# Patient Record
Sex: FEMALE | Race: WHITE | NOT HISPANIC OR LATINO | Employment: UNEMPLOYED | URBAN - METROPOLITAN AREA
[De-identification: names, ages, dates, MRNs, and addresses within clinical notes are randomized per-mention and may not be internally consistent; named-entity substitution may affect disease eponyms.]

---

## 2017-10-10 ENCOUNTER — APPOINTMENT (OUTPATIENT)
Dept: RADIOLOGY | Facility: MEDICAL CENTER | Age: 32
End: 2017-10-10
Attending: EMERGENCY MEDICINE
Payer: MEDICARE

## 2017-10-10 ENCOUNTER — HOSPITAL ENCOUNTER (EMERGENCY)
Facility: MEDICAL CENTER | Age: 32
End: 2017-10-12
Attending: EMERGENCY MEDICINE
Payer: MEDICARE

## 2017-10-10 DIAGNOSIS — R07.9 CHEST PAIN, UNSPECIFIED TYPE: ICD-10-CM

## 2017-10-10 DIAGNOSIS — N39.0 URINARY TRACT INFECTION WITH HEMATURIA, SITE UNSPECIFIED: ICD-10-CM

## 2017-10-10 DIAGNOSIS — F29 PSYCHOSIS, UNSPECIFIED PSYCHOSIS TYPE (HCC): ICD-10-CM

## 2017-10-10 DIAGNOSIS — R31.9 URINARY TRACT INFECTION WITH HEMATURIA, SITE UNSPECIFIED: ICD-10-CM

## 2017-10-10 LAB
ALBUMIN SERPL BCP-MCNC: 4.1 G/DL (ref 3.2–4.9)
ALBUMIN/GLOB SERPL: 1.6 G/DL
ALP SERPL-CCNC: 97 U/L (ref 30–99)
ALT SERPL-CCNC: 55 U/L (ref 2–50)
AMPHET UR QL SCN: NEGATIVE
ANION GAP SERPL CALC-SCNC: 11 MMOL/L (ref 0–11.9)
APPEARANCE UR: ABNORMAL
AST SERPL-CCNC: 32 U/L (ref 12–45)
BACTERIA #/AREA URNS HPF: ABNORMAL /HPF
BARBITURATES UR QL SCN: NEGATIVE
BASOPHILS # BLD AUTO: 0.2 % (ref 0–1.8)
BASOPHILS # BLD: 0.03 K/UL (ref 0–0.12)
BENZODIAZ UR QL SCN: NEGATIVE
BILIRUB SERPL-MCNC: 0.7 MG/DL (ref 0.1–1.5)
BILIRUB UR QL STRIP.AUTO: NEGATIVE
BUN SERPL-MCNC: 6 MG/DL (ref 8–22)
BZE UR QL SCN: NEGATIVE
CALCIUM SERPL-MCNC: 9.4 MG/DL (ref 8.5–10.5)
CANNABINOIDS UR QL SCN: NEGATIVE
CHLORIDE SERPL-SCNC: 108 MMOL/L (ref 96–112)
CO2 SERPL-SCNC: 20 MMOL/L (ref 20–33)
COLOR UR: YELLOW
CREAT SERPL-MCNC: 0.71 MG/DL (ref 0.5–1.4)
CULTURE IF INDICATED INDCX: YES UA CULTURE
EOSINOPHIL # BLD AUTO: 0.05 K/UL (ref 0–0.51)
EOSINOPHIL NFR BLD: 0.3 % (ref 0–6.9)
EPI CELLS #/AREA URNS HPF: ABNORMAL /HPF
ERYTHROCYTE [DISTWIDTH] IN BLOOD BY AUTOMATED COUNT: 43.7 FL (ref 35.9–50)
ETHANOL BLD-MCNC: 0 G/DL
GFR SERPL CREATININE-BSD FRML MDRD: >60 ML/MIN/1.73 M 2
GLOBULIN SER CALC-MCNC: 2.6 G/DL (ref 1.9–3.5)
GLUCOSE SERPL-MCNC: 123 MG/DL (ref 65–99)
GLUCOSE UR STRIP.AUTO-MCNC: NEGATIVE MG/DL
HCG SERPL QL: NEGATIVE
HCT VFR BLD AUTO: 42.1 % (ref 37–47)
HGB BLD-MCNC: 14.1 G/DL (ref 12–16)
IMM GRANULOCYTES # BLD AUTO: 0.05 K/UL (ref 0–0.11)
IMM GRANULOCYTES NFR BLD AUTO: 0.3 % (ref 0–0.9)
KETONES UR STRIP.AUTO-MCNC: 100 MG/DL
LEUKOCYTE ESTERASE UR QL STRIP.AUTO: ABNORMAL
LYMPHOCYTES # BLD AUTO: 1.9 K/UL (ref 1–4.8)
LYMPHOCYTES NFR BLD: 12.3 % (ref 22–41)
MCH RBC QN AUTO: 29.2 PG (ref 27–33)
MCHC RBC AUTO-ENTMCNC: 33.5 G/DL (ref 33.6–35)
MCV RBC AUTO: 87.2 FL (ref 81.4–97.8)
METHADONE UR QL SCN: NEGATIVE
MICRO URNS: ABNORMAL
MONOCYTES # BLD AUTO: 0.86 K/UL (ref 0–0.85)
MONOCYTES NFR BLD AUTO: 5.6 % (ref 0–13.4)
NEUTROPHILS # BLD AUTO: 12.57 K/UL (ref 2–7.15)
NEUTROPHILS NFR BLD: 81.3 % (ref 44–72)
NITRITE UR QL STRIP.AUTO: NEGATIVE
NRBC # BLD AUTO: 0 K/UL
NRBC BLD AUTO-RTO: 0 /100 WBC
OPIATES UR QL SCN: NEGATIVE
OXYCODONE UR QL SCN: NEGATIVE
PCP UR QL SCN: NEGATIVE
PH UR STRIP.AUTO: 5 [PH]
PLATELET # BLD AUTO: 370 K/UL (ref 164–446)
PMV BLD AUTO: 9.2 FL (ref 9–12.9)
POTASSIUM SERPL-SCNC: 3.7 MMOL/L (ref 3.6–5.5)
PROPOXYPH UR QL SCN: NEGATIVE
PROT SERPL-MCNC: 6.7 G/DL (ref 6–8.2)
PROT UR QL STRIP: 30 MG/DL
RBC # BLD AUTO: 4.83 M/UL (ref 4.2–5.4)
RBC # URNS HPF: ABNORMAL /HPF
RBC UR QL AUTO: ABNORMAL
SODIUM SERPL-SCNC: 139 MMOL/L (ref 135–145)
SP GR UR STRIP.AUTO: 1.03
TROPONIN I SERPL-MCNC: <0.01 NG/ML (ref 0–0.04)
UROBILINOGEN UR STRIP.AUTO-MCNC: 2 MG/DL
WBC # BLD AUTO: 15.5 K/UL (ref 4.8–10.8)
WBC #/AREA URNS HPF: ABNORMAL /HPF

## 2017-10-10 PROCEDURE — 96376 TX/PRO/DX INJ SAME DRUG ADON: CPT

## 2017-10-10 PROCEDURE — 80053 COMPREHEN METABOLIC PANEL: CPT

## 2017-10-10 PROCEDURE — 700111 HCHG RX REV CODE 636 W/ 250 OVERRIDE (IP): Performed by: EMERGENCY MEDICINE

## 2017-10-10 PROCEDURE — 700105 HCHG RX REV CODE 258: Performed by: EMERGENCY MEDICINE

## 2017-10-10 PROCEDURE — A9270 NON-COVERED ITEM OR SERVICE: HCPCS | Performed by: EMERGENCY MEDICINE

## 2017-10-10 PROCEDURE — 700102 HCHG RX REV CODE 250 W/ 637 OVERRIDE(OP): Performed by: EMERGENCY MEDICINE

## 2017-10-10 PROCEDURE — 81001 URINALYSIS AUTO W/SCOPE: CPT | Mod: 59

## 2017-10-10 PROCEDURE — 96374 THER/PROPH/DIAG INJ IV PUSH: CPT

## 2017-10-10 PROCEDURE — 90791 PSYCH DIAGNOSTIC EVALUATION: CPT

## 2017-10-10 PROCEDURE — 71010 DX-CHEST-PORTABLE (1 VIEW): CPT

## 2017-10-10 PROCEDURE — 84703 CHORIONIC GONADOTROPIN ASSAY: CPT

## 2017-10-10 PROCEDURE — 87086 URINE CULTURE/COLONY COUNT: CPT

## 2017-10-10 PROCEDURE — 85025 COMPLETE CBC W/AUTO DIFF WBC: CPT

## 2017-10-10 PROCEDURE — 84484 ASSAY OF TROPONIN QUANT: CPT

## 2017-10-10 PROCEDURE — 99285 EMERGENCY DEPT VISIT HI MDM: CPT

## 2017-10-10 PROCEDURE — 80307 DRUG TEST PRSMV CHEM ANLYZR: CPT

## 2017-10-10 RX ORDER — ONDANSETRON 2 MG/ML
4 INJECTION INTRAMUSCULAR; INTRAVENOUS ONCE
Status: COMPLETED | OUTPATIENT
Start: 2017-10-10 | End: 2017-10-10

## 2017-10-10 RX ORDER — SODIUM CHLORIDE 9 MG/ML
1000 INJECTION, SOLUTION INTRAVENOUS ONCE
Status: COMPLETED | OUTPATIENT
Start: 2017-10-10 | End: 2017-10-10

## 2017-10-10 RX ORDER — NITROFURANTOIN 25; 75 MG/1; MG/1
100 CAPSULE ORAL 2 TIMES DAILY WITH MEALS
Status: DISCONTINUED | OUTPATIENT
Start: 2017-10-10 | End: 2017-10-11

## 2017-10-10 RX ADMIN — NITROFURANTOIN (MONOHYDRATE/MACROCRYSTALS) 100 MG: 75; 25 CAPSULE ORAL at 17:30

## 2017-10-10 RX ADMIN — SODIUM CHLORIDE 1000 ML: 9 INJECTION, SOLUTION INTRAVENOUS at 15:36

## 2017-10-10 RX ADMIN — ONDANSETRON 4 MG: 2 INJECTION INTRAMUSCULAR; INTRAVENOUS at 20:28

## 2017-10-10 RX ADMIN — SODIUM CHLORIDE 1000 ML: 9 INJECTION, SOLUTION INTRAVENOUS at 20:27

## 2017-10-10 RX ADMIN — ONDANSETRON 4 MG: 2 INJECTION INTRAMUSCULAR; INTRAVENOUS at 15:36
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
## 2017-10-10 NOTE — ED NOTES
Pt requesting daughter be placed onto gurney. Mother has eyes closed and is not attending to daughter while daughter is crawling on gurney almost falling off. For child's safety [place] and held by CARMEN Lovell. Pediatric RN requested to bring dry clothes for child.     Social work at bedside. Princess SORIANO taking child over to pediatric room while mother is being treated in ED. Mother provided consent for this.

## 2017-10-11 LAB
BACTERIA UR CULT: NORMAL
SIGNIFICANT IND 70042: NORMAL
SITE SITE: NORMAL
SOURCE SOURCE: NORMAL

## 2017-10-11 PROCEDURE — 700102 HCHG RX REV CODE 250 W/ 637 OVERRIDE(OP): Performed by: EMERGENCY MEDICINE

## 2017-10-11 PROCEDURE — A9270 NON-COVERED ITEM OR SERVICE: HCPCS | Performed by: EMERGENCY MEDICINE

## 2017-10-11 RX ORDER — CEFDINIR 300 MG/1
300 CAPSULE ORAL EVERY 12 HOURS
Status: DISCONTINUED | OUTPATIENT
Start: 2017-10-11 | End: 2017-10-12 | Stop reason: HOSPADM

## 2017-10-11 RX ORDER — QUETIAPINE FUMARATE 25 MG/1
50 TABLET, FILM COATED ORAL 2 TIMES DAILY
Status: DISCONTINUED | OUTPATIENT
Start: 2017-10-11 | End: 2017-10-12 | Stop reason: HOSPADM

## 2017-10-11 RX ORDER — QUETIAPINE FUMARATE 100 MG/1
100 TABLET, FILM COATED ORAL EVERY EVENING
Status: DISCONTINUED | OUTPATIENT
Start: 2017-10-11 | End: 2017-10-12 | Stop reason: HOSPADM

## 2017-10-11 RX ADMIN — CEFDINIR 300 MG: 300 CAPSULE ORAL at 21:00

## 2017-10-11 RX ADMIN — CEFDINIR 300 MG: 300 CAPSULE ORAL at 11:06

## 2017-10-11 RX ADMIN — NITROFURANTOIN (MONOHYDRATE/MACROCRYSTALS) 100 MG: 75; 25 CAPSULE ORAL at 07:55

## 2017-10-11 NOTE — ED NOTES
Spoke to  Miladis,  CPS has been called. Per Miladis, CPS was also called yesterday when daughter was brought in for ED evaluation.

## 2017-10-11 NOTE — ED NOTES
Patient's home medications have been reviewed by the pharmacy team.     Past Medical History:   Diagnosis Date   • Bipolar affective (CMS-[facility])    • Depression    • Kidney infection    • Suicide attempt        Patient's Medications   New Prescriptions    No medications on file   Previous Medications    No medications on file   Modified Medications    No medications on file   Discontinued Medications    CEFDINIR (OMNICEF) 300 MG CAPS    Take 300 mg by mouth 2 times a day. 10 day course    LEVONORGESTREL-ETHINYL ESTRADIOL (LEVONEST) PER TABLET    Take 1 Tab by mouth every day.    MULTIVITAMIN (THERAGRAN) TABS    Take 1 Tab by mouth every day.    PHENAZOPYRIDINE (PYRIDIUM) 200 MG TAB    Take 1 Tab by mouth 3 times a day as needed.    TOPIRAMATE (TOPAMAX) 100 MG TABS    Take 100 mg by mouth 2 times a day.        Pt presented to the ED for psychiatric evaluation. PMH significant for schizoaffective disorder.  Patient denies taking any prescription or OTC medications; no dietary supplements or herbals.  Psychiatry is consulting on patient case; defer to Psych team for initiation of psychogenic therapies.     Of note, pt's UA on arrival was significant for packed WBCs, and pt with leukocytosis (15.5) and low-grade temperature (100.4). She was originally started on Macrobid for tx of UTI.  However, with systemic sx of infection such as elevated WBC count and temperature, Macrobid is less than sufficient for treatment. Discussed this with Dr. Wang, as well as my concern for possible STD infection given pt's hx. MD verbalized that he would re-assess patient, and either order additional tests or alter abx accordingly. Patient is now being treated for UTI with Omnicef 300 mg BID, as appropriate.     No further recommendations at this time.     Teresa Pierre, PharmD
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
## 2017-10-11 NOTE — DISCHARGE PLANNING
Medical Social Work    Referral: Legal Hold    Intervention: Legal Hold Paperwork given to SW by [facility] [employer] RN: Jeff (provided to AM SW)    Legal Hold Initiated: Date: 10/10/2017  Time: 1715    Legal Hold faxed: Date: 10/11/2017  Time: 0611    Patient’s Insurance Listed on Face Sheet: Medicare and Medicaid FFS    Referrals sent to: [facility], [facility]    Plan: Patient will transfer to mental health facility once acceptance is obtained.

## 2017-10-11 NOTE — ED NOTES
Pt. Resting in [facility] at this time. RR of 15. Sitter in front of pt. Will continue to monitor.

## 2017-10-11 NOTE — ED NOTES
Pt. Resting in gurney at this time. Visible rise and fall of pt's chest noted. Sitter in front of pt. Will continue to monitor.

## 2017-10-11 NOTE — ED NOTES
Pt. Resting in [facility] at this time. Pt. Has turned herself from side to side. Sitter in front of pt. Will continue to monitor.
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
## 2017-10-11 NOTE — ED NOTES
Med rec updated and complete.  Allergies reviewed but not verified.  Pt is not currently taking medications.

## 2017-10-11 NOTE — ED NOTES
ASSIST RN NOTE : Stroller, diaper bag, and car seat given to CPS for transport and care of baby.  Rosana and ED supervisor Divina powers.

## 2017-10-11 NOTE — ED PROVIDER NOTES
ED Provider Note Addendum    Scribed for Chauncey Wang M.D. by Lorna Ludwig on 10/11/2017 at 5:53 AM.     This is an addendum to the note on Dunia Sahni.  For further details and full chart information, see patient's initial note.       5:53 AM - I discussed the patient's case with Dr. Aponte ([facility]) who will transfer care of the patient to me at this time.      7:12 AM Patient is currently in the 17th hour of their visit to the ED. Patient has has normal saline, Zofran and Macrobid administered since in the ED. There were no overnight issues. Patient has history of bipolar depression. Her labs reveal high WBC of 15, indicating UTI. Diagnosed with psychosis and was placed on a legal hold. Vital signs were reviewed and have been normal this morning.     10:15- Patient evaluated by myself.  Patient is resting comfortably at this time with no complaints except for worry regarding nodules in her chest. I gave her a copy of her chest x-ray report which was normal. On chest exam she's concerned about rib profiles that she can feel. There is no evidence of chest wall mass. Patient also has a urinary tract infection without symptoms of bladder infection. She's had pyelonephritis before and denies fever or flank pain now. She's had a remote STD but denies discharge now and is not sexually active currently. He called the lab to confirm the urine culture is pending. I will switch her to Omnicef twice a day which would cover pyelonephritis better than Macrobid.    Disposition:  Patient will be transferred to an appropriate psychiatric facility in stable condition for further psychiatric care and evaluation.  Patient will be placed under the care of my partner awaiting transfer.    FINAL IMPRESSION  1. Chest pain, unspecified type    2. Psychosis, unspecified psychosis type    3. Urinary tract infection with hematuria, site unspecified         Lorna GARCIA (Scribe), [place] scribing for, and in the presence of, Chauncey SUE  ANUEL Wang.    Electronically signed by: Lorna Ludwig (Scribe), 10/11/2017    I, Chauncey Wang M.D. personally performed the services described in this documentation, as scribed by Lorna Ludwig in my presence, and it is both accurate and complete.    The note accurately reflects work and decisions made by me.  Chauncey Wang  10/11/2017  10:36 AM
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
## 2017-10-11 NOTE — ED NOTES
Pt. Resting in [facility] at this time. Sitter in front of pt. Will continue to monitor.
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
## 2017-10-11 NOTE — ED NOTES
Pt. Ambulated to bathroom with steady gait. Pt. Back resting in gurney at this time. Will continue to monitor.

## 2017-10-12 VITALS
DIASTOLIC BLOOD PRESSURE: 55 MMHG | BODY MASS INDEX: 36.02 KG/M2 | OXYGEN SATURATION: 97 % | SYSTOLIC BLOOD PRESSURE: 106 MMHG | WEIGHT: 230 LBS | HEART RATE: 65 BPM | TEMPERATURE: 98.1 F | RESPIRATION RATE: 16 BRPM

## 2017-10-12 PROCEDURE — 700102 HCHG RX REV CODE 250 W/ 637 OVERRIDE(OP): Performed by: EMERGENCY MEDICINE

## 2017-10-12 PROCEDURE — A9270 NON-COVERED ITEM OR SERVICE: HCPCS | Performed by: EMERGENCY MEDICINE

## 2017-10-12 RX ADMIN — CEFDINIR 300 MG: 300 CAPSULE ORAL at 10:04

## 2017-10-12 NOTE — DISCHARGE PLANNING
Medical Social Work    Referral: Legal hold Transfer to Mental Health Facility    Intervention: SW received call from Gene from [facility] stating that they have accepted the patient for admission.     Pt's accepting physcian is Dr. Fito STANLEY arranged for transportation to be set up through [facility]    The pt will be picked up at 1030.     JADEN notified the RN of the departure time as well as accepting facility.     JADEN created transfer packet and placed on chart.     Plan: Pt will transfer back to [facility] at 1030

## 2017-10-12 NOTE — ED NOTES
"[facility] here to transport pt to [place], pt refusing to go, taz barrera and alert RN in to speak w/ pt. Pt finally agreed to go \"against my will\". Pt transferred w/ Maloriesa  "

## 2017-10-12 NOTE — ED NOTES
Report received from Colt RN, assumed care. Pt resting, equal chest rise and fall. Pt is q15min observations

## 2017-10-12 NOTE — DISCHARGE PLANNING
"ALERT team note:  Patient has many complaints and expresses her annoyance easily.  \"I need to be on a special diet because I have lymph nodes in my chest\"....\"I have been very patient and kind, but I'm getting ready to call my \".  Staff asked how they could be helpful and patient said with a shower; staff stated they could assist her when security was on the floor.   ....               "

## 2017-10-12 NOTE — PSYCHIATRY
PSYCHIATRIC FOLLOW UP:    Reason for Admission: psychosis   Legal hold status:   On hold   Psychiatric Supervising Attendin:     Hailey     HPI:       33 y/o woman with a history of schizoaffective disorder. She does very well on medication and I have seen her stable enough to work. She has not liked meds in the past due to weight gain. In fact, she has gained considerable weight but reports she has been off medications for 2 years. Today she is quite psychotic, talking about her multiple royal bloodlines as a reason why she doesn't need to take medications, conspiracy theories about the [facility] and [facility] and [facility]. She has a 10 month old that was just removed from her care; he has a heart problem. She demanded to call a , which we allowed her to do (provided her with number to the 's office).  Unfortunately from yesterday's note it appears she was unable to care for her baby. Again, on medication, which she is refusing, she is very high functioning and has demonstrated the ability to work limited hours at least for a limited time. This is far from her baseline.       Psychiatric Examination: observed phenomenon:  Vitals:   Vitals:    10/11/17 1743 10/11/17 2131 10/12/17 0642 10/12/17 0914   BP: 103/62 119/75 117/67 120/80   Pulse: 69 71 92 85   Resp: 16 16 18 16   Temp: 36.3 °C (97.3 °F) 36.7 °C (98.1 °F) 36.4 °C (97.6 °F) 36.7 °C (98.1 °F)   TempSrc:  Temporal Temporal    SpO2: 96% 97% 96% 97%   Weight:           Musculoskeletal(abnormal movements, gait, etc): mild psychomotor agitation   Appearance: overweight, considerable weight gain since last encounter.   Thoughts:tangential, paranoid, delusional   Speech:pressured but normal rate   Mood:    Irritable   Affect:    comstricted  SI/HI:   Denies   Attention/Alertness:   Poor attention   Memory:      Orientation:      Fund of Knowledge:      Insight/Judgement into symptoms  Neurological Testing:( ie clock, cube drawing, MMSE,  "MOCA,etc.)    Medical systems reviewed: (pain, new complaint, etc)       Reporting \"bumps\" in her R shoulder/ chest area.   Requesting oxygen even though having no SOB and 02 sats are fine.   All other systems reviewed and are negative.       Lab results/tests:   Recent Results (from the past 48 hour(s))   CBC WITH DIFFERENTIAL    Collection Time: 10/10/17  3:35 PM   Result Value Ref Range    WBC 15.5 (H) 4.8 - 10.8 K/uL    RBC 4.83 4.20 - 5.40 M/uL    Hemoglobin 14.1 12.0 - 16.0 g/dL    Hematocrit 42.1 37.0 - 47.0 %    MCV 87.2 81.4 - 97.8 fL    MCH 29.2 27.0 - 33.0 pg    MCHC 33.5 (L) 33.6 - 35.0 g/dL    RDW 43.7 35.9 - 50.0 fL    Platelet Count 370 164 - 446 K/uL    MPV 9.2 9.0 - 12.9 fL    Neutrophils-Polys 81.30 (H) 44.00 - 72.00 %    Lymphocytes 12.30 (L) 22.00 - 41.00 %    Monocytes 5.60 0.00 - 13.40 %    Eosinophils 0.30 0.00 - 6.90 %    Basophils 0.20 0.00 - 1.80 %    Immature Granulocytes 0.30 0.00 - 0.90 %    Nucleated RBC 0.00 /100 WBC    Neutrophils (Absolute) 12.57 (H) 2.00 - 7.15 K/uL    Lymphs (Absolute) 1.90 1.00 - 4.80 K/uL    Monos (Absolute) 0.86 (H) 0.00 - 0.85 K/uL    Eos (Absolute) 0.05 0.00 - 0.51 K/uL    Baso (Absolute) 0.03 0.00 - 0.12 K/uL    Immature Granulocytes (abs) 0.05 0.00 - 0.11 K/uL    NRBC (Absolute) 0.00 K/uL   COMP METABOLIC PANEL    Collection Time: 10/10/17  3:35 PM   Result Value Ref Range    Sodium 139 135 - 145 mmol/L    Potassium 3.7 3.6 - 5.5 mmol/L    Chloride 108 96 - 112 mmol/L    Co2 20 20 - 33 mmol/L    Anion Gap 11.0 0.0 - 11.9    Glucose 123 (H) 65 - 99 mg/dL    Bun 6 (L) 8 - 22 mg/dL    Creatinine 0.71 0.50 - 1.40 mg/dL    Calcium 9.4 8.5 - 10.5 mg/dL    AST(SGOT) 32 12 - 45 U/L    ALT(SGPT) 55 (H) 2 - 50 U/L    Alkaline Phosphatase 97 30 - 99 U/L    Total Bilirubin 0.7 0.1 - 1.5 mg/dL    Albumin 4.1 3.2 - 4.9 g/dL    Total Protein 6.7 6.0 - 8.2 g/dL    Globulin 2.6 1.9 - 3.5 g/dL    A-G Ratio 1.6 g/dL   TROPONIN    Collection Time: 10/10/17  3:35 PM   Result " Value Ref Range    Troponin I <0.01 0.00 - 0.04 ng/mL   HCG QUAL SERUM    Collection Time: 10/10/17  3:35 PM   Result Value Ref Range    Beta-Hcg Qualitative Serum Negative Negative   DIAGNOSTIC ALCOHOL    Collection Time: 10/10/17  3:35 PM   Result Value Ref Range    Diagnostic Alcohol 0.00 0.00 g/dL   ESTIMATED GFR    Collection Time: 10/10/17  3:35 PM   Result Value Ref Range    GFR If African American >60 >60 mL/min/1.73 m 2    GFR If Non African American >60 >60 mL/min/1.73 m 2   TROPONIN    Collection Time: 10/10/17  3:35 PM   Result Value Ref Range    Troponin I <0.01 0.00 - 0.04 ng/mL   URINALYSIS CULTURE, IF INDICATED    Collection Time: 10/10/17  4:40 PM   Result Value Ref Range    Micro Urine Req Microscopic     Color Yellow     Character Cloudy (A)     Specific Gravity 1.030 <1.035    Ph 5.0 5.0 - 8.0    Glucose Negative Negative mg/dL    Ketones 100 (A) Negative mg/dL    Protein 30 (A) Negative mg/dL    Bilirubin Negative Negative    Urobilinogen, Urine 2.0 Negative    Nitrite Negative Negative    Leukocyte Esterase Large (A) Negative    Occult Blood Moderate (A) Negative    Culture Indicated Yes UA Culture   URINE DRUG SCREEN    Collection Time: 10/10/17  4:40 PM   Result Value Ref Range    Amphetamines Urine Negative Negative    Barbiturates Negative Negative    Benzodiazepines Negative Negative    Cocaine Metabolite Negative Negative    Methadone Negative Negative    Opiates Negative Negative    Oxycodone Negative Negative    Phencyclidine -Pcp Negative Negative    Propoxyphene Negative Negative    Cannabinoid Metab Negative Negative   URINE MICROSCOPIC (W/UA)    Collection Time: 10/10/17  4:40 PM   Result Value Ref Range    WBC Packed (A) /hpf    RBC 2-5 (A) /hpf    Bacteria Many (A) None /hpf    Epithelial Cells Few /hpf   URINE CULTURE(NEW)    Collection Time: 10/10/17  4:40 PM   Result Value Ref Range    Significant Indicator NEG     Source UR     Site      Urine Culture Mixed enteric katy  >100,000 cfu/mL    TROPONIN    Collection Time: 10/10/17 10:50 PM   Result Value Ref Range    Troponin I <0.01 0.00 - 0.04 ng/mL          Assessment:(acuity level)          Schizoaffective disorder     Plan:  legal hold:  Extended.     Pt refusing mediation.   Transferring to [facility] today.

## 2017-10-12 NOTE — DISCHARGE PLANNING
MSW received order from Dr. Bailey for extension. MSW faxed extension order, facsheet and legal hold to Kristy in Karon Ambrose's office. Fax confirmation received.

## 2017-10-12 NOTE — ED NOTES
"Pt refusing Seroquel \"due to self preservation, I don't need them\" she states     Also wants me to call her Zahra I told her I would call her Dunia the name in the chart   "

## 2017-10-12 NOTE — ED NOTES
"Pt refused seroquel, states she is not psychotic and does not need any medication for. Pt states her name as \"Princess Duque\"  "

## 2017-10-19 LAB
ALBUMIN SERPL BCP-MCNC: 4.1 G/DL (ref 3.2–4.9)
ALBUMIN/GLOB SERPL: 1.3 G/DL
ALP SERPL-CCNC: 134 U/L (ref 30–99)
ALT SERPL-CCNC: 33 U/L (ref 2–50)
ANION GAP SERPL CALC-SCNC: 13 MMOL/L (ref 0–11.9)
APTT PPP: 28.7 SEC (ref 24.7–36)
AST SERPL-CCNC: 20 U/L (ref 12–45)
BASOPHILS # BLD AUTO: 0.4 % (ref 0–1.8)
BASOPHILS # BLD: 0.05 K/UL (ref 0–0.12)
BILIRUB SERPL-MCNC: 0.4 MG/DL (ref 0.1–1.5)
BUN SERPL-MCNC: 5 MG/DL (ref 8–22)
CALCIUM SERPL-MCNC: 9.6 MG/DL (ref 8.5–10.5)
CHLORIDE SERPL-SCNC: 103 MMOL/L (ref 96–112)
CO2 SERPL-SCNC: 19 MMOL/L (ref 20–33)
CREAT SERPL-MCNC: 0.75 MG/DL (ref 0.5–1.4)
EKG IMPRESSION: NORMAL
EOSINOPHIL # BLD AUTO: 0.11 K/UL (ref 0–0.51)
EOSINOPHIL NFR BLD: 0.8 % (ref 0–6.9)
ERYTHROCYTE [DISTWIDTH] IN BLOOD BY AUTOMATED COUNT: 42.3 FL (ref 35.9–50)
GFR SERPL CREATININE-BSD FRML MDRD: >60 ML/MIN/1.73 M 2
GLOBULIN SER CALC-MCNC: 3.2 G/DL (ref 1.9–3.5)
GLUCOSE SERPL-MCNC: 112 MG/DL (ref 65–99)
HCT VFR BLD AUTO: 44.9 % (ref 37–47)
HGB BLD-MCNC: 14.6 G/DL (ref 12–16)
IMM GRANULOCYTES # BLD AUTO: 0.07 K/UL (ref 0–0.11)
IMM GRANULOCYTES NFR BLD AUTO: 0.5 % (ref 0–0.9)
INR PPP: 1.01 (ref 0.87–1.13)
LIPASE SERPL-CCNC: 7 U/L (ref 11–82)
LYMPHOCYTES # BLD AUTO: 3.01 K/UL (ref 1–4.8)
LYMPHOCYTES NFR BLD: 22.6 % (ref 22–41)
MCH RBC QN AUTO: 28.3 PG (ref 27–33)
MCHC RBC AUTO-ENTMCNC: 32.5 G/DL (ref 33.6–35)
MCV RBC AUTO: 87 FL (ref 81.4–97.8)
MONOCYTES # BLD AUTO: 0.76 K/UL (ref 0–0.85)
MONOCYTES NFR BLD AUTO: 5.7 % (ref 0–13.4)
NEUTROPHILS # BLD AUTO: 9.33 K/UL (ref 2–7.15)
NEUTROPHILS NFR BLD: 70 % (ref 44–72)
NRBC # BLD AUTO: 0 K/UL
NRBC BLD AUTO-RTO: 0 /100 WBC
PLATELET # BLD AUTO: 506 K/UL (ref 164–446)
PMV BLD AUTO: 9.2 FL (ref 9–12.9)
POTASSIUM SERPL-SCNC: 4.1 MMOL/L (ref 3.6–5.5)
PROT SERPL-MCNC: 7.3 G/DL (ref 6–8.2)
PROTHROMBIN TIME: 13.6 SEC (ref 12–14.6)
RBC # BLD AUTO: 5.16 M/UL (ref 4.2–5.4)
SODIUM SERPL-SCNC: 135 MMOL/L (ref 135–145)
WBC # BLD AUTO: 13.3 K/UL (ref 4.8–10.8)

## 2017-10-19 PROCEDURE — 85025 COMPLETE CBC W/AUTO DIFF WBC: CPT

## 2017-10-19 PROCEDURE — 93005 ELECTROCARDIOGRAM TRACING: CPT

## 2017-10-19 PROCEDURE — 85730 THROMBOPLASTIN TIME PARTIAL: CPT

## 2017-10-19 PROCEDURE — 85610 PROTHROMBIN TIME: CPT

## 2017-10-19 PROCEDURE — 99284 EMERGENCY DEPT VISIT MOD MDM: CPT

## 2017-10-19 PROCEDURE — 84484 ASSAY OF TROPONIN QUANT: CPT

## 2017-10-19 PROCEDURE — 83880 ASSAY OF NATRIURETIC PEPTIDE: CPT

## 2017-10-19 PROCEDURE — 93005 ELECTROCARDIOGRAM TRACING: CPT | Performed by: EMERGENCY MEDICINE

## 2017-10-19 PROCEDURE — 83690 ASSAY OF LIPASE: CPT

## 2017-10-19 PROCEDURE — 80053 COMPREHEN METABOLIC PANEL: CPT

## 2017-10-19 PROCEDURE — 36415 COLL VENOUS BLD VENIPUNCTURE: CPT

## 2017-10-19 ASSESSMENT — PAIN SCALES - GENERAL: PAINLEVEL_OUTOF10: 8

## 2017-10-20 ENCOUNTER — HOSPITAL ENCOUNTER (EMERGENCY)
Dept: HOSPITAL 8 - ED | Age: 32
Discharge: HOME | End: 2017-10-20
Payer: MEDICAID

## 2017-10-20 ENCOUNTER — HOSPITAL ENCOUNTER (EMERGENCY)
Facility: MEDICAL CENTER | Age: 32
End: 2017-10-20
Attending: EMERGENCY MEDICINE
Payer: MEDICARE

## 2017-10-20 ENCOUNTER — APPOINTMENT (OUTPATIENT)
Dept: RADIOLOGY | Facility: MEDICAL CENTER | Age: 32
End: 2017-10-20
Attending: EMERGENCY MEDICINE
Payer: MEDICARE

## 2017-10-20 VITALS
HEIGHT: 67 IN | BODY MASS INDEX: 42.7 KG/M2 | HEART RATE: 73 BPM | SYSTOLIC BLOOD PRESSURE: 126 MMHG | DIASTOLIC BLOOD PRESSURE: 72 MMHG | WEIGHT: 272.05 LBS | OXYGEN SATURATION: 94 % | RESPIRATION RATE: 16 BRPM | TEMPERATURE: 97.7 F

## 2017-10-20 VITALS — BODY MASS INDEX: 38.06 KG/M2 | WEIGHT: 242.51 LBS | HEIGHT: 67 IN

## 2017-10-20 VITALS — DIASTOLIC BLOOD PRESSURE: 84 MMHG | SYSTOLIC BLOOD PRESSURE: 134 MMHG

## 2017-10-20 DIAGNOSIS — N30.00: ICD-10-CM

## 2017-10-20 DIAGNOSIS — R07.89 CHEST WALL PAIN: ICD-10-CM

## 2017-10-20 DIAGNOSIS — R07.89: Primary | ICD-10-CM

## 2017-10-20 LAB
BNP SERPL-MCNC: <2 PG/ML (ref 0–100)
TROPONIN I SERPL-MCNC: <0.01 NG/ML (ref 0–0.04)

## 2017-10-20 PROCEDURE — 99285 EMERGENCY DEPT VISIT HI MDM: CPT

## 2017-10-20 PROCEDURE — 87086 URINE CULTURE/COLONY COUNT: CPT

## 2017-10-20 PROCEDURE — 81001 URINALYSIS AUTO W/SCOPE: CPT

## 2017-10-20 PROCEDURE — 71010 DX-CHEST-LIMITED (1 VIEW): CPT

## 2017-10-20 PROCEDURE — 93005 ELECTROCARDIOGRAM TRACING: CPT

## 2017-10-20 ASSESSMENT — ENCOUNTER SYMPTOMS
HEADACHES: 0
VOMITING: 0
CHILLS: 0
NAUSEA: 1
SHORTNESS OF BREATH: 1
FEVER: 0
ABDOMINAL PAIN: 0

## 2017-10-20 NOTE — DISCHARGE INSTRUCTIONS
Chest Wall Pain  Chest wall pain is pain felt in or around the chest bones and muscles. It may take up to 6 weeks to get better. It may take longer if you are active. Chest wall pain can happen on its own. Other times, things like germs, injury, coughing, or exercise can cause the pain.  HOME CARE   · Avoid activities that make you tired or cause pain. Try not to use your chest, belly (abdominal), or side muscles. Do not use heavy weights.  · Put ice on the sore area.  ¨ Put ice in a plastic bag.  ¨ Place a towel between your skin and the bag.  ¨ Leave the ice on for 15-20 minutes for the first 2 days.  · Only take medicine as told by your doctor.  GET HELP RIGHT AWAY IF:   · You have more pain or are very uncomfortable.  · You have a fever.  · Your chest pain gets worse.  · You have new problems.  · You feel sick to your stomach (nauseous) or throw up (vomit).  · You start to sweat or feel lightheaded.  · You have a cough with mucus (phlegm).  · You cough up blood.  MAKE SURE YOU:   · Understand these instructions.  · Will watch your condition.  · Will get help right away if you are not doing well or get worse.     This information is not intended to replace advice given to you by your health care provider. Make sure you discuss any questions you have with your health care provider.     Document Released: 06/05/2009 Document Revised: 03/11/2013 Document Reviewed: 03/14/2016  [employer] Interactive Patient Education ©2016 [employer]

## 2017-10-20 NOTE — ED NOTES
Dunia Sahni  32 y.o.  Chief Complaint   Patient presents with   • Chest Pain     constant x10 days non radiating chest pain.     Patient reports was seen in ED 10 days ago for same symptoms, reportedly diagnosed with PTSD.      EKG complete.      Explained wait time and triage process to pt. Pt placed back out in lobby, told to notify ED tech or triage RN of any changes, verbalized understanding.

## 2017-10-20 NOTE — ED NOTES
Pt wants a ride home rn notified pt that in our records she has Medicaid and can have a free ride home with [facility], rn notified pt that  can not provide another ride home unfortunately.

## 2017-10-20 NOTE — ED NOTES
Pt has palpable bumps under skin states if she eats a low fat diet it helps keep the bumps from getting larger. Also says she needs a specific room temp to prevent chest from hurting

## 2017-10-20 NOTE — ED PROVIDER NOTES
"ED Provider Note    Scribed for Cari Ray D.O. by Winter Niño. 10/20/2017, 4:07 AM.    Primary care provider: Pepito Michelle P.A.-C.  Means of arrival: EMS  History obtained from: Patient   History limited by: None    CHIEF COMPLAINT  Chief Complaint   Patient presents with   • Chest Pain     constant x10 days non radiating chest pain.       LINDEN Sahni is a 32 y.o. female who presents to the Emergency Department for constant chest pain that she describes as \"knots\" in her muscle localized to the right and left side of her chest, onset 10 days ago. Her pain is exacerbated to touch of the \"knots.\" She has no alleviating factors of her pain.  Patient is nauseous and has shortness of breath associated.  She denies any fever or vomiting. She has had similar symptoms before in 2013. Patient recently was diagnosed with PTSD. She denies history of heat disease, hypertension or diabetes. Patient does not smoke, use drugs, or drink alcohol.     REVIEW OF SYSTEMS  Review of Systems   Constitutional: Negative for chills and fever.   Respiratory: Positive for shortness of breath.    Cardiovascular: Positive for chest pain.   Gastrointestinal: Positive for nausea. Negative for abdominal pain and vomiting.   Genitourinary: Negative for dysuria.   Neurological: Negative for headaches.   All other systems reviewed and are negative.  C.    PAST MEDICAL HISTORY   has a past medical history of Abscess (10/12/2017); Bipolar affective (CMS-HCC); Depression; Kidney infection; and Suicide attempt.    SURGICAL HISTORY   has a past surgical history that includes c-sec only,prev c-sec and appendectomy.    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 (Registered Nurse) on 10/20/17 at 0336  Med List Status: <None>   Medication Last Dose Status        Patient Corwin Taking any Medications                       ALLERGIES  Allergies   Allergen Reactions   • Vicodin [Hydrocodone-Acetaminophen] Anaphylaxis     RXN=9 years ago   • Pcn [Penicillins] Nausea     RXN=8 years ago   • Bee Venom        PHYSICAL EXAM  VITAL SIGNS: /88   Pulse 81   Temp 36.5 °C (97.7 °F)   Resp 16   Ht 1.702 m (5' 7\")   Wt 123.4 kg (272 lb 0.8 oz)   SpO2 97%   BMI 42.61 kg/m²   Vitals reviewed.  Constitutional: Patient is oriented to person, place, and time. Appears well-developed and well-nourished. No distress.    Head: Normocephalic and atraumatic.   Ears: Normal external ears bilaterally.   Mouth/Throat: Oropharynx is clear and moist  Eyes: Conjunctivae are normal. Pupils are equal, round, and reactive to light.   Neck: Normal range of motion. Neck supple.  Cardiovascular: Normal rate, regular rhythm and normal heart sounds. Normal peripheral pulses.  Pulmonary/Chest: Effort normal and breath sounds normal. No respiratory distress, no wheezes, rhonchi, or rales.  Localized tender areas of her right and left upper chest wall.   Abdominal: Soft. Bowel sounds are normal. There is no tenderness, rebound or guarding, or peritoneal signs No CVA tenderness.  Musculoskeletal: No edema and no tenderness.   Lymphadenopathy: No cervical adenopathy.   Neurological: No focal deficits.   Skin: Skin is warm and dry. No erythema. No pallor. Healing abrasions to bilateral posterior neck.    Psychiatric: Patient has a normal mood and affect.     LABS  Results for orders placed or performed during the hospital encounter of 10/20/17   Troponin   Result Value Ref Range    Troponin I <0.01 0.00 - 0.04 ng/mL   Btype Natriuretic Peptide   Result Value Ref Range    B Natriuretic Peptide <2 0 - 100 pg/mL   CBC with Differential   Result Value Ref Range    WBC 13.3 (H) 4.8 - 10.8 " K/uL    RBC 5.16 4.20 - 5.40 M/uL    Hemoglobin 14.6 12.0 - 16.0 g/dL    Hematocrit 44.9 37.0 - 47.0 %    MCV 87.0 81.4 - 97.8 fL    MCH 28.3 27.0 - 33.0 pg    MCHC 32.5 (L) 33.6 - 35.0 g/dL    RDW 42.3 35.9 - 50.0 fL    Platelet Count 506 (H) 164 - 446 K/uL    MPV 9.2 9.0 - 12.9 fL    Neutrophils-Polys 70.00 44.00 - 72.00 %    Lymphocytes 22.60 22.00 - 41.00 %    Monocytes 5.70 0.00 - 13.40 %    Eosinophils 0.80 0.00 - 6.90 %    Basophils 0.40 0.00 - 1.80 %    Immature Granulocytes 0.50 0.00 - 0.90 %    Nucleated RBC 0.00 /100 WBC    Neutrophils (Absolute) 9.33 (H) 2.00 - 7.15 K/uL    Lymphs (Absolute) 3.01 1.00 - 4.80 K/uL    Monos (Absolute) 0.76 0.00 - 0.85 K/uL    Eos (Absolute) 0.11 0.00 - 0.51 K/uL    Baso (Absolute) 0.05 0.00 - 0.12 K/uL    Immature Granulocytes (abs) 0.07 0.00 - 0.11 K/uL    NRBC (Absolute) 0.00 K/uL   Complete Metabolic Panel (CMP)   Result Value Ref Range    Sodium 135 135 - 145 mmol/L    Potassium 4.1 3.6 - 5.5 mmol/L    Chloride 103 96 - 112 mmol/L    Co2 19 (L) 20 - 33 mmol/L    Anion Gap 13.0 (H) 0.0 - 11.9    Glucose 112 (H) 65 - 99 mg/dL    Bun 5 (L) 8 - 22 mg/dL    Creatinine 0.75 0.50 - 1.40 mg/dL    Calcium 9.6 8.5 - 10.5 mg/dL    AST(SGOT) 20 12 - 45 U/L    ALT(SGPT) 33 2 - 50 U/L    Alkaline Phosphatase 134 (H) 30 - 99 U/L    Total Bilirubin 0.4 0.1 - 1.5 mg/dL    Albumin 4.1 3.2 - 4.9 g/dL    Total Protein 7.3 6.0 - 8.2 g/dL    Globulin 3.2 1.9 - 3.5 g/dL    A-G Ratio 1.3 g/dL   Prothrombin Time   Result Value Ref Range    PT 13.6 12.0 - 14.6 sec    INR 1.01 0.87 - 1.13   APTT   Result Value Ref Range    APTT 28.7 24.7 - 36.0 sec   Lipase   Result Value Ref Range    Lipase 7 (L) 11 - 82 U/L   ESTIMATED GFR   Result Value Ref Range    GFR If African American >60 >60 mL/min/1.73 m 2    GFR If Non African American >60 >60 mL/min/1.73 m 2   EKG (ER)   Result Value Ref Range    Report       [facility]    Test Date:  2017-10-19  Pt Name:     "EVIE ENGEL           Department: ER  MRN:        [id number]                      Room:  Gender:     JAYSON                            Technician: [id number]  :        1985                   Requested By:ER TRIAGE PROTOCOL  Order #:    [id number]                    Reading MD:    Measurements  Intervals                                Axis  Rate:       92                           P:          -5  OR:         128                          QRS:        20  QRSD:       86                           T:          38  QT:         356  QTc:        441    Interpretive Statements  SINUS RHYTHM  Compared to ECG 2016 03:46:53  No significant changes       All labs reviewed by me.    EKG Interpretation  Interpreted by me    Rhythm: normal sinus   Rate: 92  Axis: normal  Ectopy: none  Conduction: normal  ST Segments: no acute change  T Waves: no acute change  Q Waves: none    Clinical Impression: no acute changes and normal EKG    RADIOLOGY  DX-CHEST-LIMITED (1 VIEW)   Final Result      Normal chest. No interval change.               INTERPRETING LOCATION: [address] [place] [address], [facility]        The radiologist's interpretation of all radiological studies have been reviewed by me.    COURSE & MEDICAL DECISION MAKING  Pertinent Labs & Imaging studies reviewed. (See chart for details)    Obtained and reviewed past medical records from 10/12/2017 for psychiactric hold for psychosis and has been off her meds for two years.     4:07 AM - Patient seen and examined at bedside. Patient presents with anterior upper, chest wall pain. Patient's concerned about subcutaneous \"knots\". She does have sore areas to this places. No overlying skin changes. It's unclear, whether these are perhaps lipomas. I doubt that they are abscesses. She does not have risk factors for coronary artery disease. Chest pain protocol was entered per protocol. Ordered estimated GFR, Troponin, BNP, CBC with differential, CMP, Prothrombin Time, APTT, Lipase, DX-chest, " EKG to evaluate her symptoms. The differential diagnoses include but are not limited to: lymphadenopathy, lipoma. I do not think that this is ACS.    5:34 AM Recheck: Patient re-evaluated at beside. Patient reports feeling improved. Discussed patient's condition and treatment plan. Patient's lab and radiology results discussed. Troponin was negative. Chest x-ray unrevealing. Labs were unrevealing. Again, I do not think that this is a presentation of ACS. Patient would like a ride home secondary to not having shoes. Patient was counseled to return to ED for any new or worsening symptoms. Patient understood and is in agreement for discharge.       The patient will return for new or worsening symptoms and is stable at the time of discharge.    The patient is referred to a primary physician for blood pressure management, diabetic screening, and for all other preventative health concerns.    DISPOSITION:  Patient will be discharged home in stable condition.    FOLLOW UP:  Pepito Michelle P.A.-C.  [address]  [address] [address]  [phone]    In 3 days      [facility], Emergency Dept  [address] [place] [id number] [phone]    If symptoms worsen      FINAL IMPRESSION  1. Chest wall pain          Winter GARCIA (Scribe), am scribing for, and in the presence of, Cari Ray D.O..    Electronically signed by: Winter Niño (Scribe), 10/20/2017    Cari GARCIA D.O. personally performed the services described in this documentation, as scribed by Winter Niño in my presence, and it is both accurate and complete.    The note accurately reflects work and decisions made by me.  Cari Ray  10/20/2017  12:28 PM

## 2017-10-20 NOTE — ED NOTES
rn educated pt on discharge instructions, gave pt a prescription for u/s of chest wall, pt ambulated to lobby steady gait

## 2017-10-20 NOTE — ED NOTES
Pt states she had an abscess on 10/17/17 and a recent UTI given macrobid but states they took her off of it because she developed a rash

## 2017-11-29 ENCOUNTER — HOSPITAL ENCOUNTER (EMERGENCY)
Dept: HOSPITAL 8 - ED | Age: 32
End: 2017-11-29
Payer: MEDICARE

## 2017-11-29 DIAGNOSIS — Z53.21: ICD-10-CM

## 2017-11-29 DIAGNOSIS — R21: Primary | ICD-10-CM

## 2017-11-29 LAB — EKG IMPRESSION: NORMAL

## 2017-11-29 PROCEDURE — 93005 ELECTROCARDIOGRAM TRACING: CPT | Performed by: EMERGENCY MEDICINE

## 2017-11-29 PROCEDURE — 99284 EMERGENCY DEPT VISIT MOD MDM: CPT

## 2017-11-29 PROCEDURE — 93005 ELECTROCARDIOGRAM TRACING: CPT

## 2017-11-30 ENCOUNTER — APPOINTMENT (OUTPATIENT)
Dept: RADIOLOGY | Facility: MEDICAL CENTER | Age: 32
End: 2017-11-30
Attending: EMERGENCY MEDICINE
Payer: MEDICARE

## 2017-11-30 ENCOUNTER — HOSPITAL ENCOUNTER (EMERGENCY)
Facility: MEDICAL CENTER | Age: 32
End: 2017-11-30
Attending: EMERGENCY MEDICINE
Payer: MEDICARE

## 2017-11-30 VITALS
HEART RATE: 67 BPM | HEIGHT: 67 IN | WEIGHT: 253.09 LBS | BODY MASS INDEX: 39.72 KG/M2 | SYSTOLIC BLOOD PRESSURE: 113 MMHG | OXYGEN SATURATION: 97 % | RESPIRATION RATE: 20 BRPM | DIASTOLIC BLOOD PRESSURE: 52 MMHG | TEMPERATURE: 98.1 F

## 2017-11-30 DIAGNOSIS — F29 PSYCHOSIS, UNSPECIFIED PSYCHOSIS TYPE (HCC): ICD-10-CM

## 2017-11-30 LAB
AMPHET UR QL SCN: NEGATIVE
ANION GAP SERPL CALC-SCNC: 8 MMOL/L (ref 0–11.9)
BARBITURATES UR QL SCN: NEGATIVE
BENZODIAZ UR QL SCN: NEGATIVE
BUN SERPL-MCNC: 6 MG/DL (ref 8–22)
BZE UR QL SCN: NEGATIVE
CALCIUM SERPL-MCNC: 9.4 MG/DL (ref 8.5–10.5)
CANNABINOIDS UR QL SCN: NEGATIVE
CHLORIDE SERPL-SCNC: 108 MMOL/L (ref 96–112)
CO2 SERPL-SCNC: 21 MMOL/L (ref 20–33)
CREAT SERPL-MCNC: 0.54 MG/DL (ref 0.5–1.4)
ETHANOL BLD-MCNC: 0 G/DL
GFR SERPL CREATININE-BSD FRML MDRD: >60 ML/MIN/1.73 M 2
GLUCOSE SERPL-MCNC: 100 MG/DL (ref 65–99)
HCG SERPL QL: NEGATIVE
METHADONE UR QL SCN: NEGATIVE
OPIATES UR QL SCN: NEGATIVE
OXYCODONE UR QL SCN: NEGATIVE
PCP UR QL SCN: NEGATIVE
POTASSIUM SERPL-SCNC: 3.4 MMOL/L (ref 3.6–5.5)
PROPOXYPH UR QL SCN: NEGATIVE
SODIUM SERPL-SCNC: 137 MMOL/L (ref 135–145)

## 2017-11-30 PROCEDURE — 80307 DRUG TEST PRSMV CHEM ANLYZR: CPT

## 2017-11-30 PROCEDURE — 80048 BASIC METABOLIC PNL TOTAL CA: CPT

## 2017-11-30 PROCEDURE — 84703 CHORIONIC GONADOTROPIN ASSAY: CPT

## 2017-11-30 PROCEDURE — 71020 DX-CHEST-2 VIEWS: CPT

## 2017-11-30 RX ORDER — QUETIAPINE FUMARATE 25 MG/1
50 TABLET, FILM COATED ORAL 2 TIMES DAILY
Status: DISCONTINUED | OUTPATIENT
Start: 2017-11-30 | End: 2017-11-30 | Stop reason: HOSPADM

## 2017-11-30 RX ORDER — PYRIDOXINE HCL (VITAMIN B6) 50 MG
50 TABLET ORAL DAILY
COMMUNITY
End: 2018-01-12

## 2017-11-30 ASSESSMENT — PAIN SCALES - GENERAL: PAINLEVEL_OUTOF10: 8

## 2017-11-30 NOTE — ED NOTES
Report received from CHRISTIE Camacho.    Patient from [facility] to [facility] via britt accompanied by ED RN.

## 2017-11-30 NOTE — ED NOTES
"Chief Complaint   Patient presents with   • Chest Pain   • Rash     right forearm     Pt ambulatory to triage, states that she has a hx of \"heart problems\" and that she has pain in her chest, states she has lymph nodes in her chest and hot/cold weather makes it worse, pt also complains of right forearm rash. Pt acing bizarre in triage taking about \"having a blood line and that I'm a  [facility]\". ekg completed. Pt ambulatory to [facility], educated to notify er tech for worsening symptoms.    Blood Pressure: 126/79, Pulse: 86, Respiration: 19, Temperature: 36.3 °C (97.3 °F), Height: 170.2 cm (5' 7\"), Weight: 114.8 kg (253 lb 1.4 oz), BMI (Calculated): 39.64, BSA (Calculated): 2.3, Pulse Oximetry: 97 %    "
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
## 2017-11-30 NOTE — ED NOTES
Patient sleeping on gurney in prone position. No agitation noted. Active chest rise and fall noted. Close observation maintained.

## 2017-11-30 NOTE — ED NOTES
Dunia Sahni is a 32 y.o. female presenting with some vague chest symptoms and psychosis.     Patient's home medications have been reviewed by the pharmacy team.     Past Medical History:   Diagnosis Date   • Abscess 10/12/2017    right AC arm area   • Bipolar affective (CMS-HCC)    • Depression    • Kidney infection    • Suicide attempt        Patient's Medications   New Prescriptions    No medications on file   Previous Medications    PRENATAL VIT-FE FUMARATE-FA (PRENATAL PO)    Take 1 Tab by mouth every day.    PYRIDOXINE (VITAMIN B-6) 50 MG TAB    Take 50 mg by mouth every day. Takes for nausea   Modified Medications    No medications on file   Discontinued Medications    No medications on file          A:  Medications do not appear to be contributing to current complaints.         P:    No recommendations at this time.     Amy Ervin, PharmD, BCPS

## 2017-11-30 NOTE — DISCHARGE PLANNING
S) I am pregnant  With royal blood lines.  O) patient pressured, insistently inserting delusional statements rather than answering questions.  A) hypomanic, delusional  P) Psych MD continuing to assess and hold for placement.

## 2017-11-30 NOTE — ED NOTES
"Patient stating \"please put in my chart that I'm going to this place against my will\" Patient requesting to walk out of ER to ambulance.   "

## 2017-11-30 NOTE — ED NOTES
"Patient refusing AM seroquel, \"my name is Princess Duque, I'm not taking that.\" Attempted to educate patient, continues to refuse. \"even if people are insane, they won't take that because it will kill their kids\" VSS.   "

## 2017-11-30 NOTE — ED NOTES
Patient resting on gurney in supine position. No acute distress. No agitation noted at this time. Water provided to patient per her request.

## 2017-11-30 NOTE — ED PROVIDER NOTES
"ED PROVIDER NOTE    Scribed for Robert Holt M.D. by Kimberlee Monaco. 11/30/2017, 9:02 AM.    This is an addendum to the note on Dunia Sahni. For further details and full chart entry, see the previously signed ED Provider Note written by Dr. Leija ([facility]).      10:55 AM  Patient reevaluated at bedside. She is resting comfortably and has no complaints at this time. Her last set of vitals were: /65   Pulse 74   Temp 36.7 °C (98.1 °F)   Resp 20   Ht 1.702 m (5' 7\")   Wt 114.8 kg (253 lb 1.4 oz)   SpO2 98%   BMI 39.64 kg/m²     IKimberlee (Scribe), am scribing for, and in the presence of, Robert Holt M.D..  Electronically signed by: Kimberlee Monaco ([facility]), 11/30/2017  IRobert M.D. personally performed the services described in this documentation, as scribed by Kimberlee Monaco in my presence, and it is both accurate and complete.    The note accurately reflects work and decisions made by me.  Robert Holt  11/30/2017  11:16 AM        "

## 2017-11-30 NOTE — ED NOTES
"Patient ambulated to bathroom with supervision, requesting breakfast for \"me and my baby\". Patient preoccupied about her heart rate, \"I need caffeine to make my heart rate go faster because I'm pregnant.\" VSS. Patient continues to display ideas of grandiosity- requesting for writer to contact the [facility] because it is a \"matter of national security.\" Linens changed in bed and given water. Sitting in bed rambling to self.   "

## 2017-11-30 NOTE — ED NOTES
Assumed care of patient. Patient complains of lymph nodes on her chest that are painful from cold/heat. Patient claims that we can get her blood and verify that she has [place] wen blood line in her blood. Patient claims that she has a rash on right forearm for unknown amount of time. Patient alert and oriented x 4. Patient denies any other complaints at this time. Patient side rails up x 2 and call bell within reach.

## 2017-11-30 NOTE — DISCHARGE PLANNING
Medical Social Work  SW approached by  ERP   regarding placing Pt on a Legal Hold.  Dr. Leija states Pt was here in October with an infant child who was taken by CPS. Dr. Lejia would like to verify baby is not back with mother as she is now in ED on a Legal Hold    SW contacted CPS spoke to Lola Jeong who states records shows baby is still with Biological Father.    JADEN updated ERP Dr. Leija.

## 2017-11-30 NOTE — ED PROVIDER NOTES
ED Provider Note    ER PROVIDER NOTE    Scribed for Sam Leija M.D.  by Sam Leija. 11/30/2017 at 1:27 AM.    Primary Care Provider: Pepito Michelle P.A.-C.  Means of Arrival: pt  History obtained from: pt   History limited by: none     CHIEF COMPLAINT  Chief Complaint   Patient presents with   • Chest Pain   • Rash     right forearm       HPI  Dunia Sahni is a 32 y.o. female who presents to the emergency department complaining ofColdness in her chest. Patient is quite rambling, states she is a wen in [facility] and has a heart condition involving her lymph nodes that react when she is exposed to cold or heat. Regarding her medical complaints, She denies any actual chest pain, no difficulty breathing, no leg pain or swelling. She's had no cough, no fevers and no other symptoms. She also does report that she might be pregnant she does not know. No abdominal pain or vaginal bleeding or urinary symptoms    Patient is continually talking about her royal blood, stating that she will need to check with the [facility] to see she can have any testing done    REVIEW OF SYSTEMS  Pertinent positives include chest coldness. Pertinent negatives include no fever. See HPI for details. All other systems reviewed and are negative.    PAST MEDICAL HISTORY   has a past medical history of Abscess (10/12/2017); Bipolar affective (CMS-HCC); Depression; Kidney infection; and Suicide attempt.    SURGICAL HISTORY   has a past surgical history that includes c-sec only,prev c-sec and appendectomy.    FAMILY HISTORY  History reviewed. No pertinent family history.    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 (Registered Nurse) on 11/30/17 at 0154  Med List Status: Not Addressed   Medication Last Dose Status        Patient Corwin Taking any Medications                       ALLERGIES  Allergies   Allergen Reactions   • Vicodin [Hydrocodone-Acetaminophen] Anaphylaxis     RXN=9 years ago   • Pcn [Penicillins] Nausea     RXN=8 years ago   • Bee Venom        PHYSICAL EXAM  VITAL SIGNS: /58   Pulse 70   Temp 36.3 °C (97.3 °F)   Resp 16   Ht 1.702 m (5' 7\")   Wt 114.8 kg (253 lb 1.4 oz)   SpO2 97%   BMI 39.64 kg/m²   Pulse ox interpretation: I interpret this pulse ox as normal.    Constitutional: Alert , Anxious appearing  HENT: No signs of trauma, Bilateral external ears normal, Nose normal.   Eyes: Pupils are equal and reactive, Conjunctiva normal, Non-icteric.   Neck: Normal range of motion, No tenderness, Supple, No stridor.   Lymphatic: No lymphadenopathy noted.   Cardiovascular: Regular rate and rhythm, no murmurs.   Thorax & Lungs: Normal breath sounds, No respiratory distress, No wheezing, No chest tenderness.   Abdomen: Bowel sounds normal, Soft, No tenderness, No masses, No pulsatile masses. No peritoneal signs.  Skin: Warm, Dry, No erythema, No rash.   Back: No bony tenderness, No CVA tenderness.   Extremities: Intact distal pulses, No edema, No tenderness, No cyanosis, Musculoskeletal: Good range of motion in all major joints. No tenderness to palpation or major deformities noted.   Neurologic: Alert , Normal motor function, Normal sensory function, No focal deficits noted.   Psychiatric: Rapid and pressured speech, somewhat tangential    DIAGNOSTIC STUDIES / PROCEDURES        LABS  Labs Reviewed   BASIC METABOLIC PANEL - Abnormal; Notable for the following:        Result Value    Potassium 3.4 (*)  "    Glucose 100 (*)     Bun 6 (*)     All other components within normal limits   HCG QUAL SERUM   DIAGNOSTIC ALCOHOL   ESTIMATED GFR   URINE DRUG SCREEN       All labs reviewed by me.    RADIOLOGY  DX-CHEST-2 VIEWS   Final Result      No radiographic evidence of acute cardiopulmonary process.        The radiologist's interpretation of all radiological studies have been reviewed by me.    COURSE & MEDICAL DECISION MAKING  Nursing notes, VS, PMSFHx reviewed in chart.    1:27 AM Patient seen and examined at bedside. . Ordered forBlood work, x-ray to evaluate her symptoms. Life skills to evaluate    Legal hold initiated    Decision Making:  This is a 32 y.o. female presenting with some vague chest symptoms. Patient does have a quite rambling presentation, with ideas of grandiosity and does appear decompensated from her baseline either bipolar or schizoaffective. She does not appear to have a good grasp on reality this point and I do not think she is safe on her own. Regarding her chest symptoms I do detect no emergent medical condition, she does have a infant to has a cardiac history and this may actually be related to this.  In reviewing her past records, she was started on Seroquel and transferred to [place] she is not taking any medications currently, I will reinstitute her Seroquel, additionally I did have social work contact  to ensure that her infant child was safe    Care be signed out to Dr. Holt pending transfer to inpatient psychiatric facility        FINAL IMPRESSION  1. Psychosis, unspecified psychosis type         The note accurately reflects work and decisions made by me.  Sam Leija  11/30/2017  4:01 AM

## 2017-11-30 NOTE — CONSULTS
"[facility]   TRIAGE ASSESSMENT    Name: Dunia Sahni  MRN: [id number]  : 1985  Age: 32 y.o.  Date of assessment: 2017  PCP: Pepito Michelle P.A.-C.  Persons in attendance: Patient    CHIEF COMPLAINT/PRESENTING ISSUE (as stated by patient): 32 year old female patient admitted to ER in a very delusional and pressured/paranoid state. Feels she is from a royal blood line and staff must receive permission from the [facility] before treating her. Complained of lymph nodes heating up and soon to catch fire. Soon after my assessment began patient stated \"who are you again?\"  Then stated \"nevermind you need to leave.\"  Patient then rolled over and placed covers over her head.  Stated, \"I refuse to speak with you further.  You may be dismissed.\" This patient has a history of bipolar disorder with bouts of psychosis with inpatient treatment. (this patient presents too paranoid to answer some questions on the assessment.  Some information was gained through reviewing past admissions into the ER, as well as, information gained through providing ERP).  Chief Complaint   Patient presents with   • Chest Pain   • Rash     right forearm        CURRENT LIVING SITUATION/SOCIAL SUPPORT: According to previous admission patient has caregiver; however, patient refused to answer question when this writer asked.    BEHAVIORAL HEALTH TREATMENT HISTORY  Does patient/parent report a history of prior behavioral health treatment for patient?   Per previous admission notes, patient has history inpatient psychiatric treatment at [facility].    SAFETY ASSESSMENT - SELF  Does patient acknowledge current or past symptoms of dangerousness to self? no  Does parent/significant other report patient has current or past symptoms of dangerousness to self? Patient admitted into ER on 7/7/15 for SI  Does presenting problem suggest symptoms of dangerousness to self? No    SAFETY ASSESSMENT - OTHERS  Does patient acknowledge " "current or past symptoms of aggressive behavior or risk to others? no  Does parent/significant other report patient has current or past symptoms of aggressive behavior or risk to others?  no  Does presenting problem suggest symptoms of dangerousness to others? No    Crisis Safety Plan completed and copy given to patient? no    ABUSE/NEGLECT SCREENING  Does patient report feeling “unsafe” in his/her home, or afraid of anyone?  no  Does patient report any history of physical, sexual, or emotional abuse?  no  Does parent or significant other report any of the above? no  Is there evidence of neglect by self?  no  Is there evidence of neglect by a caregiver? no  Does the patient/parent report any history of CPS/APS/police involvement related to suspected abuse/neglect or domestic violence? no  Based on the information provided during the current assessment, is a mandated report of suspected abuse/neglect being made?  No    SUBSTANCE USE SCREENING  Yes:  Chauncey all substances used in the past 30 days:       Last Use Amount   []   Alcohol     []   Marijuana     []   Heroin     []   Prescription Opioids  (used without prescription, for    recreation, or in excess of prescribed amount)     []   Other Prescription  (used without prescription, for    recreation, or in excess of prescribed amount)     []   Cocaine      []   Methamphetamine     []   \"\" drugs (ectasy, MDMA)     []   Other substances        UDS results: Pending  Breathalyzer results: 0.00    What consequences does the patient associate with any of the above substance use and or addictive behaviors? None    Risk factors for detox (check all that apply):  []  Seizures   []  Diaphoretic (sweating)   []  Tremors   []  Hallucinations   []  Increased blood pressure   []  Decreased blood pressure   []  Other   [x]  None      [] Patient education on risk factors for detoxification and instructed to return to ER as needed. N/A      MENTAL STATUS   Participation: " Verbally monopolizing  Grooming: Disheveled  Orientation: Evidence of delusions present  Behavior: Agitated  Eye contact: Poor  Mood: Irritable  Affect: Angry  Thought process: Tangential, Flight of Ideas  Thought content: Evidence of delusion and Paranoia  Speech: Pressured and Hypertalkative  Perception: grandiose delusional  Memory:  No gross evidence of memory deficits  Insight: Poor  Judgment:  Poor  Other:    Collateral information :   Source:  [] Significant other present in person:   [] Significant other by telephone  [] [facility]   [x] [facility] Nursing Staff  [x] [facility]  [x] Other: ERP,   [] Unable to complete full assessment due to:  [] Acute intoxication  [x] Patient declined to participate/engage  [] Patient verbally unresponsive  [] Significant cognitive deficits  [x] Significant perceptual distortions or behavioral disorganization  [] Other:      CLINICAL IMPRESSIONS:  Primary:  Acute psychosis  Secondary: Anxiety/depression (hx of Bipolar disorder)       IDENTIFIED NEEDS/PLAN:  [Trigger DISPOSITION list for any items marked]    []  Imminent safety risk - self [] Imminent safety risk - others   []  Acute substance withdrawal [x]  Psychosis/Impaired reality testing   [x]  Mood/anxiety []  Substance use/Addictive behavior   [x]  Maladaptive behaviro []  Parent/child conflict   []  Family/Couples conflict []  Biomedical   []  Housing [x]  Financial   []   Legal  Occupational/Educational   []  Domestic violence []  Other:     Disposition: Actively being addressed by Legal [facility], [facility], [facility] and [facility]    Does patient express agreement with the above plan? No (patient acutely psychotic)  Referral appointment(s) scheduled? no    Alert team only:   I have discussed findings and recommendations with Dr. Leija who is in agreement with these recommendations.     This 32 year old female presented to ER in a very psychotic state.  Placed on  legal hold and will be transferred to inpatient psychiatric treatment.    Referral information sent to the following community providers : [facility]    If applicable : Referred  to : Lee[facility] for legal hold follow up at 0300      Clive Francisco R.N.  11/30/2017

## 2017-11-30 NOTE — DISCHARGE PLANNING
Medical Social Work    Referral: Legal Hold    Intervention: Legal Hold Paperwork given to SW by Life Skills RN: Clive Francisco    Legal Hold Initiated: Date: 11-  Time: 0200    Legal Hold faxed: Date: 11-  Time: 0700    Patient’s Insurance Listed on Face Sheet: Medicare/Medicaid FFS    Referrals sent to: [facility], [facility], [facility]    Plan: Patient will transfer to mental health facility once acceptance is obtained.

## 2017-11-30 NOTE — DISCHARGE PLANNING
Medical Social Work    Referral: Legal hold Transfer to Mental Health Facility    Intervention: SW received call from Georgi at [facility] stating that they have accepted the patient for admission.     Pt's accepting physcian is Dr. Fito STANLEY arranged for transportation to be set up through [facility]    The pt will be picked up at 1030.     JADEN notified the RN of the departure time as well as accepting facility.     JADEN created transfer packet and placed on chart.     Plan: Pt will transfer back to [facility] at 1030

## 2017-12-25 ENCOUNTER — HOSPITAL ENCOUNTER (EMERGENCY)
Facility: MEDICAL CENTER | Age: 32
End: 2017-12-25
Attending: EMERGENCY MEDICINE
Payer: MEDICARE

## 2017-12-25 VITALS
SYSTOLIC BLOOD PRESSURE: 129 MMHG | WEIGHT: 246.69 LBS | TEMPERATURE: 97.6 F | RESPIRATION RATE: 18 BRPM | HEIGHT: 67 IN | HEART RATE: 73 BPM | DIASTOLIC BLOOD PRESSURE: 79 MMHG | OXYGEN SATURATION: 94 % | BODY MASS INDEX: 38.72 KG/M2

## 2017-12-25 DIAGNOSIS — T78.40XA ALLERGIC REACTION, INITIAL ENCOUNTER: ICD-10-CM

## 2017-12-25 PROCEDURE — 99284 EMERGENCY DEPT VISIT MOD MDM: CPT

## 2017-12-25 PROCEDURE — 700102 HCHG RX REV CODE 250 W/ 637 OVERRIDE(OP): Performed by: EMERGENCY MEDICINE

## 2017-12-25 PROCEDURE — A9270 NON-COVERED ITEM OR SERVICE: HCPCS | Performed by: EMERGENCY MEDICINE

## 2017-12-25 RX ORDER — DEXAMETHASONE 4 MG/1
10 TABLET ORAL ONCE
Status: COMPLETED | OUTPATIENT
Start: 2017-12-25 | End: 2017-12-25

## 2017-12-25 RX ADMIN — DEXAMETHASONE 10 MG: 4 TABLET ORAL at 22:21

## 2017-12-25 ASSESSMENT — PAIN SCALES - GENERAL: PAINLEVEL_OUTOF10: 0

## 2017-12-26 NOTE — ED PROVIDER NOTES
"ED Provider Note    Scribed for Francisco Copeland M.D. by Edi Kirkpatrick. 12/25/2017, 10:04 PM.    Primary care provider: Pepito Michelle P.A.-C.  Means of arrival: Ambulance  History obtained from: Patient  History limited by: None     CHIEF COMPLAINT  Chief Complaint   Patient presents with   • Allergic Reaction     Pt states she was given haldol Dec. 23rd and stated she developed a rash 24 hrs after administration. Pt states tonight at 1900 she took haldol PO. Pt states, \"after about 6 minutes I felt itchy and my throat and lips were swollen.\" Pt states she received 100mg benadryl total.     HPI  Dunia Sahni is a 32 y.o. female who presents to the Emergency Department with allergic reaction. The patient, who is currently residing at [facility], started Haldol medication two days ago on December 23rd. She reports that approximately 24 hours following her Haldol.  She notes the physician at [facility] has discontinued the medication however she still received tonight's dose, at 7:30 PM. She notes the rash itches and occasionally burns. She denies any nausea, vomiting, shortness of breath, tongue swelling. He does note some itching of the throat. The patient received 100 mg of diphenhydramine prior to arrival. She states she otherwise feels well and would like to be discharged promptly.    REVIEW OF SYSTEMS  See HPI    PAST MEDICAL HISTORY   has a past medical history of Abscess (10/12/2017); Bipolar affective (CMS-HCC); Depression; Kidney infection; and Suicide attempt.    SURGICAL HISTORY   has a past surgical history that includes c-sec only,prev c-sec and appendectomy.    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     CURRENT MEDICATIONS  Reviewed.  See Encounter Summary.     ALLERGIES  Allergies   Allergen Reactions " "  • Vicodin [Hydrocodone-Acetaminophen] Anaphylaxis     RXN=9 years ago   • Pcn [Penicillins] Nausea     RXN=8 years ago   • Bee Venom    • Blackberry [Rubus Fruticosus] Rash     \"rash\"   • Haldol [Haloperidol] Rash     Pt states, \"I have a rash and my lips were swollen.\"     PHYSICAL EXAM  VITAL SIGNS: /79   Pulse 72   Temp 36.4 °C (97.5 °F)   Resp 18   Ht 1.702 m (5' 7\")   Wt 111.9 kg (246 lb 11.1 oz)   LMP 12/04/2017 (Within Days)   SpO2 95%   BMI 38.64 kg/m²   Constitutional: Obese, Awake, alert in no apparent distress.  HENT: Normocephalic, Bilateral external ears normal. Nose normal. Oropharyngeal normal, no swelling.   Eyes: Conjunctiva normal, non-icteric, EOMI.  Thorax & Lungs: Easy unlabored respirations, Clear to ascultation bilaterally. No wheezing.   Cardiovascular: Regular rate, Regular rhythm, No murmurs, rubs or gallops.   Abdomen:  Soft, nontender, no masses   Skin: Lacy erythema in the bilateral thighs, small amount of erythema in the lower abdomen. No cassidy hives. There are tiny papules on the right hand.  Extremities:   No cyanosis, clubbing or edema.  Neurologic: Alert, Grossly non-focal.   Psychiatric: Normal affect, Normal mood    DIAGNOSTIC STUDIES / PROCEDURES       RADIOLOGY  No orders to display     The radiologist's interpretation of all radiological studies have been reviewed by me.    COURSE & MEDICAL DECISION MAKING  Pertinent Labs & Imaging studies reviewed. (See chart for details)        10:04 PM - Patient seen and examined at bedside. Patient will be treated with10 mg of Decadron.     Decision Making:  This is a 32 y.o. year old female who presents with a pruritic rash on the bilateral thighs and lower abdomen since starting Haldol. The lesions are not raised at this time and do not appear to be hives, this is after 100 mg of diphenhydramine so I suspect the patient did have some hives prior to arrival that are resolving. She does not have any signs of anaphylaxis at " this time, there is no indication for epinephrine. The lesions on the hand are more consistent with dyshidrotic eczema.  The patient is discharged with a single dose of Decadron which should cover for any biphasic reaction. I strongly encouraged they find a different antipsychotic.    The patient's blood pressure is elevated today. >120/80. I have referred them to primary care for follow up.           FINAL IMPRESSION  1. Allergic reaction, initial encounter          Edi GARCIA (Scribe), am scribing for, and in the presence of, Francisco Copeland M.D..    Electronically signed by: Edi Kirkpatrick (Scribe), 12/25/2017    IFrancisco M.D. personally performed the services described in this documentation, as scribed by Edi Kirkpatrick in my presence, and it is both accurate and complete.    The note accurately reflects work and decisions made by me.  Francisco Copeland  12/25/2017  10:19 PM

## 2017-12-26 NOTE — ED NOTES
Reviewed discharge instructions, pt verbalized understanding of instructions. States she will schedule follow-up appointment. Denies further questions at this time. Pt ambulatory out of ER with stable gait with sit[facility].

## 2017-12-26 NOTE — DISCHARGE INSTRUCTIONS
Anaphylactic Reaction    STOP TAKING HALDOL!    An anaphylactic reaction is a sudden, severe allergic reaction that involves the whole body. It can be life threatening. A hospital stay is often required. People with asthma, eczema, or hay fever are slightly more likely to have an anaphylactic reaction.  CAUSES   An anaphylactic reaction may be caused by anything to which you are allergic. After being exposed to the allergic substance, your immune system becomes sensitized to it. When you are exposed to that allergic substance again, an allergic reaction can occur. Common causes of an anaphylactic reaction include:  · Medicines.  · Foods, especially peanuts, wheat, shellfish, milk, and eggs.  · Insect bites or stings.  · Blood products.  · Chemicals, such as dyes, latex, and contrast material used for imaging tests.  SYMPTOMS   When an allergic reaction occurs, the body releases histamine and other substances. These substances cause symptoms such as tightening of the airway. Symptoms often develop within seconds or minutes of exposure. Symptoms may include:  · Skin rash or hives.  · Itching.  · Chest tightness.  · Swelling of the eyes, tongue, or lips.  · Trouble breathing or swallowing.  · Lightheadedness or fainting.  · Anxiety or confusion.  · Stomach pains, vomiting, or diarrhea.  · Nasal congestion.  · A fast or irregular heartbeat (palpitations).  DIAGNOSIS   Diagnosis is based on your history of recent exposure to allergic substances, your symptoms, and a physical exam. Your caregiver may also perform blood or urine tests to confirm the diagnosis.  TREATMENT   Epinephrine medicine is the main treatment for an anaphylactic reaction. Other medicines that may be used for treatment include antihistamines, steroids, and albuterol. In severe cases, fluids and medicine to support blood pressure may be given through an intravenous line (IV). Even if you improve after treatment, you need to be observed to make sure  your condition does not get worse. This may require a stay in the hospital.  HOME CARE INSTRUCTIONS   · Wear a medical alert bracelet or necklace stating your allergy.  · You and your family must learn how to use an anaphylaxis kit or give an epinephrine injection to temporarily treat an emergency allergic reaction. Always carry your epinephrine injection or anaphylaxis kit with you. This can be lifesaving if you have a severe reaction.  · Do not drive or perform tasks after treatment until the medicines used to treat your reaction have worn off, or until your caregiver says it is okay.  · If you have hives or a rash:  ¨ Take medicines as directed by your caregiver.  ¨ You may use an over-the-counter antihistamine (diphenhydramine) as needed.  ¨ Apply cold compresses to the skin or take baths in cool water. Avoid hot baths or showers.  SEEK MEDICAL CARE IF:   · You develop symptoms of an allergic reaction to a new substance. Symptoms may start right away or minutes later.  · You develop a rash, hives, or itching.  · You develop new symptoms.  SEEK IMMEDIATE MEDICAL CARE IF:   · You have swelling of the mouth, difficulty breathing, or wheezing.  · You have a tight feeling in your chest or throat.  · You develop hives, swelling, or itching all over your body.  · You develop severe vomiting or diarrhea.  · You feel faint or pass out.  This is an emergency. Use your epinephrine injection or anaphylaxis kit as you have been instructed. Call your local emergency services (911 in [place] Even if you improve after the injection, you need to be examined at a hospital emergency department.  MAKE SURE YOU:   · Understand these instructions.  · Will watch your condition.  · Will get help right away if you are not doing well or get worse.     This information is not intended to replace advice given to you by your health care provider. Make sure you discuss any questions you have with your health care provider.     Document  Released: 12/18/2006 Document Revised: 12/23/2014 Document Reviewed: 03/20/2013  Elsevier Interactive Patient Education ©2016 [employer] Inc[employer]

## 2017-12-26 NOTE — DISCHARGE PLANNING
Medical Social Work    Referral: Legal hold Transfer to Mental Health Facility    Intervention: SW received a copy of pt's legal hold and extension.  JADEN spoke with Arlet at [facility] stating that Dr. Payton has accepted the patient for admission.     JADEN arranged for transportation to be set up through [facility] with [facility].    JADEN spoke with Lorene with [facility] who states that pt is not currently eligible for transport.    The pt will be picked up at 2345.     JADEN notified the RN of the departure time as well as accepting facility.     JADEN created transfer packet and placed on chart. Copy of legal hold placed in packet; as [facility] has original.     Plan: Pt will transfer to [facility] at 2345.

## 2017-12-26 NOTE — DISCHARGE PLANNING
Medical Social Work    MSW received a call from bedside RN that pt is ready to return to [facility] on a legal hold.  MSW reviewed pt's chart and spoke with [facility] sitter; pt's legal hold did not come to [facility] with pt.  MSW contacted Charge RN Arlet at [facility] and requested pt's legal hold be faxed to this worker for transport to be arranged.  Arlet was advised that pt's legal hold needs to stay with pt.  Arlet will accept pt back; receiving physician Dr. Payton.      Plan: SW to arrange transport back to [facility] once pt's legal hold is faxed.

## 2017-12-26 NOTE — ED NOTES
Pt medicated per MAR. SW contacted for transport arrangement back to [facility]. Pt VSS. Denies needs at this time. Sitter remains at bedside.

## 2017-12-26 NOTE — ED NOTES
"Dunia Sahni  Pt bib EMS. Pt changed into gown and placed on monitor.     Chief Complaint   Patient presents with   • Allergic Reaction     Pt states she was given haldol Dec. 23rd and stated she developed a rash 24 hrs after administration. Pt states tonight at 1900 she took haldol PO. Pt states, \"after about 6 minutes I felt itchy and my throat and lips were swollen.\" Pt states she received 100mg benadryl total.     Pt transferred from [facility] for medical evaluation of s/s listed above. [facility] tech from [facility] is with pt and at bedside for escort to and from facility.   VSS currently stable. No distress noted.  Chart up and ready for ERP now.     "

## 2018-01-12 ENCOUNTER — HOSPITAL ENCOUNTER (EMERGENCY)
Facility: MEDICAL CENTER | Age: 33
End: 2018-01-12
Attending: EMERGENCY MEDICINE
Payer: MEDICARE

## 2018-01-12 VITALS
SYSTOLIC BLOOD PRESSURE: 151 MMHG | TEMPERATURE: 97.6 F | DIASTOLIC BLOOD PRESSURE: 99 MMHG | HEART RATE: 77 BPM | WEIGHT: 239.42 LBS | RESPIRATION RATE: 14 BRPM | OXYGEN SATURATION: 95 % | HEIGHT: 67 IN | BODY MASS INDEX: 37.58 KG/M2

## 2018-01-12 DIAGNOSIS — B34.9 VIRAL ILLNESS: ICD-10-CM

## 2018-01-12 LAB
APPEARANCE UR: ABNORMAL
COLOR UR AUTO: YELLOW
EKG IMPRESSION: NORMAL
GLUCOSE UR QL STRIP.AUTO: NEGATIVE MG/DL
HCG UR QL: NEGATIVE
KETONES UR QL STRIP.AUTO: NEGATIVE MG/DL
LEUKOCYTE ESTERASE UR QL STRIP.AUTO: NEGATIVE
NITRITE UR QL STRIP.AUTO: NEGATIVE
PH UR STRIP.AUTO: 6 [PH]
PROT UR QL STRIP: NEGATIVE MG/DL
RBC UR QL AUTO: NEGATIVE
SP GR UR: 1.01

## 2018-01-12 PROCEDURE — 99284 EMERGENCY DEPT VISIT MOD MDM: CPT

## 2018-01-12 PROCEDURE — 93005 ELECTROCARDIOGRAM TRACING: CPT | Performed by: EMERGENCY MEDICINE

## 2018-01-12 PROCEDURE — 93005 ELECTROCARDIOGRAM TRACING: CPT

## 2018-01-12 PROCEDURE — 81002 URINALYSIS NONAUTO W/O SCOPE: CPT

## 2018-01-12 PROCEDURE — 81025 URINE PREGNANCY TEST: CPT

## 2018-01-12 RX ORDER — IBUPROFEN 800 MG/1
800 TABLET ORAL EVERY 8 HOURS PRN
Qty: 15 TAB | Refills: 3 | Status: SHIPPED
Start: 2018-01-12 | End: 2018-03-21

## 2018-01-12 ASSESSMENT — PAIN SCALES - GENERAL: PAINLEVEL_OUTOF10: 7
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
## 2018-01-13 NOTE — ED NOTES
Pt ambulated back to room 66 without assistance and without difficulty, pt also carried multiple bags back to the room with her, without difficulty or assistance.  Pt changing into gown, warm blanket given.

## 2018-01-13 NOTE — ED NOTES
"Pt ambulated from lobby to room. Pt presents to ED for c/o weakness and a \"possible kidney infection\". Pt reports \"hot and cold chills and fatigued\". Pt denies any dysuria or hesitancy but states she has had a kidney infection in the past.   "

## 2018-01-24 ENCOUNTER — HOSPITAL ENCOUNTER (EMERGENCY)
Dept: HOSPITAL 8 - ED | Age: 33
Discharge: HOME | End: 2018-01-24
Payer: MEDICARE

## 2018-01-24 VITALS — SYSTOLIC BLOOD PRESSURE: 115 MMHG | DIASTOLIC BLOOD PRESSURE: 66 MMHG

## 2018-01-24 VITALS — HEIGHT: 67 IN | BODY MASS INDEX: 31.14 KG/M2 | WEIGHT: 198.42 LBS

## 2018-01-24 DIAGNOSIS — J00: Primary | ICD-10-CM

## 2018-01-24 PROCEDURE — 87400 INFLUENZA A/B EACH AG IA: CPT

## 2018-01-24 PROCEDURE — 93005 ELECTROCARDIOGRAM TRACING: CPT

## 2018-01-24 PROCEDURE — 99285 EMERGENCY DEPT VISIT HI MDM: CPT

## 2018-01-24 PROCEDURE — 71045 X-RAY EXAM CHEST 1 VIEW: CPT

## 2018-02-22 ENCOUNTER — HOSPITAL ENCOUNTER (OUTPATIENT)
Dept: HOSPITAL 8 - ED | Age: 33
Setting detail: OBSERVATION
LOS: 7 days | Discharge: HOME | End: 2018-03-01
Attending: FAMILY MEDICINE | Admitting: FAMILY MEDICINE
Payer: MEDICARE

## 2018-02-22 VITALS — BODY MASS INDEX: 35.64 KG/M2 | WEIGHT: 227.08 LBS | HEIGHT: 67 IN

## 2018-02-22 DIAGNOSIS — Z87.442: ICD-10-CM

## 2018-02-22 DIAGNOSIS — F32.9: ICD-10-CM

## 2018-02-22 DIAGNOSIS — Z86.718: ICD-10-CM

## 2018-02-22 DIAGNOSIS — Z88.0: ICD-10-CM

## 2018-02-22 DIAGNOSIS — R45.850: Primary | ICD-10-CM

## 2018-02-22 DIAGNOSIS — N39.0: ICD-10-CM

## 2018-02-22 DIAGNOSIS — F23: ICD-10-CM

## 2018-02-22 DIAGNOSIS — R13.10: ICD-10-CM

## 2018-02-22 DIAGNOSIS — Z88.8: ICD-10-CM

## 2018-02-22 DIAGNOSIS — Z88.6: ICD-10-CM

## 2018-02-22 DIAGNOSIS — B85.2: ICD-10-CM

## 2018-02-22 DIAGNOSIS — F41.1: ICD-10-CM

## 2018-02-22 DIAGNOSIS — J02.0: ICD-10-CM

## 2018-02-22 LAB
ALBUMIN SERPL-MCNC: 3.4 G/DL (ref 3.4–5)
ALP SERPL-CCNC: 112 U/L (ref 45–117)
ALT SERPL-CCNC: 17 U/L (ref 12–78)
ANION GAP SERPL CALC-SCNC: 9 MMOL/L (ref 5–15)
APAP SERPL-MCNC: < 2 MCG/ML (ref 10–30)
BASOPHILS # BLD AUTO: 0.04 X10^3/UL (ref 0–0.1)
BASOPHILS NFR BLD AUTO: 0 % (ref 0–1)
BILIRUB SERPL-MCNC: 0.4 MG/DL (ref 0.2–1)
CALCIUM SERPL-MCNC: 8.5 MG/DL (ref 8.5–10.1)
CHLORIDE SERPL-SCNC: 109 MMOL/L (ref 98–107)
CREAT SERPL-MCNC: 0.73 MG/DL (ref 0.55–1.02)
CULTURE INDICATED?: YES
EOSINOPHIL # BLD AUTO: 0.23 X10^3/UL (ref 0–0.4)
EOSINOPHIL NFR BLD AUTO: 2 % (ref 1–7)
ERYTHROCYTE [DISTWIDTH] IN BLOOD BY AUTOMATED COUNT: 14 % (ref 9.6–15.2)
HCG UR SG: 1.01 (ref 1–1.03)
LYMPHOCYTES # BLD AUTO: 1.39 X10^3/UL (ref 1–3.4)
LYMPHOCYTES NFR BLD AUTO: 10 % (ref 22–44)
MCH RBC QN AUTO: 30.2 PG (ref 27–34.8)
MCHC RBC AUTO-ENTMCNC: 33.8 G/DL (ref 32.4–35.8)
MCV RBC AUTO: 89.4 FL (ref 80–100)
MD: NO
MICROSCOPIC: (no result)
MONOCYTES # BLD AUTO: 0.73 X10^3/UL (ref 0.2–0.8)
MONOCYTES NFR BLD AUTO: 5 % (ref 2–9)
NEUTROPHILS # BLD AUTO: 11.22 X10^3/UL (ref 1.8–6.8)
NEUTROPHILS NFR BLD AUTO: 83 % (ref 42–75)
PLATELET # BLD AUTO: 358 X10^3/UL (ref 130–400)
PMV BLD AUTO: 7.6 FL (ref 7.4–10.4)
PROT SERPL-MCNC: 6.8 G/DL (ref 6.4–8.2)
RBC # BLD AUTO: 4.5 X10^6/UL (ref 3.82–5.3)
VANCOMYCIN TROUGH SERPL-MCNC: < 1.7 MG/DL (ref 2.8–20)

## 2018-02-22 PROCEDURE — 80329 ANALGESICS NON-OPIOID 1 OR 2: CPT

## 2018-02-22 PROCEDURE — G0378 HOSPITAL OBSERVATION PER HR: HCPCS

## 2018-02-22 PROCEDURE — 81001 URINALYSIS AUTO W/SCOPE: CPT

## 2018-02-22 PROCEDURE — 36415 COLL VENOUS BLD VENIPUNCTURE: CPT

## 2018-02-22 PROCEDURE — 80307 DRUG TEST PRSMV CHEM ANLYZR: CPT

## 2018-02-22 PROCEDURE — 84703 CHORIONIC GONADOTROPIN ASSAY: CPT

## 2018-02-22 PROCEDURE — 87081 CULTURE SCREEN ONLY: CPT

## 2018-02-22 PROCEDURE — 80053 COMPREHEN METABOLIC PANEL: CPT

## 2018-02-22 PROCEDURE — G0480 DRUG TEST DEF 1-7 CLASSES: HCPCS

## 2018-02-22 PROCEDURE — 87880 STREP A ASSAY W/OPTIC: CPT

## 2018-02-22 PROCEDURE — 87086 URINE CULTURE/COLONY COUNT: CPT

## 2018-02-22 PROCEDURE — 87147 CULTURE TYPE IMMUNOLOGIC: CPT

## 2018-02-22 PROCEDURE — 85025 COMPLETE CBC W/AUTO DIFF WBC: CPT

## 2018-02-22 PROCEDURE — 81025 URINE PREGNANCY TEST: CPT

## 2018-02-22 PROCEDURE — 99285 EMERGENCY DEPT VISIT HI MDM: CPT

## 2018-02-23 VITALS — SYSTOLIC BLOOD PRESSURE: 119 MMHG | DIASTOLIC BLOOD PRESSURE: 84 MMHG

## 2018-02-23 LAB
BASOPHILS # BLD AUTO: 0.08 X10^3/UL (ref 0–0.1)
BASOPHILS NFR BLD AUTO: 1 % (ref 0–1)
EOSINOPHIL # BLD AUTO: 0.22 X10^3/UL (ref 0–0.4)
EOSINOPHIL NFR BLD AUTO: 2 % (ref 1–7)
ERYTHROCYTE [DISTWIDTH] IN BLOOD BY AUTOMATED COUNT: 14 % (ref 9.6–15.2)
LYMPHOCYTES # BLD AUTO: 2.07 X10^3/UL (ref 1–3.4)
LYMPHOCYTES NFR BLD AUTO: 15 % (ref 22–44)
MCH RBC QN AUTO: 30.5 PG (ref 27–34.8)
MCHC RBC AUTO-ENTMCNC: 33.8 G/DL (ref 32.4–35.8)
MCV RBC AUTO: 90.2 FL (ref 80–100)
MD: NO
MONOCYTES # BLD AUTO: 0.73 X10^3/UL (ref 0.2–0.8)
MONOCYTES NFR BLD AUTO: 5 % (ref 2–9)
NEUTROPHILS # BLD AUTO: 11.22 X10^3/UL (ref 1.8–6.8)
NEUTROPHILS NFR BLD AUTO: 78 % (ref 42–75)
PLATELET # BLD AUTO: 359 X10^3/UL (ref 130–400)
PMV BLD AUTO: 7.6 FL (ref 7.4–10.4)
RBC # BLD AUTO: 4.64 X10^6/UL (ref 3.82–5.3)

## 2018-02-23 RX ADMIN — CIPROFLOXACIN HYDROCHLORIDE SCH MG: 500 TABLET, FILM COATED ORAL at 00:57

## 2018-02-23 RX ADMIN — CIPROFLOXACIN HYDROCHLORIDE SCH MG: 500 TABLET, FILM COATED ORAL at 21:10

## 2018-02-23 RX ADMIN — CIPROFLOXACIN HYDROCHLORIDE SCH MG: 500 TABLET, FILM COATED ORAL at 08:33

## 2018-02-24 VITALS — SYSTOLIC BLOOD PRESSURE: 89 MMHG | DIASTOLIC BLOOD PRESSURE: 62 MMHG

## 2018-02-24 VITALS — SYSTOLIC BLOOD PRESSURE: 123 MMHG | DIASTOLIC BLOOD PRESSURE: 74 MMHG

## 2018-02-24 RX ADMIN — CEFDINIR SCH MG: 300 CAPSULE ORAL at 20:40

## 2018-02-24 RX ADMIN — CIPROFLOXACIN HYDROCHLORIDE SCH MG: 500 TABLET, FILM COATED ORAL at 10:24

## 2018-02-24 RX ADMIN — PERMETHRIN SCH APPLIC: 50 CREAM TOPICAL at 19:36

## 2018-02-25 VITALS — SYSTOLIC BLOOD PRESSURE: 132 MMHG | DIASTOLIC BLOOD PRESSURE: 84 MMHG

## 2018-02-25 VITALS — SYSTOLIC BLOOD PRESSURE: 123 MMHG | DIASTOLIC BLOOD PRESSURE: 78 MMHG

## 2018-02-25 RX ADMIN — CEFDINIR SCH MG: 300 CAPSULE ORAL at 09:20

## 2018-02-25 RX ADMIN — CEFDINIR SCH MG: 300 CAPSULE ORAL at 21:00

## 2018-02-26 VITALS — DIASTOLIC BLOOD PRESSURE: 79 MMHG | SYSTOLIC BLOOD PRESSURE: 116 MMHG

## 2018-02-26 VITALS — DIASTOLIC BLOOD PRESSURE: 89 MMHG | SYSTOLIC BLOOD PRESSURE: 127 MMHG

## 2018-02-26 RX ADMIN — CEFDINIR SCH MG: 300 CAPSULE ORAL at 08:57

## 2018-02-26 RX ADMIN — CEFDINIR SCH MG: 300 CAPSULE ORAL at 20:31

## 2018-02-27 VITALS — SYSTOLIC BLOOD PRESSURE: 112 MMHG | DIASTOLIC BLOOD PRESSURE: 82 MMHG

## 2018-02-27 VITALS — DIASTOLIC BLOOD PRESSURE: 73 MMHG | SYSTOLIC BLOOD PRESSURE: 112 MMHG

## 2018-02-27 RX ADMIN — CEFDINIR SCH MG: 300 CAPSULE ORAL at 08:18

## 2018-02-27 RX ADMIN — CEFDINIR SCH MG: 300 CAPSULE ORAL at 20:09

## 2018-02-28 VITALS — DIASTOLIC BLOOD PRESSURE: 76 MMHG | SYSTOLIC BLOOD PRESSURE: 111 MMHG

## 2018-02-28 VITALS — SYSTOLIC BLOOD PRESSURE: 115 MMHG | DIASTOLIC BLOOD PRESSURE: 77 MMHG

## 2018-02-28 LAB
BASOPHILS # BLD AUTO: 0.06 X10^3/UL (ref 0–0.1)
BASOPHILS NFR BLD AUTO: 1 % (ref 0–1)
EOSINOPHIL # BLD AUTO: 0.17 X10^3/UL (ref 0–0.4)
EOSINOPHIL NFR BLD AUTO: 1 % (ref 1–7)
ERYTHROCYTE [DISTWIDTH] IN BLOOD BY AUTOMATED COUNT: 14 % (ref 9.6–15.2)
LYMPHOCYTES # BLD AUTO: 2.58 X10^3/UL (ref 1–3.4)
LYMPHOCYTES NFR BLD AUTO: 20 % (ref 22–44)
MCH RBC QN AUTO: 30.1 PG (ref 27–34.8)
MCHC RBC AUTO-ENTMCNC: 33.9 G/DL (ref 32.4–35.8)
MCV RBC AUTO: 88.9 FL (ref 80–100)
MD: NO
MONOCYTES # BLD AUTO: 0.63 X10^3/UL (ref 0.2–0.8)
MONOCYTES NFR BLD AUTO: 5 % (ref 2–9)
NEUTROPHILS # BLD AUTO: 9.7 X10^3/UL (ref 1.8–6.8)
NEUTROPHILS NFR BLD AUTO: 74 % (ref 42–75)
PLATELET # BLD AUTO: 384 X10^3/UL (ref 130–400)
PMV BLD AUTO: 7.3 FL (ref 7.4–10.4)
RBC # BLD AUTO: 4.54 X10^6/UL (ref 3.82–5.3)

## 2018-02-28 RX ADMIN — CEFDINIR SCH MG: 300 CAPSULE ORAL at 08:16

## 2018-02-28 RX ADMIN — CEFDINIR SCH MG: 300 CAPSULE ORAL at 21:00

## 2018-03-01 VITALS — DIASTOLIC BLOOD PRESSURE: 78 MMHG | SYSTOLIC BLOOD PRESSURE: 118 MMHG

## 2018-03-01 RX ADMIN — PERMETHRIN SCH APPLIC: 50 CREAM TOPICAL at 09:22

## 2018-03-01 RX ADMIN — CEFDINIR SCH MG: 300 CAPSULE ORAL at 09:21

## 2018-03-16 ENCOUNTER — HOSPITAL ENCOUNTER (EMERGENCY)
Dept: HOSPITAL 8 - ED | Age: 33
End: 2018-03-16
Payer: MEDICAID

## 2018-03-16 VITALS — DIASTOLIC BLOOD PRESSURE: 72 MMHG | SYSTOLIC BLOOD PRESSURE: 124 MMHG

## 2018-03-16 VITALS — BODY MASS INDEX: 32.24 KG/M2 | WEIGHT: 200.62 LBS | HEIGHT: 66 IN

## 2018-03-16 DIAGNOSIS — F32.9: ICD-10-CM

## 2018-03-16 DIAGNOSIS — Z72.89: ICD-10-CM

## 2018-03-16 DIAGNOSIS — R05: Primary | ICD-10-CM

## 2018-03-16 DIAGNOSIS — Z88.8: ICD-10-CM

## 2018-03-16 DIAGNOSIS — Z86.718: ICD-10-CM

## 2018-03-16 DIAGNOSIS — F41.9: ICD-10-CM

## 2018-03-16 DIAGNOSIS — F43.12: ICD-10-CM

## 2018-03-16 DIAGNOSIS — Z88.0: ICD-10-CM

## 2018-03-16 LAB
ALBUMIN SERPL-MCNC: 3.4 G/DL (ref 3.4–5)
ANION GAP SERPL CALC-SCNC: 7 MMOL/L (ref 5–15)
BASOPHILS # BLD AUTO: 0.11 X10^3/UL (ref 0–0.1)
BASOPHILS NFR BLD AUTO: 1 % (ref 0–1)
CALCIUM SERPL-MCNC: 8.5 MG/DL (ref 8.5–10.1)
CHLORIDE SERPL-SCNC: 109 MMOL/L (ref 98–107)
CREAT SERPL-MCNC: 0.62 MG/DL (ref 0.55–1.02)
EOSINOPHIL # BLD AUTO: 0.13 X10^3/UL (ref 0–0.4)
EOSINOPHIL NFR BLD AUTO: 1 % (ref 1–7)
ERYTHROCYTE [DISTWIDTH] IN BLOOD BY AUTOMATED COUNT: 13.5 % (ref 9.6–15.2)
LYMPHOCYTES # BLD AUTO: 2.67 X10^3/UL (ref 1–3.4)
LYMPHOCYTES NFR BLD AUTO: 21 % (ref 22–44)
MCH RBC QN AUTO: 30.7 PG (ref 27–34.8)
MCHC RBC AUTO-ENTMCNC: 33.7 G/DL (ref 32.4–35.8)
MCV RBC AUTO: 91.1 FL (ref 80–100)
MD: NO
MONOCYTES # BLD AUTO: 0.76 X10^3/UL (ref 0.2–0.8)
MONOCYTES NFR BLD AUTO: 6 % (ref 2–9)
NEUTROPHILS # BLD AUTO: 8.96 X10^3/UL (ref 1.8–6.8)
NEUTROPHILS NFR BLD AUTO: 71 % (ref 42–75)
PLATELET # BLD AUTO: 406 X10^3/UL (ref 130–400)
PMV BLD AUTO: 7.5 FL (ref 7.4–10.4)
RBC # BLD AUTO: 3.99 X10^6/UL (ref 3.82–5.3)

## 2018-03-16 PROCEDURE — 82040 ASSAY OF SERUM ALBUMIN: CPT

## 2018-03-16 PROCEDURE — 71046 X-RAY EXAM CHEST 2 VIEWS: CPT

## 2018-03-16 PROCEDURE — 99285 EMERGENCY DEPT VISIT HI MDM: CPT

## 2018-03-16 PROCEDURE — 36415 COLL VENOUS BLD VENIPUNCTURE: CPT

## 2018-03-16 PROCEDURE — 51702 INSERT TEMP BLADDER CATH: CPT

## 2018-03-16 PROCEDURE — 80048 BASIC METABOLIC PNL TOTAL CA: CPT

## 2018-03-16 PROCEDURE — 85025 COMPLETE CBC W/AUTO DIFF WBC: CPT

## 2018-03-20 ENCOUNTER — HOSPITAL ENCOUNTER (EMERGENCY)
Dept: HOSPITAL 8 - ED | Age: 33
Discharge: HOME | End: 2018-03-20
Payer: MEDICARE

## 2018-03-20 VITALS — DIASTOLIC BLOOD PRESSURE: 85 MMHG | SYSTOLIC BLOOD PRESSURE: 144 MMHG
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
## 2018-03-20 DIAGNOSIS — Z88.0: ICD-10-CM

## 2018-03-20 DIAGNOSIS — F32.9: ICD-10-CM

## 2018-03-20 DIAGNOSIS — F43.10: ICD-10-CM
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
## 2018-03-20 DIAGNOSIS — Z88.8: ICD-10-CM

## 2018-03-20 DIAGNOSIS — Z90.49: ICD-10-CM

## 2018-03-20 DIAGNOSIS — Z59.0: ICD-10-CM

## 2018-03-20 DIAGNOSIS — S39.012A: Primary | ICD-10-CM

## 2018-03-20 PROCEDURE — 99284 EMERGENCY DEPT VISIT MOD MDM: CPT

## 2018-03-20 PROCEDURE — 87400 INFLUENZA A/B EACH AG IA: CPT

## 2018-03-20 SDOH — ECONOMIC STABILITY - HOUSING INSECURITY: HOMELESSNESS: Z59.0

## 2018-03-21 ENCOUNTER — HOSPITAL ENCOUNTER (EMERGENCY)
Facility: MEDICAL CENTER | Age: 33
End: 2018-03-21
Attending: EMERGENCY MEDICINE
Payer: MEDICARE

## 2018-03-21 VITALS
BODY MASS INDEX: 37.95 KG/M2 | TEMPERATURE: 98.8 F | OXYGEN SATURATION: 96 % | HEART RATE: 97 BPM | SYSTOLIC BLOOD PRESSURE: 103 MMHG | WEIGHT: 236.11 LBS | HEIGHT: 66 IN | DIASTOLIC BLOOD PRESSURE: 61 MMHG | RESPIRATION RATE: 16 BRPM

## 2018-03-21 DIAGNOSIS — R11.10 VOMITING AND DIARRHEA: ICD-10-CM

## 2018-03-21 DIAGNOSIS — R19.7 VOMITING AND DIARRHEA: ICD-10-CM

## 2018-03-21 LAB
ALBUMIN SERPL BCP-MCNC: 3.9 G/DL (ref 3.2–4.9)
ALBUMIN/GLOB SERPL: 1.6 G/DL
ALP SERPL-CCNC: 89 U/L (ref 30–99)
ALT SERPL-CCNC: 15 U/L (ref 2–50)
ANION GAP SERPL CALC-SCNC: 8 MMOL/L (ref 0–11.9)
AST SERPL-CCNC: 13 U/L (ref 12–45)
BASOPHILS # BLD AUTO: 0.4 % (ref 0–1.8)
BASOPHILS # BLD: 0.04 K/UL (ref 0–0.12)
BILIRUB SERPL-MCNC: 0.5 MG/DL (ref 0.1–1.5)
BUN SERPL-MCNC: 8 MG/DL (ref 8–22)
CALCIUM SERPL-MCNC: 9.5 MG/DL (ref 8.5–10.5)
CHLORIDE SERPL-SCNC: 108 MMOL/L (ref 96–112)
CO2 SERPL-SCNC: 24 MMOL/L (ref 20–33)
CREAT SERPL-MCNC: 0.7 MG/DL (ref 0.5–1.4)
EOSINOPHIL # BLD AUTO: 0.13 K/UL (ref 0–0.51)
EOSINOPHIL NFR BLD: 1.2 % (ref 0–6.9)
ERYTHROCYTE [DISTWIDTH] IN BLOOD BY AUTOMATED COUNT: 42.8 FL (ref 35.9–50)
GLOBULIN SER CALC-MCNC: 2.4 G/DL (ref 1.9–3.5)
GLUCOSE SERPL-MCNC: 96 MG/DL (ref 65–99)
HCG SERPL QL: NEGATIVE
HCT VFR BLD AUTO: 37.2 % (ref 37–47)
HGB BLD-MCNC: 12.3 G/DL (ref 12–16)
IMM GRANULOCYTES # BLD AUTO: 0.05 K/UL (ref 0–0.11)
IMM GRANULOCYTES NFR BLD AUTO: 0.5 % (ref 0–0.9)
LIPASE SERPL-CCNC: 12 U/L (ref 11–82)
LYMPHOCYTES # BLD AUTO: 3.05 K/UL (ref 1–4.8)
LYMPHOCYTES NFR BLD: 27.5 % (ref 22–41)
MCH RBC QN AUTO: 30.2 PG (ref 27–33)
MCHC RBC AUTO-ENTMCNC: 33.1 G/DL (ref 33.6–35)
MCV RBC AUTO: 91.4 FL (ref 81.4–97.8)
MONOCYTES # BLD AUTO: 0.78 K/UL (ref 0–0.85)
MONOCYTES NFR BLD AUTO: 7 % (ref 0–13.4)
NEUTROPHILS # BLD AUTO: 7.04 K/UL (ref 2–7.15)
NEUTROPHILS NFR BLD: 63.4 % (ref 44–72)
NRBC # BLD AUTO: 0 K/UL
NRBC BLD-RTO: 0 /100 WBC
PLATELET # BLD AUTO: 350 K/UL (ref 164–446)
PMV BLD AUTO: 9.2 FL (ref 9–12.9)
POTASSIUM SERPL-SCNC: 3.8 MMOL/L (ref 3.6–5.5)
PROT SERPL-MCNC: 6.3 G/DL (ref 6–8.2)
RBC # BLD AUTO: 4.07 M/UL (ref 4.2–5.4)
SODIUM SERPL-SCNC: 140 MMOL/L (ref 135–145)
WBC # BLD AUTO: 11.1 K/UL (ref 4.8–10.8)

## 2018-03-21 PROCEDURE — 96361 HYDRATE IV INFUSION ADD-ON: CPT

## 2018-03-21 PROCEDURE — 80053 COMPREHEN METABOLIC PANEL: CPT

## 2018-03-21 PROCEDURE — 83690 ASSAY OF LIPASE: CPT

## 2018-03-21 PROCEDURE — 99285 EMERGENCY DEPT VISIT HI MDM: CPT

## 2018-03-21 PROCEDURE — 96374 THER/PROPH/DIAG INJ IV PUSH: CPT

## 2018-03-21 PROCEDURE — 84703 CHORIONIC GONADOTROPIN ASSAY: CPT

## 2018-03-21 PROCEDURE — 700111 HCHG RX REV CODE 636 W/ 250 OVERRIDE (IP): Performed by: EMERGENCY MEDICINE

## 2018-03-21 PROCEDURE — 99284 EMERGENCY DEPT VISIT MOD MDM: CPT

## 2018-03-21 PROCEDURE — 700105 HCHG RX REV CODE 258: Performed by: EMERGENCY MEDICINE

## 2018-03-21 PROCEDURE — 85025 COMPLETE CBC W/AUTO DIFF WBC: CPT

## 2018-03-21 RX ORDER — ONDANSETRON 2 MG/ML
4 INJECTION INTRAMUSCULAR; INTRAVENOUS ONCE
Status: COMPLETED | OUTPATIENT
Start: 2018-03-21 | End: 2018-03-21

## 2018-03-21 RX ORDER — SODIUM CHLORIDE 9 MG/ML
1000 INJECTION, SOLUTION INTRAVENOUS ONCE
Status: COMPLETED | OUTPATIENT
Start: 2018-03-21 | End: 2018-03-21

## 2018-03-21 RX ADMIN — SODIUM CHLORIDE 1000 ML: 9 INJECTION, SOLUTION INTRAVENOUS at 01:25

## 2018-03-21 RX ADMIN — ONDANSETRON HYDROCHLORIDE 4 MG: 2 INJECTION, SOLUTION INTRAMUSCULAR; INTRAVENOUS at 01:25

## 2018-03-21 ASSESSMENT — ENCOUNTER SYMPTOMS
NAUSEA: 1
DIZZINESS: 0
DIARRHEA: 1
FEVER: 0
ABDOMINAL PAIN: 0
VOMITING: 1
BACK PAIN: 0
FATIGUE: 0
COUGH: 0

## 2018-03-21 ASSESSMENT — PAIN SCALES - GENERAL
PAINLEVEL_OUTOF10: 7
PAINLEVEL_OUTOF10: 6

## 2018-03-21 ASSESSMENT — LIFESTYLE VARIABLES: DO YOU DRINK ALCOHOL: NO

## 2018-03-21 NOTE — DISCHARGE INSTRUCTIONS
Nausea and Vomiting, Adult  Introduction  Feeling sick to your stomach (nausea) means that your stomach is upset or you feel like you have to throw up (vomit). Feeling more and more sick to your stomach can lead to throwing up. Throwing up happens when food and liquid from your stomach are thrown up and out the mouth. Throwing up can make you feel weak and cause you to get dehydrated. Dehydration can make you tired and thirsty, make you have a dry mouth, and make it so you pee (urinate) less often. Older adults and people with other diseases or a weak defense system (immune system) are at higher risk for dehydration. If you feel sick to your stomach or if you throw up, it is important to follow instructions from your doctor about how to take care of yourself.  Follow these instructions at home:  Eating and drinking  Follow these instructions as told by your doctor:  · Take an oral rehydration solution (ORS). This is a drink that is sold at pharmacies and stores.  · Drink clear fluids in small amounts as you are able, such as:  ¨ Water.  ¨ Ice chips.  ¨ Diluted fruit juice.  ¨ Low-calorie sports drinks.  · Eat bland, easy-to-digest foods in small amounts as you are able, such as:  ¨ Bananas.  ¨ Applesauce.  ¨ Rice.  ¨ Low-fat (lean) meats.  ¨ Toast.  ¨ Crackers.  · Avoid fluids that have a lot of sugar or caffeine in them.  · Avoid alcohol.  · Avoid spicy or fatty foods.  General instructions  · Drink enough fluid to keep your pee (urine) clear or pale yellow.  · Wash your hands often. If you cannot use soap and water, use hand .  · Make sure that all people in your home wash their hands well and often.  · Take over-the-counter and prescription medicines only as told by your doctor.  · Rest at home while you get better.  · Watch your condition for any changes.  · Breathe slowly and deeply when you feel sick to your stomach.  · Keep all follow-up visits as told by your doctor. This is important.  Contact a  doctor if:  · You have a fever.  · You cannot keep fluids down.  · Your symptoms get worse.  · You have new symptoms.  · You feel sick to your stomach for more than two days.  · You feel light-headed or dizzy.  · You have a headache.  · You have muscle cramps.  Get help right away if:  · You have pain in your chest, neck, arm, or jaw.  · You feel very weak or you pass out (faint).  · You throw up again and again.  · You see blood in your throw-up.  · Your throw-up looks like black coffee grounds.  · You have bloody or black poop (stools) or poop that look like tar.  · You have a very bad headache, a stiff neck, or both.  · You have a rash.  · You have very bad pain, cramping, or bloating in your belly (abdomen).  · You have trouble breathing.  · You are breathing very quickly.  · Your heart is beating very quickly.  · Your skin feels cold and clammy.  · You feel confused.  · You have pain when you pee.  · You have signs of dehydration, such as:  ¨ Dark pee, hardly any pee, or no pee.  ¨ Cracked lips.  ¨ Dry mouth.  ¨ Sunken eyes.  ¨ Sleepiness.  ¨ Weakness.  These symptoms may be an emergency. Do not wait to see if the symptoms will go away. Get medical help right away. Call your local emergency services (911 in the [place] Do not drive yourself to the hospital.   This information is not intended to replace advice given to you by your health care provider. Make sure you discuss any questions you have with your health care provider.  Document Released: 06/05/2009 Document Revised: 07/07/2017 Document Reviewed: 08/23/2016  © 2017 Elsevier      Diarrhea, Adult  Introduction  Diarrhea is when you have loose and water poop (stool) often. Diarrhea can make you feel weak and cause you to get dehydrated. Dehydration can make you tired and thirsty, make you have a dry mouth, and make it so you pee (urinate) less often. Diarrhea often lasts 2-3 days. However, it can last longer if it is a sign of something more serious. It is  important to treat your diarrhea as told by your doctor.  Follow these instructions at home:  Eating and drinking  Follow these recommendations as told by your doctor:  · Take an oral rehydration solution (ORS). This is a drink that is sold at pharmacies and stores.  · Drink clear fluids, such as:  ¨ Water.  ¨ Ice chips.  ¨ Diluted fruit juice.  ¨ Low-calorie sports drinks.  · Eat bland, easy-to-digest foods in small amounts as you are able. These foods include:  ¨ Bananas.  ¨ Applesauce.  ¨ Rice.  ¨ Low-fat (lean) meats.  ¨ Toast.  ¨ Crackers.  · Avoid drinking fluids that have a lot of sugar or caffeine in them.  · Avoid alcohol.  · Avoid spicy or fatty foods.  General instructions  · Drink enough fluid to keep your pee (urine) clear or pale yellow.  · Wash your hands often. If you cannot use soap and water, use hand .  · Make sure that all people in your home wash their hands well and often.  · Take over-the-counter and prescription medicines only as told by your doctor.  · Rest at home while you get better.  · Watch your condition for any changes.  · Take a warm bath to help with any burning or pain from having diarrhea.  · Keep all follow-up visits as told by your doctor. This is important.  Contact a doctor if:  · You have a fever.  · Your diarrhea gets worse.  · You have new symptoms.  · You cannot keep fluids down.  · You feel light-headed or dizzy.  · You have a headache.  · You have muscle cramps.  Get help right away if:  · You have chest pain.  · You feel very weak or you pass out (faint).  · You have bloody or black poop or poop that look like tar.  · You have very bad pain, cramping, or bloating in your belly (abdomen).  · You have trouble breathing or you are breathing very quickly.  · Your heart is beating very quickly.  · Your skin feels cold and clammy.  · You feel confused.  · You have signs of dehydration, such as:  ¨ Dark pee, hardly any pee, or no pee.  ¨ Cracked lips.  ¨ Dry  mouth.  ¨ Sunken eyes.  ¨ Sleepiness.  ¨ Weakness.  This information is not intended to replace advice given to you by your health care provider. Make sure you discuss any questions you have with your health care provider.  Document Released: 06/05/2009 Document Revised: 07/07/2017 Document Reviewed: 08/23/2016  © 2017 Elsevier

## 2018-03-21 NOTE — ED NOTES
Pt unable to give urine or stool sample erp notified...    Pt understands discharge instructions, and follow up.

## 2018-03-21 NOTE — ED PROVIDER NOTES
ED Provider Note    ED Provider Note          CHIEF COMPLAINT  Chief Complaint   Patient presents with   • Nausea/Vomiting/Diarrhea     starting around 1400, diarrhea > 10 episodes, emesis x 2 episodes       HPI  Dunia Sahni is a 32 y.o. female who presents to the Emergency Department for concern of nausea, vomiting and diarrhea. She said this started around 1400. She said she's had 10 episodes of nonbloody diarrhea as well as 2 episodes of vomiting. She has some diffuse abdominal cramping. She says she had strep throat and was given antibiotics 2 weeks ago and has completed them since then. She denies any fevers or chills. She denies any recent travels or hospitalizations.    REVIEW OF SYSTEMS  Review of Systems   Constitutional: Negative for fatigue and fever.   HENT: Negative for congestion.    Eyes: Negative for visual disturbance.   Respiratory: Negative for cough.    Cardiovascular: Negative for chest pain.   Gastrointestinal: Positive for diarrhea, nausea and vomiting. Negative for abdominal pain.   Genitourinary: Negative for dysuria.   Musculoskeletal: Negative for back pain.   Skin: Negative for rash.   Neurological: Negative for dizziness.       PAST MEDICAL HISTORY   has a past medical history of Abscess (10/12/2017); Bipolar affective (CMS-[facility]); Depression; Kidney infection; and Suicide attempt.    SURGICAL HISTORY   has a past surgical history that includes c-sec only,prev c-sec and appendectomy.    SOCIAL HISTORY  Social History   Substance Use Topics   • Smoking status: Never Smoker   • Smokeless tobacco: Never Used   • Alcohol use No      History   Drug Use No       FAMILY HISTORY  History reviewed. No pertinent family history.    CURRENT MEDICATIONS  Reviewed.  See Encounter Summary.     ALLERGIES  Allergies   Allergen Reactions   • Vicodin [Hydrocodone-Acetaminophen] Anaphylaxis     RXN=9 years ago   • Pcn [Penicillins] Nausea     RXN=8 years ago   • Bee Venom    • Blackberry [Rubus  "Fruticosus] Rash     \"rash\"   • Haldol [Haloperidol] Rash     Pt states, \"I have a rash and my lips were swollen.\"   • Raspberry        PHYSICAL EXAM  VITAL SIGNS: /61   Pulse 97   Temp 37.1 °C (98.8 °F)   Resp 16   Ht 1.676 m (5' 6\")   Wt 107.1 kg (236 lb 1.8 oz)   LMP 03/11/2018   SpO2 96%   BMI 38.11 kg/m²   Physical Exam   Constitutional: She is oriented to person, place, and time.   HENT:   Head: Normocephalic and atraumatic.   Dry mucus membranes     Eyes: Pupils are equal, round, and reactive to light.   Neck: Normal range of motion.   Cardiovascular: Normal rate.    Pulmonary/Chest: Effort normal and breath sounds normal.   Abdominal: Soft. She exhibits no distension. There is no tenderness. There is no rebound and no guarding.   Musculoskeletal: Normal range of motion. She exhibits no edema.   Neurological: She is alert and oriented to person, place, and time.   Skin: Skin is warm and dry. She is not diaphoretic.   Psychiatric: She has a normal mood and affect. Her behavior is normal.           DIAGNOSTIC STUDIES / PROCEDURES     LABS  Labs Reviewed   CBC WITH DIFFERENTIAL - Abnormal; Notable for the following:        Result Value    WBC 11.1 (*)     RBC 4.07 (*)     MCHC 33.1 (*)     All other components within normal limits   COMP METABOLIC PANEL   LIPASE   HCG QUAL SERUM   ESTIMATED GFR   URINALYSIS,CULTURE IF INDICATED   CDIFF BY PCR RFLX TOXIN       All labs were reviewed by me.        COURSE & MEDICAL DECISION MAKING  Pertinent Labs & Imaging studies reviewed. (See chart for details)    12:54 AM - Patient seen and examined at bedside.     Decision Making:  This is a 32 y.o. year old female who presents with concern of nausea, vomiting and diarrhea. She presented here afebrile and in no acute distress. She is otherwise clinically stable. She did have some dry mucous membranes her she had no specific tenderness on her abdomen. Differential includes gastritis, colitis, food borne illness, " C. diff colitis, dehydration, electrolyte abnormality. Labs did not show significant leukocytosis to suggest a C. diff colitis. In addition she did not have any episodes of diarrhea here. I do recognize that she has risk factors for it including recent antibiotic usage however given the fact that she is not having continued diarrhea here and has no fever leukocytosis and a nontender abdomen I think this is very unlikely. In addition her electrolytes are within normal limits. I did give her initially 1 L IV fluid normal saline bolus for concern of electrolyte replacement and dehydration. On reevaluation she is feeling much better. She noted tachycardia. Her abdomen remained soft and nontender. At this time it's unclear what's causing her diarrhea and vomiting. She did not have any episodes of vomiting here. She will be discharged in with 12 hour return precautions         DISPOSITION:  Patient will be discharged home in stable condition.    FOLLOW UP:  SUSAN VelascoC.  [address]  [place] [id number] [phone]    Schedule an appointment as soon as possible for a visit        OUTPATIENT MEDICATIONS:  New Prescriptions    No medications on file           FINAL IMPRESSION  1. Vomiting and diarrhea

## 2018-03-21 NOTE — ED TRIAGE NOTES
Dunia Sahni  32 y.o.  Chief Complaint   Patient presents with   • Nausea/Vomiting/Diarrhea     starting around 1400, diarrhea > 10 episodes, emesis x 2 episodes     Ambulatory to triage with steady gait for above. States that she was seen at [facility] recently for strep throat and recently finished a course of Macrobid. Patient noted in triage with wristband from another hospital in place to R wrist.    Patient showed this Triage RN cell phone pictures of her watery diarrhea in triage.    Triage process explained to patient, apologized for wait time, and returned to [facility].

## 2018-03-22 ENCOUNTER — HOSPITAL ENCOUNTER (EMERGENCY)
Facility: MEDICAL CENTER | Age: 33
End: 2018-03-30
Attending: EMERGENCY MEDICINE
Payer: MEDICARE

## 2018-03-22 DIAGNOSIS — F25.9 SCHIZOAFFECTIVE DISORDER, UNSPECIFIED TYPE (HCC): ICD-10-CM

## 2018-03-22 LAB
AMPHET UR QL SCN: NEGATIVE
BARBITURATES UR QL SCN: NEGATIVE
BENZODIAZ UR QL SCN: NEGATIVE
BZE UR QL SCN: NEGATIVE
CANNABINOIDS UR QL SCN: NEGATIVE
HCG UR QL: NEGATIVE
METHADONE UR QL SCN: NEGATIVE
OPIATES UR QL SCN: NEGATIVE
OXYCODONE UR QL SCN: NEGATIVE
PCP UR QL SCN: NEGATIVE
POC BREATHALIZER: 0 PERCENT (ref 0–0.01)
PROPOXYPH UR QL SCN: NEGATIVE
SP GR UR REFRACTOMETRY: 1.01

## 2018-03-22 PROCEDURE — 90791 PSYCH DIAGNOSTIC EVALUATION: CPT

## 2018-03-22 PROCEDURE — 81025 URINE PREGNANCY TEST: CPT

## 2018-03-22 PROCEDURE — 80307 DRUG TEST PRSMV CHEM ANLYZR: CPT

## 2018-03-22 PROCEDURE — 302970 POC BREATHALIZER: Performed by: EMERGENCY MEDICINE

## 2018-03-22 RX ORDER — CHLORPROMAZINE HYDROCHLORIDE 50 MG/1
50 TABLET, FILM COATED ORAL
Status: DISCONTINUED | OUTPATIENT
Start: 2018-03-22 | End: 2018-03-30 | Stop reason: HOSPADM

## 2018-03-22 RX ORDER — RISPERIDONE 1 MG/1
1 TABLET ORAL 2 TIMES DAILY
Status: DISCONTINUED | OUTPATIENT
Start: 2018-03-22 | End: 2018-03-30 | Stop reason: HOSPADM

## 2018-03-22 RX ORDER — CHLORPROMAZINE HYDROCHLORIDE 25 MG/ML
25 INJECTION INTRAMUSCULAR
Status: DISCONTINUED | OUTPATIENT
Start: 2018-03-22 | End: 2018-03-30 | Stop reason: HOSPADM

## 2018-03-22 NOTE — DISCHARGE PLANNING
Alert team note:  Patient has refused medications-Respirdal prescribed by Dr Farrar.  Patient has been repeatedly provided education about the benefits of this medication but she continues to refuse.  Legal hold has been extended by psychiatry and she is not eligible for Tierney 6 at this time.  Awaiting transfer to inpatient psychiatric hospital.

## 2018-03-22 NOTE — ED NOTES
"Pt refused risperidone stating \"I don't have to take that.\"  Pt educated that Psychiatrist would like her to start on medication and explained benefits of medication.  Pt continues to refuse.    "

## 2018-03-22 NOTE — ED PROVIDER NOTES
ED Provider Note    10:39 AM    Subjective: Patient is awaiting transfer to psychiatric facility. I evaluated her. Her vital signs are normal. She complained of a sore throat. She has not had a fever, cough, difficult to breathing.    Objective: /61   Pulse 63   Temp 36 °C (96.8 °F)   Resp 18   Wt 108.7 kg (239 lb 10.2 oz)   LMP 03/11/2018   SpO2 95%   BMI 38.68 kg/m² .  Calm and cooperative. Generally nontoxic. Pharynx is normal without erythema, exudate, asymmetry. No respiratory distress. No abdominal tenderness. No skin rash. Extremities unremarkable.    Laboratory data was obtained yesterday morning that was unremarkable    Assesment/Plan:   1. Psychosis-proceed with plan for transfer      Electronically signed by: John Santiago, 3/22/2018 10:39 AM

## 2018-03-22 NOTE — ED NOTES
Pt provided with meal box since breakfast did not arrive.  Pt insists she is a Paraguayan wen and she is supposed to be increasing her caloric intake.

## 2018-03-22 NOTE — ED NOTES
" Pt states increased anxiety since last visit yesterday..  Believes a \" lymph node/nodule \" has popped in her chest and needs to stay here to sleep and rest.. \" it is a matter of national that I stay here\"  Pt denies si/hi a/v hallucinations.  Pt delusional with grandiose thoughts.    "
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
## 2018-03-22 NOTE — PROGRESS NOTES
Patient's home medications have been reviewed by the pharmacy team.     Past Medical History:   Diagnosis Date   • Abscess 10/12/2017    right AC arm area   • Bipolar affective (CMS-HCC)    • Depression    • Kidney infection    • Suicide attempt        Patient's Medications    No medications on file          A:  Patient reports no prescription or over the counter medication use.  Medications do not appear to be contributing to current complaints.       P:    No recommendations at this time.      Eboni Jules, PharmD., BCPS  PGY-2 Critical Care Resident  [phone]

## 2018-03-22 NOTE — ED NOTES
Pt moved onto hospital bed; VSS at this time; pt given box of food and water; denies further needs at this time; denies ability to give urine sample at this time. Will continue to monitor.

## 2018-03-22 NOTE — ED NOTES
"Patient moved to [facility]; belongings taken from patient and placed in two bags, searched by security and placed in locker 1 in the ambulance  bay, patient stating \"I don't understand why you're placing me on a legal hold. You can't do this. The governor of [place] will hear about this, because you're breaking the treaty\". ERP at the bedside to give patient a thorough explanation.   "

## 2018-03-22 NOTE — CONSULTS
"[facility]   TRIAGE ASSESSMENT    Name: Dunia Sahni  MRN: [id number]  : 1985  Age: 32 y.o.  Date of assessment: 3/22/2018  PCP: Pepito Michelle P.A.-C.  Persons in attendance: Patient    CHIEF COMPLAINT/PRESENTING ISSUE (as stated by patient): Patient laying in bed.  Delusional. Feels she is a Kiswahili wen and has nodes in her chest that will explode if she \"heats them up.\"  Patient claims she owns the \"palace\" or homeless shelter where she now resides.  Patient very grandiose and delusional. Denies SI/HI.  Denies auditory hallucinations.  Patient placed on legal hold by ERP and will be transferred to inpatient psychiatric facility for further evaluation and treatment.    Chief Complaint   Patient presents with   • Anxiety   • Psych Eval        CURRENT LIVING SITUATION/SOCIAL SUPPORT: Patient currently living in homeless shelter.  States she has a strong family support system as they are \"of royal bloodline.\"    BEHAVIORAL HEALTH TREATMENT HISTORY  Does patient/parent report a history of prior behavioral health treatment for patient?   Yes: Patient has extensive mental health treatment. Patient's last inpatient hospitalization was on 18 at [facility]      SAFETY ASSESSMENT - SELF  Does patient acknowledge current or past symptoms of dangerousness to self? no  Does parent/significant other report patient has current or past symptoms of dangerousness to self? no  Does presenting problem suggest symptoms of dangerousness to self? No    SAFETY ASSESSMENT - OTHERS  Does patient acknowledge current or past symptoms of aggressive behavior or risk to others? no  Does parent/significant other report patient has current or past symptoms of aggressive behavior or risk to others?  N/A  Does presenting problem suggest symptoms of dangerousness to others? No    Crisis Safety Plan completed and copy given to patient? No Patient laughed and stated, \"that will not be necessary.\"    ABUSE/NEGLECT " "SCREENING  Does patient report feeling “unsafe” in his/her home, or afraid of anyone?  no  Does patient report any history of physical, sexual, or emotional abuse?  no  Does parent or significant other report any of the above? N\A  Is there evidence of neglect by self?  no  Is there evidence of neglect by a caregiver? no  Does the patient/parent report any history of CPS/APS/police involvement related to suspected abuse/neglect or domestic violence? no  Based on the information provided during the current assessment, is a mandated report of suspected abuse/neglect being made?  No    SUBSTANCE USE SCREENING  Yes:  Chauncey all substances used in the past 30 days:Patient denies substance use      Last Use Amount   []   Alcohol     []   Marijuana     []   Heroin     []   Prescription Opioids  (used without prescription, for    recreation, or in excess of prescribed amount)     []   Other Prescription  (used without prescription, for    recreation, or in excess of prescribed amount)     []   Cocaine      []   Methamphetamine     []   \"\" drugs (ectasy, MDMA)     []   Other substances        UDS results: Pending  Breathalyzer results: 0.0    What consequences does the patient associate with any of the above substance use and or addictive behaviors? None    Risk factors for detox (check all that apply):  []  Seizures   []  Diaphoretic (sweating)   []  Tremors   []  Hallucinations   []  Increased blood pressure   []  Decreased blood pressure   []  Other   []  None      [] Patient education on risk factors for detoxification and instructed to return to ER as needed.      MENTAL STATUS   Participation: Active verbal participation and Verbally monopolizing  Grooming: Casual  Orientation: Alert, Fully Oriented and Evidence of delusions present  Behavior: Calm  Eye contact: Good  Mood: Manic  Affect: Expansive  Thought process: Tangential  Thought content: Preoccupation with royalty  Speech: Volume within normal " limits  Perception: Depersonalization  Memory:  Poor memory for chronology of events  Insight: Poor  Judgment:  Poor  Other:    Collateral information:   Source:  [] Significant other present in person:   [] Significant other by telephone  [] [facility]   [] [facility] Nursing Staff  [x] [facility] Medical Record  [] Other:     [] Unable to complete full assessment due to:  [] Acute intoxication  [] Patient declined to participate/engage  [] Patient verbally unresponsive  [] Significant cognitive deficits  [x] Significant perceptual distortions or behavioral disorganization  [] Other:      CLINICAL IMPRESSIONS:  Primary: Psychosis  Secondary:        IDENTIFIED NEEDS/PLAN:  [Trigger DISPOSITION list for any items marked]    []  Imminent safety risk - self [] Imminent safety risk - others   []  Acute substance withdrawal [x]  Psychosis/Impaired reality testing   []  Mood/anxiety []  Substance use/Addictive behavior   []  Maladaptive behaviro []  Parent/child conflict   []  Family/Couples conflict []  Biomedical   []  Housing []  Financial   []   Legal  Occupational/Educational   []  Domestic violence []  Other:     Disposition: Refer to Legal Hold    Does patient express agreement with the above plan? no    Referral appointment(s) scheduled? N\A    Alert team only:   I have discussed findings and recommendations with Dr. Seay who is in agreement with these recommendations.     Referral information sent to the following community providers :    If applicable : Referred  to : Leeann for legal hold follow up at (time): Pending medical clearance      Clive Francisco R.N.  3/22/2018

## 2018-03-22 NOTE — ED PROVIDER NOTES
"ED Provider Note    Scribed for Ziggy Seay M.D. by Dhruv Moulton. 3/22/2018, 1:36 AM.    Primary care provider: Pepito Michelle P.A.-C.  Means of arrival: EMS  History obtained from: Patient  History limited by: Patient's altered mental status.    CHIEF COMPLAINT  Chief Complaint   Patient presents with   • Anxiety   • Psych Eval       HPI  Dunia Sahni is a 32 y.o. female who presents to the Emergency Department for \"popping nodules\" in her chest. Patient also describes associated symptoms of hot flashes, dysuria, and generalized body aches. She has not taken any medication for her symptoms and denies drug, cigarette, or alcohol use.  Patient also says that her illness is of \"national security\" because she is \"related to Zahra Liliam and Hutchinson Abril.\" She asks that she be hooked up to a cardiac monitor because \"the [address] wasn't happy about it last time.\" No SI or HI.  Patient notes that she is currently homeless and living in a shelter that she claims she owns.     History limited secondary to patient's underlying psychiatric history.    REVIEW OF SYSTEMS  Pertinent positives include \"popping nodules\" in chest, hot flashes, body aches, delusion.  See HPI for further details. Review of systems limited secondary to patient's altered mental status.    C.    PAST MEDICAL HISTORY   has a past medical history of Abscess (10/12/2017); Bipolar affective (CMS-HCC); Depression; Kidney infection; and Suicide attempt.    SURGICAL HISTORY   has a past surgical history that includes c-sec only,prev c-sec and appendectomy.    SOCIAL HISTORY  Social History   Substance Use Topics   • Smoking status: Never Smoker   • Smokeless tobacco: Never Used   • Alcohol use No      History   Drug Use No       FAMILY HISTORY  None    CURRENT MEDICATIONS  None    ALLERGIES  Allergies   Allergen Reactions   • Vicodin [Hydrocodone-Acetaminophen] Anaphylaxis     RXN=9 years ago   • Pcn [Penicillins] Nausea     RXN=8 years " "ago   • Bee Venom    • Blackberry [Rubus Fruticosus] Rash     \"rash\"   • Haldol [Haloperidol] Rash     Pt states, \"I have a rash and my lips were swollen.\"   • Raspberry        PHYSICAL EXAM  VITAL SIGNS: /62   Pulse 86   Temp 36.6 °C (97.8 °F)   Resp 16   Wt 108.7 kg (239 lb 10.2 oz)   LMP 03/11/2018   SpO2 95%   BMI 38.68 kg/m²   Vitals reviewed.  Constitutional: Alert in no apparent distress.  HENT: No signs of trauma, Bilateral external ears normal, Nose normal.   Eyes: Pupils are equal and reactive, Conjunctiva normal, Non-icteric.   Neck: Normal range of motion, Supple, No stridor.   Cardiovascular: Regular rate and rhythm, no murmurs.   Thorax & Lungs: Normal breath sounds, No respiratory distress, No wheezing.  Abdomen: Bowel sounds normal, Soft, No tenderness, No peritoneal signs, No masses, No pulsatile masses.   Skin: Warm, Dry, No erythema, No rash.   Extremities: Intact distal pulses, No edema, No tenderness, No cyanosis  Musculoskeletal: Good range of motion in all major joints. No major deformities noted.   Neurologic: Alert, Normal motor function, No focal deficits noted.   Psychiatric: Grandiose thoughts    DIAGNOSTIC STUDIES / PROCEDURES    LABS  Labs Reviewed   POC URINALYSIS      All labs reviewed by me.      COURSE & MEDICAL DECISION MAKING  Nursing notes, VS, PMSFHx reviewed in chart.  Differential diagnoses include but not limited to: infection, intracranial lesion, delusion secondary to known bipolar and schizoaffective disorder.      1:36 AM Patient seen and examined at bedside. Patient arrives afebrile with normal vital signs. Patient appears well hydrated and non-toxic. The physical exam is remarkable for delusional thoughts but otherwise I do not detect an acute underlying physiologic abnormality based on her physical exam. Will check a urine to rule out urinary tract infection. She was seen in the ER approximately 24 hours ago and labs were performed at that time which were " largely normal. A pregnancy test at that time was also negative. Further testing beyond a urinalysis today would not be beneficial to the patient.    Per chart review, the patient was seen in the ER by our psychiatrist, Dr. Aguirre in October 2017. Her note suggests that she had followed the patient in the past and states that when she is on medication that the patient has been stable enough to work. Unfortunately, the patient does not care for the medications due to weight gain. At that visit, the patient was quite psychotic talking about her well blood lines as well as conspiracy theories about the [facility] and local hospitals. At that time she had a 10-month-old child that had been removed from her care. She was ultimately transferred to [place] due to inability to care for herself. The last line of Dr. Aguirre's note states that her delusional behavior is far from her baseline.    Today's visit reveals an acutely psychotic patient who is talking about being a Malawian and English Zahra related to the Hutchinson of [place]. She states that she lives in a shelter which she owns. She denies taking any medication and adamantly refuses taking any Seroquel here today. I worry that she is unable to take care of herself and will require transfer to an inpatient psychiatric facility.    I plan to place her on a legal hold with the ultimate goal being to transfer her to a psychiatric facility.    Legal hold signed. Patient care transferred to Dr. Aponte pending medical clearance. The plan is to follow up her urinalysis to ensure that she does not have a urinary tract infection. Even if she does have a urinary tract infection she will be stable for transfer to an inpatient psychiatric facility with oral antibiotics. She has a known history of schizoaffective disorder with delusions and her behavior today is consistent with decompensation of her known psychiatric disorder.    FINAL IMPRESSION  1. Schizoaffective disorder,  unspecified type (CMS-formerly [facility])          Dhruv GARCIA (Scribe), am scribing for, and in the presence of, Ziggy Seay M.D..    Electronically signed by: Dhruv Moulton (Gailibangel), 3/22/2018    IZiggy M.D. personally performed the services described in this documentation, as scribed by Dhruv Moulton in my presence, and it is both accurate and complete.    The note accurately reflects work and decisions made by me.  Ziggy Seay  3/22/2018  2:28 AM

## 2018-03-22 NOTE — DISCHARGE PLANNING
[facility]. Fax confirmation received.

## 2018-03-22 NOTE — ED TRIAGE NOTES
"Chief Complaint   Patient presents with   • Anxiety     Blood pressure 132/62, pulse 86, temperature 36.6 °C (97.8 °F), resp. rate 16, weight 108.7 kg (239 lb 10.2 oz), last menstrual period 03/11/2018, SpO2 95 %.    Pt ambulatory to triage after being BIB EMS. Pt stated, \"I came to the ED because am afraid that my nodules in my chest will cause pneumonia. Pt also stated, \"I am a Togol[place] wen, I have to come into the hospital because national security forced me to\". No c/o SOB, N/V. Pt has hx of schizophrenia.     Explained wait time and triage process to pt. Pt placed back out in lobby, told to notify ED tech or triage RN of any changes, verbalized understanding.    "

## 2018-03-22 NOTE — DISCHARGE PLANNING
Medical Social Work    Referral: Legal Hold    Intervention: Legal Hold Paperwork given to SW by [employer] RN Clive    Legal Hold Initiated: Date: 3/22/18 Time: 0322    Patient’s Insurance Listed on Face Sheet: Medicare/FFS    Referrals sent to: [facility]    This referral contains the following information:  1) Face sheet __X__  2) Page 1 and Page 2 of Legal Hold _X___  3) Alert Team Assessment/Psych Assessment __X__  4) Head to toe physical exam __X__  5) Urine Drug Screen __PEND__  6) Blood Alcohol ___X_  7) Vital signs _X___  8) Pregnancy test when applicable __PEND_  9) Medications list __X__    Plan: Patient will transfer to mental health facility once acceptance is obtained

## 2018-03-23 ASSESSMENT — LIFESTYLE VARIABLES: DO YOU DRINK ALCOHOL: NO

## 2018-03-23 NOTE — PSYCHIATRY
"PSYCHIATRIC FOLLOW UP:    Reason for Admission: \"popping nodules\" in her chest. Patient also describes associated symptoms of hot flashes, dysuria, and generalized body aches. She has not taken any medication for her symptoms and denies drug, cigarette, or alcohol use.  Patient also says that her illness is of \"national security\" because she is \"related to Zahrachiara Ricketts and Hutchinson Abril.\" She asks that she be hooked up to a cardiac monitor because \"the [address] wasn't happy about it last time.\" No SI or HI.     Legal hold status:+       How are you Ms Sahni? \"Its Zahrachiara Sahni!\" Then talks about national security, chest xrays, top priority..... Says she slept.     Psychiatric Examination: observed phenomenon:  Vitals:Blood pressure 110/66, pulse 71, temperature 36.8 °C (98.2 °F), resp. rate 16, weight 108.7 kg (239 lb 10.2 oz), last menstrual period 03/11/2018, SpO2 95 %.  Musculoskeletal(abnormal movements, gait, etc):none noted.  Appearance: obese, fair hygiene, good eye contact.  Thoughts: delusional, fast, loose  Speech:fast   Mood: did not say  Affect:calmer because she was not challenged.   SI/HI: denies  Attention/Alertness: intact   Memory: grossly intact  Orientation:grossly intact  Fund of Knowledge: not tested     Insight/Judgement into symptoms: poor     Assessment:(acuity level)  Psychotic Disorder Unsepc: R/O schizoaffective disorder vs bipolar I disorder though doesn't not look manic          Plan:non compliant with medications.  legal hold:extended  Anticipated F/U: within 48 hours.     Will follow             "

## 2018-03-23 NOTE — ED NOTES
Patient experienced episode of Nausea and vomited all over floor and linens. Vomit was cleaned up and patient provived with fresh linens

## 2018-03-23 NOTE — DISCHARGE PLANNING
Care Transitions Team    Update: JADEN spoke with Chauncey from [place], who declined the pt. The pt was declined for the following reason: maxed therapeutic benefit-also [facility] patient.     Plan: Pt has been declined by all facilities outside of [facility]. Pt is now awaiting a bed at [facility]. JADEN to update [facility].

## 2018-03-23 NOTE — DISCHARGE PLANNING
Care Transitions Team     Update: SW spoke with Karin from [facility], who declined the pt. The pt was declined for the following reason: Treatment team decision.      Plan: Pt awaiting transfer to psychiatric facility.

## 2018-03-23 NOTE — ED NOTES
"Patient refused medication and stated, \"I cannot take any medication, I'm a Vincentian wen, Its in the treaty\"  "

## 2018-03-23 NOTE — DISCHARGE PLANNING
Extension petition filed with the Court via eFlex. Waiting on verified petition. Pt awaiting transfer to psychiatric facility.

## 2018-03-23 NOTE — DISCHARGE PLANNING
"ALERT team note:  Ms Sahni continues grandiose and delusional.....\"I am a Urdu wen and I don't have to take any medication....  The [address]  and 8 psychiatrists, including Dr. Gunderson, all agreed in my favor\".  States that she does have PTSD and a slight eating disorder.  Still awaiting psychiatric hospitalization.                                           "

## 2018-03-23 NOTE — DISCHARGE PLANNING
JADEN spoke with Vivian at [facility] who advised that she has received pt's packet and pt will be put up for review today. Pt awaiting transfer to psychiatric facility.

## 2018-03-23 NOTE — DISCHARGE PLANNING
Alert team note:  Patient continues to refuse medications due to being a Beninese wen and it being in the treaty with Queen Abril.  Delusional and grandiose. Legal hold extended by psychiatry.  Awaiting transfer to inpatient psychiatric care.

## 2018-03-23 NOTE — ED NOTES
"Pt refuses medication, \" I'm a Mohawk wen, I don't have to take it because of the treaty, Queen Miraclebeth\".    "

## 2018-03-24 NOTE — ED NOTES
Pt requesting curtains to be pulled shut.  Pt notified of safety risk with closed curtains.  Pt back to bed without incident.  NAD

## 2018-03-24 NOTE — ED NOTES
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

## 2018-03-24 NOTE — ED NOTES
"Pt refusing meds at this time, states \"my cardiologist would have a fit, and I dont have to take the meds.  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

## 2018-03-24 NOTE — DISCHARGE PLANNING
Alert team note:  Patient continues to refuse medications. Very delusional.  Says she is a Arabic wen, that she does not have to take medications it is in the treaty and that she has a heart condition it would kill her. Appears to have slept 8 hours last night and is eating her meals.   Legal hold extended by psychiatry.  Awaiting transfer to inpatient psychiatric hospital.

## 2018-03-24 NOTE — ED PROVIDER NOTES
10:48 AM patient reevaluated at bedside. She is on a legal hold and awaiting transfer to psychiatric facility when a bed is available. Please refer to initial note for complete details. No complaints or concerns over night. Patient has tolerated breakfast and ambulates independently.      FINAL IMPRESSION  (F25.9) Schizoaffective disorder, unspecified type (CMS-HCC)      Electronically signed by: Christie Shepherd, 3/24/2018 10:48 AM    This dictation was created using voice recognition software. The accuracy of the dictation is limited to the abilities of the software. I expect there may be some errors of grammar and possibly content. The nursing notes were reviewed and certain aspects of this information were incorporated into this note.

## 2018-03-25 ASSESSMENT — PAIN SCALES - GENERAL: PAINLEVEL_OUTOF10: 0

## 2018-03-25 NOTE — ED PROVIDER NOTES
"ED Provider Note    11:47 AM patient was evaluated bedside. She is awake and alert. Sitting up, rubbing lotion on her feet. She is in no distress. She states that her sister, 'Princess Curry, of the royal family, has advised her that she is pregnant\" and she is requesting labs be completed.  Reassured her, that recent labs were done including a CMP pregnancy test and CBC, were overall unrevealing. Patient has no other questions or complaints. Care will be transferred to oncoming ER P. Patient is here on a legal hold. There have been no events under my care.  "

## 2018-03-25 NOTE — ED NOTES
Patient refused scheduled medication despite education.    Sitter in direct observation of patient at all times.

## 2018-03-25 NOTE — DISCHARGE PLANNING
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  Patient has rambling and delusional patterns of speaking.  She believe that she is pregnant and only down here for care because \" I can't be upstairs cause I own the hospital.\"  States that her sister is Princess Nix.  Witnessed by RN talking to self clearly internal stimuli.   Remains on wait list for bed.  When attempting to explain process states, \"Oh no I don't need any mental treatment.\"  Resting in room.  "

## 2018-03-25 NOTE — ED NOTES
"Patient refused scheduled medication, delusional, states \"it's part of the treaty, you're not allowed to ask me that\"  "

## 2018-03-25 NOTE — ED NOTES
Patient sleeping on bed, respirations even and unlabored.    Sitter in direct observation of patient at all times.

## 2018-03-25 NOTE — ED NOTES
Patient ambulatory to BR, steady gait.  Patient requested food, given crackers and water.     Sitter in direct observation of patient at all times.

## 2018-03-25 NOTE — ED NOTES
Patient sleeping on bed, respirations even and unlabored.    Sitter has unobstructed view of patient at all times.

## 2018-03-25 NOTE — ED NOTES
Provider aware of inability to care for self. Room previously stripped of all dangerous items. Pt in hospital gown and no personal items.  Legal hold in chart. No self harm discussed with pt, states will not harm self here.     Patient in direct observation of sitter at all times.

## 2018-03-26 NOTE — ED NOTES
Vital signs rechecked, she is smiling and laughing in the room. She is up and down to restroom, sitter remains to stay outside of room and with patient.

## 2018-03-26 NOTE — PSYCHIATRY
"PSYCHIATRIC FOLLOW UP:    Reason for Admission: \"popping nodules\" in her chest. Patient also describes associated symptoms of hot flashes, dysuria, and generalized body aches. She has not taken any medication for her symptoms and denies drug, cigarette, or alcohol use.  Patient also says that her illness is of \"national security\" because she is \"related to Zahrachiara Ricketts and Hutchinson Abril.\" She asks that she be hooked up to a cardiac monitor because \"the [place] [address] wasn't happy about it last time.\" No SI or HI.     Legal hold status:  +      No mention of nodules but the [facility] has determined she is pregnant and goes on to talk about top secret stuff and her royal lineage.... Says she has been told she is to stay here by the [facility].    Psychiatric Examination: observed phenomenon:  Vitals: Blood pressure 110/68, pulse 72, temperature 36.7 °C (98 °F), resp. rate 16, weight 108.7 kg (239 lb 10.2 oz), last menstrual period 03/11/2018, SpO2 95 %.  Musculoskeletal(abnormal movements, gait, etc):none noted.  Appearance: obese, fair hygiene, good eye contact.  Thoughts: delusional,but is normalized in thought process  Speech:wnl  Mood: good  Affect:euthymic  SI/HI: denies  Attention/Alertness: intact   Memory: grossly intact  Orientation:grossly intact  Fund of Knowledge: not tested     Insight/Judgement into symptoms: poor     Assessment:(acuity level)   Psychotic Disorder Unsepc: R/O schizoaffective disorder vs bipolar I disorder though doesn't not look manic         Plan:not taking meds. Won't talk about options.   legal hold:extended  Anticipated F/U: within 24 hours.     Will follow             "

## 2018-03-26 NOTE — ED PROVIDER NOTES
"9:23 AM patient seen at the bedside. She sitting up, eating breakfast. She is in no distress. She appears comfortable. She states \"I'm pregnant\". That \"I'm refusing the medication under my cardiologist's recommendation\". She states that she is \"due in court and this is by order of the supreme court for national security\". He spoke yesterday about her having a negative pregnancy test. Patient's insisted that she needed a special \"royal family pregnancy test\". No events under my care. Awaiting transfer or further evaluation by our psychiatry here. Patient care will be transferred to [facility] until that time.  "

## 2018-03-26 NOTE — ED NOTES
Pt sleeping on gurney, respirations even and unlabored, NAD noted.     Sitter has unobstructed view of patient at all times.

## 2018-03-27 NOTE — DISCHARGE PLANNING
Patient ambulated to restroom.  Denies suicidal ideation.  Continues to state she is of royalty.  Ramirez. Dismissed this writer after a couple of questions.  Resting in bed comfortably at this time.  Patient sitter in view of patient.  Will continue to monitor for safety.

## 2018-03-27 NOTE — ED NOTES
Pt removed all sheets off the bed. Clean linens applied to bed. Given pt personal care kit to do some personal hygiene in the bathroom. Pt calm and cooperative escorted by sitter.

## 2018-03-27 NOTE — DISCHARGE PLANNING
Alert team rounding  Pt hold extended, as she continues to exhibit delusional thinking and inability to care for self.  Denies SI/HI/AH/VH.

## 2018-03-27 NOTE — PSYCHIATRY
"PSYCHIATRIC FOLLOW UP:    Reason for Admission: \"popping nodules\" in her chest. Patient also describes associated symptoms of hot flashes, dysuria, and generalized body aches. She has not taken any medication for her symptoms and denies drug, cigarette, or alcohol use.  Patient also says that her illness is of \"national security\" because she is \"related to Zahra Liliam and Hutchinson Abril.\" She asks that she be hooked up to a cardiac monitor because \"the [place] [address] wasn't happy about it last time.\" No SI or HI.     Legal hold status:   +    Delusions continue. Polite and pleasant. Says she slept.    Psychiatric Examination: observed phenomenon:  Vitals:Blood pressure 102/61, pulse 78, temperature 36.6 °C (97.9 °F), resp. rate 16, weight 108.7 kg (239 lb 10.2 oz), last menstrual period 03/11/2018, SpO2 95 %.  Musculoskeletal(abnormal movements, gait, etc): none noted.  Appearance: obese, fair hygiene, good eye contact.  Thoughts: delusional,but is normalized in thought process  Speech:wnl  Mood: good  Affect:euthymic  SI/HI: denies  Attention/Alertness: intact   Memory: grossly intact  Orientation:grossly intact  Fund of Knowledge: not tested     Insight/Judgement into symptoms: poor    Assessment:(acuity level)  Psychotic Disorder Unsepc: R/O schizoaffective disorder vs bipolar I disorder though doesn't not look manic          Plan:has been here 5 days now: unclear why she has not been accepted. She isn't taking meds and continues to be very psychotic.   legal hold:extended.  Anticipated F/U: within 24 hours.     Will follow             "

## 2018-03-27 NOTE — DISCHARGE PLANNING
"ALERT note.  Brief 1:1 with Ms. Jo.  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  Declining medication because of her \"pregnancy\"; delusions and grandiosity continues.  Awaiting psychiatric hospitalization.  "

## 2018-03-28 PROCEDURE — 302092 REMEDY SKIN REPAIR CREAM: Performed by: EMERGENCY MEDICINE

## 2018-03-28 NOTE — DISCHARGE PLANNING
ALERT team note:  Ms. Sahni continues to refuse psychiatric medications; Overt attention to A/H demonstrated.....quite animated at times.  Sh is awaiting psychiatric hospitalization.

## 2018-03-28 NOTE — ED NOTES
Pt resting in bed.    Pt continuing to talk to herself at times. Pt making frequent trips to the bathroom.  Sitter in place.

## 2018-03-28 NOTE — PSYCHIATRY
"PSYCHIATRIC FOLLOW UP:    Reason for Admission: \"popping nodules\" in her chest. Patient also describes associated symptoms of hot flashes, dysuria, and generalized body aches. She has not taken any medication for her symptoms and denies drug, cigarette, or alcohol use.  Patient also says that her illness is of \"national security\" because she is \"related to Zahra Liliam and Hutchinson Abril.\" She asks that she be hooked up to a cardiac monitor because \"the [place] [address] wasn't happy about it last time.\" No SI or HI.    Legal hold status: +      Still pregnant on BLAYNE's testing (not on ours), still royal, is indignent meds are being offered when she is not mentally ill.  Today she is not to be here as per the [address]. Dr Gunderson watches her \"24/7\"......    Psychiatric Examination: observed phenomenon:  Vitals:Blood pressure 121/71, pulse 78, temperature 36.3 °C (97.3 °F), resp. rate 16, weight 108.7 kg (239 lb 10.2 oz), last menstrual period 03/11/2018, SpO2 96 %.  Musculoskeletal(abnormal movements, gait, etc): none noted.  Appearance: obese, fair hygiene, good eye contact.  Thoughts: delusional,but is normalized in thought process rate  Speech:more pressured today  Mood: upset because she is not to be here today.  Affect:euthymic  SI/HI: denies  Attention/Alertness: intact   Memory: grossly intact  Orientation:grossly intact  Fund of Knowledge: not tested     Insight/Judgement into symptoms: poor     Assessment:(acuity level)  Schizoaffective disorder: acute. R/O bipolar disorder with psychosis.          Plan:non compliant with meds. Now 6 days here.  legal hold:extended.   Anticipated F/U: within 24 hours.     Will follow             "

## 2018-03-28 NOTE — ED NOTES
Pt ambulated unassisted but escorted by sitter to restroom. Pt ambulated from restroom to RM 29 unassisted but escorted by sitter. Pt laying down in hospital [facility], door closed, curtain pulled back for ED staff to observe patient.

## 2018-03-28 NOTE — ED NOTES
PT AMBULATED TO RESTROOM ESCORTED BY SITTER. GIVEN PT APPLE JUICE. PT REQUEST FOR LOTION, NON AVAILABLE IN STOCK ROOM. NAD.

## 2018-03-28 NOTE — ED NOTES
Pt refused evening medication.  Pt states she is carrying triplets and cannot take medication.  Pt continues to talk to herself in bed.  Sitter in place.

## 2018-03-28 NOTE — ED PROVIDER NOTES
ER Provider Addendum Note     Scribed for KEKE SUÁREZ by Racquel Gonzales on 3/28/2018 at 2:06 PM.     This is an addendum to the note on Dunia Sahni.  For further details and full chart entry, see the previously signed ED Provider Note written by Dr. Ziggy Seay ([facility]).      2:00 PM On evaluation, patient is eating lunch.  Awaiting transfer at this time.      The note accurately reflects work and decisions made by me.  Keke Suárez  3/28/2018  2:46 PM     IRacquel (Scribe), am scribing for, and in the presence of, Keke Suárez M.D.    Electronically signed by: Racquel Gonzales ([place]), 3/28/2018    Keke GARCIA M.D. personally performed the services described in this documentation, as scribed by Racquel Gonzales in my presence, and it is both accurate and complete.

## 2018-03-28 NOTE — DISCHARGE PLANNING
Medical Social Work     Referral: Legal Hold Court     Intervention: Pt presented for legal hold meeting with  to discuss legal options of contesting hold and meeting with the court doctors this afternoon, or not contesting legal hold and continuing the hold for one week.  advised that pt's legal hold will be be continued for one week (4/5/18).       Plan: Pt's legal hold has been continued for one week. Pt awaiting transfer to an in patient psych facility.

## 2018-03-28 NOTE — ED NOTES
Pt resting in bed.  Pt continues to talk to herself.  Pt ambulated to bathroom several times.  Pt given boxed lunch.  Sitter in place.

## 2018-03-28 NOTE — DISCHARGE PLANNING
"Alert team rounding  Pt alert and sitting up in bed, eating breakfast.  She reports she has nausea, and is often hungry d/t being \"pregnant with 3 babies.\"  She requests to have additional food.    She reported she also has an eating disorder, high metabolism, and empty nest syndrome, per Dr. 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    She continues to refuse medications, stating it is illegal for us to even order them for her because she owns us.    She reports it take her a long time to use the bathroom because she is a virgin.  She would like it if staff would stop knocking on the bathroom door while she is going.  Grandiose, delusional thinking.  She denies SI/HI/AH/VH.  Pt noted to respond to internal stimuli while alone in room.  Continues to await placement at [facility].  She is not eligible for placement anywhere else.    "

## 2018-03-29 NOTE — DISCHARGE PLANNING
Alert team note:  Patient continues to be delusional.  She says she is a Croatian wen and it's in the treaty she does not have to take any medications.  Legal hold extended by psychiatry.  Awaiting to transfer to inpatient psychiatric hospital.

## 2018-03-29 NOTE — DISCHARGE PLANNING
Medical Social Work    MSW spoke to Vivian at [facility]. At this time they are full but pt is under review right now. Will await acceptance from [facility].

## 2018-03-29 NOTE — ED NOTES
"Pt requesting additional food \"because I am pregnant with three babies.\" Pt provided box lunch and reinforced her pregnancy test was negative. Pt states \"That is not true.\" and accepted box lunch. In view of sitter. Ongoing monitoring.   "

## 2018-03-29 NOTE — PSYCHIATRY
"PSYCHIATRIC FOLLOW UP:    Reason for Admission: \"popping nodules\" in her chest. Patient also describes associated symptoms of hot flashes, dysuria, and generalized body aches. She has not taken any medication for her symptoms and denies drug, cigarette, or alcohol use.  Patient also says that her illness is of \"national security\" because she is \"related to Zahra Liliam and Hutchinson Abril.\" She asks that she be hooked up to a cardiac monitor because \"the [place] [address] wasn't happy about it last time.\" No SI or HI.    Legal hold status:   +     No changes. Refuses meds.    Psychiatric Examination: observed phenomenon:  Vitals:Blood pressure 119/60, pulse 70, temperature 36.9 °C (98.4 °F), resp. rate 16, weight 108.7 kg (239 lb 10.2 oz), last menstrual period 03/11/2018, SpO2 95 %.  Musculoskeletal(abnormal movements, gait, etc): none noted.  Appearance: obese, fair hygiene, good eye contact.  Thoughts: delusional   Speech:wnl  Mood: should be here  Affect:euthymic  SI/HI: denies  Attention/Alertness: intact   Memory: grossly intact  Orientation:grossly intact  Fund of Knowledge: not tested     Insight/Judgement into symptoms: poor     Assessment:(acuity level)  Schizoaffective disorder: acute. R/O bipolar disorder with psychosis.          Plan:day 7 in ED.   legal hold:extended  Anticipated F/U: within  24 hours.     Will follow             "

## 2018-03-29 NOTE — ED NOTES
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  Sitter at door, bed in lowest position

## 2018-03-29 NOTE — DISCHARGE PLANNING
Patient laying in bed.  No distress noted.  Awaiting placement to inpatient facility.  Will continue to monitor for safety.  Patient sitter outside room and in view of patient.

## 2018-03-29 NOTE — ED PROVIDER NOTES
ED Provider Note  Addendum: The patient is resting comfortably. She states that she is a wen and has no complaints.  /60   Pulse 70   Temp 36.9 °C (98.4 °F)   Resp 16   Wt 108.7 kg (239 lb 10.2 oz)   LMP 03/11/2018   SpO2 95%   BMI 38.68 kg/m²

## 2018-03-29 NOTE — ED NOTES
Pt sitting in bed, alert, talking to self. Calm at this time. In view of sitter. Ongoing monitoring.

## 2018-03-29 NOTE — ED NOTES
Assumed care of pt from CHRISTIE Rinaldi. Pt sleeping in bed. Resp even and unlabored. NAD. Pt in view of sitter. Ongoing monitoring.

## 2018-03-29 NOTE — ED NOTES
"Pt refused morning medications stating \"I am a [place]ine[place] wen and I can not take those.\"   "

## 2018-03-29 NOTE — DISCHARGE PLANNING
SW received filed extension order and will provide copy to ER SW for pt's chart. Pt awaiting transfer to psychiatric facility.

## 2018-03-29 NOTE — ED NOTES
Pt sitting, alert, in bed. Combing her hair. NAD. Resp even and unlabored. In view of sitter. Ongoing monitoring.

## 2018-03-30 VITALS
HEART RATE: 78 BPM | BODY MASS INDEX: 38.68 KG/M2 | OXYGEN SATURATION: 96 % | TEMPERATURE: 98 F | SYSTOLIC BLOOD PRESSURE: 112 MMHG | WEIGHT: 239.64 LBS | RESPIRATION RATE: 16 BRPM | DIASTOLIC BLOOD PRESSURE: 60 MMHG

## 2018-03-30 NOTE — ED NOTES
Pt now asleep. Respirations even, equal and unlabored.    Remains under close obs of designee monitoring in [facility].

## 2018-03-30 NOTE — ED NOTES
TX amb  to [facility] per [facility]  Documentation, personal belongings w/ pt.

## 2018-03-30 NOTE — DISCHARGE PLANNING
Medical Social Work    Referral: Legal hold Transfer to Mental Health Facility    Intervention: SW received call from Vivian at [facility] stating that they have accepted the patient for admission.     Pt's accepting physcian is Dr. Ventura STANLEY arranged for transportation to be set up through [facility]    The pt will be picked up at 1430.     JADEN notified the RN of the departure time as well as accepting facility.     JADEN created transfer packet and placed on chart.     Plan: Pt will transfer back to [facility] at 1430    MSW called and spoke to Tran at [facility]. Pt is not eligible for transportation benefits. MSW alerted Nathan at [facility].

## 2018-03-30 NOTE — ED NOTES
"Pt finished hot meal, requested a box lunch stating \"because I'm pregnant\". Explained to pt her urine pregnancy came back negative, pt continues to insist that she is pregnant. 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 Pt amb to restroom with steady gait. NAD noted. Will cont to monitor.   "

## 2018-03-30 NOTE — ED NOTES
Assumed care of pt, report received from Jenny. Pt quietly resting on hospital bed, eating meal. Pt under obs of designee monitoring in hallway.

## 2018-03-30 NOTE — ED NOTES
Pt remains asleep in hospital bed. NAD noted. Respirations even, equal and unlabored.    Remains under close obs of designee monitoring in hallway.

## 2018-03-30 NOTE — PSYCHIATRY
"PSYCHIATRIC FOLLOW UP:    Reason for Admission:  \"popping nodules\" in her chest. Patient also describes associated symptoms of hot flashes, dysuria, and generalized body aches. She has not taken any medication for her symptoms and denies drug, cigarette, or alcohol use.  Patient also says that her illness is of \"national security\" because she is \"related to Zahra Liliam and Hutchinson Abril.\" She asks that she be hooked up to a cardiac monitor because \"the [address] wasn't happy about it last time.\" No SI or HI.    Legal hold status: +    Once again she is supposed to be here according to her designated authorities. She is not depressed, not SI/HI. Is very bothered by being offered meds because \"I am not mentally ill\".    Psychiatric Examination: observed phenomenon:  Vitals:Blood pressure 104/60, pulse 70, temperature 37.1 °C (98.7 °F), temperature source Temporal, resp. rate 18, weight 108.7 kg (239 lb 10.2 oz), last menstrual period 03/11/2018, SpO2 98 %.  Musculoskeletal(abnormal movements, gait, etc): none noted.  Appearance: obese, fair hygiene, good eye contact.  Thoughts: delusional   Speech:wnl  Mood: should be here  Affect:euthymic  SI/HI: denies  Attention/Alertness: intact   Memory: grossly intact  Orientation:grossly intact  Fund of Knowledge: not tested     Insight/Judgement into symptoms: poor    Assessment:(acuity level)   Schizoaffective disorder: acute. R/O bipolar disorder with psychosis.        Plan:Day 8 in the ED.  legal hold:extended.  Anticipated F/U: within 48 hours.     Will follow             "

## 2018-03-30 NOTE — ED NOTES
Pt resting in bed, responding to internal stimuli. NAD noted. Refused medication.   Remains under close obs.

## 2018-03-30 NOTE — DISCHARGE PLANNING
Alert team note:  Patient is scheduled to transfer to [facility] at 1430 today.  Continues to be delusional about being a [place] wen and it is the treaty that she does not have to take medications.  Her 8th day in ER and her legal hold is extended.

## 2018-03-30 NOTE — ED NOTES
Pt amb to and from restroom with steady gait. NAD noted. Provided juice per request. Remains under close obs.

## 2018-03-30 NOTE — ED PROVIDER NOTES
ED Provider Note  3/30 this is a patient awaiting transfer to psychiatric facility. Currently she is pleasant and cooperative no complaints vital signs have remained normal

## 2018-06-06 ENCOUNTER — HOSPITAL ENCOUNTER (EMERGENCY)
Dept: HOSPITAL 8 - ED | Age: 33
Discharge: HOME | End: 2018-06-06
Payer: MEDICARE

## 2018-06-06 VITALS — WEIGHT: 230.82 LBS | BODY MASS INDEX: 37.1 KG/M2 | HEIGHT: 66 IN

## 2018-06-06 VITALS — DIASTOLIC BLOOD PRESSURE: 73 MMHG | SYSTOLIC BLOOD PRESSURE: 130 MMHG

## 2018-06-06 DIAGNOSIS — F43.10: ICD-10-CM

## 2018-06-06 DIAGNOSIS — N30.01: Primary | ICD-10-CM

## 2018-06-06 DIAGNOSIS — Z86.718: ICD-10-CM

## 2018-06-06 LAB
CULTURE INDICATED?: YES
MICROSCOPIC: (no result)

## 2018-06-06 PROCEDURE — 87086 URINE CULTURE/COLONY COUNT: CPT

## 2018-06-06 PROCEDURE — 81001 URINALYSIS AUTO W/SCOPE: CPT

## 2018-06-06 PROCEDURE — 99284 EMERGENCY DEPT VISIT MOD MDM: CPT

## 2018-06-21 ENCOUNTER — HOSPITAL ENCOUNTER (EMERGENCY)
Dept: HOSPITAL 8 - ED | Age: 33
Discharge: HOME | End: 2018-06-21
Payer: MEDICARE

## 2018-06-21 ENCOUNTER — HOSPITAL ENCOUNTER (EMERGENCY)
Dept: HOSPITAL 8 - ED | Age: 33
LOS: 1 days | Discharge: HOME | End: 2018-06-22
Payer: SELF-PAY

## 2018-06-21 VITALS — HEIGHT: 66 IN | BODY MASS INDEX: 33.3 KG/M2 | WEIGHT: 207.23 LBS

## 2018-06-21 VITALS — DIASTOLIC BLOOD PRESSURE: 65 MMHG | SYSTOLIC BLOOD PRESSURE: 117 MMHG

## 2018-06-21 VITALS — HEIGHT: 66 IN | WEIGHT: 176.37 LBS | BODY MASS INDEX: 28.34 KG/M2

## 2018-06-21 DIAGNOSIS — R11.2: Primary | ICD-10-CM

## 2018-06-21 DIAGNOSIS — R10.31: ICD-10-CM

## 2018-06-21 DIAGNOSIS — Z86.718: ICD-10-CM

## 2018-06-21 DIAGNOSIS — N39.0: ICD-10-CM

## 2018-06-21 DIAGNOSIS — K80.20: Primary | ICD-10-CM

## 2018-06-21 LAB
ALBUMIN SERPL-MCNC: 3.3 G/DL (ref 3.4–5)
ALBUMIN SERPL-MCNC: 3.5 G/DL (ref 3.4–5)
ALP SERPL-CCNC: 91 U/L (ref 45–117)
ALP SERPL-CCNC: 98 U/L (ref 45–117)
ALT SERPL-CCNC: 18 U/L (ref 12–78)
ALT SERPL-CCNC: 19 U/L (ref 12–78)
ANION GAP SERPL CALC-SCNC: 5 MMOL/L (ref 5–15)
ANION GAP SERPL CALC-SCNC: 6 MMOL/L (ref 5–15)
BASOPHILS # BLD AUTO: 0.01 X10^3/UL (ref 0–0.1)
BASOPHILS # BLD AUTO: 0.06 X10^3/UL (ref 0–0.1)
BASOPHILS NFR BLD AUTO: 0 % (ref 0–1)
BASOPHILS NFR BLD AUTO: 1 % (ref 0–1)
BILIRUB SERPL-MCNC: 0.2 MG/DL (ref 0.2–1)
BILIRUB SERPL-MCNC: 0.3 MG/DL (ref 0.2–1)
CALCIUM SERPL-MCNC: 8.3 MG/DL (ref 8.5–10.1)
CALCIUM SERPL-MCNC: 8.5 MG/DL (ref 8.5–10.1)
CHLORIDE SERPL-SCNC: 110 MMOL/L (ref 98–107)
CHLORIDE SERPL-SCNC: 110 MMOL/L (ref 98–107)
CREAT SERPL-MCNC: 0.77 MG/DL (ref 0.55–1.02)
CREAT SERPL-MCNC: 0.81 MG/DL (ref 0.55–1.02)
EOSINOPHIL # BLD AUTO: 0.09 X10^3/UL (ref 0–0.4)
EOSINOPHIL # BLD AUTO: 0.09 X10^3/UL (ref 0–0.4)
EOSINOPHIL NFR BLD AUTO: 1 % (ref 1–7)
EOSINOPHIL NFR BLD AUTO: 1 % (ref 1–7)
ERYTHROCYTE [DISTWIDTH] IN BLOOD BY AUTOMATED COUNT: 14 % (ref 9.6–15.2)
ERYTHROCYTE [DISTWIDTH] IN BLOOD BY AUTOMATED COUNT: 14.3 % (ref 9.6–15.2)
LYMPHOCYTES # BLD AUTO: 2.14 X10^3/UL (ref 1–3.4)
LYMPHOCYTES # BLD AUTO: 2.32 X10^3/UL (ref 1–3.4)
LYMPHOCYTES NFR BLD AUTO: 16 % (ref 22–44)
LYMPHOCYTES NFR BLD AUTO: 16 % (ref 22–44)
MCH RBC QN AUTO: 30 PG (ref 27–34.8)
MCH RBC QN AUTO: 30.3 PG (ref 27–34.8)
MCHC RBC AUTO-ENTMCNC: 33.5 G/DL (ref 32.4–35.8)
MCHC RBC AUTO-ENTMCNC: 34.1 G/DL (ref 32.4–35.8)
MCV RBC AUTO: 89.1 FL (ref 80–100)
MCV RBC AUTO: 89.6 FL (ref 80–100)
MD: NO
MD: NO
MONOCYTES # BLD AUTO: 0.42 X10^3/UL (ref 0.2–0.8)
MONOCYTES # BLD AUTO: 0.63 X10^3/UL (ref 0.2–0.8)
MONOCYTES NFR BLD AUTO: 3 % (ref 2–9)
MONOCYTES NFR BLD AUTO: 5 % (ref 2–9)
NEUTROPHILS # BLD AUTO: 10.25 X10^3/UL (ref 1.8–6.8)
NEUTROPHILS # BLD AUTO: 11.8 X10^3/UL (ref 1.8–6.8)
NEUTROPHILS NFR BLD AUTO: 78 % (ref 42–75)
NEUTROPHILS NFR BLD AUTO: 81 % (ref 42–75)
PLATELET # BLD AUTO: 383 X10^3/UL (ref 130–400)
PLATELET # BLD AUTO: 385 X10^3/UL (ref 130–400)
PMV BLD AUTO: 7.4 FL (ref 7.4–10.4)
PMV BLD AUTO: 7.5 FL (ref 7.4–10.4)
PROT SERPL-MCNC: 6.3 G/DL (ref 6.4–8.2)
PROT SERPL-MCNC: 6.7 G/DL (ref 6.4–8.2)
RBC # BLD AUTO: 4.21 X10^6/UL (ref 3.82–5.3)
RBC # BLD AUTO: 4.29 X10^6/UL (ref 3.82–5.3)

## 2018-06-21 PROCEDURE — 80053 COMPREHEN METABOLIC PANEL: CPT

## 2018-06-21 PROCEDURE — 83690 ASSAY OF LIPASE: CPT

## 2018-06-21 PROCEDURE — 85025 COMPLETE CBC W/AUTO DIFF WBC: CPT

## 2018-06-21 PROCEDURE — 76700 US EXAM ABDOM COMPLETE: CPT

## 2018-06-21 PROCEDURE — 99285 EMERGENCY DEPT VISIT HI MDM: CPT

## 2018-06-21 PROCEDURE — 99284 EMERGENCY DEPT VISIT MOD MDM: CPT

## 2018-06-21 PROCEDURE — 81001 URINALYSIS AUTO W/SCOPE: CPT

## 2018-06-21 PROCEDURE — 36415 COLL VENOUS BLD VENIPUNCTURE: CPT

## 2018-06-21 PROCEDURE — 96372 THER/PROPH/DIAG INJ SC/IM: CPT

## 2018-06-21 PROCEDURE — 96374 THER/PROPH/DIAG INJ IV PUSH: CPT

## 2018-06-21 PROCEDURE — 87186 SC STD MICRODIL/AGAR DIL: CPT

## 2018-06-21 PROCEDURE — 87086 URINE CULTURE/COLONY COUNT: CPT

## 2018-06-21 PROCEDURE — 87077 CULTURE AEROBIC IDENTIFY: CPT

## 2018-06-21 PROCEDURE — 84703 CHORIONIC GONADOTROPIN ASSAY: CPT

## 2018-06-22 VITALS — DIASTOLIC BLOOD PRESSURE: 65 MMHG | SYSTOLIC BLOOD PRESSURE: 124 MMHG

## 2018-06-22 LAB
CULTURE INDICATED?: YES
MICROSCOPIC: (no result)

## 2018-06-28 ENCOUNTER — HOSPITAL ENCOUNTER (EMERGENCY)
Dept: HOSPITAL 8 - ED | Age: 33
Discharge: HOME | End: 2018-06-28
Payer: SELF-PAY

## 2018-06-28 VITALS — HEIGHT: 66 IN | BODY MASS INDEX: 27.46 KG/M2 | WEIGHT: 170.86 LBS

## 2018-06-28 VITALS — DIASTOLIC BLOOD PRESSURE: 65 MMHG | SYSTOLIC BLOOD PRESSURE: 108 MMHG

## 2018-06-28 DIAGNOSIS — N30.91: Primary | ICD-10-CM

## 2018-06-28 DIAGNOSIS — I82.409: ICD-10-CM

## 2018-06-28 LAB
ALBUMIN SERPL-MCNC: 3.6 G/DL (ref 3.4–5)
ALP SERPL-CCNC: 98 U/L (ref 45–117)
ALT SERPL-CCNC: 20 U/L (ref 12–78)
ANION GAP SERPL CALC-SCNC: 7 MMOL/L (ref 5–15)
BASOPHILS # BLD AUTO: 0.14 X10^3/UL (ref 0–0.1)
BASOPHILS NFR BLD AUTO: 1 % (ref 0–1)
BILIRUB SERPL-MCNC: 0.4 MG/DL (ref 0.2–1)
CALCIUM SERPL-MCNC: 8.7 MG/DL (ref 8.5–10.1)
CHLORIDE SERPL-SCNC: 108 MMOL/L (ref 98–107)
CREAT SERPL-MCNC: 0.82 MG/DL (ref 0.55–1.02)
CULTURE INDICATED?: YES
EOSINOPHIL # BLD AUTO: 0.13 X10^3/UL (ref 0–0.4)
EOSINOPHIL NFR BLD AUTO: 1 % (ref 1–7)
ERYTHROCYTE [DISTWIDTH] IN BLOOD BY AUTOMATED COUNT: 14 % (ref 9.6–15.2)
LYMPHOCYTES # BLD AUTO: 4.36 X10^3/UL (ref 1–3.4)
LYMPHOCYTES NFR BLD AUTO: 28 % (ref 22–44)
MCH RBC QN AUTO: 29.4 PG (ref 27–34.8)
MCHC RBC AUTO-ENTMCNC: 33.3 G/DL (ref 32.4–35.8)
MCV RBC AUTO: 88.3 FL (ref 80–100)
MD: NO
MICROSCOPIC: (no result)
MONOCYTES # BLD AUTO: 0.81 X10^3/UL (ref 0.2–0.8)
MONOCYTES NFR BLD AUTO: 5 % (ref 2–9)
NEUTROPHILS # BLD AUTO: 10.26 X10^3/UL (ref 1.8–6.8)
NEUTROPHILS NFR BLD AUTO: 65 % (ref 42–75)
PLATELET # BLD AUTO: 413 X10^3/UL (ref 130–400)
PMV BLD AUTO: 7.5 FL (ref 7.4–10.4)
PROT SERPL-MCNC: 7 G/DL (ref 6.4–8.2)
RBC # BLD AUTO: 4.5 X10^6/UL (ref 3.82–5.3)

## 2018-06-28 PROCEDURE — 36415 COLL VENOUS BLD VENIPUNCTURE: CPT

## 2018-06-28 PROCEDURE — 87086 URINE CULTURE/COLONY COUNT: CPT

## 2018-06-28 PROCEDURE — 81001 URINALYSIS AUTO W/SCOPE: CPT

## 2018-06-28 PROCEDURE — 87186 SC STD MICRODIL/AGAR DIL: CPT

## 2018-06-28 PROCEDURE — 84703 CHORIONIC GONADOTROPIN ASSAY: CPT

## 2018-06-28 PROCEDURE — 80053 COMPREHEN METABOLIC PANEL: CPT

## 2018-06-28 PROCEDURE — 99284 EMERGENCY DEPT VISIT MOD MDM: CPT

## 2018-06-28 PROCEDURE — 83690 ASSAY OF LIPASE: CPT

## 2018-06-28 PROCEDURE — 85025 COMPLETE CBC W/AUTO DIFF WBC: CPT

## 2018-06-28 PROCEDURE — 87077 CULTURE AEROBIC IDENTIFY: CPT

## 2018-06-30 ENCOUNTER — HOSPITAL ENCOUNTER (INPATIENT)
Facility: MEDICAL CENTER | Age: 33
LOS: 8 days | DRG: 418 | End: 2018-07-08
Attending: INTERNAL MEDICINE | Admitting: INTERNAL MEDICINE
Payer: MEDICARE

## 2018-06-30 ENCOUNTER — APPOINTMENT (OUTPATIENT)
Dept: RADIOLOGY | Facility: MEDICAL CENTER | Age: 33
DRG: 418 | End: 2018-06-30
Attending: EMERGENCY MEDICINE
Payer: MEDICARE

## 2018-06-30 DIAGNOSIS — K80.20 CHOLELITHIASIS WITHOUT CHOLECYSTITIS: ICD-10-CM

## 2018-06-30 DIAGNOSIS — R10.11 RUQ ABDOMINAL PAIN: ICD-10-CM

## 2018-06-30 DIAGNOSIS — R79.89 ELEVATED LFTS: ICD-10-CM

## 2018-06-30 DIAGNOSIS — K80.20 CALCULUS OF GALLBLADDER WITHOUT CHOLECYSTITIS WITHOUT OBSTRUCTION: ICD-10-CM

## 2018-06-30 LAB
ALBUMIN SERPL BCP-MCNC: 4.3 G/DL (ref 3.2–4.9)
ALBUMIN/GLOB SERPL: 1.3 G/DL
ALP SERPL-CCNC: 187 U/L (ref 30–99)
ALT SERPL-CCNC: 1027 U/L (ref 2–50)
ANION GAP SERPL CALC-SCNC: 9 MMOL/L (ref 0–11.9)
APPEARANCE UR: ABNORMAL
AST SERPL-CCNC: 865 U/L (ref 12–45)
BACTERIA #/AREA URNS HPF: ABNORMAL /HPF
BASOPHILS # BLD AUTO: 0.2 % (ref 0–1.8)
BASOPHILS # BLD: 0.02 K/UL (ref 0–0.12)
BILIRUB SERPL-MCNC: 2.9 MG/DL (ref 0.1–1.5)
BILIRUB UR QL STRIP.AUTO: ABNORMAL
BUN SERPL-MCNC: 8 MG/DL (ref 8–22)
CALCIUM SERPL-MCNC: 9.2 MG/DL (ref 8.5–10.5)
CHLORIDE SERPL-SCNC: 105 MMOL/L (ref 96–112)
CO2 SERPL-SCNC: 24 MMOL/L (ref 20–33)
COLOR UR: ABNORMAL
CREAT SERPL-MCNC: 0.64 MG/DL (ref 0.5–1.4)
EOSINOPHIL # BLD AUTO: 0.08 K/UL (ref 0–0.51)
EOSINOPHIL NFR BLD: 0.7 % (ref 0–6.9)
EPI CELLS #/AREA URNS HPF: ABNORMAL /HPF
ERYTHROCYTE [DISTWIDTH] IN BLOOD BY AUTOMATED COUNT: 42.4 FL (ref 35.9–50)
GLOBULIN SER CALC-MCNC: 3.2 G/DL (ref 1.9–3.5)
GLUCOSE SERPL-MCNC: 108 MG/DL (ref 65–99)
GLUCOSE UR STRIP.AUTO-MCNC: NEGATIVE MG/DL
HAV IGM SERPL QL IA: NEGATIVE
HBV CORE IGM SER QL: NEGATIVE
HBV SURFACE AG SER QL: NEGATIVE
HCG SERPL QL: NEGATIVE
HCT VFR BLD AUTO: 45.4 % (ref 37–47)
HCV AB SER QL: NEGATIVE
HGB BLD-MCNC: 15.1 G/DL (ref 12–16)
HYALINE CASTS #/AREA URNS LPF: ABNORMAL /LPF
IMM GRANULOCYTES # BLD AUTO: 0.04 K/UL (ref 0–0.11)
IMM GRANULOCYTES NFR BLD AUTO: 0.4 % (ref 0–0.9)
KETONES UR STRIP.AUTO-MCNC: 15 MG/DL
LEUKOCYTE ESTERASE UR QL STRIP.AUTO: ABNORMAL
LIPASE SERPL-CCNC: 15 U/L (ref 11–82)
LYMPHOCYTES # BLD AUTO: 1.8 K/UL (ref 1–4.8)
LYMPHOCYTES NFR BLD: 16.3 % (ref 22–41)
MCH RBC QN AUTO: 29.3 PG (ref 27–33)
MCHC RBC AUTO-ENTMCNC: 33.3 G/DL (ref 33.6–35)
MCV RBC AUTO: 88.2 FL (ref 81.4–97.8)
MICRO URNS: ABNORMAL
MONOCYTES # BLD AUTO: 0.85 K/UL (ref 0–0.85)
MONOCYTES NFR BLD AUTO: 7.7 % (ref 0–13.4)
NEUTROPHILS # BLD AUTO: 8.28 K/UL (ref 2–7.15)
NEUTROPHILS NFR BLD: 74.7 % (ref 44–72)
NITRITE UR QL STRIP.AUTO: POSITIVE
NRBC # BLD AUTO: 0 K/UL
NRBC BLD-RTO: 0 /100 WBC
PH UR STRIP.AUTO: 7 [PH]
PLATELET # BLD AUTO: 364 K/UL (ref 164–446)
PMV BLD AUTO: 9.3 FL (ref 9–12.9)
POTASSIUM SERPL-SCNC: 4.3 MMOL/L (ref 3.6–5.5)
PROT SERPL-MCNC: 7.5 G/DL (ref 6–8.2)
PROT UR QL STRIP: NEGATIVE MG/DL
RBC # BLD AUTO: 5.15 M/UL (ref 4.2–5.4)
RBC # URNS HPF: ABNORMAL /HPF
RBC UR QL AUTO: NEGATIVE
SODIUM SERPL-SCNC: 138 MMOL/L (ref 135–145)
SP GR UR STRIP.AUTO: 1.03
UROBILINOGEN UR STRIP.AUTO-MCNC: 1 MG/DL
WBC # BLD AUTO: 11.1 K/UL (ref 4.8–10.8)
WBC #/AREA URNS HPF: ABNORMAL /HPF

## 2018-06-30 PROCEDURE — A9270 NON-COVERED ITEM OR SERVICE: HCPCS | Performed by: HOSPITALIST

## 2018-06-30 PROCEDURE — 700105 HCHG RX REV CODE 258: Performed by: INTERNAL MEDICINE

## 2018-06-30 PROCEDURE — 96361 HYDRATE IV INFUSION ADD-ON: CPT

## 2018-06-30 PROCEDURE — A9270 NON-COVERED ITEM OR SERVICE: HCPCS | Performed by: INTERNAL MEDICINE

## 2018-06-30 PROCEDURE — 700102 HCHG RX REV CODE 250 W/ 637 OVERRIDE(OP): Performed by: HOSPITALIST

## 2018-06-30 PROCEDURE — 700101 HCHG RX REV CODE 250: Performed by: INTERNAL MEDICINE

## 2018-06-30 PROCEDURE — 85025 COMPLETE CBC W/AUTO DIFF WBC: CPT

## 2018-06-30 PROCEDURE — 81001 URINALYSIS AUTO W/SCOPE: CPT

## 2018-06-30 PROCEDURE — 99285 EMERGENCY DEPT VISIT HI MDM: CPT

## 2018-06-30 PROCEDURE — 94760 N-INVAS EAR/PLS OXIMETRY 1: CPT

## 2018-06-30 PROCEDURE — 700111 HCHG RX REV CODE 636 W/ 250 OVERRIDE (IP): Performed by: INTERNAL MEDICINE

## 2018-06-30 PROCEDURE — 96374 THER/PROPH/DIAG INJ IV PUSH: CPT

## 2018-06-30 PROCEDURE — 700102 HCHG RX REV CODE 250 W/ 637 OVERRIDE(OP): Performed by: INTERNAL MEDICINE

## 2018-06-30 PROCEDURE — 700111 HCHG RX REV CODE 636 W/ 250 OVERRIDE (IP): Performed by: HOSPITALIST

## 2018-06-30 PROCEDURE — 700111 HCHG RX REV CODE 636 W/ 250 OVERRIDE (IP): Performed by: EMERGENCY MEDICINE

## 2018-06-30 PROCEDURE — 83690 ASSAY OF LIPASE: CPT

## 2018-06-30 PROCEDURE — 770006 HCHG ROOM/CARE - MED/SURG/GYN SEMI*

## 2018-06-30 PROCEDURE — 80053 COMPREHEN METABOLIC PANEL: CPT

## 2018-06-30 PROCEDURE — 80074 ACUTE HEPATITIS PANEL: CPT

## 2018-06-30 PROCEDURE — 700105 HCHG RX REV CODE 258: Performed by: EMERGENCY MEDICINE

## 2018-06-30 PROCEDURE — 700111 HCHG RX REV CODE 636 W/ 250 OVERRIDE (IP)

## 2018-06-30 PROCEDURE — 99223 1ST HOSP IP/OBS HIGH 75: CPT | Performed by: INTERNAL MEDICINE

## 2018-06-30 PROCEDURE — 76705 ECHO EXAM OF ABDOMEN: CPT

## 2018-06-30 PROCEDURE — 96375 TX/PRO/DX INJ NEW DRUG ADDON: CPT

## 2018-06-30 PROCEDURE — 84703 CHORIONIC GONADOTROPIN ASSAY: CPT

## 2018-06-30 RX ORDER — OXYCODONE HYDROCHLORIDE 5 MG/1
5 TABLET ORAL EVERY 4 HOURS PRN
Status: DISCONTINUED | OUTPATIENT
Start: 2018-06-30 | End: 2018-07-01

## 2018-06-30 RX ORDER — ONDANSETRON 2 MG/ML
4 INJECTION INTRAMUSCULAR; INTRAVENOUS EVERY 4 HOURS PRN
Status: DISCONTINUED | OUTPATIENT
Start: 2018-06-30 | End: 2018-07-02

## 2018-06-30 RX ORDER — AMOXICILLIN 250 MG
2 CAPSULE ORAL 2 TIMES DAILY
Status: DISCONTINUED | OUTPATIENT
Start: 2018-06-30 | End: 2018-07-08 | Stop reason: HOSPADM

## 2018-06-30 RX ORDER — BISACODYL 10 MG
10 SUPPOSITORY, RECTAL RECTAL
Status: DISCONTINUED | OUTPATIENT
Start: 2018-06-30 | End: 2018-07-08 | Stop reason: HOSPADM

## 2018-06-30 RX ORDER — ONDANSETRON 2 MG/ML
4 INJECTION INTRAMUSCULAR; INTRAVENOUS ONCE
Status: COMPLETED | OUTPATIENT
Start: 2018-06-30 | End: 2018-06-30

## 2018-06-30 RX ORDER — CIPROFLOXACIN 2 MG/ML
200 INJECTION, SOLUTION INTRAVENOUS EVERY 12 HOURS
Status: DISCONTINUED | OUTPATIENT
Start: 2018-06-30 | End: 2018-06-30

## 2018-06-30 RX ORDER — CEFTRIAXONE SODIUM 2 G/50ML
2 INJECTION, SOLUTION INTRAVENOUS EVERY 24 HOURS
Status: DISCONTINUED | OUTPATIENT
Start: 2018-06-30 | End: 2018-07-07

## 2018-06-30 RX ORDER — ONDANSETRON 4 MG/1
4 TABLET, ORALLY DISINTEGRATING ORAL EVERY 4 HOURS PRN
Status: DISCONTINUED | OUTPATIENT
Start: 2018-06-30 | End: 2018-07-02

## 2018-06-30 RX ORDER — DIPHENHYDRAMINE HCL 25 MG
25 TABLET ORAL EVERY 8 HOURS PRN
Status: DISCONTINUED | OUTPATIENT
Start: 2018-06-30 | End: 2018-07-08 | Stop reason: HOSPADM

## 2018-06-30 RX ORDER — HYDROMORPHONE HYDROCHLORIDE 2 MG/ML
1 INJECTION, SOLUTION INTRAMUSCULAR; INTRAVENOUS; SUBCUTANEOUS ONCE
Status: COMPLETED | OUTPATIENT
Start: 2018-06-30 | End: 2018-06-30

## 2018-06-30 RX ORDER — SODIUM CHLORIDE 9 MG/ML
INJECTION, SOLUTION INTRAVENOUS CONTINUOUS
Status: DISCONTINUED | OUTPATIENT
Start: 2018-06-30 | End: 2018-07-08 | Stop reason: HOSPADM

## 2018-06-30 RX ORDER — SODIUM CHLORIDE 9 MG/ML
1000 INJECTION, SOLUTION INTRAVENOUS ONCE
Status: COMPLETED | OUTPATIENT
Start: 2018-06-30 | End: 2018-06-30

## 2018-06-30 RX ORDER — POLYETHYLENE GLYCOL 3350 17 G/17G
1 POWDER, FOR SOLUTION ORAL
Status: DISCONTINUED | OUTPATIENT
Start: 2018-06-30 | End: 2018-07-08 | Stop reason: HOSPADM

## 2018-06-30 RX ORDER — KETOROLAC TROMETHAMINE 30 MG/ML
30 INJECTION, SOLUTION INTRAMUSCULAR; INTRAVENOUS EVERY 6 HOURS PRN
Status: DISPENSED | OUTPATIENT
Start: 2018-06-30 | End: 2018-07-05

## 2018-06-30 RX ORDER — ONDANSETRON 4 MG/1
4 TABLET, ORALLY DISINTEGRATING ORAL ONCE
Status: COMPLETED | OUTPATIENT
Start: 2018-06-30 | End: 2018-06-30

## 2018-06-30 RX ADMIN — ONDANSETRON 4 MG: 2 INJECTION, SOLUTION INTRAMUSCULAR; INTRAVENOUS at 16:55

## 2018-06-30 RX ADMIN — SODIUM CHLORIDE: 9 INJECTION, SOLUTION INTRAVENOUS at 16:56

## 2018-06-30 RX ADMIN — STANDARDIZED SENNA CONCENTRATE AND DOCUSATE SODIUM 2 TABLET: 8.6; 5 TABLET, FILM COATED ORAL at 21:13

## 2018-06-30 RX ADMIN — DIPHENHYDRAMINE HCL 25 MG: 25 TABLET ORAL at 21:15

## 2018-06-30 RX ADMIN — OXYCODONE HYDROCHLORIDE 5 MG: 5 TABLET ORAL at 16:55

## 2018-06-30 RX ADMIN — KETOROLAC TROMETHAMINE 30 MG: 30 INJECTION, SOLUTION INTRAMUSCULAR at 21:25

## 2018-06-30 RX ADMIN — ONDANSETRON 4 MG: 2 INJECTION, SOLUTION INTRAMUSCULAR; INTRAVENOUS at 05:00

## 2018-06-30 RX ADMIN — HYDROMORPHONE HYDROCHLORIDE 1 MG: 2 INJECTION, SOLUTION INTRAMUSCULAR; INTRAVENOUS; SUBCUTANEOUS at 05:00

## 2018-06-30 RX ADMIN — SODIUM CHLORIDE 1000 ML: 9 INJECTION, SOLUTION INTRAVENOUS at 04:59

## 2018-06-30 RX ADMIN — KETOROLAC TROMETHAMINE 30 MG: 30 INJECTION, SOLUTION INTRAMUSCULAR at 14:55

## 2018-06-30 RX ADMIN — ENOXAPARIN SODIUM 40 MG: 100 INJECTION SUBCUTANEOUS at 21:12

## 2018-06-30 RX ADMIN — SODIUM CHLORIDE: 9 INJECTION, SOLUTION INTRAVENOUS at 12:04

## 2018-06-30 RX ADMIN — OXYCODONE HYDROCHLORIDE 5 MG: 5 TABLET ORAL at 23:05

## 2018-06-30 RX ADMIN — METRONIDAZOLE 500 MG: 500 INJECTION, SOLUTION INTRAVENOUS at 12:03

## 2018-06-30 RX ADMIN — CEFTRIAXONE SODIUM 2 G: 2 INJECTION, SOLUTION INTRAVENOUS at 13:36

## 2018-06-30 RX ADMIN — METRONIDAZOLE 500 MG: 500 INJECTION, SOLUTION INTRAVENOUS at 21:26

## 2018-06-30 RX ADMIN — ONDANSETRON 4 MG: 4 TABLET, ORALLY DISINTEGRATING ORAL at 04:20

## 2018-06-30 ASSESSMENT — PAIN SCALES - GENERAL
PAINLEVEL_OUTOF10: 9
PAINLEVEL_OUTOF10: 7
PAINLEVEL_OUTOF10: 6
PAINLEVEL_OUTOF10: 7
PAINLEVEL_OUTOF10: 5
PAINLEVEL_OUTOF10: 6

## 2018-06-30 ASSESSMENT — ENCOUNTER SYMPTOMS
SHORTNESS OF BREATH: 0
ABDOMINAL PAIN: 1
HEADACHES: 0
HEARTBURN: 0
PHOTOPHOBIA: 0
MYALGIAS: 0
TREMORS: 0
BRUISES/BLEEDS EASILY: 0
DIARRHEA: 0
DOUBLE VISION: 0
BLURRED VISION: 0
FALLS: 0
PALPITATIONS: 0
DEPRESSION: 0
BACK PAIN: 0
WEIGHT LOSS: 0
DIZZINESS: 0
HEMOPTYSIS: 0
HALLUCINATIONS: 0
BLOOD IN STOOL: 0
SPUTUM PRODUCTION: 0
MEMORY LOSS: 0
COUGH: 0
CONSTIPATION: 0
ORTHOPNEA: 0
FEVER: 1
CHILLS: 1
VOMITING: 1
POLYDIPSIA: 1
EYE PAIN: 0
DIAPHORESIS: 0
NAUSEA: 1

## 2018-06-30 ASSESSMENT — COGNITIVE AND FUNCTIONAL STATUS - GENERAL
DAILY ACTIVITIY SCORE: 24
SUGGESTED CMS G CODE MODIFIER DAILY ACTIVITY: CH
MOBILITY SCORE: 24
SUGGESTED CMS G CODE MODIFIER MOBILITY: CH

## 2018-06-30 ASSESSMENT — COPD QUESTIONNAIRES
DO YOU EVER COUGH UP ANY MUCUS OR PHLEGM?: YES, A FEW DAYS A WEEK OR MONTH
COPD SCREENING SCORE: 1
HAVE YOU SMOKED AT LEAST 100 CIGARETTES IN YOUR ENTIRE LIFE: NO/DON'T KNOW
DURING THE PAST 4 WEEKS HOW MUCH DID YOU FEEL SHORT OF BREATH: NONE/LITTLE OF THE TIME

## 2018-06-30 ASSESSMENT — LIFESTYLE VARIABLES
EVER_SMOKED: NEVER
EVER_SMOKED: NEVER
ALCOHOL_USE: NO

## 2018-06-30 ASSESSMENT — PATIENT HEALTH QUESTIONNAIRE - PHQ9
1. LITTLE INTEREST OR PLEASURE IN DOING THINGS: NOT AT ALL
SUM OF ALL RESPONSES TO PHQ9 QUESTIONS 1 AND 2: 0
2. FEELING DOWN, DEPRESSED, IRRITABLE, OR HOPELESS: NOT AT ALL

## 2018-06-30 NOTE — CONSULTS
General Surgery Consult  6/30/2018    CC: Abdominal pain     HPI: Ms Dunia Sahni  is a pleasant 33 y.o. Female reports 1 day history of nausea vomiting and right upper quadrant pain.   She states pain sever located upper abdomen  Associated subjective fever  She states pain made worse with movement  Pain made better with rest and medication  She states now comfortable pain controlled   She reports similar occurrence 9 days ago diagnosis with gallbladder disease    Past Medical History:   Diagnosis Date   • Abscess 10/12/2017    right AC arm area   • Bipolar affective (HCC)    • Depression    • Kidney infection    • Suicide attempt (HCC)        Past Surgical History:   Procedure Laterality Date   • APPENDECTOMY     • PB C-SEC ONLY,PBEV C-SEC         Current Facility-Administered Medications   Medication Dose Route Frequency Provider Last Rate Last Dose   • senna-docusate (PERICOLACE or SENOKOT S) 8.6-50 MG per tablet 2 Tab  2 Tab Oral BID Terrance Gipson M.D.        And   • polyethylene glycol/lytes (MIRALAX) PACKET 1 Packet  1 Packet Oral QDAY PRN Terrance Gipson M.D.        And   • magnesium hydroxide (MILK OF MAGNESIA) suspension 30 mL  30 mL Oral QDAY PRN Terrance Gipson M.D.        And   • bisacodyl (DULCOLAX) suppository 10 mg  10 mg Rectal QDAY PRN Terrance Gipson M.D.       • Respiratory Care per Protocol   Nebulization Continuous RT Terrance Gipson M.D.       • NS infusion   Intravenous Continuous Terrance Gipson M.D. 100 mL/hr at 06/30/18 1204     • metroNIDAZOLE (FLAGYL) IVPB 500 mg  500 mg Intravenous Q8HRS Terrance Gipson M.D.   Stopped at 06/30/18 1303   • enoxaparin (LOVENOX) inj 40 mg  40 mg Subcutaneous QHS Terrance Gipson M.D.       • ketorolac (TORADOL) injection 30 mg  30 mg Intravenous Q6HRS PRN Terrance Gipson M.D.       • cefTRIAXone (ROCEPHIN) IVPB premix 2 g  2 g Intravenous Q24HRS Terrance Gipson M.D.   2 g at 06/30/18 1336       Social History     Social History  "  • Marital status:      Spouse name: N/A   • Number of children: N/A   • Years of education: N/A     Occupational History   • Not on file.     Social History Main Topics   • Smoking status: Never Smoker   • Smokeless tobacco: Never Used   • Alcohol use No   • Drug use: No   • Sexual activity: Not on file     Other Topics Concern   • Not on file     Social History Narrative   • No narrative on file       No family history on file.    Allergies:  Vicodin [hydrocodone-acetaminophen]; Pcn [penicillins]; Bee venom; Blackberry [rubus fruticosus]; Haldol [haloperidol]; and Raspberry    Review of Systems:  Positive as noted above otherwise negative    Physical Exam:  Blood pressure 119/53, pulse (!) 49, temperature 36.4 °C (97.6 °F), resp. rate 18, height 1.676 m (5' 6\"), weight 105 kg (231 lb 7.7 oz), last menstrual period 06/21/2018, SpO2 96 %, not currently breastfeeding.    Constitutional: Awake, alert, oriented x3. No acute distress.  Head: No cephalohematoma. Pupils reactive bilaterally.   Neck: No tracheal deviation. No stridor  Cardiovascular: Normal rate, skin warm brisk cap refill  Pulmonary/Chest: Clavicles nontender to palpation. There is not any chest wall tenderness bilaterally.  No crepitus. Positive breath sounds bilaterally.   Abdominal: Soft, nondistended. Nontender to palpation. No guarding no rebound  Musculoskeletal: warm dry  Neurological:  GCS 15. E4 V5 M6.  Skin: Skin is warm and dry.    Psychiatric:  Normal mood and affect.  Behavior is appropriate.       Labs:  Recent Labs      06/30/18 0425   WBC  11.1*   RBC  5.15   HEMOGLOBIN  15.1   HEMATOCRIT  45.4   MCV  88.2   MCH  29.3   MCHC  33.3*   RDW  42.4   PLATELETCT  364   MPV  9.3     Recent Labs      06/30/18   0425   SODIUM  138   POTASSIUM  4.3   CHLORIDE  105   CO2  24   GLUCOSE  108*   BUN  8   CREATININE  0.64   CALCIUM  9.2         Recent Labs      06/30/18 0425   ASTSGOT  865*   ALTSGPT  1027*   TBILIRUBIN  2.9*   ALKPHOSPHAT  " 187*   GLOBULIN  3.2       Radiology:  US-GALLBLADDER   Final Result         1.  Cholelithiasis and gallbladder sludge without additional sonographic findings of acute cholecystitis.   2.  Hepatomegaly and echogenic liver, compatible with fatty change versus fibrosis.      NA-GHSRBEU-P/O    (Results Pending)         Assessment: This is a 33 y.o. Gallbladder disorder  Elevated bilirubin lft    Plan:   Active Hospital Problems    Diagnosis   • RUQ pain [R10.11]     Priority: High   • LFT elevation [R79.89]     Priority: High   • Leukocytosis [D72.829]   • Lower urinary tract infectious disease [N39.0]     Diagnois update 10/1/2016     • Bipolar disorder (HCC) [F31.9]   Discussed findings with patient  discussed surgical removal of gallbladder   She is hesitant surgery due to prior experience she states can not tolerate anesthesia  She agree to discuss again after MRI    Antibiotics   Follow up imaging  Discuss findings, surgery laparoscopic cholecystectomy      Leroy Goodson MD, FACS  [facility]   [phone]

## 2018-06-30 NOTE — PROGRESS NOTES
Patient arrived to floor. Patient walked from the gurney to the bed.     Patient oriented to unit and call light system

## 2018-06-30 NOTE — ED NOTES
Patient is alert and oriented no acute distress at this time.  Patient is ready for transport to the floor for care.

## 2018-06-30 NOTE — ED PROVIDER NOTES
ED Provider Note    Scribed for Robert Holt M.D. by Jose Gee. 6/30/2018  4:34 AM    Primary care provider: Sam Monroy M.D.  Means of arrival: Ambulance  History obtained from: Patient  History limited by: None    CHIEF COMPLAINT  Chief Complaint   Patient presents with   • Abdominal Pain     Pt c/o right sided abd pain, dx with gallstones 1 week ago, unable to follow up.    • Vomiting     Pt started vomiting on and off x a couple days       HPI  Dunia Sahni is a 33 y.o. Female with a history of gallstones who presents to the Emergency Department for evaluation of severe right upper quadrant pain onset 24 hours ago. She describes the quality of her pain as being diffuse and preventing her from doing much movement.  She reports associated nausea, vomiting, and subjective fever. She has not been able to keep food down for the past day. 9 days ago she received her gallstones diagnosis from [facility]. No exacerbating or alleviating factors reported at this time. She denies experiencing diarrhea.      REVIEW OF SYSTEMS  Pertinent positives include right upper quadrant pain, nausea, subjective fever, and vomiting.  Pertinent negatives include no diarrhea.     All other systems reviewed and negative.    C.     PAST MEDICAL HISTORY   has a past medical history of Abscess (10/12/2017); Bipolar affective (HCC); Depression; Kidney infection; and Suicide attempt (HCC).    SURGICAL HISTORY   has a past surgical history that includes c-sec only,prev c-sec and appendectomy.    SOCIAL HISTORY  Social History   Substance Use Topics   • Smoking status: Never Smoker   • Smokeless tobacco: Never Used   • Alcohol use No      History   Drug Use No       FAMILY HISTORY  None reported    CURRENT MEDICATIONS  Home Medications    None reported         ALLERGIES  Allergies   Allergen Reactions   • Vicodin [Hydrocodone-Acetaminophen] Anaphylaxis     RXN=9 years ago   • Pcn [Penicillins] Nausea     RXN=8 years ago   •  "Bee Venom    • Blackberry [Rubus Fruticosus] Rash     \"rash\"   • Haldol [Haloperidol] Rash     Pt states, \"I have a rash and my lips were swollen.\"   • Raspberry        PHYSICAL EXAM  VITAL SIGNS: /64   Pulse 61   Temp 36.7 °C (98.1 °F)   Resp 18   Ht 1.676 m (5' 6\")   Wt 105 kg (231 lb 7.7 oz)   BMI 37.36 kg/m²     Nursing note and vitals reviewed.  Constitutional: Well-developed and well-nourished. Moderate distress secondary to pain.    HENT: Head is normocephalic and atraumatic. Oropharynx is clear and moist without exudate or erythema.   Eyes: Pupils are equal, round, and reactive to light. Conjunctiva are normal.   Cardiovascular: Normal rate and regular rhythm. No murmur heard. Normal radial pulses.  Pulmonary/Chest: Breath sounds normal. No wheezes or rales.   Abdominal: Soft. Positive Ginger's sign. Right upper quadrant tenderness.   Musculoskeletal: Extremities exhibit normal range of motion without edema or tenderness.   Neurological: Awake, alert and oriented to person, place, and time. No focal deficits noted.  Skin: Skin is warm and dry. No rash.   Psychiatric: Normal mood and affect. Appropriate for clinical situation    DIAGNOSTIC STUDIES / PROCEDURES      LABS  Results for orders placed or performed during the hospital encounter of 06/30/18   CBC WITH DIFFERENTIAL   Result Value Ref Range    WBC 11.1 (H) 4.8 - 10.8 K/uL    RBC 5.15 4.20 - 5.40 M/uL    Hemoglobin 15.1 12.0 - 16.0 g/dL    Hematocrit 45.4 37.0 - 47.0 %    MCV 88.2 81.4 - 97.8 fL    MCH 29.3 27.0 - 33.0 pg    MCHC 33.3 (L) 33.6 - 35.0 g/dL    RDW 42.4 35.9 - 50.0 fL    Platelet Count 364 164 - 446 K/uL    MPV 9.3 9.0 - 12.9 fL    Neutrophils-Polys 74.70 (H) 44.00 - 72.00 %    Lymphocytes 16.30 (L) 22.00 - 41.00 %    Monocytes 7.70 0.00 - 13.40 %    Eosinophils 0.70 0.00 - 6.90 %    Basophils 0.20 0.00 - 1.80 %    Immature Granulocytes 0.40 0.00 - 0.90 %    Nucleated RBC 0.00 /100 WBC    Neutrophils (Absolute) 8.28 (H) 2.00 " - 7.15 K/uL    Lymphs (Absolute) 1.80 1.00 - 4.80 K/uL    Monos (Absolute) 0.85 0.00 - 0.85 K/uL    Eos (Absolute) 0.08 0.00 - 0.51 K/uL    Baso (Absolute) 0.02 0.00 - 0.12 K/uL    Immature Granulocytes (abs) 0.04 0.00 - 0.11 K/uL    NRBC (Absolute) 0.00 K/uL   COMP METABOLIC PANEL   Result Value Ref Range    Sodium 138 135 - 145 mmol/L    Potassium 4.3 3.6 - 5.5 mmol/L    Chloride 105 96 - 112 mmol/L    Co2 24 20 - 33 mmol/L    Anion Gap 9.0 0.0 - 11.9    Glucose 108 (H) 65 - 99 mg/dL    Bun 8 8 - 22 mg/dL    Creatinine 0.64 0.50 - 1.40 mg/dL    Calcium 9.2 8.5 - 10.5 mg/dL    AST(SGOT) 865 (H) 12 - 45 U/L    ALT(SGPT) 1027 (H) 2 - 50 U/L    Alkaline Phosphatase 187 (H) 30 - 99 U/L    Total Bilirubin 2.9 (H) 0.1 - 1.5 mg/dL    Albumin 4.3 3.2 - 4.9 g/dL    Total Protein 7.5 6.0 - 8.2 g/dL    Globulin 3.2 1.9 - 3.5 g/dL    A-G Ratio 1.3 g/dL   LIPASE   Result Value Ref Range    Lipase 15 11 - 82 U/L   URINALYSIS CULTURE, IF INDICATED   Result Value Ref Range    Color DK Yellow     Character Cloudy (A)     Specific Gravity 1.026 <1.035    Ph 7.0 5.0 - 8.0    Glucose Negative Negative mg/dL    Ketones 15 (A) Negative mg/dL    Protein Negative Negative mg/dL    Bilirubin Moderate (A) Negative    Urobilinogen, Urine 1.0 Negative    Nitrite Positive (A) Negative    Leukocyte Esterase Small (A) Negative    Occult Blood Negative Negative    Micro Urine Req Microscopic    HCG QUAL SERUM   Result Value Ref Range    Beta-Hcg Qualitative Serum Negative Negative   ESTIMATED GFR   Result Value Ref Range    GFR If African American >60 >60 mL/min/1.73 m 2    GFR If Non African American >60 >60 mL/min/1.73 m 2   URINE MICROSCOPIC (W/UA)   Result Value Ref Range    WBC 5-10 (A) /hpf    RBC 2-5 (A) /hpf    Bacteria Many (A) None /hpf    Epithelial Cells Few /hpf    Hyaline Cast 6-10 (A) /lpf       All labs reviewed by me.    RADIOLOGY  US-GALLBLADDER   Final Result         1.  Cholelithiasis and gallbladder sludge without additional  sonographic findings of acute cholecystitis.   2.  Hepatomegaly and echogenic liver, compatible with fatty change versus fibrosis.        The radiologist's interpretation of all radiological studies have been reviewed by me.    COURSE & MEDICAL DECISION MAKING  Nursing notes, VS, PMSFHx reviewed in chart.     Review of past medical records shows the patient was seen here 3 months ago for vomiting and diarrhea.    4:34 AM - Patient seen and examined at bedside. Patient will be treated with Zofran 4 mg, Dilaudid 1 mg, Zofran ODT 4 mg, and NS 1000 mL for vomiting. Ordered US-Gallbladder, CBC, CMP, Lipase, Ua, and HCG Qual Serum to evaluate her symptoms.    7:33 AM Paged General Surgery.     8:00 AM Consult with Dr. Goodson (general surgery). He recommends Hospitalist admission and agrees to see the patient.     8:31 AM Patient reevaluated at bedside. Her symptoms have improved following IV fluids. I informed her on plan of care including hospital admission. She understands and agrees.     8:51 AM Consult with Dr. Gipson (hospitalist). He agrees to admit the patient.       DISPOSITION:  Patient will be admitted to Dr. Gipson in guarded condition.      FINAL IMPRESSION  1. Elevated LFTs    2. RUQ abdominal pain    3. Calculus of gallbladder without cholecystitis without obstruction          Jose GARCIA ([place]), am scribing for, and in the presence of, Robert Holt M.D..    Electronically signed by: Jose Gee ([place]), 6/30/2018    IRobert M.D. personally performed the services described in this documentation, as scribed by Jose Gee in my presence, and it is both accurate and complete.    The note accurately reflects work and decisions made by me.  Robert Holt  6/30/2018  11:23 AM

## 2018-06-30 NOTE — PROGRESS NOTES
Report received from Er nurse    Assessment complete.  A&O x 4. Patient calls appropriately.  Patient ambulates with standby.   Patient has 5/10 pain. States this is manageable for her.  Denies N&V. Patient NPO at this time.  + void, + flatus  Patient denies SOB.  SCD's in use.    Review plan with of care with patient. Call light and personal belongings with in reach. Hourly rounding in place. All needs met at this time.

## 2018-06-30 NOTE — ASSESSMENT & PLAN NOTE
Status post laparoscopic cholecystectomy after ERCP. She is now postop day #1.  Continue with pain management, continue advancing diet, ambulate patient.

## 2018-06-30 NOTE — ED TRIAGE NOTES
Chief Complaint   Patient presents with   • Abdominal Pain     Pt c/o right sided abd pain, dx with gallstones 1 week ago, unable to follow up.    • Vomiting     Pt started vomiting on and off x a couple days

## 2018-06-30 NOTE — H&P
Hospital Medicine History and Physical    Date of Service  6/30/2018    Chief Complaint  Chief Complaint   Patient presents with   • Abdominal Pain     Pt c/o right sided abd pain, dx with gallstones 1 week ago, unable to follow up.    • Vomiting     Pt started vomiting on and off x a couple days       History of Presenting Illness  33 y.o. Female with history of BPD, anxiety, appendectomy who presented 6/30/2018 with complaints of right upper quadrant pain.  RUQ is sharp, intermittent exacerbated by oral intake, up to 10 out of 10, radiating to the right shoulder.  States that she has been having such a pain on and off in the last 2 weeks, but it has been getting progressively worse the last 24 hours.  She had multiple vomiting and dry heaves and poor oral intake due to above.  Her urine was dark.  Associated also his chills and subjective fever.  Denies drinking alcohol or drug use.  Denies currently taking any medications.  Patient is anxious about possible surgery (cholecystectomy), as she had complicated appendectomy many years ago, and she has a lot of anxiety at baseline.    Primary Care Physician  Sam Monroy M.D.    Consultants  Surgery    Code Status  Full code    Review of Systems  Review of Systems   Constitutional: Positive for chills, fever and malaise/fatigue. Negative for diaphoresis and weight loss.   HENT: Negative for congestion, ear discharge, ear pain, nosebleeds and tinnitus.    Eyes: Negative for blurred vision, double vision, photophobia and pain.   Respiratory: Negative for cough, hemoptysis, sputum production and shortness of breath.    Cardiovascular: Negative for chest pain, palpitations and orthopnea.   Gastrointestinal: Positive for abdominal pain, nausea and vomiting. Negative for blood in stool, constipation, diarrhea and heartburn.   Genitourinary: Negative for dysuria, frequency and urgency.   Musculoskeletal: Negative for back pain, falls and myalgias.   Skin: Negative for  "itching and rash.   Neurological: Negative for dizziness, tremors and headaches.   Endo/Heme/Allergies: Positive for polydipsia. Negative for environmental allergies. Does not bruise/bleed easily.   Psychiatric/Behavioral: Negative for depression, hallucinations and memory loss.     Please see HPI, all other systems were reviewed and are negative (AMA/CMS criteria)       Past Medical History  Past Medical History:   Diagnosis Date   • Abscess 10/12/2017    right AC arm area   • Bipolar affective (HCC)    • Depression    • Kidney infection    • Suicide attempt (HCC)        Surgical History  Past Surgical History:   Procedure Laterality Date   • APPENDECTOMY     • PB C-SEC ONLY,PBEV C-SEC         Medications  Denies     Family History    Patient denies family history of heart disease, diabetes or cancer.    Social History  Social History   Substance Use Topics   • Smoking status: Never Smoker   • Smokeless tobacco: Never Used   • Alcohol use No       Allergies  Allergies   Allergen Reactions   • Vicodin [Hydrocodone-Acetaminophen] Anaphylaxis     RXN=9 years ago   • Pcn [Penicillins] Nausea     RXN=8 years ago  Toleartes rocephin   • Bee Venom    • Blackberry [Rubus Fruticosus] Rash     \"rash\"   • Haldol [Haloperidol] Rash     Pt states, \"I have a rash and my lips were swollen.\"   • Raspberry         Physical Exam  Laboratory   Hemodynamics  Temp (24hrs), Av.6 °C (97.9 °F), Min:36.4 °C (97.6 °F), Max:36.7 °C (98.1 °F)   Temperature: 36.4 °C (97.6 °F)  Pulse  Av.7  Min: 47  Max: 61    Blood Pressure: 119/53, NIBP: 114/65      Respiratory      Respiration: 18, Pulse Oximetry: 96 %             Physical Exam  Vitals/ General Appearance:   Weight/BMI: Body mass index is 37.36 kg/m².  Blood pressure 119/53, pulse (!) 49, temperature 36.4 °C (97.6 °F), resp. rate 18, height 1.676 m (5' 6\"), weight 105 kg (231 lb 7.7 oz), last menstrual period 2018, SpO2 96 %, not currently breastfeeding. Vitals:    18 " 0700 06/30/18 0938 06/30/18 1033 06/30/18 1119   BP:  105/60 106/59 119/53   Pulse: (!) 49 (!) 52 (!) 56 (!) 49   Resp:  18 18 18   Temp:    36.4 °C (97.6 °F)   SpO2: 99% 97% 97% 96%   Weight:       Height:        Oxygen Therapy:  Pulse Oximetry: 96 %, O2 (LPM): 0, O2 Delivery: None (Room Air)    Constitutional:  well developed, obese  HENMT: Normocephalic, atraumatic, b/l ears normal, nose normal. Dry MM  Eyes:  EOMI, conjunctiva normal, no discharge  Neck: no tracheal deviation, supple  Cardiovascular: bradycardic,  Rhythm regular, no murmurs, no rubs or gallops; no cyanosis, clubbing or edema  Lungs: Respiratory effort is normal, normal breath sounds, breath sounds clear to auscultation b/l, no rales, rhonchi or wheezing  Abdomen: soft, TTP, no guarding or rebound. +Jc. BS+  Skin: warm, dry, no erythema, no rash  Neurologic: Alert and oriented, no focal deficits, CN II-XII normal  Psychiatric: Some anxiety or depression    Recent Labs      06/30/18   0425   WBC  11.1*   RBC  5.15   HEMOGLOBIN  15.1   HEMATOCRIT  45.4   MCV  88.2   MCH  29.3   MCHC  33.3*   RDW  42.4   PLATELETCT  364   MPV  9.3     Recent Labs      06/30/18   0425   SODIUM  138   POTASSIUM  4.3   CHLORIDE  105   CO2  24   GLUCOSE  108*   BUN  8   CREATININE  0.64   CALCIUM  9.2     Recent Labs      06/30/18   0425   ALTSGPT  1027*   ASTSGOT  865*   ALKPHOSPHAT  187*   TBILIRUBIN  2.9*   LIPASE  15   GLUCOSE  108*                 Lab Results   Component Value Date    TROPONINI <0.01 10/19/2017     Urinalysis:    Lab Results  Component Value Date/Time   SPECGRAVITY 1.026 06/30/2018 0725   GLUCOSEUR Negative 06/30/2018 0725   KETONES 15 (A) 06/30/2018 0725   NITRITE Positive (A) 06/30/2018 0725   WBCURINE 5-10 (A) 06/30/2018 0725   RBCURINE 2-5 (A) 06/30/2018 0725   BACTERIA Many (A) 06/30/2018 0725   EPITHELCELL Few 06/30/2018 0725        Imaging  US-GALLBLADDER   Final Result         1.  Cholelithiasis and gallbladder sludge without additional  sonographic findings of acute cholecystitis.   2.  Hepatomegaly and echogenic liver, compatible with fatty change versus fibrosis.      [id number]    (Results Pending)      Assessment/Plan     I anticipate this patient will require at least two midnights for appropriate medical management, necessitating inpatient admission.    LFT elevation   Assessment & Plan    US liver showed possible fatty liver  Differential diagnosis is broad , and includes a transient cholestasis accompanied to obstruction of CBD and liver disease itself  Workup includes MRCP , viral hepatitis panel, JADYN, ferritin, IgG, DBili  Discusses w/ gastroenterology Dr Francois; if MRCP results came back abnormal, he will need to be called and involved        RUQ pain   Assessment & Plan    Probably secondary to symptomatic cholelithiasis versus cholecystitis  Pain control with Toradol, as patient is allergic to opiates  Surgery eval pending (Dr Goodson to see)  MRCP pending  Will start empiric coverage for cholecystitis with ceftriaxone Flagyl        Leukocytosis- (present on admission)   Assessment & Plan    Secondary to cholecystitis versus UTI  On antibiotics        Bipolar disorder (HCC)- (present on admission)   Assessment & Plan    Stable  f/u mental health as o/p        Lower urinary tract infectious disease- (present on admission)   Assessment & Plan    Patient denies dysuria or frequency.  At this point, patient is receiving IV ceftriaxone            Prophylaxis: lovenox       I spent 77 minutes evaluating Dunia Sahni, reviewing the chart, vitals, labs and imaging, discussing the case with ED physician, medication reconciliation, placing orders and enacting the plan above.    Sections of this note have been dictated using Dragon Speech recognition software. While care and attention has been placed to ensure the accuracy of this note, there can be occasional typographical errors that may be missed and I apologize if this situation  occurs. Please take these errors into context. If questions occur please do not hesitate to call or notify the author of this note for clarification.

## 2018-06-30 NOTE — ED NOTES
Patient is alert and oriented, understands her care plan and admission.  No acute distress, alert and oriented X4

## 2018-07-01 ENCOUNTER — APPOINTMENT (OUTPATIENT)
Dept: RADIOLOGY | Facility: MEDICAL CENTER | Age: 33
DRG: 418 | End: 2018-07-01
Attending: INTERNAL MEDICINE
Payer: MEDICARE

## 2018-07-01 LAB
ALBUMIN SERPL BCP-MCNC: 3.7 G/DL (ref 3.2–4.9)
ALBUMIN/GLOB SERPL: 1.5 G/DL
ALP SERPL-CCNC: 207 U/L (ref 30–99)
ALT SERPL-CCNC: 663 U/L (ref 2–50)
ANION GAP SERPL CALC-SCNC: 12 MMOL/L (ref 0–11.9)
ANION GAP SERPL CALC-SCNC: 13 MMOL/L (ref 0–11.9)
AST SERPL-CCNC: 236 U/L (ref 12–45)
BASOPHILS # BLD AUTO: 0.4 % (ref 0–1.8)
BASOPHILS # BLD: 0.04 K/UL (ref 0–0.12)
BILIRUB SERPL-MCNC: 1.4 MG/DL (ref 0.1–1.5)
BUN SERPL-MCNC: 10 MG/DL (ref 8–22)
BUN SERPL-MCNC: 11 MG/DL (ref 8–22)
CALCIUM SERPL-MCNC: 8.8 MG/DL (ref 8.5–10.5)
CALCIUM SERPL-MCNC: 8.9 MG/DL (ref 8.5–10.5)
CHLORIDE SERPL-SCNC: 108 MMOL/L (ref 96–112)
CHLORIDE SERPL-SCNC: 108 MMOL/L (ref 96–112)
CO2 SERPL-SCNC: 17 MMOL/L (ref 20–33)
CO2 SERPL-SCNC: 18 MMOL/L (ref 20–33)
CREAT SERPL-MCNC: 0.74 MG/DL (ref 0.5–1.4)
CREAT SERPL-MCNC: 0.74 MG/DL (ref 0.5–1.4)
EOSINOPHIL # BLD AUTO: 0.12 K/UL (ref 0–0.51)
EOSINOPHIL NFR BLD: 1.1 % (ref 0–6.9)
ERYTHROCYTE [DISTWIDTH] IN BLOOD BY AUTOMATED COUNT: 44.2 FL (ref 35.9–50)
GLOBULIN SER CALC-MCNC: 2.5 G/DL (ref 1.9–3.5)
GLUCOSE SERPL-MCNC: 68 MG/DL (ref 65–99)
GLUCOSE SERPL-MCNC: 70 MG/DL (ref 65–99)
HCT VFR BLD AUTO: 39.6 % (ref 37–47)
HGB BLD-MCNC: 12.9 G/DL (ref 12–16)
IMM GRANULOCYTES # BLD AUTO: 0.04 K/UL (ref 0–0.11)
IMM GRANULOCYTES NFR BLD AUTO: 0.4 % (ref 0–0.9)
LIPASE SERPL-CCNC: 6 U/L (ref 11–82)
LYMPHOCYTES # BLD AUTO: 2.88 K/UL (ref 1–4.8)
LYMPHOCYTES NFR BLD: 26.3 % (ref 22–41)
MCH RBC QN AUTO: 29.2 PG (ref 27–33)
MCHC RBC AUTO-ENTMCNC: 32.6 G/DL (ref 33.6–35)
MCV RBC AUTO: 89.6 FL (ref 81.4–97.8)
MONOCYTES # BLD AUTO: 0.64 K/UL (ref 0–0.85)
MONOCYTES NFR BLD AUTO: 5.9 % (ref 0–13.4)
NEUTROPHILS # BLD AUTO: 7.21 K/UL (ref 2–7.15)
NEUTROPHILS NFR BLD: 65.9 % (ref 44–72)
NRBC # BLD AUTO: 0 K/UL
NRBC BLD-RTO: 0 /100 WBC
PLATELET # BLD AUTO: 316 K/UL (ref 164–446)
PMV BLD AUTO: 9.3 FL (ref 9–12.9)
POTASSIUM SERPL-SCNC: 4.1 MMOL/L (ref 3.6–5.5)
POTASSIUM SERPL-SCNC: 4.2 MMOL/L (ref 3.6–5.5)
PROT SERPL-MCNC: 6.2 G/DL (ref 6–8.2)
RBC # BLD AUTO: 4.42 M/UL (ref 4.2–5.4)
SODIUM SERPL-SCNC: 138 MMOL/L (ref 135–145)
SODIUM SERPL-SCNC: 138 MMOL/L (ref 135–145)
WBC # BLD AUTO: 10.9 K/UL (ref 4.8–10.8)

## 2018-07-01 PROCEDURE — 99232 SBSQ HOSP IP/OBS MODERATE 35: CPT | Performed by: HOSPITALIST

## 2018-07-01 PROCEDURE — 700111 HCHG RX REV CODE 636 W/ 250 OVERRIDE (IP): Performed by: INTERNAL MEDICINE

## 2018-07-01 PROCEDURE — 700101 HCHG RX REV CODE 250: Performed by: INTERNAL MEDICINE

## 2018-07-01 PROCEDURE — 36415 COLL VENOUS BLD VENIPUNCTURE: CPT

## 2018-07-01 PROCEDURE — 83690 ASSAY OF LIPASE: CPT

## 2018-07-01 PROCEDURE — 85025 COMPLETE CBC W/AUTO DIFF WBC: CPT

## 2018-07-01 PROCEDURE — 700102 HCHG RX REV CODE 250 W/ 637 OVERRIDE(OP): Performed by: HOSPITALIST

## 2018-07-01 PROCEDURE — 700102 HCHG RX REV CODE 250 W/ 637 OVERRIDE(OP): Performed by: INTERNAL MEDICINE

## 2018-07-01 PROCEDURE — 770006 HCHG ROOM/CARE - MED/SURG/GYN SEMI*

## 2018-07-01 PROCEDURE — A9270 NON-COVERED ITEM OR SERVICE: HCPCS | Performed by: HOSPITALIST

## 2018-07-01 PROCEDURE — A9270 NON-COVERED ITEM OR SERVICE: HCPCS | Performed by: INTERNAL MEDICINE

## 2018-07-01 PROCEDURE — 700105 HCHG RX REV CODE 258: Performed by: INTERNAL MEDICINE

## 2018-07-01 PROCEDURE — 700111 HCHG RX REV CODE 636 W/ 250 OVERRIDE (IP): Performed by: HOSPITALIST

## 2018-07-01 PROCEDURE — 80053 COMPREHEN METABOLIC PANEL: CPT

## 2018-07-01 PROCEDURE — 80048 BASIC METABOLIC PNL TOTAL CA: CPT

## 2018-07-01 RX ORDER — HYDROMORPHONE HYDROCHLORIDE 4 MG/1
4 TABLET ORAL
Status: DISCONTINUED | OUTPATIENT
Start: 2018-07-01 | End: 2018-07-08 | Stop reason: HOSPADM

## 2018-07-01 RX ADMIN — HYDROMORPHONE HYDROCHLORIDE 4 MG: 4 TABLET ORAL at 12:17

## 2018-07-01 RX ADMIN — STANDARDIZED SENNA CONCENTRATE AND DOCUSATE SODIUM 2 TABLET: 8.6; 5 TABLET, FILM COATED ORAL at 20:53

## 2018-07-01 RX ADMIN — METRONIDAZOLE 500 MG: 500 INJECTION, SOLUTION INTRAVENOUS at 06:27

## 2018-07-01 RX ADMIN — STANDARDIZED SENNA CONCENTRATE AND DOCUSATE SODIUM 2 TABLET: 8.6; 5 TABLET, FILM COATED ORAL at 08:51

## 2018-07-01 RX ADMIN — HYDROMORPHONE HYDROCHLORIDE 4 MG: 4 TABLET ORAL at 17:40

## 2018-07-01 RX ADMIN — HYDROMORPHONE HYDROCHLORIDE 4 MG: 4 TABLET ORAL at 22:31

## 2018-07-01 RX ADMIN — ENOXAPARIN SODIUM 40 MG: 100 INJECTION SUBCUTANEOUS at 20:53

## 2018-07-01 RX ADMIN — CEFTRIAXONE SODIUM 2 G: 2 INJECTION, SOLUTION INTRAVENOUS at 08:51

## 2018-07-01 RX ADMIN — ONDANSETRON 4 MG: 2 INJECTION, SOLUTION INTRAMUSCULAR; INTRAVENOUS at 17:39

## 2018-07-01 RX ADMIN — HYDROMORPHONE HYDROCHLORIDE 4 MG: 4 TABLET ORAL at 08:51

## 2018-07-01 RX ADMIN — SODIUM CHLORIDE: 9 INJECTION, SOLUTION INTRAVENOUS at 08:52

## 2018-07-01 RX ADMIN — SODIUM CHLORIDE: 9 INJECTION, SOLUTION INTRAVENOUS at 17:40

## 2018-07-01 RX ADMIN — METRONIDAZOLE 500 MG: 500 INJECTION, SOLUTION INTRAVENOUS at 14:15

## 2018-07-01 RX ADMIN — METRONIDAZOLE 500 MG: 500 INJECTION, SOLUTION INTRAVENOUS at 22:31

## 2018-07-01 ASSESSMENT — ENCOUNTER SYMPTOMS
EYES NEGATIVE: 1
CONSTIPATION: 0
HEARTBURN: 0
VOMITING: 0
NERVOUS/ANXIOUS: 1
CHILLS: 0
NEUROLOGICAL NEGATIVE: 1
FEVER: 0
DEPRESSION: 0
WEAKNESS: 0
CHILLS: 1
ABDOMINAL PAIN: 1
SHORTNESS OF BREATH: 0
DIZZINESS: 0
COUGH: 0
PALPITATIONS: 0
NAUSEA: 0
FEVER: 1
MUSCULOSKELETAL NEGATIVE: 1
POLYDIPSIA: 0
RESPIRATORY NEGATIVE: 1
BLURRED VISION: 0
BRUISES/BLEEDS EASILY: 0
NECK PAIN: 0
CARDIOVASCULAR NEGATIVE: 1
BACK PAIN: 0
HEADACHES: 0
DOUBLE VISION: 0
DIAPHORESIS: 0
FOCAL WEAKNESS: 0
NAUSEA: 1
BLOOD IN STOOL: 0

## 2018-07-01 ASSESSMENT — PAIN SCALES - GENERAL
PAINLEVEL_OUTOF10: 4
PAINLEVEL_OUTOF10: 7
PAINLEVEL_OUTOF10: 4
PAINLEVEL_OUTOF10: 9
PAINLEVEL_OUTOF10: 5

## 2018-07-01 NOTE — PROGRESS NOTES
[facility]    Date of Service: 2018    Chief Complaint  33 y.o. female admitted 2018 with abdominal pain, right upper quadrant pain, abnormal liver enzymes, abnormal gallbladder.    Interval Problem Update  Patient seen and examined today.    Patient tolerating treatment and therapies.  All Data, Medication data reviewed.  Case discussed with nursing as available.  Plan of Care reviewed with patient and notified of changes.   patient feels somewhat better, denies fevers or chills, awaiting MRCP to evaluate for biliary system obstruction, stones, cholecystitis.  Surgery is consulted    Consultants/Specialty  Surgery    Disposition  To be determined        Review of Systems   Constitutional: Positive for malaise/fatigue. Negative for chills and fever.   HENT: Negative.    Eyes: Negative.    Respiratory: Negative.  Negative for cough.    Cardiovascular: Negative.  Negative for chest pain and palpitations.   Gastrointestinal: Positive for abdominal pain and nausea. Negative for blood in stool, constipation, heartburn and vomiting.   Genitourinary: Negative.  Negative for dysuria and frequency.   Musculoskeletal: Negative.  Negative for back pain and neck pain.   Skin: Negative.  Negative for itching and rash.   Neurological: Negative.  Negative for dizziness, focal weakness, weakness and headaches.   Endo/Heme/Allergies: Negative.  Negative for polydipsia. Does not bruise/bleed easily.   Psychiatric/Behavioral: Negative for depression. The patient is nervous/anxious.       Physical Exam  Laboratory/Imaging   Hemodynamics  Temp (24hrs), Av.8 °C (98.2 °F), Min:36.5 °C (97.7 °F), Max:37.1 °C (98.7 °F)   Temperature: 36.8 °C (98.3 °F)  Pulse  Av.2  Min: 46  Max: 105    Blood Pressure: 126/68      Respiratory      Respiration: 18, Pulse Oximetry: 96 %, O2 Daily Delivery Respiratory : Room Air with O2 Available        RUL Breath Sounds: Clear, RML Breath Sounds: Clear, RLL Breath  Sounds: Diminished, MATIAS Breath Sounds: Clear, LLL Breath Sounds: Diminished    Fluids    Intake/Output Summary (Last 24 hours) at 07/01/18 1449  Last data filed at 07/01/18 0400   Gross per 24 hour   Intake             1200 ml   Output                0 ml   Net             1200 ml       Nutrition  Orders Placed This Encounter   Procedures   • Diet NPO     Standing Status:   Standing     Number of Occurrences:   1     Order Specific Question:   Restrict to:     Answer:   Sips with Medications [3]     Physical Exam   Constitutional: She is oriented to person, place, and time. She appears well-developed and well-nourished.   HENT:   Head: Normocephalic and atraumatic.   Nose: Nose normal.   Mouth/Throat: Oropharynx is clear and moist.   Eyes: Conjunctivae and EOM are normal. Pupils are equal, round, and reactive to light.   Neck: Normal range of motion. Neck supple. No JVD present. No thyromegaly present.   Cardiovascular: Normal rate, regular rhythm and normal heart sounds.  Exam reveals no gallop and no friction rub.    Pulses:       Dorsalis pedis pulses are 2+ on the right side, and 2+ on the left side.   Capillary refill <3 secs   Pulmonary/Chest: Effort normal and breath sounds normal. She has no wheezes. She has no rales.   Abdominal: Soft. Bowel sounds are normal. She exhibits no distension and no mass. There is tenderness. There is no rebound and no guarding.   Musculoskeletal: Normal range of motion. She exhibits no edema or tenderness.   Lymphadenopathy:     She has no cervical adenopathy.   Neurological: She is alert and oriented to person, place, and time. No cranial nerve deficit.   Skin: Skin is warm and dry. She is not diaphoretic. No cyanosis.   Psychiatric: She has a normal mood and affect. Her behavior is normal.   Nursing note and vitals reviewed.      Recent Labs      06/30/18   0425  07/01/18   0254   WBC  11.1*  10.9*   RBC  5.15  4.42   HEMOGLOBIN  15.1  12.9   HEMATOCRIT  45.4  39.6   MCV  88.2   89.6   MCH  29.3  29.2   MCHC  33.3*  32.6*   RDW  42.4  44.2   PLATELETCT  364  316   MPV  9.3  9.3     Recent Labs      06/30/18   0425  07/01/18   0254   SODIUM  138  138  138   POTASSIUM  4.3  4.2  4.1   CHLORIDE  105  108  108   CO2  24  17*  18*   GLUCOSE  108*  68  70   BUN  8  11  10   CREATININE  0.64  0.74  0.74   CALCIUM  9.2  8.8  8.9                      Assessment/Plan     LFT elevation   Assessment & Plan    US liver   Follow clinically  Possibly related to cholelithiasis versus cholecystitis        RUQ pain   Assessment & Plan    Probably secondary to symptomatic cholelithiasis versus cholecystitis  Pain control   Surgery eval pending (Dr Goodson to see)  MRCP pending  Will start empiric coverage for cholecystitis with ceftriaxone Flagyl        Leukocytosis- (present on admission)   Assessment & Plan    Secondary to cholecystitis versus UTI  On antibiotics        Bipolar disorder (HCC)- (present on admission)   Assessment & Plan    Stable  f/u mental health as o/p        Lower urinary tract infectious disease- (present on admission)   Assessment & Plan    Patient denies dysuria or frequency.  At this point, patient is receiving IV ceftriaxone          Quality-Core Measures    Plan   await MRCP  Surgery is following  Likely cholecystectomy  Continue with supportive care  Adjust the pain medication  See orders

## 2018-07-01 NOTE — CARE PLAN
Problem: Bowel/Gastric:  Goal: Will not experience complications related to bowel motility  Outcome: PROGRESSING AS EXPECTED  Pt denies nausea at this time although reports frequent episodes of emesis earlier in the day.   Pt verbalizes understanding of need to call if nausea comes back.     Problem: Pain Management  Goal: Pain level will decrease to patient's comfort goal  Outcome: PROGRESSING AS EXPECTED  PRN pain medication in use to help pt manage pain.

## 2018-07-01 NOTE — PROGRESS NOTES
Bedside report received.  Assessment complete.  A&O x 4. Patient calls appropriately.  Patient up self, no assistance required.   Patient has 6/10 pain. Medication [id number]  Denies N&V. Tolerating sips with meds.  positive void, positive flatus  Patient denies SOB.  Patient resting in bed, pt expressing some rears regarding possible surgery- pt encouraged to communicate concerns with healthcare team.  Review plan with of care with patient. Call light and personal belongings with in reach. Hourly rounding in place. All needs met at this time.

## 2018-07-01 NOTE — PROGRESS NOTES
Bedside report received.    Assessment complete.  A&O x 4. Patient calls appropriately.  Patient up self.   Patient has 9/10 pain. Medicated per MAR  Denies N&V at this time. Patient NPO.  + void, + flatus  Patient denies SOB.    Review plan with of care with patient. Call light and personal belongings with in reach. Hourly rounding in place. All needs met at this time.

## 2018-07-01 NOTE — PROGRESS NOTES
"  Trauma/Surgical Progress Note    Author: Leroy Goodson Date & Time created: 7/1/2018   12:43 PM   Abdominal pain  Cholelithiasis  Elevated LFT  Question Hepatomegaly and echogenic liver, compatible with fatty change versus fibrosis.    Interval Events:  Ms Sahni reports feeling better  Pain well controlled    Review of Systems   Constitutional: Positive for chills, fever and malaise/fatigue. Negative for diaphoresis.   HENT: Negative for congestion, hearing loss and nosebleeds.    Eyes: Negative for blurred vision and double vision.   Respiratory: Negative for shortness of breath.    Cardiovascular: Negative for chest pain and palpitations.   Gastrointestinal: Positive for abdominal pain. Negative for heartburn, nausea and vomiting.   Neurological: Negative for weakness and headaches.     Hemodynamics:  Blood pressure 126/68, pulse 69, temperature 36.8 °C (98.3 °F), resp. rate 18, height 1.676 m (5' 6\"), weight 101.1 kg (222 lb 14.2 oz), last menstrual period 06/21/2018, SpO2 96 %, not currently breastfeeding.     Respiratory:    Respiration: 18, Pulse Oximetry: 96 %, O2 Daily Delivery Respiratory : Room Air with O2 Available        RUL Breath Sounds: Clear, RML Breath Sounds: Clear, RLL Breath Sounds: Diminished, MATIAS Breath Sounds: Clear, LLL Breath Sounds: Diminished  Fluids:    Intake/Output Summary (Last 24 hours) at 07/01/18 1243  Last data filed at 07/01/18 0400   Gross per 24 hour   Intake             1200 ml   Output                0 ml   Net             1200 ml     Admit Weight: 105 kg (231 lb 7.7 oz)  Current Weight: 101.1 kg (222 lb 14.2 oz)    Physical Exam   Constitutional: She is oriented to person, place, and time. She appears well-developed and well-nourished. No distress.   HENT:   Head: Normocephalic.   Eyes: Right eye exhibits no discharge. Left eye exhibits no discharge.   Neck: No tracheal deviation present.   Cardiovascular: Normal rate.    Pulmonary/Chest: No stridor. No respiratory " distress. She has no wheezes. She has no rales. She exhibits no tenderness.   Abdominal: Soft. She exhibits no distension. There is no tenderness. There is no rebound and no guarding.   Neurological: She is alert and oriented to person, place, and time.   Skin: Skin is warm and dry. She is not diaphoretic.   Psychiatric: She has a normal mood and affect. Her behavior is normal.       Medical Decision Making/Problem List:    Active Hospital Problems    Diagnosis   • RUQ pain [R10.11]     Priority: High   • LFT elevation [R79.89]     Priority: High   • Leukocytosis [D72.829]   • Lower urinary tract infectious disease [N39.0]     Diagnois update 10/1/2016     • Bipolar disorder (HCC) [F31.9]     Core Measures & Quality Metrics:  Core Measures & Quality Metrics  ESTRELLA Score    Assessment/Plan  MRI planned  Follow up imaging  lft trend  Plan towards cholecystectomy   discussed risks benefits and alternative  Ms Sahni akila remains apprehensive but accepts

## 2018-07-02 PROBLEM — F25.9 SCHIZOAFFECTIVE DISORDER (HCC): Status: ACTIVE | Noted: 2018-07-02

## 2018-07-02 LAB
ALBUMIN SERPL BCP-MCNC: 3.7 G/DL (ref 3.2–4.9)
ALBUMIN/GLOB SERPL: 1.4 G/DL
ALP SERPL-CCNC: 249 U/L (ref 30–99)
ALT SERPL-CCNC: 477 U/L (ref 2–50)
AMPHET UR QL SCN: NEGATIVE
ANION GAP SERPL CALC-SCNC: 8 MMOL/L (ref 0–11.9)
AST SERPL-CCNC: 105 U/L (ref 12–45)
BARBITURATES UR QL SCN: NEGATIVE
BENZODIAZ UR QL SCN: NEGATIVE
BILIRUB SERPL-MCNC: 1.1 MG/DL (ref 0.1–1.5)
BUN SERPL-MCNC: 9 MG/DL (ref 8–22)
BZE UR QL SCN: NEGATIVE
CALCIUM SERPL-MCNC: 8.8 MG/DL (ref 8.5–10.5)
CANNABINOIDS UR QL SCN: NEGATIVE
CHLORIDE SERPL-SCNC: 106 MMOL/L (ref 96–112)
CO2 SERPL-SCNC: 20 MMOL/L (ref 20–33)
CREAT SERPL-MCNC: 0.66 MG/DL (ref 0.5–1.4)
ERYTHROCYTE [DISTWIDTH] IN BLOOD BY AUTOMATED COUNT: 43 FL (ref 35.9–50)
GLOBULIN SER CALC-MCNC: 2.6 G/DL (ref 1.9–3.5)
GLUCOSE SERPL-MCNC: 69 MG/DL (ref 65–99)
HCT VFR BLD AUTO: 39.1 % (ref 37–47)
HGB BLD-MCNC: 13 G/DL (ref 12–16)
MAGNESIUM SERPL-MCNC: 1.8 MG/DL (ref 1.5–2.5)
MCH RBC QN AUTO: 29.7 PG (ref 27–33)
MCHC RBC AUTO-ENTMCNC: 33.2 G/DL (ref 33.6–35)
MCV RBC AUTO: 89.5 FL (ref 81.4–97.8)
METHADONE UR QL SCN: NEGATIVE
OPIATES UR QL SCN: POSITIVE
OXYCODONE UR QL SCN: NEGATIVE
PCP UR QL SCN: NEGATIVE
PHOSPHATE SERPL-MCNC: 3.3 MG/DL (ref 2.5–4.5)
PLATELET # BLD AUTO: 310 K/UL (ref 164–446)
PMV BLD AUTO: 9.3 FL (ref 9–12.9)
POTASSIUM SERPL-SCNC: 4 MMOL/L (ref 3.6–5.5)
PROPOXYPH UR QL SCN: NEGATIVE
PROT SERPL-MCNC: 6.3 G/DL (ref 6–8.2)
RBC # BLD AUTO: 4.37 M/UL (ref 4.2–5.4)
SODIUM SERPL-SCNC: 134 MMOL/L (ref 135–145)
WBC # BLD AUTO: 10.3 K/UL (ref 4.8–10.8)

## 2018-07-02 PROCEDURE — 700102 HCHG RX REV CODE 250 W/ 637 OVERRIDE(OP): Performed by: INTERNAL MEDICINE

## 2018-07-02 PROCEDURE — 83735 ASSAY OF MAGNESIUM: CPT

## 2018-07-02 PROCEDURE — 700111 HCHG RX REV CODE 636 W/ 250 OVERRIDE (IP): Performed by: HOSPITALIST

## 2018-07-02 PROCEDURE — 36415 COLL VENOUS BLD VENIPUNCTURE: CPT

## 2018-07-02 PROCEDURE — 700102 HCHG RX REV CODE 250 W/ 637 OVERRIDE(OP): Performed by: HOSPITALIST

## 2018-07-02 PROCEDURE — 85027 COMPLETE CBC AUTOMATED: CPT

## 2018-07-02 PROCEDURE — 80053 COMPREHEN METABOLIC PANEL: CPT

## 2018-07-02 PROCEDURE — 80307 DRUG TEST PRSMV CHEM ANLYZR: CPT

## 2018-07-02 PROCEDURE — A9270 NON-COVERED ITEM OR SERVICE: HCPCS | Performed by: HOSPITALIST

## 2018-07-02 PROCEDURE — 700101 HCHG RX REV CODE 250: Performed by: INTERNAL MEDICINE

## 2018-07-02 PROCEDURE — 99233 SBSQ HOSP IP/OBS HIGH 50: CPT | Performed by: HOSPITALIST

## 2018-07-02 PROCEDURE — A9270 NON-COVERED ITEM OR SERVICE: HCPCS | Performed by: INTERNAL MEDICINE

## 2018-07-02 PROCEDURE — 770006 HCHG ROOM/CARE - MED/SURG/GYN SEMI*

## 2018-07-02 PROCEDURE — 700105 HCHG RX REV CODE 258: Performed by: INTERNAL MEDICINE

## 2018-07-02 PROCEDURE — 700111 HCHG RX REV CODE 636 W/ 250 OVERRIDE (IP): Performed by: INTERNAL MEDICINE

## 2018-07-02 PROCEDURE — 84100 ASSAY OF PHOSPHORUS: CPT

## 2018-07-02 RX ORDER — CHLORPROMAZINE HYDROCHLORIDE 25 MG/ML
25 INJECTION INTRAMUSCULAR EVERY 6 HOURS PRN
Status: DISCONTINUED | OUTPATIENT
Start: 2018-07-02 | End: 2018-07-08 | Stop reason: HOSPADM

## 2018-07-02 RX ORDER — CHLORPROMAZINE HYDROCHLORIDE 50 MG/1
50 TABLET, FILM COATED ORAL 3 TIMES DAILY PRN
Status: DISCONTINUED | OUTPATIENT
Start: 2018-07-02 | End: 2018-07-08 | Stop reason: HOSPADM

## 2018-07-02 RX ADMIN — HYDROMORPHONE HYDROCHLORIDE 4 MG: 4 TABLET ORAL at 22:04

## 2018-07-02 RX ADMIN — SODIUM CHLORIDE: 9 INJECTION, SOLUTION INTRAVENOUS at 22:04

## 2018-07-02 RX ADMIN — ONDANSETRON 4 MG: 2 INJECTION, SOLUTION INTRAMUSCULAR; INTRAVENOUS at 07:52

## 2018-07-02 RX ADMIN — HYDROMORPHONE HYDROCHLORIDE 4 MG: 4 TABLET ORAL at 07:53

## 2018-07-02 RX ADMIN — SODIUM CHLORIDE: 9 INJECTION, SOLUTION INTRAVENOUS at 05:48

## 2018-07-02 RX ADMIN — METRONIDAZOLE 500 MG: 500 INJECTION, SOLUTION INTRAVENOUS at 05:48

## 2018-07-02 RX ADMIN — STANDARDIZED SENNA CONCENTRATE AND DOCUSATE SODIUM 2 TABLET: 8.6; 5 TABLET, FILM COATED ORAL at 22:04

## 2018-07-02 RX ADMIN — ENOXAPARIN SODIUM 40 MG: 100 INJECTION SUBCUTANEOUS at 22:03

## 2018-07-02 RX ADMIN — HYDROMORPHONE HYDROCHLORIDE 4 MG: 4 TABLET ORAL at 03:57

## 2018-07-02 RX ADMIN — CEFTRIAXONE SODIUM 2 G: 2 INJECTION, SOLUTION INTRAVENOUS at 07:52

## 2018-07-02 RX ADMIN — METRONIDAZOLE 500 MG: 500 INJECTION, SOLUTION INTRAVENOUS at 14:06

## 2018-07-02 RX ADMIN — STANDARDIZED SENNA CONCENTRATE AND DOCUSATE SODIUM 2 TABLET: 8.6; 5 TABLET, FILM COATED ORAL at 07:52

## 2018-07-02 RX ADMIN — METRONIDAZOLE 500 MG: 500 INJECTION, SOLUTION INTRAVENOUS at 22:03

## 2018-07-02 RX ADMIN — HYDROMORPHONE HYDROCHLORIDE 4 MG: 4 TABLET ORAL at 14:06

## 2018-07-02 ASSESSMENT — ENCOUNTER SYMPTOMS
NECK PAIN: 0
RESPIRATORY NEGATIVE: 1
ABDOMINAL PAIN: 1
BRUISES/BLEEDS EASILY: 0
COUGH: 0
DIZZINESS: 0
NAUSEA: 1
BACK PAIN: 0
NERVOUS/ANXIOUS: 1
FOCAL WEAKNESS: 0
PALPITATIONS: 0
CONSTIPATION: 0
NEUROLOGICAL NEGATIVE: 1
CHILLS: 0
VOMITING: 0
CARDIOVASCULAR NEGATIVE: 1
WEAKNESS: 0
EYES NEGATIVE: 1
HEARTBURN: 0
HEADACHES: 0
DEPRESSION: 0
POLYDIPSIA: 0
MUSCULOSKELETAL NEGATIVE: 1
BLOOD IN STOOL: 0
FEVER: 0

## 2018-07-02 ASSESSMENT — PAIN SCALES - GENERAL
PAINLEVEL_OUTOF10: 8
PAINLEVEL_OUTOF10: 3
PAINLEVEL_OUTOF10: 8
PAINLEVEL_OUTOF10: 7

## 2018-07-02 NOTE — PROGRESS NOTES
[facility] Hospitalist Progress Note    Date of Service: 7/2/2018    Chief Complaint  33 y.o. female admitted 6/30/2018 with abdominal pain, right upper quadrant pain, abnormal liver enzymes, abnormal gallbladder.    Interval Problem Update  Patient seen and examined today.    Patient tolerating treatment and therapies.  All Data, Medication data reviewed.  Case discussed with nursing as available.  Plan of Care reviewed with patient and notified of changes.  7/1 patient feels somewhat better, denies fevers or chills, awaiting MRCP to evaluate for biliary system obstruction, stones, cholecystitis.  Surgery is consulted  7/2 the patient unable to tolerate MRI, demands female attendance and has been unable to complete the test.  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  Unable to contact family that allegedly is residing in [place].  All her contact numbers are disconnected.  In reviewing the chart the patient has had multiple trips to the hospital for a psychiatry related issues and admissions to inpatient psych.  The patient complains of nausea and abdominal discomfort  Consultants/Specialty  Surgery    Disposition  To be determined        Review of Systems   Constitutional: Positive for malaise/fatigue. Negative for chills and fever.   HENT: Negative.    Eyes: Negative.    Respiratory: Negative.  Negative for cough.    Cardiovascular: Negative.  Negative for chest pain and palpitations.   Gastrointestinal: Positive for abdominal pain and nausea. Negative for blood in stool, constipation, heartburn and vomiting.   Genitourinary: Negative.  Negative for dysuria and frequency.   Musculoskeletal: Negative.  Negative for back pain and neck pain.   Skin: Negative.  Negative for itching and rash.   Neurological: Negative.  Negative for dizziness, focal weakness, weakness and headaches.   Endo/Heme/Allergies: Negative.  Negative for polydipsia. Does not  bruise/bleed easily.   Psychiatric/Behavioral: Negative for depression. The patient is nervous/anxious.       Physical Exam  Laboratory/Imaging   Hemodynamics  Temp (24hrs), Av.7 °C (98.1 °F), Min:36.6 °C (97.8 °F), Max:36.9 °C (98.5 °F)   Temperature: 36.8 °C (98.2 °F)  Pulse  Av.3  Min: 46  Max: 105    Blood Pressure: (!) 95/65 (RN notified)      Respiratory      Respiration: 16, Pulse Oximetry: 98 %        RUL Breath Sounds: Clear, RML Breath Sounds: Clear, RLL Breath Sounds: Diminished, MATIAS Breath Sounds: Clear, LLL Breath Sounds: Diminished    Fluids    Intake/Output Summary (Last 24 hours) at 18 1400  Last data filed at 18 0400   Gross per 24 hour   Intake              400 ml   Output                0 ml   Net              400 ml       Nutrition  Orders Placed This Encounter   Procedures   • Diet NPO     Standing Status:   Standing     Number of Occurrences:   1     Order Specific Question:   Restrict to:     Answer:   Sips with Medications [3]     Physical Exam   Constitutional: She is oriented to person, place, and time. She appears well-developed and well-nourished.   HENT:   Head: Normocephalic and atraumatic.   Nose: Nose normal.   Mouth/Throat: Oropharynx is clear and moist.   Eyes: Conjunctivae and EOM are normal. Pupils are equal, round, and reactive to light.   Neck: Normal range of motion. Neck supple. No JVD present. No thyromegaly present.   Cardiovascular: Normal rate, regular rhythm and normal heart sounds.  Exam reveals no gallop and no friction rub.    Pulses:       Dorsalis pedis pulses are 2+ on the right side, and 2+ on the left side.   Capillary refill <3 secs   Pulmonary/Chest: Effort normal and breath sounds normal. She has no wheezes. She has no rales.   Abdominal: Soft. Bowel sounds are normal. She exhibits no distension and no mass. There is tenderness. There is no rebound and no guarding.   Musculoskeletal: Normal range of motion. She exhibits no edema or  tenderness.   Lymphadenopathy:     She has no cervical adenopathy.   Neurological: She is alert and oriented to person, place, and time. No cranial nerve deficit.   Skin: Skin is warm and dry. She is not diaphoretic. No cyanosis.   Psychiatric: She has a normal mood and affect. Her behavior is normal.   Nursing note and vitals reviewed.      Recent Labs      06/30/18   0425  07/01/18   0254  07/02/18   0012   WBC  11.1*  10.9*  10.3   RBC  5.15  4.42  4.37   HEMOGLOBIN  15.1  12.9  13.0   HEMATOCRIT  45.4  39.6  39.1   MCV  88.2  89.6  89.5   MCH  29.3  29.2  29.7   MCHC  33.3*  32.6*  33.2*   RDW  42.4  44.2  43.0   PLATELETCT  364  316  310   MPV  9.3  9.3  9.3     Recent Labs      06/30/18   0425  07/01/18   0254  07/02/18   0012   SODIUM  138  138  138  134*   POTASSIUM  4.3  4.2  4.1  4.0   CHLORIDE  105  108  108  106   CO2  24  17*  18*  20   GLUCOSE  108*  68  70  69   BUN  8  11  10  9   CREATININE  0.64  0.74  0.74  0.66   CALCIUM  9.2  8.8  8.9  8.8                      Assessment/Plan     LFT elevation   Assessment & Plan    US liver   Follow clinically  Possibly related to cholelithiasis versus cholecystitis        RUQ pain   Assessment & Plan    Probably secondary to symptomatic cholelithiasis versus cholecystitis  Pain control   Surgery eval pending (Dr Goodson to see)  MRCP pending  Will start empiric coverage for cholecystitis with ceftriaxone Flagyl        Schizoaffective disorder (HCC)   Assessment & Plan    Had multiple admissions in the past and was on legal hold and transferred to inpatient psych  Always had to refuse medication  Apparently had a child taken away because of inability to take care for the child  Psychiatry evaluation        Leukocytosis- (present on admission)   Assessment & Plan    Secondary to cholecystitis versus UTI  On antibiotics        Bipolar disorder (HCC)- (present on admission)   Assessment & Plan    Patient with significantly abnormal behavior at this time         Lower urinary tract infectious disease- (present on admission)   Assessment & Plan    Patient denies dysuria or frequency.  At this point, patient is receiving IV ceftriaxone          Quality-Core Measures    Plan   Psychiatry eval, unclear if the patient currently has capacity to make medical decisions and/or is thought to be possibly compliant after surgery.  await MRCP, study was delayed secondary to the patient's behavior  Surgery is following  Likely cholecystectomy  Continue with supportive care  Adjust the pain medication  See orders

## 2018-07-02 NOTE — PROGRESS NOTES
"  Trauma/Surgical Progress Note    Author: Leroy Goodson Date & Time created: 7/2/2018   9:10 AM   Abdominal pain  Cholelithiasis  Elevated LFT  Question Hepatomegaly and echogenic liver, compatible with fatty change versus fibrosis.  Interval Events:  Ms Sahni reports feeling better  Pain well controlled  ROS   Constitutional: Positive for chills, fever and malaise/fatigue. Negative for diaphoresis.   HENT: Negative for congestion, hearing loss and nosebleeds.    Eyes: Negative for blurred vision and double vision.   Respiratory: Negative for shortness of breath.    Cardiovascular: Negative for chest pain and palpitations.   Gastrointestinal: Positive for abdominal pain. Negative for heartburn, nausea and vomiting.   Neurological: Negative for weakness and headaches.   Hemodynamics:  Blood pressure (!) 95/65, pulse 67, temperature 36.8 °C (98.2 °F), resp. rate 16, height 1.676 m (5' 6\"), weight 101.1 kg (222 lb 14.2 oz), last menstrual period 06/21/2018, SpO2 98 %, not currently breastfeeding.     Respiratory:    Respiration: 16, Pulse Oximetry: 98 %        RUL Breath Sounds: Clear, RML Breath Sounds: Clear, RLL Breath Sounds: Diminished, MTAIAS Breath Sounds: Clear, LLL Breath Sounds: Diminished  Fluids:    Intake/Output Summary (Last 24 hours) at 07/02/18 0910  Last data filed at 07/02/18 0400   Gross per 24 hour   Intake              800 ml   Output                0 ml   Net              800 ml     Admit Weight: 105 kg (231 lb 7.7 oz)  Current      Physical Exam  Constitutional: She is oriented to person, place, and time. She appears well-developed and well-nourished. No distress.   HENT:   Head: Normocephalic.   Eyes: Right eye exhibits no discharge. Left eye exhibits no discharge.   Neck: No tracheal deviation present.   Cardiovascular: Normal rate.    Pulmonary/Chest: No stridor. No respiratory distress. She has no wheezes. She has no rales. She exhibits no tenderness.   Abdominal: Soft. She exhibits no " distension. There is no tenderness. There is no rebound and no guarding.   Neurological: She is alert and oriented to person, place, and time.   Skin: Skin is warm and dry. She is not diaphoretic.   Psychiatric: She has a normal mood and affect. Her behavior is normal.   Medical Decision Making/Problem List:    Active Hospital Problems    Diagnosis   • RUQ pain [R10.11]     Priority: High   • LFT elevation [R79.89]     Priority: High   • Leukocytosis [D72.829]   • Lower urinary tract infectious disease [N39.0]     Diagnois update 10/1/2016     • Bipolar disorder (HCC) [F31.9]     Core Measures & Quality Metrics:  Core Measures & Quality Metrics  ESTRELLA Score      Assessment/Plan  MRI issue last pm as above plan for today Follow up imaging  lft trend  Plan towards cholecystectomy   discussed risks benefits and alternative

## 2018-07-02 NOTE — PROGRESS NOTES
Bedside report received.    Assessment complete.  A&O x 4. Patient calls appropriately.  Patient up self.   Patient has 8/10 pain. Medicated per MAR.  Reports nausea. Medicated per MAR. Patient NPO at this time.  + void, + flatus  Patient denies SOB.  Patient asking repetitive questions. Mumbling to self and defensive when questioned.    Review plan with of care with patient. Call light and personal belongings with in reach. Hourly rounding in place. All needs met at this time.

## 2018-07-02 NOTE — PROGRESS NOTES
"Pt returned from attempt at MRI.     Pt A&O x 4. Flat affect and asks repetitive questions when already told the answer.     Vitals: /54   Pulse 75   Temp 36.9 °C (98.5 °F)   Resp 18   Ht 1.676 m (5' 6\")   Wt 101.1 kg (222 lb 14.2 oz)   LMP 06/21/2018 (Exact Date)   SpO2 98%   Breastfeeding? No   BMI 35.97 kg/m²      Pt rates pain 4 out of 10. Declines additional interventions at this time.      Neuro: IQBAL. Pt states she has numbness in bilateral fingertips.     Cardiac: Denies new onset of chest pain.     Vascular: Pulses 1+ BUE, BLE. No edema noted.     Respiratory: Lungs sound clear/diminished to auscultation. On room air.  on, satting in 90's. Denies SOB.     GI: Abdomen soft, tender. Hypoactive bowel sounds, + flatus, - BM, last BM 2 days ago per pt. + nausea, was administered Zofran.     : Pt voiding adequately.      MSK: Pt up to bathroom self, tolerating well.     Integumentary: Skin intact.     Labs noted.     Fall precautions in place: Bed locked in lowest position, Upper bed rails up, treaded socks in place, personal belongings within reach, call light within reach, appropriate mobility signs in place, - bed alarm. Pt calls appropriately.      Pt updated on POC.    "

## 2018-07-02 NOTE — PROGRESS NOTES
Rn entered room to find patient gazing toward the end of the bed and speaking out loud.   When asked who the patient was speaking to, patient replied that she was 'talking to the governor who is over there on the loud speaker.'    MD paged

## 2018-07-02 NOTE — ASSESSMENT & PLAN NOTE
Patient's hallucinations slightly worse after surgery continue to monitor and give supportive treatment.

## 2018-07-02 NOTE — PROGRESS NOTES
RN spoke with patient about the importance completing an MRI.     Patient stated she 'understands it's important and wants to try again.' RN asked if patient feels that she needs medication to help get through the exam, patient stated 'I don't want to take anything. I think I can do if there is a female tech. I just don't feel comfortable with a male tech.'    RN called MRI to confirm that patient is on the schedule and a female tech will be available.

## 2018-07-02 NOTE — PROGRESS NOTES
Received call from MRI tech stating pt refusing care from male tech, requesting female. No female techs available tonight. Pt being transported back to room. Will attempt scan tomorrow.

## 2018-07-02 NOTE — CARE PLAN
Problem: Safety  Goal: Will remain free from injury  Outcome: PROGRESSING AS EXPECTED  Pt has steady gait when ambulating. Pt calls appropriately when in need of assistance. Bed in locked and lowest position. Call light and belongings within reach.    Problem: Pain Management  Goal: Pain level will decrease to patient's comfort goal  Outcome: PROGRESSING AS EXPECTED  Pain being managed with PRN pain medication.
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
## 2018-07-02 NOTE — PROGRESS NOTES
Pt requesting female mri tech for scan/ none available at this time/ Nurse notified.I offered to send patient back and have scan done following day when female tech is available,  Pt then told me she was ok with a male tech and wanted to get scan done. I attempted scan- pt then refused due to claustrophobia.

## 2018-07-03 ENCOUNTER — APPOINTMENT (OUTPATIENT)
Dept: RADIOLOGY | Facility: MEDICAL CENTER | Age: 33
DRG: 418 | End: 2018-07-03
Attending: INTERNAL MEDICINE
Payer: MEDICARE

## 2018-07-03 PROBLEM — K80.20 CHOLELITHIASIS WITHOUT CHOLECYSTITIS: Status: ACTIVE | Noted: 2018-06-30

## 2018-07-03 LAB
ALBUMIN SERPL BCP-MCNC: 3.7 G/DL (ref 3.2–4.9)
ALBUMIN/GLOB SERPL: 1.4 G/DL
ALP SERPL-CCNC: 314 U/L (ref 30–99)
ALT SERPL-CCNC: 345 U/L (ref 2–50)
ANION GAP SERPL CALC-SCNC: 9 MMOL/L (ref 0–11.9)
AST SERPL-CCNC: 72 U/L (ref 12–45)
BILIRUB SERPL-MCNC: 1.1 MG/DL (ref 0.1–1.5)
BUN SERPL-MCNC: 6 MG/DL (ref 8–22)
CALCIUM SERPL-MCNC: 8.9 MG/DL (ref 8.5–10.5)
CHLORIDE SERPL-SCNC: 107 MMOL/L (ref 96–112)
CO2 SERPL-SCNC: 22 MMOL/L (ref 20–33)
CREAT SERPL-MCNC: 0.64 MG/DL (ref 0.5–1.4)
ERYTHROCYTE [DISTWIDTH] IN BLOOD BY AUTOMATED COUNT: 41.9 FL (ref 35.9–50)
GLOBULIN SER CALC-MCNC: 2.7 G/DL (ref 1.9–3.5)
GLUCOSE SERPL-MCNC: 93 MG/DL (ref 65–99)
HCT VFR BLD AUTO: 39.6 % (ref 37–47)
HGB BLD-MCNC: 13.3 G/DL (ref 12–16)
MCH RBC QN AUTO: 29.4 PG (ref 27–33)
MCHC RBC AUTO-ENTMCNC: 33.6 G/DL (ref 33.6–35)
MCV RBC AUTO: 87.6 FL (ref 81.4–97.8)
PLATELET # BLD AUTO: 330 K/UL (ref 164–446)
PMV BLD AUTO: 9.3 FL (ref 9–12.9)
POTASSIUM SERPL-SCNC: 4.1 MMOL/L (ref 3.6–5.5)
PROT SERPL-MCNC: 6.4 G/DL (ref 6–8.2)
RBC # BLD AUTO: 4.52 M/UL (ref 4.2–5.4)
SODIUM SERPL-SCNC: 138 MMOL/L (ref 135–145)
WBC # BLD AUTO: 8.6 K/UL (ref 4.8–10.8)

## 2018-07-03 PROCEDURE — A9270 NON-COVERED ITEM OR SERVICE: HCPCS | Performed by: PSYCHIATRY & NEUROLOGY

## 2018-07-03 PROCEDURE — 700102 HCHG RX REV CODE 250 W/ 637 OVERRIDE(OP): Performed by: PSYCHIATRY & NEUROLOGY

## 2018-07-03 PROCEDURE — 74181 MRI ABDOMEN W/O CONTRAST: CPT

## 2018-07-03 PROCEDURE — 770006 HCHG ROOM/CARE - MED/SURG/GYN SEMI*

## 2018-07-03 PROCEDURE — A9270 NON-COVERED ITEM OR SERVICE: HCPCS | Performed by: HOSPITALIST

## 2018-07-03 PROCEDURE — 700111 HCHG RX REV CODE 636 W/ 250 OVERRIDE (IP): Performed by: HOSPITALIST

## 2018-07-03 PROCEDURE — 85027 COMPLETE CBC AUTOMATED: CPT

## 2018-07-03 PROCEDURE — 36415 COLL VENOUS BLD VENIPUNCTURE: CPT

## 2018-07-03 PROCEDURE — 700101 HCHG RX REV CODE 250: Performed by: INTERNAL MEDICINE

## 2018-07-03 PROCEDURE — 700105 HCHG RX REV CODE 258: Performed by: INTERNAL MEDICINE

## 2018-07-03 PROCEDURE — 700111 HCHG RX REV CODE 636 W/ 250 OVERRIDE (IP): Performed by: INTERNAL MEDICINE

## 2018-07-03 PROCEDURE — 80053 COMPREHEN METABOLIC PANEL: CPT

## 2018-07-03 PROCEDURE — A9270 NON-COVERED ITEM OR SERVICE: HCPCS | Performed by: INTERNAL MEDICINE

## 2018-07-03 PROCEDURE — 700102 HCHG RX REV CODE 250 W/ 637 OVERRIDE(OP): Performed by: INTERNAL MEDICINE

## 2018-07-03 PROCEDURE — 700102 HCHG RX REV CODE 250 W/ 637 OVERRIDE(OP): Performed by: HOSPITALIST

## 2018-07-03 PROCEDURE — 99232 SBSQ HOSP IP/OBS MODERATE 35: CPT | Performed by: HOSPITALIST

## 2018-07-03 RX ORDER — MAGNESIUM SULFATE 1 G/100ML
1 INJECTION INTRAVENOUS ONCE
Status: COMPLETED | OUTPATIENT
Start: 2018-07-03 | End: 2018-07-03

## 2018-07-03 RX ADMIN — METRONIDAZOLE 500 MG: 500 INJECTION, SOLUTION INTRAVENOUS at 21:39

## 2018-07-03 RX ADMIN — SODIUM CHLORIDE: 9 INJECTION, SOLUTION INTRAVENOUS at 06:01

## 2018-07-03 RX ADMIN — CHLORPROMAZINE HYDROCHLORIDE 50 MG: 50 TABLET, SUGAR COATED ORAL at 09:18

## 2018-07-03 RX ADMIN — STANDARDIZED SENNA CONCENTRATE AND DOCUSATE SODIUM 2 TABLET: 8.6; 5 TABLET, FILM COATED ORAL at 21:39

## 2018-07-03 RX ADMIN — SODIUM CHLORIDE: 9 INJECTION, SOLUTION INTRAVENOUS at 21:46

## 2018-07-03 RX ADMIN — CEFTRIAXONE SODIUM 2 G: 2 INJECTION, SOLUTION INTRAVENOUS at 09:18

## 2018-07-03 RX ADMIN — HYDROMORPHONE HYDROCHLORIDE 4 MG: 4 TABLET ORAL at 06:02

## 2018-07-03 RX ADMIN — STANDARDIZED SENNA CONCENTRATE AND DOCUSATE SODIUM 2 TABLET: 8.6; 5 TABLET, FILM COATED ORAL at 09:18

## 2018-07-03 RX ADMIN — METRONIDAZOLE 500 MG: 500 INJECTION, SOLUTION INTRAVENOUS at 06:01

## 2018-07-03 RX ADMIN — ENOXAPARIN SODIUM 40 MG: 100 INJECTION SUBCUTANEOUS at 21:39

## 2018-07-03 RX ADMIN — METRONIDAZOLE 500 MG: 500 INJECTION, SOLUTION INTRAVENOUS at 14:14

## 2018-07-03 RX ADMIN — MAGNESIUM SULFATE IN DEXTROSE 1 G: 10 INJECTION, SOLUTION INTRAVENOUS at 10:32

## 2018-07-03 RX ADMIN — HYDROMORPHONE HYDROCHLORIDE 4 MG: 4 TABLET ORAL at 09:18

## 2018-07-03 ASSESSMENT — PAIN SCALES - GENERAL
PAINLEVEL_OUTOF10: 7
PAINLEVEL_OUTOF10: 2
PAINLEVEL_OUTOF10: 7
PAINLEVEL_OUTOF10: 4
PAINLEVEL_OUTOF10: 4

## 2018-07-03 ASSESSMENT — ENCOUNTER SYMPTOMS
CONSTIPATION: 0
VOMITING: 0
WHEEZING: 0
SHORTNESS OF BREATH: 0
MYALGIAS: 1
NAUSEA: 0
CHILLS: 0
HALLUCINATIONS: 1
HEADACHES: 0
ABDOMINAL PAIN: 1
FEVER: 0
NERVOUS/ANXIOUS: 1
DIARRHEA: 0
COUGH: 0

## 2018-07-03 NOTE — PROGRESS NOTES
[facility] Hospitalist Progress Note    Date of Service: 7/3/2018    Chief Complaint  33 y.o. female admitted 2018 with abdominal pain, right upper quadrant pain, abnormal liver enzymes, abnormal gallbladder.    Interval Problem Update  Patient seen and examined today.    Patient tolerating treatment and therapies.  All Data, Medication data reviewed.  Case discussed with nursing as available.  Plan of Care reviewed with patient and notified of changes.   patient feels somewhat better, denies fevers or chills, awaiting MRCP to evaluate for biliary system obstruction, stones, cholecystitis.  Surgery is consulted   the patient unable to tolerate MRI, demands female attendance and has been unable to complete the test.  The patient today is significantly psychotic she states that she is royalty and related to [facility].  Unable to contact family that allegedly is residing in [place].  All her contact numbers are disconnected.  In reviewing the chart the patient has had multiple trips to the hospital for a psychiatry related issues and admissions to inpatient psych.  The patient complains of nausea and abdominal discomfort  7/3 patient agreeable to MRCP. Still having some hallucinations. Replete magnesium.    Consultants/Specialty  Surgery    Disposition  To be determined        Review of Systems   Constitutional: Negative for chills and fever.   Respiratory: Negative for cough, shortness of breath and wheezing.    Cardiovascular: Negative for chest pain.   Gastrointestinal: Positive for abdominal pain. Negative for constipation, diarrhea, nausea and vomiting.   Genitourinary: Negative for dysuria.   Musculoskeletal: Positive for myalgias.   Neurological: Negative for headaches.   Psychiatric/Behavioral: Positive for hallucinations. The patient is nervous/anxious.       Physical Exam  Laboratory/Imaging   Hemodynamics  Temp (24hrs), Av.9 °C (98.5 °F), Min:36.6 °C (97.9 °F), Max:37.3 °C (99.1 °F)    Temperature: 37.2 °C (99 °F)  Pulse  Av  Min: 46  Max: 105    Blood Pressure: 109/68      Respiratory      Respiration: 16, Pulse Oximetry: 97 %        RUL Breath Sounds: Clear, RML Breath Sounds: Clear, RLL Breath Sounds: Diminished, MATIAS Breath Sounds: Clear, LLL Breath Sounds: Diminished    Fluids    Intake/Output Summary (Last 24 hours) at 18 1535  Last data filed at 18 0400   Gross per 24 hour   Intake             1660 ml   Output                0 ml   Net             1660 ml       Nutrition  Orders Placed This Encounter   Procedures   • Diet Order Clear Liquid     Standing Status:   Standing     Number of Occurrences:   1     Order Specific Question:   Diet:     Answer:   Clear Liquid [10]     Physical Exam   Constitutional: She appears well-developed.   Obese   HENT:   Head: Normocephalic.   Eyes: Conjunctivae are normal.   Cardiovascular: Normal rate.  Exam reveals no gallop.    Pulmonary/Chest: No respiratory distress. She has no wheezes.   Abdominal: She exhibits no distension. There is tenderness. There is no rebound and no guarding.   Neurological: She is alert.       Recent Labs      18   0254  18   0012  18   0308   WBC  10.9*  10.3  8.6   RBC  4.42  4.37  4.52   HEMOGLOBIN  12.9  13.0  13.3   HEMATOCRIT  39.6  39.1  39.6   MCV  89.6  89.5  87.6   MCH  29.2  29.7  29.4   MCHC  32.6*  33.2*  33.6   RDW  44.2  43.0  41.9   PLATELETCT  316  310  330   MPV  9.3  9.3  9.3     Recent Labs      18   0254  18   0012  18   0308   SODIUM  138  138  134*  138   POTASSIUM  4.2  4.1  4.0  4.1   CHLORIDE  108  108  106  107   CO2  17*  18*  20  22   GLUCOSE  68  70  69  93   BUN  11  10  9  6*   CREATININE  0.74  0.74  0.66  0.64   CALCIUM  8.8  8.9  8.8  8.9                      Assessment/Plan     Cholelithiasis without cholecystitis   Assessment & Plan    US liver shows cholelithiasis without cholecystitis        RUQ pain   Assessment & Plan    Probably  secondary to symptomatic cholelithiasis versus cholecystitis  Pain control   Surgeon Dr. Goodson consulted  MRCP pending  Will start empiric coverage for cholecystitis with ceftriaxone Flagyl        Schizoaffective disorder (HCC)   Assessment & Plan    Had multiple admissions in the past and was on legal hold and transferred to inpatient psych  Always had to refuse medication  Apparently had a child taken away because of inability to take care for the child  Psychiatry evaluated and patient is familiar to them but they will be available in case we need them  Currently not on legal hold        Leukocytosis- (present on admission)   Assessment & Plan    Secondary to cholelithiasis versus UTI  On antibiotics        Bipolar disorder (HCC)- (present on admission)   Assessment & Plan    Patient with significantly abnormal behavior at this time        Lower urinary tract infectious disease- (present on admission)   Assessment & Plan    Patient denies dysuria or frequency.  At this point, patient is receiving IV ceftriaxone          Quality-Core Measures   Reviewed items::  Labs reviewed and Medications reviewed  DVT prophylaxis pharmacological::  Enoxaparin (Lovenox)      Plan   Psychiatry eval, unclear if the patient currently has capacity to make medical decisions and/or is thought to be possibly compliant after surgery.  await MRCP, study was delayed secondary to the patient's behavior  Surgery is following  Likely cholecystectomy  Continue with supportive care  Adjust the pain medication  See orders

## 2018-07-03 NOTE — CARE PLAN
Problem: Pain Management  Goal: Pain level will decrease to patient's comfort goal  Outcome: PROGRESSING AS EXPECTED  Pain being managed with PRN pain medication and repositioning.     Problem: Respiratory:  Goal: Respiratory status will improve  Outcome: PROGRESSING AS EXPECTED  Pt demonstrates proper use of IS. Satting in high 90's on room air.

## 2018-07-03 NOTE — PROGRESS NOTES
Bedside report received.    Assessment complete.  A&O x 4. Patient calls appropriately.  Patient up self.   Patient reports 7/10 pain. Medicated per MAR  Reports some nausea. Medicated per MAR.  + void, + flatus  Patient denies SOB.  Patient has flat affect. States that she is decedent from [facility]. Reports visual and auditory hallucinations. Asks repetitive questions. MD aware    Review plan with of care with patient. Call light and personal belongings with in reach. Hourly rounding in place. All needs met at this time.

## 2018-07-03 NOTE — PSYCHIATRY
BRIEF PSYCHIATRIC CONSULT NOTE: patient seen, full note to follow.  -Legal Hold:voluntary- no legal hold at this time.   -Sitter:no   -Orders Placed: prns  -Plan:signing off     Psych is happy to follow this patient, but currently she doesn't want psych care and us following her may just agitate her. She is well known to service. Please call us if she is still so psychotic it is interfering with care. Of note, she can do very well on medication and was even holding down a job at [employer] for a while in their voc rehab program.     She will not take psych medications but will take meds for nausea. Recommend switch to thorazine for nausea as it will help just as much but may also help her psychotic symptoms.    She is currently agreeing to MRI. She understands that if she continues to refuse tests while this ill she may need a legal hold. She states she does not want to be on a hold, and states she wants to be physically well and will participate in tests.       Thank you for the conuslt.

## 2018-07-03 NOTE — DIETARY
"Nutrition services: Day 3 of admit.  Dunia Sahni is a 33 y.o. female with admitting DX of RUQ abd pain, LFT elevation   Pt noted with nutrition admit screen trigger: unplanned wt loss PTA     Assessment:  Height: 167.6 cm (5' 5.98\")  Weight: 101.1 kg (222 lb 14.2 oz)  Body mass index is 35.99 kg/m².- obese, class II   Diet/Intake: Clear Liquid - per ADLs, consumed % x 1 snack     Evaluation:   1. Per chart review, pt noted with previous visits to the ED and has had weights of (1/12) 108.6 kg and (3/21) 107.1 kg; with current wt pt is noted with a wt loss of 7% x 6 months and 5.6% wt loss x 3 months which is not significant   2. Slow wt loss is encouraged d/t pt's class II obesity   3. Per chart review, pt is hallucinating and making clams she is related to Zahra Liliam  4. AST 72, , AlkPhos 314  5. Meds reviewed   6. Pt is not at high nutritional risk at this time     Recommendations/Plan:  1. Advance diet past CL as pt is able per MD   2. Encourage PO intake   3. Document all PO intake as % taken in ADL's to provide interdisciplinary communication across all shifts.   4. Monitor weight.  5. Nutrition rep will continue to see patient for ongoing meal and snack preferences.     RD will con't to monitor per dept policy; please consult nutrition services as warranted             "

## 2018-07-03 NOTE — PROGRESS NOTES
"Pt A&O x 4. Flat affect and asks repetitive questions when already told the answer.     Vitals: BP (!) 93/54 Comment: nurse notified  Pulse 66   Temp 36.8 °C (98.3 °F)   Resp 17   Ht 1.676 m (5' 5.98\")   Wt 101.1 kg (222 lb 14.2 oz)   LMP 06/21/2018 (Exact Date)   SpO2 92%   Breastfeeding? No   BMI 35.99 kg/m²      Pt rates pain 7 out of 10. Medicated per MAR.     Neuro: IQBAL. Pt states she has numbness in bilateral fingertips.     Cardiac: Denies new onset of chest pain.     Vascular: Pulses 1+ BUE, BLE. No edema noted.     Respiratory: Lungs sound clear/diminished to auscultation. On room air.  on, satting in 90's. Denies SOB.     GI: Abdomen soft, tender. Hypoactive bowel sounds, + flatus, - BM, last BM 2 days ago per pt. Denies nausea. Tolerating clears.     : Pt voiding adequately.      MSK: Pt up to bathroom self, tolerating well.     Integumentary: Skin intact.     Labs noted.     Fall precautions in place: Bed locked in lowest position, Upper bed rails up, treaded socks in place, personal belongings within reach, call light within reach, appropriate mobility signs in place, - bed alarm. Pt calls appropriately.      Pt updated on POC  "

## 2018-07-03 NOTE — PROGRESS NOTES
Ms Sahni reports feeling better  She awake alert and interactive  breathing with ease no distress  Discussed Plan for MRI  Will reschedule surgery

## 2018-07-04 LAB
ALBUMIN SERPL BCP-MCNC: 3.9 G/DL (ref 3.2–4.9)
ALBUMIN/GLOB SERPL: 1.6 G/DL
ALP SERPL-CCNC: 316 U/L (ref 30–99)
ALT SERPL-CCNC: 286 U/L (ref 2–50)
ANION GAP SERPL CALC-SCNC: 8 MMOL/L (ref 0–11.9)
AST SERPL-CCNC: 82 U/L (ref 12–45)
BILIRUB SERPL-MCNC: 1.9 MG/DL (ref 0.1–1.5)
BUN SERPL-MCNC: 5 MG/DL (ref 8–22)
CALCIUM SERPL-MCNC: 9.3 MG/DL (ref 8.5–10.5)
CHLORIDE SERPL-SCNC: 106 MMOL/L (ref 96–112)
CO2 SERPL-SCNC: 23 MMOL/L (ref 20–33)
CREAT SERPL-MCNC: 0.74 MG/DL (ref 0.5–1.4)
GLOBULIN SER CALC-MCNC: 2.4 G/DL (ref 1.9–3.5)
GLUCOSE SERPL-MCNC: 96 MG/DL (ref 65–99)
HCG UR QL: NEGATIVE
MAGNESIUM SERPL-MCNC: 1.8 MG/DL (ref 1.5–2.5)
PHOSPHATE SERPL-MCNC: 3 MG/DL (ref 2.5–4.5)
POTASSIUM SERPL-SCNC: 3.7 MMOL/L (ref 3.6–5.5)
PROT SERPL-MCNC: 6.3 G/DL (ref 6–8.2)
SODIUM SERPL-SCNC: 137 MMOL/L (ref 135–145)
SP GR UR REFRACTOMETRY: 1.02

## 2018-07-04 PROCEDURE — 36415 COLL VENOUS BLD VENIPUNCTURE: CPT

## 2018-07-04 PROCEDURE — 80053 COMPREHEN METABOLIC PANEL: CPT

## 2018-07-04 PROCEDURE — 99232 SBSQ HOSP IP/OBS MODERATE 35: CPT | Performed by: HOSPITALIST

## 2018-07-04 PROCEDURE — 700102 HCHG RX REV CODE 250 W/ 637 OVERRIDE(OP): Performed by: HOSPITALIST

## 2018-07-04 PROCEDURE — 700102 HCHG RX REV CODE 250 W/ 637 OVERRIDE(OP): Performed by: INTERNAL MEDICINE

## 2018-07-04 PROCEDURE — 83735 ASSAY OF MAGNESIUM: CPT

## 2018-07-04 PROCEDURE — 700105 HCHG RX REV CODE 258: Performed by: INTERNAL MEDICINE

## 2018-07-04 PROCEDURE — A9270 NON-COVERED ITEM OR SERVICE: HCPCS | Performed by: HOSPITALIST

## 2018-07-04 PROCEDURE — 770006 HCHG ROOM/CARE - MED/SURG/GYN SEMI*

## 2018-07-04 PROCEDURE — 700111 HCHG RX REV CODE 636 W/ 250 OVERRIDE (IP): Performed by: PSYCHIATRY & NEUROLOGY

## 2018-07-04 PROCEDURE — 81025 URINE PREGNANCY TEST: CPT

## 2018-07-04 PROCEDURE — 84100 ASSAY OF PHOSPHORUS: CPT

## 2018-07-04 PROCEDURE — A9270 NON-COVERED ITEM OR SERVICE: HCPCS | Performed by: INTERNAL MEDICINE

## 2018-07-04 PROCEDURE — 700101 HCHG RX REV CODE 250: Performed by: INTERNAL MEDICINE

## 2018-07-04 PROCEDURE — 700111 HCHG RX REV CODE 636 W/ 250 OVERRIDE (IP): Performed by: INTERNAL MEDICINE

## 2018-07-04 RX ADMIN — METRONIDAZOLE 500 MG: 500 INJECTION, SOLUTION INTRAVENOUS at 05:41

## 2018-07-04 RX ADMIN — METRONIDAZOLE 500 MG: 500 INJECTION, SOLUTION INTRAVENOUS at 22:10

## 2018-07-04 RX ADMIN — STANDARDIZED SENNA CONCENTRATE AND DOCUSATE SODIUM 2 TABLET: 8.6; 5 TABLET, FILM COATED ORAL at 07:39

## 2018-07-04 RX ADMIN — CHLORPROMAZINE HYDROCHLORIDE 25 MG: 25 INJECTION INTRAMUSCULAR at 15:05

## 2018-07-04 RX ADMIN — METRONIDAZOLE 500 MG: 500 INJECTION, SOLUTION INTRAVENOUS at 12:56

## 2018-07-04 RX ADMIN — HYDROMORPHONE HYDROCHLORIDE 4 MG: 4 TABLET ORAL at 20:05

## 2018-07-04 RX ADMIN — DIPHENHYDRAMINE HCL 25 MG: 25 TABLET ORAL at 18:41

## 2018-07-04 RX ADMIN — SODIUM CHLORIDE: 9 INJECTION, SOLUTION INTRAVENOUS at 00:58

## 2018-07-04 RX ADMIN — SODIUM CHLORIDE: 9 INJECTION, SOLUTION INTRAVENOUS at 22:12

## 2018-07-04 RX ADMIN — CEFTRIAXONE SODIUM 2 G: 2 INJECTION, SOLUTION INTRAVENOUS at 07:39

## 2018-07-04 RX ADMIN — HYDROMORPHONE HYDROCHLORIDE 4 MG: 4 TABLET ORAL at 11:54

## 2018-07-04 RX ADMIN — STANDARDIZED SENNA CONCENTRATE AND DOCUSATE SODIUM 2 TABLET: 8.6; 5 TABLET, FILM COATED ORAL at 20:05

## 2018-07-04 ASSESSMENT — ENCOUNTER SYMPTOMS
CARDIOVASCULAR NEGATIVE: 1
DIAPHORESIS: 0
HALLUCINATIONS: 1
CONSTIPATION: 0
MUSCULOSKELETAL NEGATIVE: 1
WHEEZING: 0
HEARTBURN: 0
SEIZURES: 0
NERVOUS/ANXIOUS: 0
BRUISES/BLEEDS EASILY: 0
CONSTITUTIONAL NEGATIVE: 1
EYES NEGATIVE: 1
FEVER: 0
NAUSEA: 0
ABDOMINAL PAIN: 1
FOCAL WEAKNESS: 0
RESPIRATORY NEGATIVE: 1
COUGH: 0
HEMOPTYSIS: 0
LOSS OF CONSCIOUSNESS: 0
CHILLS: 0
DOUBLE VISION: 0
PALPITATIONS: 0
DIZZINESS: 0
VOMITING: 0
HEADACHES: 1
BLOOD IN STOOL: 0
DIARRHEA: 0
DEPRESSION: 0

## 2018-07-04 ASSESSMENT — PAIN SCALES - GENERAL
PAINLEVEL_OUTOF10: 4
PAINLEVEL_OUTOF10: 2
PAINLEVEL_OUTOF10: 5
PAINLEVEL_OUTOF10: 3
PAINLEVEL_OUTOF10: 7
PAINLEVEL_OUTOF10: 2

## 2018-07-04 NOTE — PROGRESS NOTES
"Assumed care of patient. Patient alert and oriented, but with flat affect and schizo-effective behavior. Patient made NPO this morning. Patient scheduled for lap skip at 1145. Lovenox held. Patient declining pain medications because \"they're going to give me anesthesia and I don't want to overdose on pain medication\". Educated patient to notify RN if pain increases. No other needs at this time. Call light within reach.   "

## 2018-07-04 NOTE — PROGRESS NOTES
[facility]ist Progress Note    Date of Service: 2018    Chief Complaint  33 y.o. female admitted 2018 with right upper quadrant abdominal pain    Interval Problem Update  Diagnosis cholelithiasis which is symptomatic, patient is scheduled for surgery that had to be later canceled as surgery would like to do ERCP 1st. Continue at this point with anxiety and pain management. Patient will continue also with antibiotic management for time being monitor at this point vitals as well as electrolytes.    Consultants/Specialty  Surgery    Disposition  To be determined        Review of Systems   Constitutional: Negative.  Negative for chills, diaphoresis and fever.   HENT: Negative.    Eyes: Negative.  Negative for double vision.   Respiratory: Negative.  Negative for cough, hemoptysis and wheezing.    Cardiovascular: Negative.  Negative for chest pain, palpitations and leg swelling.   Gastrointestinal: Positive for abdominal pain. Negative for blood in stool, constipation, diarrhea, heartburn, nausea and vomiting.   Genitourinary: Negative.  Negative for frequency, hematuria and urgency.   Musculoskeletal: Negative.  Negative for joint pain.   Skin: Negative.  Negative for itching and rash.   Neurological: Positive for headaches. Negative for dizziness, focal weakness, seizures and loss of consciousness.   Endo/Heme/Allergies: Negative.  Does not bruise/bleed easily.   Psychiatric/Behavioral: Positive for hallucinations. Negative for depression and suicidal ideas. The patient is not nervous/anxious.    All other systems reviewed and are negative.     Physical Exam  Laboratory/Imaging   Hemodynamics  Temp (24hrs), Av °C (98.6 °F), Min:36.5 °C (97.7 °F), Max:37.2 °C (99 °F)   Temperature: 37 °C (98.6 °F)  Pulse  Av.4  Min: 46  Max: 105    Blood Pressure: 118/67      Respiratory      Respiration: 17, Pulse Oximetry: 93 %        RUL Breath Sounds: Clear, RML Breath Sounds: Clear, RLL Breath Sounds:  Diminished, MATIAS Breath Sounds: Clear, LLL Breath Sounds: Diminished    Fluids    Intake/Output Summary (Last 24 hours) at 07/04/18 1401  Last data filed at 07/04/18 1200   Gross per 24 hour   Intake             2650 ml   Output                0 ml   Net             2650 ml       Nutrition  Orders Placed This Encounter   Procedures   • DIET NPO     Standing Status:   Standing     Number of Occurrences:   1     Order Specific Question:   Restrict to:     Answer:   Sips with Medications [3]     Physical Exam   Constitutional: She is oriented to person, place, and time. She appears well-developed and well-nourished.   HENT:   Head: Normocephalic and atraumatic.   Right Ear: External ear normal.   Left Ear: External ear normal.   Nose: Nose normal.   Mouth/Throat: Oropharynx is clear and moist.   Eyes: Conjunctivae and EOM are normal. Pupils are equal, round, and reactive to light.   Neck: Normal range of motion. Neck supple. No JVD present. No thyromegaly present.   Cardiovascular: Normal rate and regular rhythm.    No murmur heard.  Pulmonary/Chest: She has no wheezes. She has no rales. She exhibits no tenderness.   Abdominal: Soft. Bowel sounds are normal. She exhibits no distension and no mass. There is tenderness in the right upper quadrant, right lower quadrant and epigastric area. There is no rebound and no guarding.   Musculoskeletal: Normal range of motion. She exhibits no edema or tenderness.   Lymphadenopathy:     She has no cervical adenopathy.   Neurological: She is alert and oriented to person, place, and time. She has normal reflexes. No cranial nerve deficit.   Skin: Skin is warm and dry. No rash noted. No erythema.   Psychiatric: She has a normal mood and affect. Her speech is normal. Judgment and thought content normal. She is actively hallucinating. Cognition and memory are normal.   Nursing note and vitals reviewed.      Recent Labs      07/02/18   0012  07/03/18   0308   WBC  10.3  8.6   RBC  4.37   4.52   HEMOGLOBIN  13.0  13.3   HEMATOCRIT  39.1  39.6   MCV  89.5  87.6   MCH  29.7  29.4   MCHC  33.2*  33.6   RDW  43.0  41.9   PLATELETCT  310  330   MPV  9.3  9.3     Recent Labs      07/02/18   0012  07/03/18   0308  07/04/18   0215   SODIUM  134*  138  137   POTASSIUM  4.0  4.1  3.7   CHLORIDE  106  107  106   CO2  20  22  23   GLUCOSE  69  93  96   BUN  9  6*  5*   CREATININE  0.66  0.64  0.74   CALCIUM  8.8  8.9  9.3                      Assessment/Plan     Cholelithiasis without cholecystitis   Assessment & Plan    Surgery at this point has canceled surgery today and he would like an ERCP to be done 1st.        RUQ pain   Assessment & Plan    Secondary to acute cholecystitis.  The patient was scheduled to go for laparoscopic cholecystectomy today. Surgery at this point has canceled the surgery and they would like an ERCP to be done prior to surgery.        Schizoaffective disorder (HCC)   Assessment & Plan    Patient thinks she is a [place] citizen related to [employer]. She is otherwise pleasant and cooperative harmless does not have any suicidal or homicidal ideation.        Leukocytosis- (present on admission)   Assessment & Plan    Leukocytosis has resolved most recent levels 8.6. Most likely secondary to urinary tract infection and has been treated to resolution with antibiotics.        Bipolar disorder (HCC)- (present on admission)   Assessment & Plan    Currently bipolar disorder seems to be stable. The patient does have periodic abnormal behavior. This morning she was noticed to be talking to another person that is not there.        Lower urinary tract infectious disease- (present on admission)   Assessment & Plan    Status post antibiotic treatment. Microbiology cultures show mixed enteric katy greater than 100,000 colony-forming units.            Quality-Core Measures   Reviewed items::  EKG reviewed, Labs reviewed, Medications reviewed and Radiology images reviewed  Schulz catheter::  No  Schulz  DVT prophylaxis pharmacological::  Contraindicated - High bleeding risk  Ulcer Prophylaxis::  Not indicated  Antibiotics:  Treating active infection/contamination beyond 24 hours perioperative coverage  Assessed for rehabilitation services:  Patient returned to prior level of function, rehabilitation not indicated at this time

## 2018-07-04 NOTE — PROGRESS NOTES
"Pt A&O x 4. Flat affect and asks repetitive questions when already told the answer.     Vitals: /69   Pulse 76   Temp 36.5 °C (97.7 °F)   Resp 17   Ht 1.676 m (5' 5.98\")   Wt 101.1 kg (222 lb 14.2 oz)   LMP 06/21/2018 (Exact Date)   SpO2 96%   Breastfeeding? No   BMI 35.99 kg/m²      Pt rates pain 4 out of 10. Declines additional interventions.     Neuro: IQBAL. Pt states she has numbness in bilateral fingertips.     Cardiac: Denies new onset of chest pain.     Vascular: Pulses 1+ BUE, BLE. No edema noted.     Respiratory: Lungs sound clear/diminished to auscultation. On room air.  on, satting in 90's. Denies SOB.     GI: Abdomen soft, tender. Hypoactive bowel sounds, + flatus, - BM. Denies nausea. Tolerating clears.     : Pt voiding adequately.      MSK: Pt up to bathroom self, tolerating well.     Integumentary: Skin intact.     Labs noted.     Fall precautions in place: Bed locked in lowest position, Upper bed rails up, treaded socks in place, personal belongings within reach, call light within reach, appropriate mobility signs in place, - bed alarm. Pt calls appropriately.      Pt updated on POC  "

## 2018-07-04 NOTE — PROGRESS NOTES
Ms Sahni reports feeling better    She awake alert and interactive  breathing with ease no distress  Abdomen soft not tender not distended no guarding no rebound    MRI choledocholithiasis  GI consult    Discussed finding and Plan  Will reschedule surgery

## 2018-07-04 NOTE — CARE PLAN
Problem: Knowledge Deficit  Goal: Knowledge of disease process/condition, treatment plan, diagnostic tests, and medications will improve  Outcome: PROGRESSING AS EXPECTED  Updated patient on POC. All questions answered at this time.    Problem: Pain Management  Goal: Pain level will decrease to patient's comfort goal  Outcome: PROGRESSING AS EXPECTED  Pain being managed with PRN pain medication.

## 2018-07-04 NOTE — CONSULTS
Date of Service:7/4/18    Consult Requested By: Albina Barber M.D.    Reason for Consultation: abnormal MRCP and elevated liver enzymes    History of Present Illness:   Dunia aShni is a 33 y.o. admitted 6/30/2018. For the last several weeks has been suffering from RUQ pain that is knife like radiating into her back associated with N/V, she denies any fever or chills no hematemesis, or bowel abnormalities at this time.  She had an uneventful pregnancy.  She has a history of BPD, anxiety, appendectomy who presented 6/30/2018 with complaints of right upper quadrant pain.  RUQ is sharp, intermittent exacerbated by oral intake, up to 10 out of 10, radiating to the right shoulder.  States that she has been having such a pain on and off in the last 2 weeks, but it has been getting progressively worse the last 24 hours.  She had multiple vomiting and dry heaves and poor oral intake due to above.  Her urine was dark.  Associated also his chills and subjective fever.  Denies drinking alcohol or drug use.  Denies currently taking any medications.  Patient is anxious about possible surgery (cholecystectomy), as she had complicated appendectomy many years ago, and she has a lot of anxiety at baseline.    Review Of Systems:  n oheadaches visual issues, sob, cp ,f/c/n/v/d/le swelling only RUQ abdominal pain, no  issues    PMH:   Past Medical History:   Diagnosis Date   • Abscess 10/12/2017    right AC arm area   • Bipolar affective (HCC)    • Depression    • Kidney infection    • Suicide attempt (HCC)          PSH:  Past Surgical History:   Procedure Laterality Date   • APPENDECTOMY     • PB C-SEC ONLY,PBEV C-SEC         FAMILY HX:  No family history on file.    SOCIAL HX:  Social History     Social History   • Marital status:      Spouse name: N/A   • Number of children: N/A   • Years of education: N/A     Occupational History   • Not on file.     Social History Main Topics   • Smoking status: Never Smoker   •  "Smokeless tobacco: Never Used   • Alcohol use No   • Drug use: No   • Sexual activity: Not on file     Other Topics Concern   • Not on file     Social History Narrative   • No narrative on file     History   Smoking Status   • Never Smoker   Smokeless Tobacco   • Never Used     History   Alcohol Use No       Allergies/Intolerances:  Allergies   Allergen Reactions   • Vicodin [Hydrocodone-Acetaminophen] Anaphylaxis     RXN=9 years ago   • Pcn [Penicillins] Nausea     RXN=8 years ago  Toleartes rocephin   • Bee Venom    • Blackberry [Rubus Fruticosus] Rash     \"rash\"   • Haldol [Haloperidol] Rash     Pt states, \"I have a rash and my lips were swollen.\"   • Raspberry        History reviewed with the patient    Other Current Medications:    Current Facility-Administered Medications:   •  chlorproMAZINE (THORAZINE) tablet 50 mg, 50 mg, Oral, TID PRN, Vivian Hart M.D., 50 mg at 07/03/18 0918  •  chlorproMAZINE (THORAZINE) injection 25 mg, 25 mg, Intramuscular, Q6HRS PRN, Vivian Hart M.D., 25 mg at 07/04/18 1505  •  HYDROmorphone (DILAUDID) tablet 4 mg, 4 mg, Oral, Q3HRS PRN, Aguilar Hanley M.D., 4 mg at 07/04/18 1154  •  senna-docusate (PERICOLACE or SENOKOT S) 8.6-50 MG per tablet 2 Tab, 2 Tab, Oral, BID, 2 Tab at 07/04/18 0739 **AND** polyethylene glycol/lytes (MIRALAX) PACKET 1 Packet, 1 Packet, Oral, QDAY PRN **AND** magnesium hydroxide (MILK OF MAGNESIA) suspension 30 mL, 30 mL, Oral, QDAY PRN **AND** bisacodyl (DULCOLAX) suppository 10 mg, 10 mg, Rectal, QDAY PRN, Terrance Gipson M.D.  •  Respiratory Care per Protocol, , Nebulization, Continuous RT, Terrance Gipson M.D.  •  NS infusion, , Intravenous, Continuous, Terrance Gipson M.D., Last Rate: 100 mL/hr at 07/04/18 0058  •  metroNIDAZOLE (FLAGYL) IVPB 500 mg, 500 mg, Intravenous, Q8HRS, Terrance Gipson M.D., Stopped at 07/04/18 1356  •  ketorolac (TORADOL) injection 30 mg, 30 mg, Intravenous, Q6HRS PRN, Terrance " "ANUEL Gipson, 30 mg at 18  •  cefTRIAXone (ROCEPHIN) IVPB premix 2 g, 2 g, Intravenous, Q24HRS, Terrance Gipson M.D., 2 g at 18  •  diphenhydrAMINE (BENADRYL) tablet/capsule 25 mg, 25 mg, Oral, Q8HRS PRN, Arpita Raygoza M.D., 25 mg at 18  [unfilled]    Most Recent Vital Signs:  /67   Pulse 87   Temp 37 °C (98.6 °F)   Resp 17   Ht 1.676 m (5' 5.98\")   Wt 101.1 kg (222 lb 14.2 oz)   LMP 2018 (Exact Date)   SpO2 93%   Breastfeeding? No   BMI 35.99 kg/m²   Temp  Av.8 °C (98.3 °F)  Min: 36.4 °C (97.6 °F)  Max: 37.3 °C (99.1 °F)    Physical Exam:  General: Nontoxic, no acute distress obese  HEENT: sclera anicteric, PERRL, EOMI, MMM, no oral lesions  Neck: supple, no lymphadenopathy  Chest: CTAB, no r/r/w, normal work of breathing.  Cardiac: Regular, no murmurs no gallops heard  Abdomen: + bowel sounds, soft, tender in the RUQ region to palpation, non-distended, no HSM  Extremities: No edema. No joint swelling.  Skin: no rashes or erythema  Neuro: Alert and oriented times 3, non-focal exam    Pertinent Lab Results:  Recent Labs      18   0308   WBC  10.3  8.6      Recent Labs      182  18   0308   HEMOGLOBIN  13.0  13.3   HEMATOCRIT  39.1  39.6   MCV  89.5  87.6   MCH  29.7  29.4   PLATELETCT  310  330         Recent Labs      182  18   0308  18   0215   SODIUM  134*  138  137   POTASSIUM  4.0  4.1  3.7   CHLORIDE  106  107  106   CO2  20  22  23   CREATININE  0.66  0.64  0.74        Recent Labs      182  18   0308  18   0215   ALBUMIN  3.7  3.7  3.9      Recent Labs      18   0012  18   0308  18   0215   ASTSGOT  105*  72*  82*   ALTSGPT  477*  345*  286*   TBILIRUBIN  1.1  1.1  1.9*   ALKPHOSPHAT  249*  314*  316*   GLOBULIN  2.6  2.7  2.4       MRCP    Impression       1.  There is cholelithiasis without evidence of pericholecystic fluid or " gallbladder wall thickening to suggest acute cholecystitis.  2.  There is dilatation of the common bile duct and central intrahepatic biliary tree with a small 2-3 mm partially obstructive stone in the distal common duct just above the ampulla consistent with choledocholithiasis.  3.  There is hepatomegaly.  4.  There is splenomegaly           Pertinent Micro:  Results     Procedure Component Value Units Date/Time    URINALYSIS CULTURE, IF INDICATED [[id number]]  (Abnormal) Collected:  06/30/18 5260    Order Status:  Completed Specimen:  Urine Updated:  06/30/18 0754     Color DK Yellow     Character Cloudy (A)     Specific Gravity 1.026     Ph 7.0     Glucose Negative mg/dL      Ketones 15 (A) mg/dL      Protein Negative mg/dL      Bilirubin Moderate (A)     Urobilinogen, Urine 1.0     Nitrite Positive (A)     Leukocyte Esterase Small (A)     Occult Blood Negative     Micro Urine Req Microscopic        No results found for: BLOODCULTU, BLDCULT, BCHOLD     Studies:              Results for orders placed during the hospital encounter of 11/30/17   DX-CHEST-2 VIEWS    Impression No radiographic evidence of acute cardiopulmonary process.                                                                                  IMPRESSION:     Choledocholithiasis   Based on her elevated liver enzymes and positive MRCP  We will plan for an ERCP we discussed the risk and benefits of the procedure bleeding infection perforation along with a 3-10 % chance of pancreatitis.  All her questions were answered she  Is willing to undergo the procedure  We will plan for tomorrow with the help of anesthesia    PLAN:   See above  Any questions please call [phone]    Discussed with IM. Will continue to follow    Nathan Maravilla M.D.

## 2018-07-04 NOTE — CARE PLAN
Problem: Infection  Goal: Will remain free from infection  IV antibiotics ordered and administered.     Problem: Pain Management  Goal: Pain level will decrease to patient's comfort goal  Patient requesting oral medications as needed.

## 2018-07-05 ENCOUNTER — APPOINTMENT (OUTPATIENT)
Dept: RADIOLOGY | Facility: MEDICAL CENTER | Age: 33
DRG: 418 | End: 2018-07-05
Attending: INTERNAL MEDICINE
Payer: MEDICARE

## 2018-07-05 PROCEDURE — 160002 HCHG RECOVERY MINUTES (STAT): Performed by: INTERNAL MEDICINE

## 2018-07-05 PROCEDURE — 770006 HCHG ROOM/CARE - MED/SURG/GYN SEMI*

## 2018-07-05 PROCEDURE — 700102 HCHG RX REV CODE 250 W/ 637 OVERRIDE(OP): Performed by: INTERNAL MEDICINE

## 2018-07-05 PROCEDURE — 700101 HCHG RX REV CODE 250: Performed by: INTERNAL MEDICINE

## 2018-07-05 PROCEDURE — A9270 NON-COVERED ITEM OR SERVICE: HCPCS

## 2018-07-05 PROCEDURE — 160208 HCHG ENDO MINUTES - EA ADDL 1 MIN LEVEL 4: Performed by: INTERNAL MEDICINE

## 2018-07-05 PROCEDURE — 160203 HCHG ENDO MINUTES - 1ST 30 MINS LEVEL 4: Performed by: INTERNAL MEDICINE

## 2018-07-05 PROCEDURE — 160048 HCHG OR STATISTICAL LEVEL 1-5: Performed by: INTERNAL MEDICINE

## 2018-07-05 PROCEDURE — 160035 HCHG PACU - 1ST 60 MINS PHASE I: Performed by: INTERNAL MEDICINE

## 2018-07-05 PROCEDURE — 700105 HCHG RX REV CODE 258: Performed by: INTERNAL MEDICINE

## 2018-07-05 PROCEDURE — 700111 HCHG RX REV CODE 636 W/ 250 OVERRIDE (IP)

## 2018-07-05 PROCEDURE — A9270 NON-COVERED ITEM OR SERVICE: HCPCS | Performed by: HOSPITALIST

## 2018-07-05 PROCEDURE — A9270 NON-COVERED ITEM OR SERVICE: HCPCS | Performed by: INTERNAL MEDICINE

## 2018-07-05 PROCEDURE — 99232 SBSQ HOSP IP/OBS MODERATE 35: CPT | Performed by: HOSPITALIST

## 2018-07-05 PROCEDURE — 700102 HCHG RX REV CODE 250 W/ 637 OVERRIDE(OP)

## 2018-07-05 PROCEDURE — 74328 X-RAY BILE DUCT ENDOSCOPY: CPT

## 2018-07-05 PROCEDURE — 0FC98ZZ EXTIRPATION OF MATTER FROM COMMON BILE DUCT, VIA NATURAL OR ARTIFICIAL OPENING ENDOSCOPIC: ICD-10-PCS | Performed by: INTERNAL MEDICINE

## 2018-07-05 PROCEDURE — 700102 HCHG RX REV CODE 250 W/ 637 OVERRIDE(OP): Performed by: PSYCHIATRY & NEUROLOGY

## 2018-07-05 PROCEDURE — 110371 HCHG SHELL REV 272: Performed by: INTERNAL MEDICINE

## 2018-07-05 PROCEDURE — 160009 HCHG ANES TIME/MIN: Performed by: INTERNAL MEDICINE

## 2018-07-05 PROCEDURE — BF101ZZ FLUOROSCOPY OF BILE DUCTS USING LOW OSMOLAR CONTRAST: ICD-10-PCS | Performed by: INTERNAL MEDICINE

## 2018-07-05 PROCEDURE — 700101 HCHG RX REV CODE 250

## 2018-07-05 PROCEDURE — A9270 NON-COVERED ITEM OR SERVICE: HCPCS | Performed by: PSYCHIATRY & NEUROLOGY

## 2018-07-05 PROCEDURE — 500066 HCHG BITE BLOCK, ECT: Performed by: INTERNAL MEDICINE

## 2018-07-05 PROCEDURE — 502240 HCHG MISC OR SUPPLY RC 0272: Performed by: INTERNAL MEDICINE

## 2018-07-05 PROCEDURE — 700111 HCHG RX REV CODE 636 W/ 250 OVERRIDE (IP): Performed by: INTERNAL MEDICINE

## 2018-07-05 PROCEDURE — 700102 HCHG RX REV CODE 250 W/ 637 OVERRIDE(OP): Performed by: HOSPITALIST

## 2018-07-05 RX ORDER — INDOMETHACIN 50 MG/1
100 SUPPOSITORY RECTAL ONCE
Status: COMPLETED | OUTPATIENT
Start: 2018-07-05 | End: 2018-07-05

## 2018-07-05 RX ORDER — INDOMETHACIN 50 MG/1
SUPPOSITORY RECTAL
Status: COMPLETED
Start: 2018-07-05 | End: 2018-07-05

## 2018-07-05 RX ADMIN — METRONIDAZOLE 500 MG: 500 INJECTION, SOLUTION INTRAVENOUS at 06:21

## 2018-07-05 RX ADMIN — METRONIDAZOLE 500 MG: 500 INJECTION, SOLUTION INTRAVENOUS at 20:40

## 2018-07-05 RX ADMIN — HYDROMORPHONE HYDROCHLORIDE 4 MG: 4 TABLET ORAL at 03:27

## 2018-07-05 RX ADMIN — INDOMETHACIN 100 MG: 50 SUPPOSITORY RECTAL at 14:15

## 2018-07-05 RX ADMIN — CHLORPROMAZINE HYDROCHLORIDE 50 MG: 50 TABLET, SUGAR COATED ORAL at 07:50

## 2018-07-05 RX ADMIN — SODIUM CHLORIDE: 9 INJECTION, SOLUTION INTRAVENOUS at 14:55

## 2018-07-05 RX ADMIN — HYDROMORPHONE HYDROCHLORIDE 4 MG: 4 TABLET ORAL at 20:42

## 2018-07-05 RX ADMIN — CEFTRIAXONE SODIUM 2 G: 2 INJECTION, SOLUTION INTRAVENOUS at 07:50

## 2018-07-05 RX ADMIN — STANDARDIZED SENNA CONCENTRATE AND DOCUSATE SODIUM 2 TABLET: 8.6; 5 TABLET, FILM COATED ORAL at 20:43

## 2018-07-05 RX ADMIN — STANDARDIZED SENNA CONCENTRATE AND DOCUSATE SODIUM 2 TABLET: 8.6; 5 TABLET, FILM COATED ORAL at 07:50

## 2018-07-05 RX ADMIN — METRONIDAZOLE 500 MG: 500 INJECTION, SOLUTION INTRAVENOUS at 14:53

## 2018-07-05 ASSESSMENT — ENCOUNTER SYMPTOMS
SPEECH CHANGE: 0
PHOTOPHOBIA: 0
RESPIRATORY NEGATIVE: 1
BLOOD IN STOOL: 0
CONSTITUTIONAL NEGATIVE: 1
NECK PAIN: 0
FLANK PAIN: 0
VOMITING: 0
EYE PAIN: 0
TREMORS: 0
HEARTBURN: 0
DEPRESSION: 0
BACK PAIN: 0
CARDIOVASCULAR NEGATIVE: 1
CLAUDICATION: 0
BRUISES/BLEEDS EASILY: 0
DIARRHEA: 0
CONSTIPATION: 0
HALLUCINATIONS: 1
SPUTUM PRODUCTION: 0
WEAKNESS: 0
STRIDOR: 0
MYALGIAS: 1
ORTHOPNEA: 0
NAUSEA: 0
COUGH: 0
SENSORY CHANGE: 0
SHORTNESS OF BREATH: 0
WEIGHT LOSS: 0
ABDOMINAL PAIN: 1
EYES NEGATIVE: 1
SINUS PAIN: 0

## 2018-07-05 ASSESSMENT — PAIN SCALES - GENERAL
PAINLEVEL_OUTOF10: 1
PAINLEVEL_OUTOF10: 3
PAINLEVEL_OUTOF10: ASSUMED PAIN PRESENT
PAINLEVEL_OUTOF10: 3
PAINLEVEL_OUTOF10: 3
PAINLEVEL_OUTOF10: ASSUMED PAIN PRESENT
PAINLEVEL_OUTOF10: 3
PAINLEVEL_OUTOF10: ASSUMED PAIN PRESENT
PAINLEVEL_OUTOF10: 3
PAINLEVEL_OUTOF10: 3
PAINLEVEL_OUTOF10: 7

## 2018-07-05 ASSESSMENT — LIFESTYLE VARIABLES: SUBSTANCE_ABUSE: 0

## 2018-07-05 NOTE — OR SURGEON
Immediate Post OP Note    PreOp Diagnosis: choledocholithiasis    PostOp Diagnosis:   EGD:    1: normal appearing esophagus     2/ GEJ at 36 cm    3/ stomach: normal appearing fundus , body, antrum and pylorus    4/ Duodenum:     Normal appearing bulb      Normal appearing second portion    Ampulla : normal   ERCP      1/ selective cannulation of the CBD via a Trucut guidewire with the assistance of fluoroscopy guidance    2/ Aspiration of Bile was confirmed        Cholangiogram    1/ Common bile duct: appeared dilated to 9 mm in diameter, and a small filling defect appreciated with in it    2/ intrahepatics left and right appeared Normal    3/ sphincterotomy of 8 mm  Performed   2/ exchange performed leaving the guidewire in place   3/ introduction of a 12 mm balloon over the guidewire was introduced and inflated   4/ removal of a yellow 5 mm stone visualized endoscopically   5/ obstructive cholangiogram demonstrated no further filling defects   6/ balloon was deflated and removed with the guidewire   4/ good bilary drainage noted endoscopically and radiologically          Procedure(s):  ERCP IN OR - Wound Class: Clean Contaminated    Surgeon(s):  Nathan Maravilla M.D.    Anesthesiologist/Type of Anesthesia:  Anesthesiologist: Wallace Torres M.D./General    Surgical Staff:  Circulator: Aleshia Mckinnon R.N.  Endoscopy Technician: Negra Paredes  Monitoring Nurse: Ancelmo Saini R.N.  Radiology Technologist: Jesus SUE Gross    Specimens removed if any:  * No specimens in log *    Estimated Blood Loss: 3 cc    Findings:     Narrative:   Prior to the procedure the patient was informed of the risk and benefits of the procedure, bleeding infection, perforation 3-10% chance of pancreatitis along with cardiopulmonary compromise and possible death.  The patient was agreeable and all questions were answered.    The patient was placed in the prone position once they were sedated and the bite  block was placed.  The head was rotated to the right. Introduction of the standard Olympus duodenoscope was performed under indirect visualation.  The entire esophagus appeared normal of what was seen.  The GEJ was at 36 cm appeared normal.  Intubation of the gastric cavity was performed and it appeared normal.  Antrum, pylorus, duodenal bulb and second portion also appeared normal.  The ampulla of vater was brought on faus and it appeared normal.  Bilious drainage was noted.  Selective cannulation of the Common bile duct was performed under fluoroscopy guidance using a Trucut cannulotome.  Aspiration of bile was performed further confirming position.  Cholangiogram performed demonstrated a CBD of 9 mm in size with a filling defect was appreciated.   The intrahepatic systems appeared normal.  A 8 mm sphincterotomy was performed after which time an exchange was performed introducing a balloon over the guidewire.  It was inflated to 12 mm and several sweeps of the CBD were performed extracting a 4-5 mm yellow/white biliary stone.  An obstructive cholangiogram demonstrated no further filling defects.  The balloon and the guidewire were then removed and good bilious drainage was endoscopically and radiologically.  The scope was then straightened and the excess air was removed.  The patient tolerated the procedure well.      Complications: none     Recommendations:   1/ clear liquid diet   2/ check labs in am, cbc.cmp and lipase   3/ follow up at [facility] as o/p on discharge [phone] PRN   4/ surgical consultation for Lap skip.         7/5/2018 1:36 PM Nathan Maravilla M.D.

## 2018-07-05 NOTE — CARE PLAN
Problem: Infection  Goal: Will remain free from infection  IV antibiotics ordered and administered.     Problem: Pain Management  Goal: Pain level will decrease to patient's comfort goal  Patient requesting oral pain medications as needed.

## 2018-07-05 NOTE — PROGRESS NOTES
Patient back from ERCP. Patient awake and alert. Patient ambulated to bathroom with steady gait. Complaining of a sore throat, gave her an Otter Pop per request. No other needs.

## 2018-07-05 NOTE — PROGRESS NOTES
Assumed care of patient. Patient awake and alert. Patient denying pain, but stating she is starting to feel nauseous. Medicated per MAR. Abdomen is soft. Patient anxious regarding ERCP. Will update with time of procedure once known. No other needs at this time. Call light within reach.

## 2018-07-05 NOTE — CARE PLAN
Problem: Bowel/Gastric:  Goal: Normal bowel function is maintained or improved  Outcome: PROGRESSING AS EXPECTED  Patient currently NPO for procedure tomorrow. + Flatus, - BM. + Bowel sounds.    Problem: Pain Management  Goal: Pain level will decrease to patient's comfort goal  Outcome: PROGRESSING AS EXPECTED  Pain being managed with PRN pain medication and ice packs.

## 2018-07-05 NOTE — OR NURSING
POSTOP ERCP    PT ARRIVED SLEEPING WITH OPA PRESENT ON 5L NC, PT AWOKE WITHIN 15 MINUTES OF ARRIVAL, NO LAP SITES PRESENT      PT ABLE TO WEAN OFF O2 TO RA, NO PAIN OR NAUSEA DURING RECOVERY. INDOMETHACIN SUPPOSITORY GIVEN PER MAR WHILE IN PACU. PT TOLERATED WELL. ABLE TO CLEAR SECRETIONS AND BLOW NOSE INDEPENDENTLY. CALM, SANG TO SELF WHILE IN RECOVERY, APPROPRIATE BEHAVIOR ONCE AWAKE.     2 OTTER POPS GIVEN PER PT REQUEST, AFTER PT ABLE TO TOLERATE PO FLUIDS WITHOUT NAUSEA. PT MET ASPAN PHASE I CRITERIA, APPROPRIATE TO RETURN TO ROOM. UPDATE GIVEN TO EVIE GSU RN, CHART AND PT RETURNED TO ROOM.

## 2018-07-05 NOTE — PROGRESS NOTES
[facility] Hospitalist Progress Note    Date of Service: 2018    Chief Complaint  33 y.o. female admitted 2018 with right upper quadrant abdominal pain    Interval Problem Update  Patient was diagnosed with acute cholecystitis. The patient at this point is going for ERCP today. This will be followed by evaluation for cholecystectomy. The patient's pain will be continued to be controlled. 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 Currently patient is not suicidal not homicidal she's not on a legal hold.    Consultants/Specialty  Surgery    Disposition  To be determined        Review of Systems   Constitutional: Negative.  Negative for weight loss.   HENT: Negative.  Negative for hearing loss and sinus pain.    Eyes: Negative.  Negative for photophobia and pain.   Respiratory: Negative.  Negative for cough, sputum production, shortness of breath and stridor.    Cardiovascular: Negative.  Negative for chest pain, orthopnea and claudication.   Gastrointestinal: Positive for abdominal pain. Negative for blood in stool, constipation, diarrhea, heartburn, nausea and vomiting.   Genitourinary: Negative.  Negative for dysuria, flank pain and urgency.   Musculoskeletal: Positive for myalgias (right arm). Negative for back pain and neck pain.   Skin: Negative.    Neurological: Negative for tremors, sensory change, speech change and weakness.   Endo/Heme/Allergies: Negative.  Negative for environmental allergies. Does not bruise/bleed easily.   Psychiatric/Behavioral: Positive for hallucinations. Negative for depression, substance abuse and suicidal ideas.   All other systems reviewed and are negative.     Physical Exam  Laboratory/Imaging   Hemodynamics  Temp (24hrs), Av.9 °C (98.4 °F), Min:36.5 °C (97.7 °F), Max:37.3 °C (99.1 °F)   Temperature: 36.9 °C (98.4  °F)  Pulse  Av.3  Min: 46  Max: 105 Heart Rate (Monitored): (!) 54  Blood Pressure: 118/56, NIBP: 139/75      Respiratory      Respiration: 16, Pulse Oximetry: 97 %        RUL Breath Sounds: Clear, RML Breath Sounds: Clear, RLL Breath Sounds: Diminished, MATIAS Breath Sounds: Clear, LLL Breath Sounds: Diminished    Fluids    Intake/Output Summary (Last 24 hours) at 18 1507  Last data filed at 18 1415   Gross per 24 hour   Intake             1800 ml   Output                0 ml   Net             1800 ml       Nutrition  Orders Placed This Encounter   Procedures   • DIET NPO     Standing Status:   Standing     Number of Occurrences:   8     Order Specific Question:   Type:     Answer:   At Midnight [5]     Order Specific Question:   Restrict to:     Answer:   Sips with Medications [3]     Physical Exam   Constitutional: She is oriented to person, place, and time. She appears well-developed and well-nourished. No distress.   HENT:   Head: Normocephalic and atraumatic.   Right Ear: External ear normal.   Left Ear: External ear normal.   Eyes: Conjunctivae and EOM are normal. Pupils are equal, round, and reactive to light. Right eye exhibits no discharge. Left eye exhibits no discharge.   Neck: Normal range of motion. Neck supple. No tracheal deviation present.   Cardiovascular: Normal rate and regular rhythm.  Exam reveals no friction rub.    No murmur heard.  Pulmonary/Chest: No stridor. No respiratory distress. She has no wheezes.   Abdominal: Soft. Bowel sounds are normal. She exhibits no distension. There is tenderness in the right upper quadrant, right lower quadrant and epigastric area.   Musculoskeletal: Normal range of motion. She exhibits no edema or tenderness.   Neurological: She is alert and oriented to person, place, and time. She has normal reflexes. No cranial nerve deficit. Coordination normal.   Skin: Skin is warm and dry. No rash noted. She is not diaphoretic. No erythema.   Psychiatric:  She has a normal mood and affect. Her speech is normal. Judgment and thought content normal. She is actively hallucinating. Cognition and memory are normal.   Nursing note and vitals reviewed.      Recent Labs      07/03/18   0308   WBC  8.6   RBC  4.52   HEMOGLOBIN  13.3   HEMATOCRIT  39.6   MCV  87.6   MCH  29.4   MCHC  33.6   RDW  41.9   PLATELETCT  330   MPV  9.3     Recent Labs      07/03/18   0308  07/04/18   0215   SODIUM  138  137   POTASSIUM  4.1  3.7   CHLORIDE  107  106   CO2  22  23   GLUCOSE  93  96   BUN  6*  5*   CREATININE  0.64  0.74   CALCIUM  8.9  9.3                      Assessment/Plan     Cholelithiasis without cholecystitis   Assessment & Plan    ERCP scheduled today for evaluation of the common bile duct and possible stone resection.        RUQ pain   Assessment & Plan    Secondary to acute cholecystitis.  Patient going for ERCP today.  Patient may be going for laparoscopic cholecystectomy afterwards.        Schizoaffective disorder (HCC)   Assessment & Plan    Patient is not exhibiting any homicidal or suicidal ideation.  Patient does have tendency to talk to herself as though she were to personalities.  Continue at this point with monitoring.        Leukocytosis- (present on admission)   Assessment & Plan    Monitor white blood cell count at this point it has resolved at 8.6 repeat labs tomorrow morning.        Bipolar disorder (HCC)- (present on admission)   Assessment & Plan    Chronic but stable patient does have tendency to have discussion with self as though she were to different people.        Lower urinary tract infectious disease- (present on admission)   Assessment & Plan    Patient did have mixed enteric katy. At this point she is completing antibiotic course.            Quality-Core Measures   Reviewed items::  EKG reviewed, Labs reviewed, Medications reviewed and Radiology images reviewed  Schulz catheter::  No Schulz  DVT prophylaxis pharmacological::  Contraindicated - High  bleeding risk  Ulcer Prophylaxis::  Not indicated  Antibiotics:  Treating active infection/contamination beyond 24 hours perioperative coverage  Assessed for rehabilitation services:  Patient returned to prior level of function, rehabilitation not indicated at this time

## 2018-07-05 NOTE — PROGRESS NOTES
"Pt A&O x 4. Flat affect and tearful upon assessment. When asked if everything was ok, pt states, \"everything is fine\".     Vitals:       Pt rates pain 7 out of 10. Medicated per MAR.     Neuro: IQBAL. Pt states she has numbness in bilateral fingertips.     Cardiac: Denies new onset of chest pain.     Vascular: Pulses 1+ BUE, BLE. No edema noted.     Respiratory: Lungs sound clear/diminished to auscultation. On room air.  on, satting in 90's. Denies SOB.     GI: Abdomen soft, tender. Hypoactive bowel sounds, + flatus, - BM. Denies nausea at this time. Patient currently NPO for procedure in AM.     : Pt voiding adequately.      MSK: Pt up to bathroom self, tolerating well.     Integumentary: Skin intact.     Labs noted.     Fall precautions in place: Bed locked in lowest position, Upper bed rails up, treaded socks in place, personal belongings within reach, call light within reach, appropriate mobility signs in place, - bed alarm. Pt calls appropriately.      Pt updated on POC  "

## 2018-07-06 LAB
ANION GAP SERPL CALC-SCNC: 9 MMOL/L (ref 0–11.9)
BUN SERPL-MCNC: 6 MG/DL (ref 8–22)
CALCIUM SERPL-MCNC: 8.8 MG/DL (ref 8.5–10.5)
CHLORIDE SERPL-SCNC: 108 MMOL/L (ref 96–112)
CO2 SERPL-SCNC: 20 MMOL/L (ref 20–33)
CREAT SERPL-MCNC: 0.51 MG/DL (ref 0.5–1.4)
ERYTHROCYTE [DISTWIDTH] IN BLOOD BY AUTOMATED COUNT: 46.5 FL (ref 35.9–50)
GLUCOSE SERPL-MCNC: 93 MG/DL (ref 65–99)
HCT VFR BLD AUTO: 40.7 % (ref 37–47)
HGB BLD-MCNC: 13.4 G/DL (ref 12–16)
MCH RBC QN AUTO: 29.7 PG (ref 27–33)
MCHC RBC AUTO-ENTMCNC: 32.9 G/DL (ref 33.6–35)
MCV RBC AUTO: 90.2 FL (ref 81.4–97.8)
PLATELET # BLD AUTO: 293 K/UL (ref 164–446)
PMV BLD AUTO: 9.6 FL (ref 9–12.9)
POTASSIUM SERPL-SCNC: 3.5 MMOL/L (ref 3.6–5.5)
RBC # BLD AUTO: 4.51 M/UL (ref 4.2–5.4)
SODIUM SERPL-SCNC: 137 MMOL/L (ref 135–145)
WBC # BLD AUTO: 14.5 K/UL (ref 4.8–10.8)

## 2018-07-06 PROCEDURE — 500514 HCHG ENDOCLIP: Performed by: SURGERY

## 2018-07-06 PROCEDURE — 501568 HCHG TROCAR, BLUNTPORT 12MM: Performed by: SURGERY

## 2018-07-06 PROCEDURE — 500516 HCHG ENDOLOOP II 0 VIOLET 18: Performed by: SURGERY

## 2018-07-06 PROCEDURE — 160009 HCHG ANES TIME/MIN: Performed by: SURGERY

## 2018-07-06 PROCEDURE — 700105 HCHG RX REV CODE 258: Performed by: INTERNAL MEDICINE

## 2018-07-06 PROCEDURE — 700102 HCHG RX REV CODE 250 W/ 637 OVERRIDE(OP): Performed by: PSYCHIATRY & NEUROLOGY

## 2018-07-06 PROCEDURE — 500002 HCHG ADHESIVE, DERMABOND: Performed by: SURGERY

## 2018-07-06 PROCEDURE — 80048 BASIC METABOLIC PNL TOTAL CA: CPT

## 2018-07-06 PROCEDURE — 160035 HCHG PACU - 1ST 60 MINS PHASE I: Performed by: SURGERY

## 2018-07-06 PROCEDURE — 0FT44ZZ RESECTION OF GALLBLADDER, PERCUTANEOUS ENDOSCOPIC APPROACH: ICD-10-PCS | Performed by: SURGERY

## 2018-07-06 PROCEDURE — 502571 HCHG PACK, LAP CHOLE: Performed by: SURGERY

## 2018-07-06 PROCEDURE — 160048 HCHG OR STATISTICAL LEVEL 1-5: Performed by: SURGERY

## 2018-07-06 PROCEDURE — 501583 HCHG TROCAR, THRD CAN&SEAL 5X100: Performed by: SURGERY

## 2018-07-06 PROCEDURE — 700111 HCHG RX REV CODE 636 W/ 250 OVERRIDE (IP)

## 2018-07-06 PROCEDURE — 501570 HCHG TROCAR, SEPARATOR: Performed by: SURGERY

## 2018-07-06 PROCEDURE — 501577 HCHG TROCAR, STEP 11MM: Performed by: SURGERY

## 2018-07-06 PROCEDURE — 700101 HCHG RX REV CODE 250: Performed by: INTERNAL MEDICINE

## 2018-07-06 PROCEDURE — 85027 COMPLETE CBC AUTOMATED: CPT

## 2018-07-06 PROCEDURE — 160002 HCHG RECOVERY MINUTES (STAT): Performed by: SURGERY

## 2018-07-06 PROCEDURE — 700102 HCHG RX REV CODE 250 W/ 637 OVERRIDE(OP)

## 2018-07-06 PROCEDURE — 160039 HCHG SURGERY MINUTES - EA ADDL 1 MIN LEVEL 3: Performed by: SURGERY

## 2018-07-06 PROCEDURE — 88304 TISSUE EXAM BY PATHOLOGIST: CPT

## 2018-07-06 PROCEDURE — 160036 HCHG PACU - EA ADDL 30 MINS PHASE I: Performed by: SURGERY

## 2018-07-06 PROCEDURE — 36415 COLL VENOUS BLD VENIPUNCTURE: CPT

## 2018-07-06 PROCEDURE — 770006 HCHG ROOM/CARE - MED/SURG/GYN SEMI*

## 2018-07-06 PROCEDURE — A9270 NON-COVERED ITEM OR SERVICE: HCPCS

## 2018-07-06 PROCEDURE — 501838 HCHG SUTURE GENERAL: Performed by: SURGERY

## 2018-07-06 PROCEDURE — 99232 SBSQ HOSP IP/OBS MODERATE 35: CPT | Performed by: HOSPITALIST

## 2018-07-06 PROCEDURE — 700101 HCHG RX REV CODE 250

## 2018-07-06 PROCEDURE — 501399 HCHG SPECIMAN BAG, ENDO CATC: Performed by: SURGERY

## 2018-07-06 PROCEDURE — A9270 NON-COVERED ITEM OR SERVICE: HCPCS | Performed by: PSYCHIATRY & NEUROLOGY

## 2018-07-06 PROCEDURE — A9270 NON-COVERED ITEM OR SERVICE: HCPCS | Performed by: INTERNAL MEDICINE

## 2018-07-06 PROCEDURE — 700111 HCHG RX REV CODE 636 W/ 250 OVERRIDE (IP): Performed by: INTERNAL MEDICINE

## 2018-07-06 PROCEDURE — 700102 HCHG RX REV CODE 250 W/ 637 OVERRIDE(OP): Performed by: INTERNAL MEDICINE

## 2018-07-06 PROCEDURE — 160028 HCHG SURGERY MINUTES - 1ST 30 MINS LEVEL 3: Performed by: SURGERY

## 2018-07-06 RX ORDER — ONDANSETRON 2 MG/ML
INJECTION INTRAMUSCULAR; INTRAVENOUS
Status: COMPLETED
Start: 2018-07-06 | End: 2018-07-06

## 2018-07-06 RX ORDER — OXYCODONE HCL 5 MG/5 ML
SOLUTION, ORAL ORAL
Status: COMPLETED
Start: 2018-07-06 | End: 2018-07-06

## 2018-07-06 RX ORDER — ACETAMINOPHEN 500 MG
TABLET ORAL
Status: COMPLETED
Start: 2018-07-06 | End: 2018-07-06

## 2018-07-06 RX ORDER — GABAPENTIN 300 MG/1
CAPSULE ORAL
Status: COMPLETED
Start: 2018-07-06 | End: 2018-07-06

## 2018-07-06 RX ORDER — DIPHENHYDRAMINE HYDROCHLORIDE 50 MG/ML
INJECTION INTRAMUSCULAR; INTRAVENOUS
Status: COMPLETED
Start: 2018-07-06 | End: 2018-07-06

## 2018-07-06 RX ORDER — SCOLOPAMINE TRANSDERMAL SYSTEM 1 MG/1
PATCH, EXTENDED RELEASE TRANSDERMAL
Status: COMPLETED
Start: 2018-07-06 | End: 2018-07-06

## 2018-07-06 RX ORDER — CELECOXIB 200 MG/1
CAPSULE ORAL
Status: COMPLETED
Start: 2018-07-06 | End: 2018-07-06

## 2018-07-06 RX ORDER — MIDAZOLAM HYDROCHLORIDE 1 MG/ML
INJECTION INTRAMUSCULAR; INTRAVENOUS
Status: COMPLETED
Start: 2018-07-06 | End: 2018-07-06

## 2018-07-06 RX ORDER — BUPIVACAINE HYDROCHLORIDE AND EPINEPHRINE 5; 5 MG/ML; UG/ML
INJECTION, SOLUTION EPIDURAL; INTRACAUDAL; PERINEURAL
Status: DISCONTINUED | OUTPATIENT
Start: 2018-07-06 | End: 2018-07-06 | Stop reason: HOSPADM

## 2018-07-06 RX ADMIN — FENTANYL CITRATE 50 MCG: 50 INJECTION, SOLUTION INTRAMUSCULAR; INTRAVENOUS at 13:40

## 2018-07-06 RX ADMIN — ONDANSETRON HYDROCHLORIDE 4 MG: 2 INJECTION, SOLUTION INTRAMUSCULAR; INTRAVENOUS at 13:45

## 2018-07-06 RX ADMIN — MIDAZOLAM 1 MG: 1 INJECTION INTRAMUSCULAR; INTRAVENOUS at 14:00

## 2018-07-06 RX ADMIN — SODIUM CHLORIDE: 9 INJECTION, SOLUTION INTRAVENOUS at 02:48

## 2018-07-06 RX ADMIN — MIDAZOLAM 1 MG: 1 INJECTION INTRAMUSCULAR; INTRAVENOUS at 13:30

## 2018-07-06 RX ADMIN — METRONIDAZOLE 500 MG: 500 INJECTION, SOLUTION INTRAVENOUS at 21:38

## 2018-07-06 RX ADMIN — FENTANYL CITRATE 50 MCG: 50 INJECTION, SOLUTION INTRAMUSCULAR; INTRAVENOUS at 13:30

## 2018-07-06 RX ADMIN — METRONIDAZOLE 500 MG: 500 INJECTION, SOLUTION INTRAVENOUS at 06:00

## 2018-07-06 RX ADMIN — GABAPENTIN 300 MG: 300 CAPSULE ORAL at 11:30

## 2018-07-06 RX ADMIN — FENTANYL CITRATE 50 MCG: 50 INJECTION, SOLUTION INTRAMUSCULAR; INTRAVENOUS at 13:50

## 2018-07-06 RX ADMIN — FENTANYL CITRATE 50 MCG: 50 INJECTION, SOLUTION INTRAMUSCULAR; INTRAVENOUS at 14:10

## 2018-07-06 RX ADMIN — HYDROMORPHONE HYDROCHLORIDE 0.5 MG: 10 INJECTION, SOLUTION INTRAMUSCULAR; INTRAVENOUS; SUBCUTANEOUS at 13:30

## 2018-07-06 RX ADMIN — SCOPOLAMINE 1 PATCH: 1 PATCH, EXTENDED RELEASE TRANSDERMAL at 11:45

## 2018-07-06 RX ADMIN — HYDROMORPHONE HYDROCHLORIDE 0.5 MG: 10 INJECTION, SOLUTION INTRAMUSCULAR; INTRAVENOUS; SUBCUTANEOUS at 13:40

## 2018-07-06 RX ADMIN — OXYCODONE HYDROCHLORIDE 10 MG: 5 SOLUTION ORAL at 13:30

## 2018-07-06 RX ADMIN — CELECOXIB 400 MG: 200 CAPSULE ORAL at 11:35

## 2018-07-06 RX ADMIN — CHLORPROMAZINE HYDROCHLORIDE 50 MG: 50 TABLET, SUGAR COATED ORAL at 01:45

## 2018-07-06 RX ADMIN — DIPHENHYDRAMINE HYDROCHLORIDE 12.5 MG: 50 INJECTION INTRAMUSCULAR; INTRAVENOUS at 13:45

## 2018-07-06 RX ADMIN — STANDARDIZED SENNA CONCENTRATE AND DOCUSATE SODIUM 2 TABLET: 8.6; 5 TABLET, FILM COATED ORAL at 21:39

## 2018-07-06 RX ADMIN — METRONIDAZOLE 500 MG: 500 INJECTION, SOLUTION INTRAVENOUS at 16:27

## 2018-07-06 RX ADMIN — ACETAMINOPHEN 1000 MG: 500 TABLET, FILM COATED ORAL at 11:35

## 2018-07-06 RX ADMIN — FENTANYL CITRATE 50 MCG: 50 INJECTION, SOLUTION INTRAMUSCULAR; INTRAVENOUS at 14:00

## 2018-07-06 RX ADMIN — CEFTRIAXONE SODIUM 2 G: 2 INJECTION, SOLUTION INTRAVENOUS at 08:41

## 2018-07-06 ASSESSMENT — ENCOUNTER SYMPTOMS
BRUISES/BLEEDS EASILY: 0
RESPIRATORY NEGATIVE: 1
CONSTIPATION: 0
SEIZURES: 0
CARDIOVASCULAR NEGATIVE: 1
FOCAL WEAKNESS: 0
HEMOPTYSIS: 0
EYE DISCHARGE: 0
ABDOMINAL PAIN: 1
INSOMNIA: 1
VOMITING: 1
WHEEZING: 0
COUGH: 0
HALLUCINATIONS: 1
DOUBLE VISION: 0
BLOOD IN STOOL: 0
HEARTBURN: 0
EYE REDNESS: 0
DIARRHEA: 0
WEAKNESS: 0
CONSTITUTIONAL NEGATIVE: 1
DEPRESSION: 0
MYALGIAS: 1
EYES NEGATIVE: 1
TINGLING: 0
WEIGHT LOSS: 0
GASTROINTESTINAL NEGATIVE: 1
NAUSEA: 1
PALPITATIONS: 0

## 2018-07-06 ASSESSMENT — PAIN SCALES - GENERAL
PAINLEVEL_OUTOF10: 7
PAINLEVEL_OUTOF10: 8
PAINLEVEL_OUTOF10: 6
PAINLEVEL_OUTOF10: 10
PAINLEVEL_OUTOF10: 5
PAINLEVEL_OUTOF10: 10
PAINLEVEL_OUTOF10: 5

## 2018-07-06 ASSESSMENT — LIFESTYLE VARIABLES: SUBSTANCE_ABUSE: 0

## 2018-07-06 NOTE — OR SURGEON
Immediate Post OP Note    PreOp Diagnosis: cholecystitis  choledochoithiasiis    PostOp Diagnosis:   cholecystitis  choledochoithiasiis  Procedure(s):  GEREMIAS BY LAPAROSCOPY - Wound Class: Clean Contaminated    Surgeon(s):  Leroy Goodson M.D.    Anesthesiologist/Type of Anesthesia:  Anesthesiologist: Edda Anderson M.D./General    Surgical Staff:  Circulator: Tran Moreno R.N.  Scrub Person: Shad Vasquez    Specimens removed if any:  * No specimens in log *  Gallbladder and contetns  Estimated Blood Loss: 20    Findings: inflammation gallbladder    Complications: none        7/6/2018 1:15 PM Leroy Goodson M.D.

## 2018-07-06 NOTE — PROGRESS NOTES
Assumed care at 0700. Received report from night shift RN. Bedside report completed. AOx4.    Denies pain.  Denies nausea.  NPO for procedure today. +voiding. +BM yesterday.    IVF infusing. Ambulating with steady gait. Pt call light and belongings within reach, fall precautions in place.

## 2018-07-06 NOTE — OP REPORT
DATE OF OPERATION: 7/6/2018     PREOPERATIVE DIAGNOSIS: Acute cholecystitis    POSTOPERATIVE DIAGNOSIS: Acute cholecystitis     PROCEDURE PERFORMED: Laparoscopic cholecystectomy  SURGEON: Leroy Goodson M.D.    ANESTHESIOLOGIST:Edda Anderson M.D./General     ANESTHESIA: General endotracheal anesthesia.      INDICATIONS: The patient is a 33 y.o.-year-old female with clinical and radiographic findings of Acute cholecystitis. After GI clearance duct She  is taken to the operating room for laparoscopic cholecystectomy.     FINDINGS: inflammation gallbladder    WOUND CLASSIFICATION: Class II, Clean-Contaminated.    SPECIMEN: gallbladder and contents    ESTIMATED BLOOD LOSS:20 mL.    PROCEDURE: Following informed consent, the patient was properly identified, taken to the operating room and placed in supine position where general endotracheal anesthesia was administered. Intravenous antibiotics were administered by the anesthesiologist in correct time interval. Sequential compression devices were employed. The abdomen was prepped and draped into a sterile field.     Marcaine 0.5% with epinephrine was used to infiltrate the port sites. An infraumbilical midline incision was made and subcutaneous tissue spread bluntly. The fascia was elevated and Incised A radha port placed under vision. Carbon dioxide pneumoperitoneum was instilled.    A subxiphoid 12 mm trocar was applied.  2 right upper quadrant 5 mm trochars were applied under direct visualization.    The gallbladder was located in its right upper quadrant position.  It was retracted superiorly and laterally.  The contents of Calot's triangle were dissected clearly free.  Once a critical view of safety was obtained, the cystic duct was triply clipped on the common duct side and singly clipped on the gallbladder side.  This was then transected.  Endoloop placed secure closure The cystic artery was dealt with in a similar fashion.  Electrocautery were used to  transect the attachments of the gallbladder to the liver bed.  An Endo Catch was used to retrieve the gallbladder    The right upper quadrant was copiously irrigated till clear.  The clips were in place without biliary spillage.  Hemostasis was achieved.  An Endo Close was used to close the radha and subxiphoid incision.  Monocryl was used for skin edges.  Dermabond was applied as a dressing    The patient tolerated the procedure well and there were no apparent complication. All sponge, needle, and instrument counts were correct on 2 separate occasions. She  was awakened, extubated, and transferred to the recovery room in satisfactory condition.   ____________________________________   Leroy Goodson M.D.    DD: 7/6/2018  1:16 PM

## 2018-07-06 NOTE — PROGRESS NOTES
Assume pt care. Pt a/o x3, VSS, No acute issues overnight. Pt rounds Q1-2hr with assessment of 4p's. IV assessment and care given. Pt education provided base on admission, care plan, current medications, and PRN. Pt resting comfortably in bed. NPO @0000 for sx tomorrow.

## 2018-07-06 NOTE — PROGRESS NOTES
[facility] Hospitalist Progress Note    Date of Service: 2018    Chief Complaint  33 y.o. female admitted 2018 with right upper quadrant abdominal pain    Interval Problem Update  Patient with acute cholecystitis and choledocholithiasis had a ERCP done yesterday which showed a 5 mm stone from the common bile duct. Today the patient had laparoscopic cholecystectomy. Continue at this point with postoperative pain management, fluid resuscitation as well as monitoring of her bipolar/schizoaffective disorder.    Consultants/Specialty  Surgery    Disposition  To be determined        Review of Systems   Constitutional: Negative.  Negative for weight loss.   HENT: Negative.  Negative for congestion, ear pain, hearing loss and nosebleeds.    Eyes: Negative.  Negative for double vision, discharge and redness.   Respiratory: Negative.  Negative for cough, hemoptysis and wheezing.    Cardiovascular: Negative.  Negative for chest pain, palpitations and leg swelling.   Gastrointestinal: Positive for abdominal pain (improved), nausea and vomiting. Negative for blood in stool, constipation, diarrhea and heartburn.   Genitourinary: Negative.  Negative for frequency and hematuria.   Musculoskeletal: Positive for myalgias (right arm). Negative for joint pain.   Skin: Negative.    Neurological: Negative for tingling, focal weakness, seizures and weakness.   Endo/Heme/Allergies: Negative.  Negative for environmental allergies. Does not bruise/bleed easily.   Psychiatric/Behavioral: Positive for hallucinations. Negative for depression, substance abuse and suicidal ideas. The patient has insomnia.    All other systems reviewed and are negative.     Physical Exam  Laboratory/Imaging   Hemodynamics  Temp (24hrs), Av.7 °C (98.1 °F), Min:36.3 °C (97.4 °F), Max:36.9 °C (98.5 °F)   Temperature: 36.7 °C (98.1 °F)  Pulse  Av.8  Min: 46  Max: 105 Heart Rate (Monitored): 68  Blood Pressure: 113/63, NIBP: 106/62      Respiratory       Respiration: 14, Pulse Oximetry: 93 %        RUL Breath Sounds: Clear, RML Breath Sounds: Clear, RLL Breath Sounds: Clear, MATIAS Breath Sounds: Clear, LLL Breath Sounds: Clear    Fluids    Intake/Output Summary (Last 24 hours) at 07/06/18 1542  Last data filed at 07/06/18 1303   Gross per 24 hour   Intake             1850 ml   Output               25 ml   Net             1825 ml       Nutrition  Orders Placed This Encounter   Procedures   • Diet Order Regular     Standing Status:   Standing     Number of Occurrences:   1     Order Specific Question:   Diet:     Answer:   Regular [1]     Physical Exam   Constitutional: She is oriented to person, place, and time. She appears well-developed and well-nourished.   HENT:   Head: Normocephalic and atraumatic.   Right Ear: External ear normal.   Left Ear: External ear normal.   Nose: Nose normal.   Mouth/Throat: Oropharynx is clear and moist.   Eyes: Conjunctivae and EOM are normal. Pupils are equal, round, and reactive to light.   Neck: Normal range of motion. Neck supple. No JVD present. No thyromegaly present.   Cardiovascular: Normal rate, regular rhythm and normal heart sounds.    Pulmonary/Chest: Effort normal and breath sounds normal. No respiratory distress. She has no wheezes. She has no rales.   Abdominal: Soft. Bowel sounds are normal. She exhibits no distension and no mass. There is tenderness in the right upper quadrant, right lower quadrant and epigastric area. There is no guarding.   Musculoskeletal: Normal range of motion. She exhibits no edema or deformity.   Neurological: She is alert and oriented to person, place, and time. She has normal reflexes.   Skin: Skin is warm and dry.   Psychiatric: She has a normal mood and affect. Her speech is normal. Judgment and thought content normal. She is actively hallucinating. Cognition and memory are normal.   Nursing note and vitals reviewed.      Recent Labs      07/06/18   0806   WBC  14.5*   RBC  4.51   HEMOGLOBIN   13.4   HEMATOCRIT  40.7   MCV  90.2   MCH  29.7   MCHC  32.9*   RDW  46.5   PLATELETCT  293   MPV  9.6     Recent Labs      07/04/18   0215  07/06/18   0806   SODIUM  137  137   POTASSIUM  3.7  3.5*   CHLORIDE  106  108   CO2  23  20   GLUCOSE  96  93   BUN  5*  6*   CREATININE  0.74  0.51   CALCIUM  9.3  8.8                      Assessment/Plan     Cholelithiasis without cholecystitis   Assessment & Plan    ERCP remove the stone surgery the gallbladder today.        RUQ pain   Assessment & Plan    Patient had acute cholecystitis.  Patient had ERCP with the stone.  Today patient underwent laparoscopic cholecystectomy.  Continue at this point postoperative pain management        Schizoaffective disorder (HCC)   Assessment & Plan    Chronic monitor postoperative confusion hopefully the patient stays stable        Leukocytosis- (present on admission)   Assessment & Plan    Most recent white blood cell count is 14.5        Bipolar disorder (HCC)- (present on admission)   Assessment & Plan    Monitor at this point for unusual behavior after surgery the patient guarding was present since borderline personality disorder as well as talking to hallucinations        Lower urinary tract infectious disease- (present on admission)   Assessment & Plan    Resolved with antibiotic management            Quality-Core Measures   Reviewed items::  EKG reviewed, Labs reviewed, Medications reviewed and Radiology images reviewed  Scuhlz catheter::  No Schulz  DVT prophylaxis pharmacological::  Contraindicated - High bleeding risk  Ulcer Prophylaxis::  Not indicated  Antibiotics:  Treating active infection/contamination beyond 24 hours perioperative coverage  Assessed for rehabilitation services:  Patient returned to prior level of function, rehabilitation not indicated at this time

## 2018-07-06 NOTE — OR NURSING
1420  Pt VSS, pain and nausea tolerable requesting to go back to her room, unable to  Contact her primary RN unit clerk transferred this RN to 3 wrong RN's so report given to Sheree RN [facility] charge and  Pt transferred back to [facility] in stable condition.

## 2018-07-06 NOTE — PROGRESS NOTES
Pt back to room.    Pt up with sba to restroom, now resting comfortably with no needs.  Hourly rounding in place. Call light within reach.    PAC-U RN informed Charge RN he would give report at bedside.  Pt found in room, no report given.  RN called PAC-U, unable to obtain report from PAC-U RN.  Will attempt again.

## 2018-07-06 NOTE — PROGRESS NOTES
Gastroenterology Progress Note     Author: Cyrus Niñolos   Date & Time Created: 2018 11:29 AM    Chief Complaint:  cbd stone    Interval History:  ERCP yesterday with sphincterotomy and clearance of duct.  No post-procedure abdominal pain, tolerating clear liquids.    Review of Systems:  Review of Systems   Constitutional: Negative.    Gastrointestinal: Negative.        Physical Exam:  Physical Exam   Cardiovascular: Normal rate.    Pulmonary/Chest: Effort normal.   Abdominal: Soft.       Labs:        Invalid input(s): [id number]      Recent Labs      18   08   SODIUM  137  137   POTASSIUM  3.7  3.5*   CHLORIDE  106  108   CO2  23  20   BUN  5*  6*   CREATININE  0.74  0.51   MAGNESIUM  1.8   --    PHOSPHORUS  3.0   --    CALCIUM  9.3  8.8     Recent Labs      18   08   ALTSGPT  286*   --    ASTSGOT  82*   --    ALKPHOSPHAT  316*   --    TBILIRUBIN  1.9*   --    GLUCOSE  96  93     Recent Labs      18   08   RBC  4.51   HEMOGLOBIN  13.4   HEMATOCRIT  40.7   PLATELETCT  293     Recent Labs      18   0806   WBC   --   14.5*   ASTSGOT  82*   --    ALTSGPT  286*   --    ALKPHOSPHAT  316*   --    TBILIRUBIN  1.9*   --      Hemodynamics:  Temp (24hrs), Av.8 °C (98.2 °F), Min:36.3 °C (97.4 °F), Max:37.3 °C (99.1 °F)  Temperature: 36.3 °C (97.4 °F)  Pulse  Av.4  Min: 46  Max: 105Heart Rate (Monitored): (!) 54  Blood Pressure: 113/63, NIBP: 139/75     Respiratory:    Respiration: 16, Pulse Oximetry: 89 %        RUL Breath Sounds: Clear, RML Breath Sounds: Clear, RLL Breath Sounds: Diminished, MATIAS Breath Sounds: Clear, LLL Breath Sounds: Diminished  Fluids:    Intake/Output Summary (Last 24 hours) at 18 1129  Last data filed at 18 0814   Gross per 24 hour   Intake             1900 ml   Output                0 ml   Net             1900 ml        GI/Nutrition:  Orders Placed This Encounter   Procedures   • DIET NPO      Standing Status:   Standing     Number of Occurrences:   8     Order Specific Question:   Type:     Answer:   At Midnight [5]     Order Specific Question:   Restrict to:     Answer:   Sips with Medications [3]     Medical Decision Making, by Problem:  Active Hospital Problems    Diagnosis   • RUQ pain [R10.11]   • Cholelithiasis without cholecystitis [K80.20]   • Schizoaffective disorder (HCC) [F25.9]   • Leukocytosis [D72.829]   • Lower urinary tract infectious disease [N39.0]   • Bipolar disorder (HCC) [F31.9]       Plan:  Doing well post-ERCP.  Will s/o - please call if needed.    Quality-Core Measures   Reviewed items::  Labs reviewed and Radiology images reviewed

## 2018-07-06 NOTE — PROGRESS NOTES
Surgery  Met with Ms Sahni before surgery  Disucssed proposed procedure laproscopic possible open cholecystectomy possible cholangiogram  Discussed risks to include but no limited to bleeding rquiring transfuision  Injury to liver, bile ducts, injury bowel bladder blood vessels or other intra-abdominal organs, bile leak, hernia, bowel obstruciton pain, ugly scar, diarrhea  All questions answered/discussed   Informed consent obtained

## 2018-07-07 PROCEDURE — 700105 HCHG RX REV CODE 258: Performed by: INTERNAL MEDICINE

## 2018-07-07 PROCEDURE — A9270 NON-COVERED ITEM OR SERVICE: HCPCS | Performed by: INTERNAL MEDICINE

## 2018-07-07 PROCEDURE — A9270 NON-COVERED ITEM OR SERVICE: HCPCS | Performed by: HOSPITALIST

## 2018-07-07 PROCEDURE — 700101 HCHG RX REV CODE 250: Performed by: INTERNAL MEDICINE

## 2018-07-07 PROCEDURE — 700102 HCHG RX REV CODE 250 W/ 637 OVERRIDE(OP): Performed by: HOSPITALIST

## 2018-07-07 PROCEDURE — 770006 HCHG ROOM/CARE - MED/SURG/GYN SEMI*

## 2018-07-07 PROCEDURE — 99232 SBSQ HOSP IP/OBS MODERATE 35: CPT | Performed by: HOSPITALIST

## 2018-07-07 PROCEDURE — 700111 HCHG RX REV CODE 636 W/ 250 OVERRIDE (IP): Performed by: INTERNAL MEDICINE

## 2018-07-07 PROCEDURE — 700102 HCHG RX REV CODE 250 W/ 637 OVERRIDE(OP): Performed by: INTERNAL MEDICINE

## 2018-07-07 PROCEDURE — A9270 NON-COVERED ITEM OR SERVICE: HCPCS | Performed by: PSYCHIATRY & NEUROLOGY

## 2018-07-07 PROCEDURE — 700102 HCHG RX REV CODE 250 W/ 637 OVERRIDE(OP): Performed by: PSYCHIATRY & NEUROLOGY

## 2018-07-07 RX ADMIN — SODIUM CHLORIDE: 9 INJECTION, SOLUTION INTRAVENOUS at 01:23

## 2018-07-07 RX ADMIN — HYDROMORPHONE HYDROCHLORIDE 4 MG: 4 TABLET ORAL at 04:56

## 2018-07-07 RX ADMIN — MAGNESIUM HYDROXIDE 30 ML: 400 SUSPENSION ORAL at 20:29

## 2018-07-07 RX ADMIN — STANDARDIZED SENNA CONCENTRATE AND DOCUSATE SODIUM 2 TABLET: 8.6; 5 TABLET, FILM COATED ORAL at 20:30

## 2018-07-07 RX ADMIN — METRONIDAZOLE 500 MG: 500 INJECTION, SOLUTION INTRAVENOUS at 04:56

## 2018-07-07 RX ADMIN — CEFTRIAXONE SODIUM 2 G: 2 INJECTION, SOLUTION INTRAVENOUS at 08:20

## 2018-07-07 RX ADMIN — STANDARDIZED SENNA CONCENTRATE AND DOCUSATE SODIUM 2 TABLET: 8.6; 5 TABLET, FILM COATED ORAL at 08:17

## 2018-07-07 RX ADMIN — HYDROMORPHONE HYDROCHLORIDE 4 MG: 4 TABLET ORAL at 20:30

## 2018-07-07 RX ADMIN — CHLORPROMAZINE HYDROCHLORIDE 50 MG: 50 TABLET, SUGAR COATED ORAL at 10:16

## 2018-07-07 ASSESSMENT — ENCOUNTER SYMPTOMS
CARDIOVASCULAR NEGATIVE: 1
DIZZINESS: 1
BRUISES/BLEEDS EASILY: 0
CONSTIPATION: 1
WEAKNESS: 1
RESPIRATORY NEGATIVE: 1
EYE PAIN: 0
HALLUCINATIONS: 1
FEVER: 0
CHILLS: 0
SHORTNESS OF BREATH: 0
MYALGIAS: 1
ABDOMINAL PAIN: 1
SPUTUM PRODUCTION: 0
EYES NEGATIVE: 1
DEPRESSION: 0
STRIDOR: 0
PALPITATIONS: 0
PHOTOPHOBIA: 0
INSOMNIA: 0

## 2018-07-07 ASSESSMENT — LIFESTYLE VARIABLES: SUBSTANCE_ABUSE: 0

## 2018-07-07 ASSESSMENT — PAIN SCALES - GENERAL: PAINLEVEL_OUTOF10: 5

## 2018-07-07 NOTE — PROGRESS NOTES
Assumed care at 0700. Received report from night shift RN. Bedside report completed. AOx4.    States pain is tolerable at this time.  Denies nausea.  Tolerating small amounts of diet.  RN provided education regarding diet. +voiding. +flatus, -BM    Ambulating with sba. Pt call light and belongings within reach, fall precautions in place.   Pt has flat affect and noticeably talking to someone not in the room.  States she doesn't feel ready to go home because she has stairs at home and not ready to care for herself.

## 2018-07-07 NOTE — PROGRESS NOTES
[facility] Hospitalist Progress Note    Date of Service: 2018    Chief Complaint  33 y.o. female admitted 2018 with right upper quadrant abdominal pain    Interval Problem Update  Patient is postoperative day #1 after laparoscopic cholecystectomy and postoperative day #2 after ERCP. Patient is complaining of dizziness pain desaturation on room air with activity as well as lack of bowel movement and generalized fatigue. Patient at this point was evaluated by myself I ambulated her she stayed above 92% on room air. She does not need additional oxygen. Fluids have been discontinued patient is encouraged to ambulate patient most likely will be able to discharge tomorrow.    Consultants/Specialty  Surgery    Disposition  To be determined        Review of Systems   Constitutional: Negative for chills, fever and malaise/fatigue.   HENT: Negative.  Negative for ear discharge and nosebleeds.    Eyes: Negative.  Negative for photophobia and pain.   Respiratory: Negative.  Negative for sputum production, shortness of breath and stridor.    Cardiovascular: Negative.  Negative for chest pain, palpitations and leg swelling.   Gastrointestinal: Positive for abdominal pain and constipation.   Genitourinary: Negative.  Negative for dysuria and urgency.   Musculoskeletal: Positive for myalgias (right arm). Negative for joint pain.   Skin: Negative.  Negative for itching and rash.   Neurological: Positive for dizziness and weakness.   Endo/Heme/Allergies: Negative.  Negative for environmental allergies. Does not bruise/bleed easily.   Psychiatric/Behavioral: Positive for hallucinations. Negative for depression, substance abuse and suicidal ideas. The patient does not have insomnia.    All other systems reviewed and are negative.     Physical Exam  Laboratory/Imaging   Hemodynamics  Temp (24hrs), Av.8 °C (98.3 °F), Min:36.7 °C (98 °F), Max:37.2 °C (99 °F)   Temperature: 36.7 °C (98 °F)  Pulse  Av.7  Min: 46  Max: 105       Blood Pressure: 106/49      Respiratory      Respiration: 16, Pulse Oximetry: 93 %        RUL Breath Sounds: Clear, RML Breath Sounds: Clear, RLL Breath Sounds: Clear, MATIAS Breath Sounds: Clear, LLL Breath Sounds: Clear    Fluids    Intake/Output Summary (Last 24 hours) at 07/07/18 1638  Last data filed at 07/07/18 1400   Gross per 24 hour   Intake             3060 ml   Output                0 ml   Net             3060 ml       Nutrition  Orders Placed This Encounter   Procedures   • Diet Order Regular     Standing Status:   Standing     Number of Occurrences:   1     Order Specific Question:   Diet:     Answer:   Regular [1]     Physical Exam   Constitutional: She is oriented to person, place, and time. She appears well-developed and well-nourished.   HENT:   Head: Normocephalic and atraumatic.   Right Ear: External ear normal.   Left Ear: External ear normal.   Nose: Nose normal.   Mouth/Throat: Oropharynx is clear and moist.   Eyes: Conjunctivae and EOM are normal. Pupils are equal, round, and reactive to light.   Neck: Normal range of motion. Neck supple. No JVD present. No thyromegaly present.   Cardiovascular: Normal rate and regular rhythm.    Pulmonary/Chest: Effort normal and breath sounds normal. She exhibits no tenderness.   Abdominal: Soft. Bowel sounds are normal. She exhibits distension. There is tenderness in the right upper quadrant, right lower quadrant and epigastric area.       Musculoskeletal: Normal range of motion. She exhibits no edema or deformity.   Neurological: She is alert and oriented to person, place, and time. She has normal reflexes.   Skin: Skin is warm and dry.   Psychiatric: She has a normal mood and affect. Her speech is normal. Judgment and thought content normal. She is actively hallucinating. Cognition and memory are normal.   Nursing note and vitals reviewed.      Recent Labs      07/06/18   0806   WBC  14.5*   RBC  4.51   HEMOGLOBIN  13.4   HEMATOCRIT  40.7   MCV  90.2   MCH   29.7   MCHC  32.9*   RDW  46.5   PLATELETCT  293   MPV  9.6     Recent Labs      07/06/18   0806   SODIUM  137   POTASSIUM  3.5*   CHLORIDE  108   CO2  20   GLUCOSE  93   BUN  6*   CREATININE  0.51   CALCIUM  8.8                      Assessment/Plan     Cholelithiasis without cholecystitis   Assessment & Plan    Status post laparoscopic cholecystectomy. Continue with pain management        RUQ pain   Assessment & Plan    Status post laparoscopic cholecystectomy after ERCP. She is now postop day #1.  Continue with pain management, continue advancing diet, ambulate patient.        Schizoaffective disorder (HCC)   Assessment & Plan    Patient's hallucinations slightly worse after surgery continue to monitor and give supportive treatment.        Leukocytosis- (present on admission)   Assessment & Plan    Repeat labs in the morning        Bipolar disorder (HCC)- (present on admission)   Assessment & Plan    Continue at this point with monitoring and controlling of schizophrenic-like reactions.        Lower urinary tract infectious disease- (present on admission)   Assessment & Plan    Status post antibiotic treatment          Quality-Core Measures   Reviewed items::  EKG reviewed, Labs reviewed, Medications reviewed and Radiology images reviewed  Schulz catheter::  No Schulz  DVT prophylaxis pharmacological::  Contraindicated - High bleeding risk  Ulcer Prophylaxis::  Not indicated  Antibiotics:  Treating active infection/contamination beyond 24 hours perioperative coverage  Assessed for rehabilitation services:  Patient returned to prior level of function, rehabilitation not indicated at this time

## 2018-07-07 NOTE — CARE PLAN
Problem: Safety  Goal: Will remain free from injury  Outcome: PROGRESSING AS EXPECTED      Problem: Bowel/Gastric:  Goal: Normal bowel function is maintained or improved  Outcome: PROGRESSING AS EXPECTED    Goal: Will not experience complications related to bowel motility  Outcome: PROGRESSING AS EXPECTED      Problem: Knowledge Deficit  Goal: Knowledge of disease process/condition, treatment plan, diagnostic tests, and medications will improve  Outcome: PROGRESSING AS EXPECTED

## 2018-07-07 NOTE — PROGRESS NOTES
Assume pt care. Pt a/o x3, VSS, No acute issues overnight. Pt rounds Q1-2hr with assessment of 4p's. IV assessment and care given. Pt education provided base on admission, care plan, current medications, and PRN. Pt resting comfortably in bed.  s/p lap appy, sx puncture x4, NAREN, CDI. Pt displays subtle psychotic behavior; hallucinations and changes in affect. c/o mild abd pain. OOB SB to bathroom. Pt refusing male assist to bathroom. IVF running.

## 2018-07-08 VITALS
TEMPERATURE: 97.9 F | HEART RATE: 65 BPM | BODY MASS INDEX: 35.82 KG/M2 | SYSTOLIC BLOOD PRESSURE: 114 MMHG | HEIGHT: 66 IN | WEIGHT: 222.88 LBS | OXYGEN SATURATION: 94 % | DIASTOLIC BLOOD PRESSURE: 72 MMHG | RESPIRATION RATE: 18 BRPM

## 2018-07-08 LAB
ANION GAP SERPL CALC-SCNC: 7 MMOL/L (ref 0–11.9)
BASOPHILS # BLD AUTO: 0.4 % (ref 0–1.8)
BASOPHILS # BLD: 0.05 K/UL (ref 0–0.12)
BUN SERPL-MCNC: 8 MG/DL (ref 8–22)
CALCIUM SERPL-MCNC: 8.9 MG/DL (ref 8.5–10.5)
CHLORIDE SERPL-SCNC: 107 MMOL/L (ref 96–112)
CO2 SERPL-SCNC: 24 MMOL/L (ref 20–33)
CREAT SERPL-MCNC: 0.68 MG/DL (ref 0.5–1.4)
EOSINOPHIL # BLD AUTO: 0.08 K/UL (ref 0–0.51)
EOSINOPHIL NFR BLD: 0.7 % (ref 0–6.9)
ERYTHROCYTE [DISTWIDTH] IN BLOOD BY AUTOMATED COUNT: 46.5 FL (ref 35.9–50)
GLUCOSE SERPL-MCNC: 126 MG/DL (ref 65–99)
HCT VFR BLD AUTO: 38.6 % (ref 37–47)
HGB BLD-MCNC: 12.9 G/DL (ref 12–16)
IMM GRANULOCYTES # BLD AUTO: 0.05 K/UL (ref 0–0.11)
IMM GRANULOCYTES NFR BLD AUTO: 0.4 % (ref 0–0.9)
LYMPHOCYTES # BLD AUTO: 3.15 K/UL (ref 1–4.8)
LYMPHOCYTES NFR BLD: 28.1 % (ref 22–41)
MCH RBC QN AUTO: 30.1 PG (ref 27–33)
MCHC RBC AUTO-ENTMCNC: 33.4 G/DL (ref 33.6–35)
MCV RBC AUTO: 90 FL (ref 81.4–97.8)
MONOCYTES # BLD AUTO: 0.82 K/UL (ref 0–0.85)
MONOCYTES NFR BLD AUTO: 7.3 % (ref 0–13.4)
NEUTROPHILS # BLD AUTO: 7.06 K/UL (ref 2–7.15)
NEUTROPHILS NFR BLD: 63.1 % (ref 44–72)
NRBC # BLD AUTO: 0 K/UL
NRBC BLD-RTO: 0 /100 WBC
PLATELET # BLD AUTO: 303 K/UL (ref 164–446)
PMV BLD AUTO: 9.9 FL (ref 9–12.9)
POTASSIUM SERPL-SCNC: 3.9 MMOL/L (ref 3.6–5.5)
RBC # BLD AUTO: 4.29 M/UL (ref 4.2–5.4)
SODIUM SERPL-SCNC: 138 MMOL/L (ref 135–145)
WBC # BLD AUTO: 11.2 K/UL (ref 4.8–10.8)

## 2018-07-08 PROCEDURE — 85025 COMPLETE CBC W/AUTO DIFF WBC: CPT

## 2018-07-08 PROCEDURE — 99239 HOSP IP/OBS DSCHRG MGMT >30: CPT | Performed by: HOSPITALIST

## 2018-07-08 PROCEDURE — A9270 NON-COVERED ITEM OR SERVICE: HCPCS | Performed by: INTERNAL MEDICINE

## 2018-07-08 PROCEDURE — 80048 BASIC METABOLIC PNL TOTAL CA: CPT

## 2018-07-08 PROCEDURE — 36415 COLL VENOUS BLD VENIPUNCTURE: CPT

## 2018-07-08 PROCEDURE — 700102 HCHG RX REV CODE 250 W/ 637 OVERRIDE(OP): Performed by: INTERNAL MEDICINE

## 2018-07-08 RX ORDER — OXYCODONE HYDROCHLORIDE 5 MG/1
5 TABLET ORAL EVERY 4 HOURS PRN
Qty: 20 TAB | Refills: 0 | Status: SHIPPED | OUTPATIENT
Start: 2018-07-08 | End: 2018-07-15

## 2018-07-08 RX ORDER — CHLORPROMAZINE HYDROCHLORIDE 50 MG/1
50 TABLET, FILM COATED ORAL 3 TIMES DAILY PRN
Qty: 90 TAB | Refills: 3 | Status: SHIPPED | OUTPATIENT
Start: 2018-07-08 | End: 2018-07-22

## 2018-07-08 RX ORDER — POLYETHYLENE GLYCOL 3350 17 G/17G
17 POWDER, FOR SOLUTION ORAL
Qty: 3 EACH | Refills: 3 | Status: SHIPPED | OUTPATIENT
Start: 2018-07-08 | End: 2018-07-11

## 2018-07-08 RX ADMIN — STANDARDIZED SENNA CONCENTRATE AND DOCUSATE SODIUM 2 TABLET: 8.6; 5 TABLET, FILM COATED ORAL at 10:16

## 2018-07-08 RX ADMIN — MAGNESIUM HYDROXIDE 30 ML: 400 SUSPENSION ORAL at 10:18

## 2018-07-08 RX ADMIN — POLYETHYLENE GLYCOL 3350 1 PACKET: 17 POWDER, FOR SOLUTION ORAL at 02:57

## 2018-07-08 ASSESSMENT — PAIN SCALES - GENERAL: PAINLEVEL_OUTOF10: 5

## 2018-07-08 NOTE — DISCHARGE INSTRUCTIONS
Discharge Instructions    Discharged to home by taxi with self. Discharged via wheelchair, hospital escort: Yes.  Special equipment needed: Not Applicable    Be sure to schedule a follow-up appointment with your primary care doctor or any specialists as instructed.     Discharge Plan:   Influenza Vaccine Indication: Indicated: 9 to 64 years of age  Influenza Vaccine Given - only chart on this line when given: Influenza Vaccine Given (See MAR)    I understand that a diet low in cholesterol, fat, and sodium is recommended for good health. Unless I have been given specific instructions below for another diet, I accept this instruction as my diet prescription.   Other diet: Regular diet.     Special Instructions:    Diet regular with 9-13 servings of fruits and vegetables   Activities as tolerated   Follow ups with PCP in 7-10 days, call for appointment   No smoking, no alcohol, no caffeine   Wear seat belt in motorized vehicle   Take medications as perscribed   Keep appointments   If symptoms worsen call PCP, 911 or urgent care.    · Is patient discharged on Warfarin / Coumadin?   No       Laparoscopic Cholecystectomy, Care After  Refer to this sheet in the next few weeks. These instructions provide you with information on caring for yourself after your procedure. Your health care provider may also give you more specific instructions. Your treatment has been planned according to current medical practices, but problems sometimes occur. Call your health care provider if you have any problems or questions after your procedure.  WHAT TO EXPECT AFTER THE PROCEDURE  After your procedure, it is typical to have the following:  · Pain at your incision sites. You will be given pain medicines to control the pain.  · Mild nausea or vomiting. This should improve after the first 24 hours.  · Bloating and possibly shoulder pain from the gas used during the procedure. This will improve after the first 24 hours.  HOME CARE INSTRUCTIONS    · Change bandages (dressings) as directed by your health care provider.  · Keep the wound dry and clean. You may wash the wound gently with soap and water. Gently blot or dab the area dry.  · Do not take baths or use swimming pools or hot tubs for 2 weeks or until your health care provider approves.  · Only take over-the-counter or prescription medicines as directed by your health care provider.  · Continue your normal diet as directed by your health care provider.  · Do not lift anything heavier than 10 pounds (4.5 kg) until your health care provider approves.  · Do not play contact sports for 1 week or until your health care provider approves.  SEEK MEDICAL CARE IF:   · You have redness, swelling, or increasing pain in the wound.  · You notice yellowish-white fluid (pus) coming from the wound.  · You have drainage from the wound that lasts longer than 1 day.  · You notice a bad smell coming from the wound or dressing.  · Your surgical cuts (incisions) break open.  SEEK IMMEDIATE MEDICAL CARE IF:   · You develop a rash.  · You have difficulty breathing.  · You have chest pain.  · You have a fever.  · You have increasing pain in the shoulders (shoulder strap areas).  · You have dizzy episodes or faint while standing.  · You have severe abdominal pain.  · You feel sick to your stomach (nauseous) or throw up (vomit) and this lasts for more than 1 day.     This information is not intended to replace advice given to you by your health care provider. Make sure you discuss any questions you have with your health care provider.     Document Released: 12/18/2006 Document Revised: 10/08/2014 Document Reviewed: 07/30/2014  [employer] Interactive Patient Education ©2016 [employer]    Depression / Suicide Risk    As you are discharged from this [facility] facility, it is important to learn how to keep safe from harming yourself.    Recognize the warning signs:  · Abrupt changes in personality, positive or negative-  including increase in energy   · Giving away possessions  · Change in eating patterns- significant weight changes-  positive or negative  · Change in sleeping patterns- unable to sleep or sleeping all the time   · Unwillingness or inability to communicate  · Depression  · Unusual sadness, discouragement and loneliness  · Talk of wanting to die  · Neglect of personal appearance   · Rebelliousness- reckless behavior  · Withdrawal from people/activities they love  · Confusion- inability to concentrate     If you or a loved one observes any of these behaviors or has concerns about self-harm, here's what you can do:  · Talk about it- your feelings and reasons for harming yourself  · Remove any means that you might use to hurt yourself (examples: pills, rope, extension cords, firearm)  · Get professional help from the community (Mental Health, Substance Abuse, psychological counseling)  · Do not be alone:Call your Safe Contact- someone whom you trust who will be there for you.  · Call your local CRISIS HOTLINE 320-1780 or 543-603-4560  · Call your local Children's Mobile Crisis Response Team [place] (373) 652-2182 or www.Hotalot  · Call the toll free National Suicide Prevention Hotlines   · National Suicide Prevention Lifeline 624-719-CYZS (4107)  · National Hope Line Network 800-SUICIDE (520-6872)

## 2018-07-08 NOTE — PROGRESS NOTES
Assume pt care. Pt a/o x3, VSS, No acute issues overnight. Pt rounds Q1-2hr with assessment of 4p's. IV assessment and care given. Pt education provided base on admission, care plan, current medications, and PRN. Pt resting comfortably in bed.  s/p lap appy, sx puncture x4, NAREN, CDI. Pt displays subtle psychotic behavior; hallucinations and changes in affect. c/o mild abd pain. OOB SB to bathroom. Pt refusing male assist to bathroom. IVF running.     Cont to show mild psychotic behavior; hallucinations delusions and changes in affect, talking to self.

## 2018-07-08 NOTE — PROGRESS NOTES
In prescribing controlled substances to this patient, I certify that I have obtained and reviewed the medical history of Dunia Sahni. I have also made a good rowdy effort to obtain applicable records from other providers who have treated the patient and records did not demonstrate any increased risk of substance abuse that would prevent me from prescribing controlled substances.     I have conducted a physical exam and documented it. I have reviewed Ms. Sahni’s prescription history as maintained by the [place] Prescription Monitoring Program.     I have assessed the patient’s risk for abuse, dependency, and addiction using the validated Opioid Risk Tool available at https://www.mdcalc.com/anybcb-ydwk-sovb-ort-narcotic-abuse.     Given the above, I believe the benefits of controlled substance therapy outweigh the risks. The reasons for prescribing controlled substances include non-narcotic, oral analgesic alternatives have been inadequate for pain control. Accordingly, I have discussed the risk and benefits, treatment plan, and alternative therapies with the patient.

## 2018-07-08 NOTE — CARE PLAN
Problem: Safety  Goal: Will remain free from falls  Outcome: PROGRESSING AS EXPECTED      Problem: Knowledge Deficit  Goal: Knowledge of disease process/condition, treatment plan, diagnostic tests, and medications will improve  Outcome: PROGRESSING AS EXPECTED      Problem: Pain Management  Goal: Pain level will decrease to patient's comfort goal  Outcome: PROGRESSING AS EXPECTED

## 2018-07-08 NOTE — PROGRESS NOTES
Discharge instructions reviewed with pt.  Pt refusing prescription for oxycodone. Educated pt on use/risk/benefit of taking medication. Pt stating understanding.   All questions answered.  PIV removed.  Pt escorted off unit with staff via wheelchair to discharge home via taxi.

## 2018-07-08 NOTE — PROGRESS NOTES
Pt AAOx4.  Hx of schizoaffective disorder.  Pt noted to be talking to self this morning.   C/o pain at 5/10, declining intervention at this time. Pt educated on available pain meds, ice/heat pack offered.  Lap sites x4 to abdomen. Pt only allowing partial visualization of lap sites. Refusing to allow assessment of all lap sites.  Regular diet in place, no c/o n/v.  LBM 7/8, + flatus, + void.  POC reviewed with pt.  Plan to discharge home today.   Call light within reach, pt educated to call for assistance.  Hourly rounding in place.

## 2018-07-08 NOTE — DISCHARGE SUMMARY
Discharge Summary    CHIEF COMPLAINT ON ADMISSION  Chief Complaint   Patient presents with   • Abdominal Pain     Pt c/o right sided abd pain, dx with gallstones 1 week ago, unable to follow up.    • Vomiting     Pt started vomiting on and off x a couple days       Reason for Admission  EMS     Admission Date  6/30/2018    CODE STATUS  Full Code    HPI & HOSPITAL COURSE  This is a 33 y.o. female here with abdominal pain.  Patient was found to have acute cholecystitis with gallstones.  Patient underwent an ERCP.  The ERCP removed a 5 mm stone and sphincterotomy was also performed.  Afterwards patient was able to go to surgery and had a laparoscopic cholecystectomy.  Afterwards patient pain is not controlled.  Patient complains of not having enough oxygen oxygen saturations however on room air are normal.  Patient does not have any infection at this point does not need additional antibiotics.  Patient at this point has returned back to her baseline, she does have schizoaffective disorder and will need Thorazine to discharge with.  Overall patient's condition at this point is stable for discharge and she can be discharged home with outpatient follow-ups and management.       Therefore, she is discharged in good and stable condition to home with close outpatient follow-up.    The patient met 2-midnight criteria for an inpatient stay at the time of discharge.    Discharge Date  7/8/2018    FOLLOW UP ITEMS POST DISCHARGE  Follow-up with primary care physician in 7 to 10 days, follow-up with surgery in 1-2 weeks    DISCHARGE DIAGNOSES  Active Problems:    RUQ pain POA: Unknown    Cholelithiasis without cholecystitis POA: Unknown    Lower urinary tract infectious disease POA: Yes      Overview: Juliette update 10/1/2016    Bipolar disorder (HCC) POA: Yes    Leukocytosis POA: Yes    Schizoaffective disorder (HCC) POA: Unknown  Resolved Problems:    * No resolved hospital problems. *      FOLLOW UP  No future  "appointments.  No follow-up provider specified.    MEDICATIONS ON DISCHARGE     Medication List      START taking these medications      Instructions   chlorproMAZINE 50 MG Tabs  Commonly known as:  THORAZINE   Take 1 Tab by mouth 3 times a day as needed (for nausea).  Dose:  50 mg     oxyCODONE immediate-release 5 MG Tabs  Commonly known as:  ROXICODONE   Take 1 Tab by mouth every four hours as needed for Severe Pain for up to 7 days.  Dose:  5 mg     polyethylene glycol/lytes Pack  Commonly known as:  MIRALAX   Take 1 Packet by mouth 1 time daily as needed (if sennosides and docusate ineffective after 24 hours) for up to 3 days.  Dose:  17 g            Allergies  Allergies   Allergen Reactions   • Vicodin [Hydrocodone-Acetaminophen] Anaphylaxis     RXN=9 years ago   • Pcn [Penicillins] Nausea     RXN=8 years ago  Toleartes rocephin   • Bee Venom    • Blackberry [Rubus Fruticosus] Rash     \"rash\"   • Haldol [Haloperidol] Rash     Pt states, \"I have a rash and my lips were swollen.\"   • Raspberry        DIET  Orders Placed This Encounter   Procedures   • Diet Order Regular     Standing Status:   Standing     Number of Occurrences:   1     Order Specific Question:   Diet:     Answer:   Regular [1]       ACTIVITY  As tolerated.  Weight bearing as tolerated    CONSULTATIONS  Surgery    PROCEDURES  Laparoscopic cholecystectomy    LABORATORY  Lab Results   Component Value Date    SODIUM 138 07/08/2018    POTASSIUM 3.9 07/08/2018    CHLORIDE 107 07/08/2018    CO2 24 07/08/2018    GLUCOSE 126 (H) 07/08/2018    BUN 8 07/08/2018    CREATININE 0.68 07/08/2018        Lab Results   Component Value Date    WBC 11.2 (H) 07/08/2018    HEMOGLOBIN 12.9 07/08/2018    HEMATOCRIT 38.6 07/08/2018    PLATELETCT 303 07/08/2018        Total time of the discharge process exceeds 33 minutes.  "

## 2018-07-13 ENCOUNTER — HOSPITAL ENCOUNTER (EMERGENCY)
Dept: HOSPITAL 8 - ED | Age: 33
Discharge: HOME | End: 2018-07-13
Payer: SELF-PAY

## 2018-07-13 VITALS — HEIGHT: 66 IN | WEIGHT: 198.42 LBS | BODY MASS INDEX: 31.89 KG/M2

## 2018-07-13 VITALS — DIASTOLIC BLOOD PRESSURE: 67 MMHG | SYSTOLIC BLOOD PRESSURE: 113 MMHG

## 2018-07-13 DIAGNOSIS — R31.29: Primary | ICD-10-CM

## 2018-07-13 DIAGNOSIS — Z90.89: ICD-10-CM

## 2018-07-13 DIAGNOSIS — Z90.49: ICD-10-CM

## 2018-07-13 DIAGNOSIS — Z86.718: ICD-10-CM

## 2018-07-13 LAB
CULTURE INDICATED?: YES
HCG UR SG: 1.02 (ref 1–1.03)
MICROSCOPIC: (no result)

## 2018-07-13 PROCEDURE — 87186 SC STD MICRODIL/AGAR DIL: CPT

## 2018-07-13 PROCEDURE — 87077 CULTURE AEROBIC IDENTIFY: CPT

## 2018-07-13 PROCEDURE — 99284 EMERGENCY DEPT VISIT MOD MDM: CPT

## 2018-07-13 PROCEDURE — 87086 URINE CULTURE/COLONY COUNT: CPT

## 2018-07-13 PROCEDURE — 81025 URINE PREGNANCY TEST: CPT

## 2018-07-13 PROCEDURE — 81001 URINALYSIS AUTO W/SCOPE: CPT

## 2018-07-17 ENCOUNTER — HOSPITAL ENCOUNTER (EMERGENCY)
Dept: HOSPITAL 8 - ED | Age: 33
Discharge: HOME | End: 2018-07-17
Payer: MEDICARE

## 2018-07-17 VITALS — BODY MASS INDEX: 35.79 KG/M2 | WEIGHT: 222.67 LBS | HEIGHT: 66 IN

## 2018-07-17 VITALS — SYSTOLIC BLOOD PRESSURE: 122 MMHG | DIASTOLIC BLOOD PRESSURE: 64 MMHG

## 2018-07-17 DIAGNOSIS — F29: ICD-10-CM

## 2018-07-17 DIAGNOSIS — R06.00: Primary | ICD-10-CM

## 2018-07-17 PROCEDURE — 99284 EMERGENCY DEPT VISIT MOD MDM: CPT

## 2018-07-17 PROCEDURE — 93005 ELECTROCARDIOGRAM TRACING: CPT

## 2018-07-17 PROCEDURE — 71045 X-RAY EXAM CHEST 1 VIEW: CPT

## 2018-07-20 ENCOUNTER — HOSPITAL ENCOUNTER (EMERGENCY)
Dept: HOSPITAL 8 - ED | Age: 33
Discharge: HOME | End: 2018-07-20
Payer: MEDICARE

## 2018-07-20 VITALS — SYSTOLIC BLOOD PRESSURE: 122 MMHG | DIASTOLIC BLOOD PRESSURE: 85 MMHG

## 2018-07-20 VITALS — BODY MASS INDEX: 36.85 KG/M2 | HEIGHT: 66 IN | WEIGHT: 229.28 LBS

## 2018-07-20 DIAGNOSIS — Z00.00: ICD-10-CM

## 2018-07-20 DIAGNOSIS — M79.674: Primary | ICD-10-CM

## 2018-07-20 DIAGNOSIS — M79.675: ICD-10-CM

## 2018-07-20 PROCEDURE — 99281 EMR DPT VST MAYX REQ PHY/QHP: CPT

## 2018-07-22 ENCOUNTER — HOSPITAL ENCOUNTER (EMERGENCY)
Facility: MEDICAL CENTER | Age: 33
End: 2018-07-23
Attending: EMERGENCY MEDICINE

## 2018-07-22 DIAGNOSIS — F29 PSYCHOSIS, UNSPECIFIED PSYCHOSIS TYPE (HCC): ICD-10-CM

## 2018-07-22 LAB
AMPHET UR QL SCN: NEGATIVE
BARBITURATES UR QL SCN: NEGATIVE
BENZODIAZ UR QL SCN: NEGATIVE
BZE UR QL SCN: NEGATIVE
CANNABINOIDS UR QL SCN: NEGATIVE
HCG UR QL: NEGATIVE
METHADONE UR QL SCN: NEGATIVE
OPIATES UR QL SCN: NEGATIVE
OXYCODONE UR QL SCN: NEGATIVE
PCP UR QL SCN: NEGATIVE
POC BREATHALIZER: 0 PERCENT (ref 0–0.01)
PROPOXYPH UR QL SCN: NEGATIVE
SP GR UR REFRACTOMETRY: 1.02

## 2018-07-22 PROCEDURE — 81025 URINE PREGNANCY TEST: CPT

## 2018-07-22 PROCEDURE — 80307 DRUG TEST PRSMV CHEM ANLYZR: CPT

## 2018-07-22 PROCEDURE — 302970 POC BREATHALIZER: Performed by: EMERGENCY MEDICINE

## 2018-07-22 PROCEDURE — 90791 PSYCH DIAGNOSTIC EVALUATION: CPT

## 2018-07-22 PROCEDURE — 99285 EMERGENCY DEPT VISIT HI MDM: CPT

## 2018-07-22 ASSESSMENT — PAIN SCALES - GENERAL
PAINLEVEL_OUTOF10: 3
PAINLEVEL_OUTOF10: 0
PAINLEVEL_OUTOF10: 0

## 2018-07-22 ASSESSMENT — LIFESTYLE VARIABLES: DO YOU DRINK ALCOHOL: NO

## 2018-07-22 NOTE — ED NOTES
Patient's home medications have been reviewed by the pharmacy team. Pt denies taking any RX's or OTC's       Past Medical History:   Diagnosis Date   • Abscess 10/12/2017    right AC arm area   • Bipolar affective (HCC)    • Depression    • Kidney infection    • Suicide attempt (HCC)        Patient's Medications   New Prescriptions    No medications on file   Previous Medications    No medications on file   Modified Medications    No medications on file   Discontinued Medications    CHLORPROMAZINE (THORAZINE) 50 MG TAB    Take 1 Tab by mouth 3 times a day as needed (for nausea).          A:  Medications do not appear to be contributing to current complaints.       P:    No recommendations at this time.     Amy Ervin, PharmD, BCPS

## 2018-07-22 NOTE — ED NOTES
Med Rec completed per Pt at bedside  Allergies reviewed  No ABX in last 30 days    Pt denies taking any RX's or OTC's

## 2018-07-22 NOTE — ED NOTES
Pt not a legal hold or suicide risk, but being monitored close to nurses station due to psychosis.
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
## 2018-07-22 NOTE — ED NOTES
Rounded with patient in waiting room. Vital signs rechecked. All questions answered. Apologized for wait time. No further needs at this time.

## 2018-07-22 NOTE — DISCHARGE PLANNING
Alert team note: awake and alert. States she would like some food and a shower.  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  Continues psychotic.  Awaiting placement.
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
## 2018-07-22 NOTE — ED PROVIDER NOTES
"ED Provider Note    CHIEF COMPLAINT  Chief Complaint   Patient presents with   • Other     Pt. bib EMS for abdominal pain. Pt. has multiple complaints. Pt. states she feels weak, tired, and has poor circulation in her feet. Pt. states she thinks her laproscopic cholesystecomy incision is infected. Umbilical incision has small scab over it with no drainage or redness.       HPI  Dunia Sahni is a 33 y.o. female who presents with lower extremity swelling. The patient states that she's been walking around a lot as she is currently homeless and lives at the shelter. She states that she feels like her feet are red and swollen. She states she is also some periodic abdominal pain after her cholecystectomy. She has not had any fevers nor vomiting. The patient states she also wants to be placed on a legal hold as she cannot take care of herself. She does not have any suicidal or homicidal ideation at this time.    REVIEW OF SYSTEMS  See HPI for further details. All other systems are negative.     PAST MEDICAL HISTORY  Past Medical History:   Diagnosis Date   • Abscess 10/12/2017    right AC arm area   • Bipolar affective (HCC)    • Depression    • Kidney infection    • Suicide attempt (HCC)        SOCIAL HISTORY  Social History     Social History   • Marital status:      Spouse name: N/A   • Number of children: N/A   • Years of education: N/A     Social History Main Topics   • Smoking status: Never Smoker   • Smokeless tobacco: Never Used   • Alcohol use No   • Drug use: No   • Sexual activity: Not on file     Other Topics Concern   • Not on file     Social History Narrative   • No narrative on file           PHYSICAL EXAM  VITAL SIGNS: /78   Pulse 75   Temp 36.4 °C (97.5 °F)   Resp 16   Ht 1.702 m (5' 7\")   Wt 104.6 kg (230 lb 9.6 oz)   SpO2 98%   BMI 36.12 kg/m²   Constitutional: Unkempt but nontoxic  HENT: Normocephalic, Atraumatic, tympanic membranes are intact and nonerythematous bilaterally, " Oropharynx moist without exudates or erythema, Nose normal.   Eyes: PERRLA, EOMI, Conjunctiva normal.  Neck: Supple without meningismus  Lymphatic: No lymphadenopathy noted.   Cardiovascular: Normal heart rate, Normal rhythm, No murmurs, No rubs, No gallops.   Thorax & Lungs: Normal breath sounds, No respiratory distress, No wheezing, No chest tenderness.   Abdomen: Bowel sounds normal, Soft, No tenderness, no rebound, no guarding, no distention, No masses, No pulsatile masses.   Skin: Warm, Dry, No erythema, No rash.   Back: No tenderness, No CVA tenderness.   Extremities: Atraumatic with symmetric distal pulses, No edema, No tenderness, No cyanosis, No clubbing.   Neurologic: Alert & oriented x 3, cranial nerves II through XII are intact, Normal motor function, Normal sensory function, No focal deficits noted.   Psychiatric: Depressed affect but no suicidal or homicidal ideation      COURSE & MEDICAL DECISION MAKING  Pertinent Labs & Imaging studies reviewed. (See chart for details)  This a 33-year-old female who presents with swollen legs and abdominal pain. I do not appreciate any significant swelling. The patient on repeat examination does have some flight of ideas and started complaining of abdominal pain. Her incisions are clean, dry, and intact. Her abdomen is benign. The patient then started discussing paranoid thoughts that people are coming after her. Therefore I did have lice skills evaluate the patient. They feel that she is in a manic phase from her bipolar disorder and has poor judgment and cannot care for herself. Therefore she'll be placed on a legal hold. She is medically cleared at this time.    FINAL IMPRESSION  1. Acute psychosis  2. Subjective lower extremity swelling  3. Postoperative abdominal pain  4. Medically cleared for psychiatric care     Disposition  The patient will be transferred for further psychiatric care in stable condition    Electronically signed by: Daryl Alonso,  7/22/2018 3:27 AM

## 2018-07-22 NOTE — ED NOTES
Pt resting in stretcher. NAD. AAOX4. Respirations even and unlabored. Skin is warm and dry. Pt is calm and cooperative. Will continue to monitor.

## 2018-07-22 NOTE — ED PROVIDER NOTES
ED Provider Note    Patient remains on psychiatric hold, resting quietly, and awaits psychiatric reevaluation or transfer to facility when available.  No new complaints at this time

## 2018-07-22 NOTE — DISCHARGE PLANNING
Medical Social Work    Referral: Legal Hold    Intervention: Legal Hold Paperwork given to SW by RODRICKW: Comfort    Legal Hold Initiated: Date: 07/22/2018  Time: 0430    Legal Hold faxed: Date: 07/22/2018  Time: 0930    Patient’s Insurance Listed on Face Sheet: Medicare     Referrals sent to: [facility], [facility], [facility]     Plan: Patient will transfer to mental health facility once acceptance is obtained.     Confirmation of receipt of referral by phone with: fax confirmation

## 2018-07-23 VITALS
SYSTOLIC BLOOD PRESSURE: 117 MMHG | HEIGHT: 67 IN | BODY MASS INDEX: 36.19 KG/M2 | DIASTOLIC BLOOD PRESSURE: 91 MMHG | OXYGEN SATURATION: 96 % | WEIGHT: 230.6 LBS | TEMPERATURE: 97.5 F | HEART RATE: 81 BPM | RESPIRATION RATE: 16 BRPM

## 2018-07-23 NOTE — ED NOTES
Pt ambulatory from bathroom. Pt awake, alert and oriented x 3. Pt states she needs to have her fat content check because she is loosing weight. Report given by day RN stated pt has eating 6 meals, including dinner at 1900. Will continue to monitor closely. Pt bed at this time. In direct view from nursing station. Sitter outside room

## 2018-07-23 NOTE — ED PROVIDER NOTES
ED Provider Note    1030 -patient reevaluated.  Patient is on a legal hold, waiting transfer  to psychiatric facility when a bed is available.  Please refer to initial note for complete details.  No concerns overnight, although patient is requesting a dietary consult, she wishes to have more food.  Tolerating food without difficulty and ambulates to the bathroom independently.     FINAL IMPRESSION  (F29) Psychosis, unspecified psychosis type      Electronically signed by: Christie Shepherd, 7/23/2018 10:46 AM    This dictation was created using voice recognition software. The accuracy of the dictation is limited to the abilities of the software. I expect there may be some errors of grammar and possibly content. The nursing notes were reviewed and certain aspects of this information were incorporated into this note.

## 2018-07-23 NOTE — DISCHARGE PLANNING
Medical Social Work    Referral: Legal hold Transfer to Mental Health Facility    Intervention: JADEN received call from [facility] stating that Dr. Lockett has accepted the patient for admission.     JADEN arranged for transportation to be set up through [facility]     The pt will be picked up at 1330     SW notified the RN of the departure time as well as accepting facility.     JADEN created transfer packet and placed on chart. Original Legal Hold placed in packet.     Plan: Pt will transfer to [facility] at 1330.

## 2018-07-23 NOTE — DISCHARGE PLANNING
Alert Team Note: Patient rested calmly throughout the night, awaiting psychiatric hospitalization.    Comfort Singh, Ph.D.  Alert Team Therapist

## 2018-07-23 NOTE — ED NOTES
Pt requesting food. Pt informed that dinner will arrive to patient soon. Pt states understanding.

## 2018-07-23 NOTE — ED NOTES
Kathryn from [facility] called to obtain report on this pt. Awaiting to hear if this pt is going to be accepted at this facility.

## 2018-07-23 NOTE — DISCHARGE PLANNING
Alert Team  Per chart review, pt has had numerous inpatient psychiatric admissions.  She has been sent to [facility] from this ER 5 times: twice in 2014, twice in 2015, and most recently on 3/30/18.  She has been sent to [place] [facility] from here 5 times as well; three times in 2017 and twice in 2015.

## 2018-07-23 NOTE — DISCHARGE PLANNING
Agency/Facility Name: [facility]  Spoke To: Kathryn  Outcome: Pt has been accepted by Dr. Lockett. Requesting to have pt transferred over as soon as possible. Will relay info to unit LSW for transportation time.

## 2018-08-10 ENCOUNTER — HOSPITAL ENCOUNTER (EMERGENCY)
Dept: HOSPITAL 8 - ED | Age: 33
Discharge: HOME | End: 2018-08-10
Payer: MEDICAID

## 2018-08-10 ENCOUNTER — HOSPITAL ENCOUNTER (EMERGENCY)
Facility: MEDICAL CENTER | Age: 33
End: 2018-08-11
Attending: EMERGENCY MEDICINE

## 2018-08-10 VITALS — DIASTOLIC BLOOD PRESSURE: 61 MMHG | SYSTOLIC BLOOD PRESSURE: 113 MMHG

## 2018-08-10 VITALS — BODY MASS INDEX: 30.12 KG/M2 | HEIGHT: 66 IN | WEIGHT: 187.39 LBS

## 2018-08-10 DIAGNOSIS — R19.7: ICD-10-CM

## 2018-08-10 DIAGNOSIS — F29 PSYCHOSIS, UNSPECIFIED PSYCHOSIS TYPE (HCC): ICD-10-CM

## 2018-08-10 DIAGNOSIS — D72.829 LEUKOCYTOSIS, UNSPECIFIED TYPE: ICD-10-CM

## 2018-08-10 DIAGNOSIS — R10.84: ICD-10-CM

## 2018-08-10 DIAGNOSIS — R11.2 NON-INTRACTABLE VOMITING WITH NAUSEA, UNSPECIFIED VOMITING TYPE: ICD-10-CM

## 2018-08-10 DIAGNOSIS — R11.2: Primary | ICD-10-CM

## 2018-08-10 LAB
ALBUMIN SERPL-MCNC: 3.4 G/DL (ref 3.4–5)
ALP SERPL-CCNC: 107 U/L (ref 45–117)
ALT SERPL-CCNC: 19 U/L (ref 12–78)
ANION GAP SERPL CALC-SCNC: 8 MMOL/L (ref 5–15)
BASOPHILS # BLD AUTO: 0.05 X10^3/UL (ref 0–0.1)
BASOPHILS NFR BLD AUTO: 0 % (ref 0–1)
BILIRUB SERPL-MCNC: 0.7 MG/DL (ref 0.2–1)
CALCIUM SERPL-MCNC: 8 MG/DL (ref 8.5–10.1)
CHLORIDE SERPL-SCNC: 109 MMOL/L (ref 98–107)
CREAT SERPL-MCNC: 0.77 MG/DL (ref 0.55–1.02)
CULTURE INDICATED?: YES
EOSINOPHIL # BLD AUTO: 0.07 X10^3/UL (ref 0–0.4)
EOSINOPHIL NFR BLD AUTO: 0 % (ref 1–7)
ERYTHROCYTE [DISTWIDTH] IN BLOOD BY AUTOMATED COUNT: 13.8 % (ref 9.6–15.2)
LYMPHOCYTES # BLD AUTO: 0.7 X10^3/UL (ref 1–3.4)
LYMPHOCYTES NFR BLD AUTO: 4 % (ref 22–44)
MCH RBC QN AUTO: 30.3 PG (ref 27–34.8)
MCHC RBC AUTO-ENTMCNC: 34.2 G/DL (ref 32.4–35.8)
MCV RBC AUTO: 88.6 FL (ref 80–100)
MD: NO
MICROSCOPIC: (no result)
MONOCYTES # BLD AUTO: 0.39 X10^3/UL (ref 0.2–0.8)
MONOCYTES NFR BLD AUTO: 2 % (ref 2–9)
NEUTROPHILS # BLD AUTO: 15.5 X10^3/UL (ref 1.8–6.8)
NEUTROPHILS NFR BLD AUTO: 93 % (ref 42–75)
PLATELET # BLD AUTO: 354 X10^3/UL (ref 130–400)
PMV BLD AUTO: 7.8 FL (ref 7.4–10.4)
PROT SERPL-MCNC: 6.9 G/DL (ref 6.4–8.2)
RBC # BLD AUTO: 4.7 X10^6/UL (ref 3.82–5.3)

## 2018-08-10 PROCEDURE — 99285 EMERGENCY DEPT VISIT HI MDM: CPT

## 2018-08-10 PROCEDURE — 96360 HYDRATION IV INFUSION INIT: CPT

## 2018-08-10 PROCEDURE — 96372 THER/PROPH/DIAG INJ SC/IM: CPT

## 2018-08-10 PROCEDURE — 74177 CT ABD & PELVIS W/CONTRAST: CPT

## 2018-08-10 PROCEDURE — 81001 URINALYSIS AUTO W/SCOPE: CPT

## 2018-08-10 PROCEDURE — 85025 COMPLETE CBC W/AUTO DIFF WBC: CPT

## 2018-08-10 PROCEDURE — 83690 ASSAY OF LIPASE: CPT

## 2018-08-10 PROCEDURE — 36415 COLL VENOUS BLD VENIPUNCTURE: CPT

## 2018-08-10 PROCEDURE — 87086 URINE CULTURE/COLONY COUNT: CPT

## 2018-08-10 PROCEDURE — 84703 CHORIONIC GONADOTROPIN ASSAY: CPT

## 2018-08-10 PROCEDURE — 93005 ELECTROCARDIOGRAM TRACING: CPT | Performed by: EMERGENCY MEDICINE

## 2018-08-10 PROCEDURE — 80053 COMPREHEN METABOLIC PANEL: CPT

## 2018-08-10 RX ORDER — SODIUM CHLORIDE 9 MG/ML
1000 INJECTION, SOLUTION INTRAVENOUS ONCE
Status: COMPLETED | OUTPATIENT
Start: 2018-08-10 | End: 2018-08-11

## 2018-08-10 RX ORDER — ONDANSETRON 2 MG/ML
4 INJECTION INTRAMUSCULAR; INTRAVENOUS ONCE
Status: COMPLETED | OUTPATIENT
Start: 2018-08-10 | End: 2018-08-11

## 2018-08-10 ASSESSMENT — PAIN SCALES - GENERAL: PAINLEVEL_OUTOF10: 9

## 2018-08-11 VITALS
HEART RATE: 67 BPM | BODY MASS INDEX: 36.5 KG/M2 | DIASTOLIC BLOOD PRESSURE: 56 MMHG | SYSTOLIC BLOOD PRESSURE: 105 MMHG | TEMPERATURE: 97.2 F | RESPIRATION RATE: 16 BRPM | WEIGHT: 233.03 LBS | OXYGEN SATURATION: 95 %

## 2018-08-11 LAB
ALBUMIN SERPL BCP-MCNC: 4 G/DL (ref 3.2–4.9)
ALBUMIN/GLOB SERPL: 1.7 G/DL
ALP SERPL-CCNC: 90 U/L (ref 30–99)
ALT SERPL-CCNC: 10 U/L (ref 2–50)
AMPHET UR QL SCN: NEGATIVE
ANION GAP SERPL CALC-SCNC: 8 MMOL/L (ref 0–11.9)
APPEARANCE UR: CLEAR
AST SERPL-CCNC: 12 U/L (ref 12–45)
BARBITURATES UR QL SCN: NEGATIVE
BASOPHILS # BLD AUTO: 0.1 % (ref 0–1.8)
BASOPHILS # BLD: 0.02 K/UL (ref 0–0.12)
BENZODIAZ UR QL SCN: NEGATIVE
BILIRUB SERPL-MCNC: 0.6 MG/DL (ref 0.1–1.5)
BILIRUB UR QL STRIP.AUTO: NEGATIVE
BUN SERPL-MCNC: 9 MG/DL (ref 8–22)
BZE UR QL SCN: NEGATIVE
CALCIUM SERPL-MCNC: 8.4 MG/DL (ref 8.5–10.5)
CANNABINOIDS UR QL SCN: NEGATIVE
CHLORIDE SERPL-SCNC: 107 MMOL/L (ref 96–112)
CO2 SERPL-SCNC: 21 MMOL/L (ref 20–33)
COLOR UR: YELLOW
CREAT SERPL-MCNC: 0.66 MG/DL (ref 0.5–1.4)
EOSINOPHIL # BLD AUTO: 0.01 K/UL (ref 0–0.51)
EOSINOPHIL NFR BLD: 0.1 % (ref 0–6.9)
ERYTHROCYTE [DISTWIDTH] IN BLOOD BY AUTOMATED COUNT: 44 FL (ref 35.9–50)
ETHANOL BLD-MCNC: 0 G/DL
GLOBULIN SER CALC-MCNC: 2.3 G/DL (ref 1.9–3.5)
GLUCOSE SERPL-MCNC: 121 MG/DL (ref 65–99)
GLUCOSE UR STRIP.AUTO-MCNC: NEGATIVE MG/DL
HCG SERPL QL: NEGATIVE
HCT VFR BLD AUTO: 39.4 % (ref 37–47)
HGB BLD-MCNC: 13.2 G/DL (ref 12–16)
IMM GRANULOCYTES # BLD AUTO: 0.09 K/UL (ref 0–0.11)
IMM GRANULOCYTES NFR BLD AUTO: 0.6 % (ref 0–0.9)
KETONES UR STRIP.AUTO-MCNC: NEGATIVE MG/DL
LEUKOCYTE ESTERASE UR QL STRIP.AUTO: NEGATIVE
LIPASE SERPL-CCNC: 5 U/L (ref 11–82)
LYMPHOCYTES # BLD AUTO: 0.98 K/UL (ref 1–4.8)
LYMPHOCYTES NFR BLD: 6.9 % (ref 22–41)
MCH RBC QN AUTO: 30.4 PG (ref 27–33)
MCHC RBC AUTO-ENTMCNC: 33.5 G/DL (ref 33.6–35)
MCV RBC AUTO: 90.8 FL (ref 81.4–97.8)
METHADONE UR QL SCN: NEGATIVE
MICRO URNS: NORMAL
MONOCYTES # BLD AUTO: 0.53 K/UL (ref 0–0.85)
MONOCYTES NFR BLD AUTO: 3.8 % (ref 0–13.4)
NEUTROPHILS # BLD AUTO: 12.5 K/UL (ref 2–7.15)
NEUTROPHILS NFR BLD: 88.5 % (ref 44–72)
NITRITE UR QL STRIP.AUTO: NEGATIVE
NRBC # BLD AUTO: 0 K/UL
NRBC BLD-RTO: 0 /100 WBC
OPIATES UR QL SCN: NEGATIVE
OXYCODONE UR QL SCN: NEGATIVE
PCP UR QL SCN: NEGATIVE
PH UR STRIP.AUTO: 6 [PH]
PLATELET # BLD AUTO: 328 K/UL (ref 164–446)
PMV BLD AUTO: 9.7 FL (ref 9–12.9)
POTASSIUM SERPL-SCNC: 3.7 MMOL/L (ref 3.6–5.5)
PROPOXYPH UR QL SCN: NEGATIVE
PROT SERPL-MCNC: 6.3 G/DL (ref 6–8.2)
PROT UR QL STRIP: NEGATIVE MG/DL
RBC # BLD AUTO: 4.34 M/UL (ref 4.2–5.4)
RBC UR QL AUTO: NEGATIVE
SODIUM SERPL-SCNC: 136 MMOL/L (ref 135–145)
SP GR UR STRIP.AUTO: 1.04
UROBILINOGEN UR STRIP.AUTO-MCNC: 1 MG/DL
WBC # BLD AUTO: 14.1 K/UL (ref 4.8–10.8)

## 2018-08-11 PROCEDURE — 85025 COMPLETE CBC W/AUTO DIFF WBC: CPT

## 2018-08-11 PROCEDURE — 83690 ASSAY OF LIPASE: CPT

## 2018-08-11 PROCEDURE — 80307 DRUG TEST PRSMV CHEM ANLYZR: CPT

## 2018-08-11 PROCEDURE — 80053 COMPREHEN METABOLIC PANEL: CPT

## 2018-08-11 PROCEDURE — 90791 PSYCH DIAGNOSTIC EVALUATION: CPT

## 2018-08-11 PROCEDURE — 81003 URINALYSIS AUTO W/O SCOPE: CPT

## 2018-08-11 PROCEDURE — 96374 THER/PROPH/DIAG INJ IV PUSH: CPT

## 2018-08-11 PROCEDURE — 700111 HCHG RX REV CODE 636 W/ 250 OVERRIDE (IP): Performed by: EMERGENCY MEDICINE

## 2018-08-11 PROCEDURE — 84703 CHORIONIC GONADOTROPIN ASSAY: CPT

## 2018-08-11 PROCEDURE — 700105 HCHG RX REV CODE 258: Performed by: EMERGENCY MEDICINE

## 2018-08-11 RX ADMIN — SODIUM CHLORIDE 1000 ML: 9 INJECTION, SOLUTION INTRAVENOUS at 00:17

## 2018-08-11 RX ADMIN — ONDANSETRON 4 MG: 2 INJECTION, SOLUTION INTRAMUSCULAR; INTRAVENOUS at 00:18

## 2018-08-11 NOTE — DISCHARGE PLANNING
Pt has been accepted to [facility] by Dr. Kaye.  Enedelia called [facility] and set up transport through [place] for 1130.  Enedelia updated [facility], bedside rn, and completed transfer packet.
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
## 2018-08-11 NOTE — ED PROVIDER NOTES
"ED Provider Note    Scribed for Charlee Syed M.D. by Edi Jeong. 8/10/2018  11:16 PM    Means of arrival: EMS  History obtained from: Patient  History limited by: None      CHIEF COMPLAINT  Chief Complaint   Patient presents with   • Psych Eval   • N/V       HPI  Dunia Sahni is a 33 y.o. female with past medical history of bipolar disorder who presents to the Emergency Department for evaluation of abdominal pain and vomiting. Patient reports feeling nauseous, weak, and having diarrhea for the last 24 hours. She also endorses pain described as \"all over\" along with chest pain and a subjective fever. She states she is unable to keep food or water down, and fears if she stays at the shelter she will get more sick.  She states that she does not feel that she can care for herself and feels as though she should be placed on a mental health hold.  She denies any suicidal or homicidal ideation. She states she was admitted to [facility] at the end of last month.  She was discharged from there and has not been on any psychiatric medications.  Patient is concerned for chance of pregnancy and endorses her last normal menstrual cycle was at the beginning of this month.        REVIEW OF SYSTEMS  Pertinent positive include nausea, vomiting, diarrhea, myalgias. Pertinent negative include suicidal ideation, homicidal ideation. All other systems reviewed and are negative. C.      PAST MEDICAL HISTORY   has a past medical history of Abscess (10/12/2017); Bipolar affective (HCC); Depression; Kidney infection; and Suicide attempt (HCC).    SOCIAL HISTORY  Social History     Social History Main Topics   • Smoking status: Never Smoker   • Smokeless tobacco: Never Used   • Alcohol use No   • Drug use: No   • Sexual activity: None noted       SURGICAL HISTORY   has a past surgical history that includes c-sec only,prev c-sec; appendectomy; ercp in or (N/A, 7/5/2018); and skip by laparoscopy (N/A, " "7/6/2018).    CURRENT MEDICATIONS  No current facility-administered medications on file prior to encounter.      No current outpatient prescriptions on file prior to encounter.       ALLERGIES  Allergies   Allergen Reactions   • Vicodin [Hydrocodone-Acetaminophen] Anaphylaxis     RXN=9 years ago   • Pcn [Penicillins] Nausea     RXN=8 years ago  Toleartes rocephin   • Bee Venom    • Blackberry [Rubus Fruticosus] Rash     \"rash\"   • Haldol [Haloperidol] Rash     Pt states, \"I have a rash and my lips were swollen.\"   • Raspberry        PHYSICAL EXAM   VITAL SIGNS: /57   Pulse (!) 103   Temp 37.2 °C (98.9 °F) (Temporal)   Resp 16   Wt 105.7 kg (233 lb 0.4 oz)   SpO2 98%   BMI 36.50 kg/m²    Constitutional: Disheveled, nontoxic appearing female alert in no apparent distress.  HENT: Normocephalic, Atraumatic. Bilateral external ears normal. Nose normal.  Dry mucous membranes.  Oropharynx clear.  Eyes: Pupils are equal and reactive. Conjunctiva normal.   Neck: Supple, full range of motion  Heart: Regular rate and rhythm.  No murmurs.    Lungs: No respiratory distress, normal work of breathing. Lungs clear to auscultation bilaterally.  Abdomen Soft, no distention.  No tenderness to palpation.  Musculoskeletal: Atraumatic. No obvious deformities noted.  No lower extremity edema.  Skin: Warm, Dry.  No erythema, No rash.   Neurologic: Alert and oriented x3. Moving all extremities spontaneously without focal deficits.  Psychiatric: Flat affect, denies suicidal or homicidal ideation, no evidence of psychosis    DIAGNOSTIC STUDIES    EKG  EKG personally reviewed by myself in the absence of a cardiologist showed:  NSR with rate of 80  Normal axis and intervals  No ectopy or hypertrophy  No ST change  T wave inversions in leads III and aVF  Impression: No change from prior EKG from 1/12/18      LABS  Personally reviewed by me  Labs Reviewed   CBC WITH DIFFERENTIAL - Abnormal; Notable for the following:        Result " Value    WBC 14.1 (*)     MCHC 33.5 (*)     Neutrophils-Polys 88.50 (*)     Lymphocytes 6.90 (*)     Neutrophils (Absolute) 12.50 (*)     Lymphs (Absolute) 0.98 (*)     All other components within normal limits   COMP METABOLIC PANEL - Abnormal; Notable for the following:     Glucose 121 (*)     Calcium 8.4 (*)     All other components within normal limits   LIPASE - Abnormal; Notable for the following:     Lipase 5 (*)     All other components within normal limits   URINALYSIS,CULTURE IF INDICATED   URINE DRUG SCREEN   DIAGNOSTIC ALCOHOL   HCG QUAL SERUM   ESTIMATED GFR         ED COURSE  Vitals:    08/10/18 2058 08/10/18 2102 08/11/18 0628   BP: 101/57  (!) 98/60   Pulse: (!) 103  74   Resp: 16  14   Temp: 37.2 °C (98.9 °F)  36.2 °C (97.2 °F)   TempSrc: Temporal     SpO2: 98%  94%   Weight:  105.7 kg (233 lb 0.4 oz)          Medications administered:  Medications   ondansetron (ZOFRAN) syringe/vial injection 4 mg (4 mg Intravenous Given 8/11/18 0018)   NS infusion 1,000 mL (0 mL Intravenous Stopped 8/11/18 0207)         Old records personally reviewed:  Patient was seen here at end of July and put on legal hold for manic behavior and transferred to [facility].  She did have cholecystectomy completed earlier this year.    11:16 PM Patient seen and examined at bedside. The patient presents with shortness of breath. Ordered for Beta-HCG qualitative serum, Estimated GFR, EKG, Urine drug screen, CBC with differential, CMP, Lipase, Urinalysis culture if indicated, Diagnostic alcohol to evaluate. Patient will be treated with Zofran 4 mg, and 1 L of IV fluids secondary to patients dry mucous membranes and possible dehydration.     MEDICAL DECISION MAKING  Patient with history of bipolar disorder in addition to prior cholecystectomy and appendectomy who presents with abdominal pain and vomiting in addition to complaints of being unable to care for herself.  She is mildly tachycardic on arrival with otherwise  normal vital signs and nontoxic-appearing.  EKG without concern for ischemia or arrhythmia.  Patient has a benign abdominal exam without concern for bowel obstruction or perforation, or other acute pathology.  Labs demonstrate evidence of leukocytosis which may be due to stress reaction.  Labs are otherwise reassuring.  Patient is not pregnant.    I discussed the patient with our behavioral health team who feels that the patient should be placed on a mental health hold after their evaluation.  She believes that the patient is suffering from psychosis which is making her gravely disabled.  Patient is medically cleared at this time.  Plan to transfer her care to my partner pending transfer to inpatient psychiatric facility your psychiatrist evaluation.      IMPRESSION  (F29) Psychosis, unspecified psychosis type  (R11.2) Non-intractable vomiting with nausea, unspecified vomiting type  (D72.829) Leukocytosis, unspecified type     Disposition - pending transfer to inpatient psychiatric facility    Results, diagnoses, and treatment options were discussed with the patient and/or family. Patient verbalized understanding of plan of care.     Edi GARCIA (Hebere), am scribing for, and in the presence of, Charlee Syed M.D..    Electronically signed by: Edi Jeong (Winston), 8/10/2018    ICharlee M.D. personally performed the services described in this documentation, as scribed by Edi Jeong in my presence, and it is both accurate and complete.    The note accurately reflects work and decisions made by me.  Charlee Syed  8/11/2018  7:50 AM

## 2018-08-11 NOTE — ED NOTES
Patient is medically cleared per Dr Syed.  Patient ambulates with [facility] to [place] [id number] at this time.

## 2018-08-11 NOTE — ED NOTES
"IV inserted and fluids started.  zofran given for nausea.  Patient resting quietly in bed without distress, denies any complaints or needs at this time.  Call bell within reach.  Repeatedly states she is royalty and she needs to be treated as such.  States \"I am a threat to the government and security of the states\"  "

## 2018-08-11 NOTE — ED TRIAGE NOTES
"Dunia Sahni    Chief Complaint   Patient presents with   • Psych Eval   • N/V       Pt BIBA from bus [facility].  Pt discharged today from [facility] for similar symptoms.  Pt states she has 9/10 pain \"everywhere\" Pt giving vague symptoms.  Pt states, \"I want to check into the [facility] because I am unable to care for self.\" Denies HI/SI.      Blood Pressure: 101/57, Pulse: (!) 103, Respiration: 16, Temperature: 37.2 °C (98.9 °F), Weight: 105.7 kg (233 lb 0.4 oz), Pulse Oximetry: 98 %      "

## 2018-08-11 NOTE — DISCHARGE PLANNING
Medical Social Work    Referral: Legal Hold    Intervention: Legal Hold Paperwork given to SW by RODRICKW: Comfort    Legal Hold Initiated: Date: 08/11/2018  Time: 0146    Legal Hold faxed: Date: 08/11/2018  Time: 0910    Patient’s Insurance Listed on Face Sheet: Medicare     Referrals sent to: [facility], [facility], [facility], [facility]    Plan: Patient will transfer to mental health facility once acceptance is obtained.     Confirmation of receipt of referral by phone with: fax confirmation

## 2018-08-11 NOTE — ED NOTES
Spoke with Yen RN at [facility] and gave report on this pt. Pt ambulated to restroom w/o difficulty.

## 2018-08-11 NOTE — ED NOTES
"Pt eating, denies any needs. Pt stated \"I have health issues, and cant deal with the shelter. I need help.\" Pt denies SI/HI  "

## 2018-08-11 NOTE — ED NOTES
"Patient ambulated with steady gait to room.  Patient states she suspects she might be pregnant, \"but only like a week pregnant.\" Also states she has had mild episodes of intermittent N/V and cant take care of herself.   "

## 2018-08-11 NOTE — ED PROVIDER NOTES
ED Provider Note Addendum    Scribed for Kya Burns M.D. by Leroy De La Garza on 8/11/2018 at 6:00 AM.     This is an addendum to the note on Dunia Sahni.  For further details and full chart information, see patient's initial note.       6:00 AM - I discussed the patient's case with Dr. Syed ([facility]) who will transfer care of the patient to me at this time.        7:13 AM  - Patient evaluated by myself.  Patient is resting comfortably at this time with no complaints.    Disposition:  Patient will be transferred to an appropriate psychiatric facility in stable condition for further psychiatric care and evaluation.  Patient will be placed under the care of my partner awaiting transfer.    FINAL IMPRESSION  1. Psychosis, unspecified psychosis type    2. Non-intractable vomiting with nausea, unspecified vomiting type    3. Leukocytosis, unspecified type         I, Leroy De La Garza (Scribe), am scribing for, and in the presence of, Kya Burns M.D..    Electronically signed by: Leroy De La Garza (Scribe), 8/11/2018    IKya M.D. personally performed the services described in this documentation, as scribed by Leroy De La Garza in my presence, and it is both accurate and complete.    The note accurately reflects work and decisions made by me.  Kya Burns  8/11/2018  9:21 AM

## 2018-10-04 LAB — EKG IMPRESSION: NORMAL

## 2018-12-10 ENCOUNTER — HOSPITAL ENCOUNTER (EMERGENCY)
Dept: HOSPITAL 8 - ED | Age: 33
Discharge: HOME | End: 2018-12-10
Payer: MEDICARE

## 2018-12-10 VITALS — BODY MASS INDEX: 40.75 KG/M2 | WEIGHT: 253.53 LBS | HEIGHT: 66 IN

## 2018-12-10 VITALS — DIASTOLIC BLOOD PRESSURE: 69 MMHG | SYSTOLIC BLOOD PRESSURE: 103 MMHG

## 2018-12-10 DIAGNOSIS — R31.9: ICD-10-CM

## 2018-12-10 DIAGNOSIS — N10: Primary | ICD-10-CM

## 2018-12-10 LAB
<PLATELET ESTIMATE>: ADEQUATE
<PLT MORPHOLOGY>: (no result)
ALBUMIN SERPL-MCNC: 3.3 G/DL (ref 3.4–5)
ALP SERPL-CCNC: 79 U/L (ref 45–117)
ALT SERPL-CCNC: 20 U/L (ref 12–78)
ANION GAP SERPL CALC-SCNC: 7 MMOL/L (ref 5–15)
BAND#(MANUAL): 0.86 X10^3/UL
BILIRUB DIRECT SERPL-MCNC: NORMAL MG/DL
BILIRUB SERPL-MCNC: 0.2 MG/DL (ref 0.2–1)
CALCIUM SERPL-MCNC: 8 MG/DL (ref 8.5–10.1)
CHLORIDE SERPL-SCNC: 106 MMOL/L (ref 98–107)
CREAT SERPL-MCNC: 0.81 MG/DL (ref 0.55–1.02)
CULTURE INDICATED?: YES
ERYTHROCYTE [DISTWIDTH] IN BLOOD BY AUTOMATED COUNT: 14 % (ref 9.6–15.2)
LYMPH#(MANUAL): 3.89 X10^3/UL (ref 1–3.4)
LYMPHS% (MANUAL): 18 % (ref 22–44)
MCH RBC QN AUTO: 30.5 PG (ref 27–34.8)
MCHC RBC AUTO-ENTMCNC: 33.9 G/DL (ref 32.4–35.8)
MCV RBC AUTO: 90.1 FL (ref 80–100)
MD: YES
MICROSCOPIC: (no result)
MONOS#(MANUAL): 1.73 X10^3/UL (ref 0.3–2.7)
MONOS% (MANUAL): 8 % (ref 2–9)
NEUTS BAND NFR BLD: 4 % (ref 0–7)
PLATELET # BLD AUTO: 436 X10^3/UL (ref 130–400)
PMV BLD AUTO: 7.2 FL (ref 7.4–10.4)
PROT SERPL-MCNC: 6.6 G/DL (ref 6.4–8.2)
RBC # BLD AUTO: 4.62 X10^6/UL (ref 3.82–5.3)
SEG#(MANUAL): 15.12 X10^3/UL (ref 1.8–6.8)
SEGS% (MANUAL): 70 % (ref 42–75)

## 2018-12-10 PROCEDURE — 87186 SC STD MICRODIL/AGAR DIL: CPT

## 2018-12-10 PROCEDURE — 85025 COMPLETE CBC W/AUTO DIFF WBC: CPT

## 2018-12-10 PROCEDURE — 74176 CT ABD & PELVIS W/O CONTRAST: CPT

## 2018-12-10 PROCEDURE — 99284 EMERGENCY DEPT VISIT MOD MDM: CPT

## 2018-12-10 PROCEDURE — 96375 TX/PRO/DX INJ NEW DRUG ADDON: CPT

## 2018-12-10 PROCEDURE — 80053 COMPREHEN METABOLIC PANEL: CPT

## 2018-12-10 PROCEDURE — 87086 URINE CULTURE/COLONY COUNT: CPT

## 2018-12-10 PROCEDURE — 36415 COLL VENOUS BLD VENIPUNCTURE: CPT

## 2018-12-10 PROCEDURE — 87077 CULTURE AEROBIC IDENTIFY: CPT

## 2018-12-10 PROCEDURE — 84703 CHORIONIC GONADOTROPIN ASSAY: CPT

## 2018-12-10 PROCEDURE — 81001 URINALYSIS AUTO W/SCOPE: CPT

## 2018-12-10 PROCEDURE — 96374 THER/PROPH/DIAG INJ IV PUSH: CPT

## 2019-01-08 ENCOUNTER — HOSPITAL ENCOUNTER (EMERGENCY)
Facility: MEDICAL CENTER | Age: 34
End: 2019-01-09
Attending: EMERGENCY MEDICINE
Payer: MEDICARE

## 2019-01-08 DIAGNOSIS — R45.851 SUICIDAL IDEATION: ICD-10-CM

## 2019-01-08 LAB
AMPHET UR QL SCN: NEGATIVE
ANION GAP SERPL CALC-SCNC: 7 MMOL/L (ref 0–11.9)
APPEARANCE UR: ABNORMAL
BACTERIA #/AREA URNS HPF: ABNORMAL /HPF
BARBITURATES UR QL SCN: NEGATIVE
BASOPHILS # BLD AUTO: 0.2 % (ref 0–1.8)
BASOPHILS # BLD: 0.03 K/UL (ref 0–0.12)
BENZODIAZ UR QL SCN: NEGATIVE
BILIRUB UR QL STRIP.AUTO: NEGATIVE
BUN SERPL-MCNC: 10 MG/DL (ref 8–22)
BZE UR QL SCN: NEGATIVE
CALCIUM SERPL-MCNC: 9.2 MG/DL (ref 8.5–10.5)
CANNABINOIDS UR QL SCN: NEGATIVE
CHLORIDE SERPL-SCNC: 106 MMOL/L (ref 96–112)
CO2 SERPL-SCNC: 25 MMOL/L (ref 20–33)
COLOR UR: YELLOW
CREAT SERPL-MCNC: 0.72 MG/DL (ref 0.5–1.4)
EOSINOPHIL # BLD AUTO: 0.04 K/UL (ref 0–0.51)
EOSINOPHIL NFR BLD: 0.3 % (ref 0–6.9)
EPI CELLS #/AREA URNS HPF: ABNORMAL /HPF
ERYTHROCYTE [DISTWIDTH] IN BLOOD BY AUTOMATED COUNT: 42 FL (ref 35.9–50)
GLUCOSE SERPL-MCNC: 104 MG/DL (ref 65–99)
GLUCOSE UR STRIP.AUTO-MCNC: NEGATIVE MG/DL
HCG UR QL: NEGATIVE
HCT VFR BLD AUTO: 41 % (ref 37–47)
HGB BLD-MCNC: 13.6 G/DL (ref 12–16)
HYALINE CASTS #/AREA URNS LPF: ABNORMAL /LPF
IMM GRANULOCYTES # BLD AUTO: 0.05 K/UL (ref 0–0.11)
IMM GRANULOCYTES NFR BLD AUTO: 0.4 % (ref 0–0.9)
KETONES UR STRIP.AUTO-MCNC: NEGATIVE MG/DL
LEUKOCYTE ESTERASE UR QL STRIP.AUTO: ABNORMAL
LYMPHOCYTES # BLD AUTO: 2.34 K/UL (ref 1–4.8)
LYMPHOCYTES NFR BLD: 17.8 % (ref 22–41)
MCH RBC QN AUTO: 30.2 PG (ref 27–33)
MCHC RBC AUTO-ENTMCNC: 33.2 G/DL (ref 33.6–35)
MCV RBC AUTO: 90.9 FL (ref 81.4–97.8)
METHADONE UR QL SCN: NEGATIVE
MICRO URNS: ABNORMAL
MONOCYTES # BLD AUTO: 0.55 K/UL (ref 0–0.85)
MONOCYTES NFR BLD AUTO: 4.2 % (ref 0–13.4)
NEUTROPHILS # BLD AUTO: 10.17 K/UL (ref 2–7.15)
NEUTROPHILS NFR BLD: 77.1 % (ref 44–72)
NITRITE UR QL STRIP.AUTO: NEGATIVE
NRBC # BLD AUTO: 0 K/UL
NRBC BLD-RTO: 0 /100 WBC
OPIATES UR QL SCN: NEGATIVE
OXYCODONE UR QL SCN: NEGATIVE
PCP UR QL SCN: NEGATIVE
PH UR STRIP.AUTO: 6 [PH]
PLATELET # BLD AUTO: 355 K/UL (ref 164–446)
PMV BLD AUTO: 8.8 FL (ref 9–12.9)
POC BREATHALIZER: 0 PERCENT (ref 0–0.01)
POTASSIUM SERPL-SCNC: 4.1 MMOL/L (ref 3.6–5.5)
PROPOXYPH UR QL SCN: NEGATIVE
PROT UR QL STRIP: NEGATIVE MG/DL
RBC # BLD AUTO: 4.51 M/UL (ref 4.2–5.4)
RBC # URNS HPF: ABNORMAL /HPF
RBC UR QL AUTO: NEGATIVE
SODIUM SERPL-SCNC: 138 MMOL/L (ref 135–145)
SP GR UR STRIP.AUTO: 1.01
UROBILINOGEN UR STRIP.AUTO-MCNC: 0.2 MG/DL
WBC # BLD AUTO: 13.2 K/UL (ref 4.8–10.8)
WBC #/AREA URNS HPF: ABNORMAL /HPF

## 2019-01-08 PROCEDURE — 302970 POC BREATHALIZER

## 2019-01-08 PROCEDURE — A9270 NON-COVERED ITEM OR SERVICE: HCPCS

## 2019-01-08 PROCEDURE — 81025 URINE PREGNANCY TEST: CPT

## 2019-01-08 PROCEDURE — 700102 HCHG RX REV CODE 250 W/ 637 OVERRIDE(OP): Performed by: EMERGENCY MEDICINE

## 2019-01-08 PROCEDURE — 81001 URINALYSIS AUTO W/SCOPE: CPT | Mod: XU

## 2019-01-08 PROCEDURE — 302970 POC BREATHALIZER: Performed by: EMERGENCY MEDICINE

## 2019-01-08 PROCEDURE — 99285 EMERGENCY DEPT VISIT HI MDM: CPT

## 2019-01-08 PROCEDURE — 80048 BASIC METABOLIC PNL TOTAL CA: CPT

## 2019-01-08 PROCEDURE — 87086 URINE CULTURE/COLONY COUNT: CPT

## 2019-01-08 PROCEDURE — 80307 DRUG TEST PRSMV CHEM ANLYZR: CPT

## 2019-01-08 PROCEDURE — A9270 NON-COVERED ITEM OR SERVICE: HCPCS | Performed by: EMERGENCY MEDICINE

## 2019-01-08 PROCEDURE — 85025 COMPLETE CBC W/AUTO DIFF WBC: CPT

## 2019-01-08 PROCEDURE — 90791 PSYCH DIAGNOSTIC EVALUATION: CPT

## 2019-01-08 PROCEDURE — 700102 HCHG RX REV CODE 250 W/ 637 OVERRIDE(OP)

## 2019-01-08 RX ORDER — ARIPIPRAZOLE 10 MG/1
5 TABLET ORAL DAILY
Status: DISCONTINUED | OUTPATIENT
Start: 2019-01-08 | End: 2019-01-09 | Stop reason: HOSPADM

## 2019-01-08 RX ORDER — CEFDINIR 300 MG/1
300 CAPSULE ORAL EVERY 12 HOURS
Status: DISCONTINUED | OUTPATIENT
Start: 2019-01-08 | End: 2019-01-09 | Stop reason: HOSPADM

## 2019-01-08 RX ORDER — RISPERIDONE 1 MG/1
0.5 TABLET ORAL EVERY EVENING
Status: DISCONTINUED | OUTPATIENT
Start: 2019-01-08 | End: 2019-01-09 | Stop reason: HOSPADM

## 2019-01-08 RX ORDER — IBUPROFEN 600 MG/1
TABLET ORAL
Status: COMPLETED
Start: 2019-01-08 | End: 2019-01-08

## 2019-01-08 RX ORDER — IBUPROFEN 600 MG/1
600 TABLET ORAL ONCE
Status: COMPLETED | OUTPATIENT
Start: 2019-01-08 | End: 2019-01-08

## 2019-01-08 RX ADMIN — RISPERIDONE 0.5 MG: 1 TABLET ORAL at 17:11

## 2019-01-08 RX ADMIN — IBUPROFEN 600 MG: 600 TABLET ORAL at 17:16

## 2019-01-08 RX ADMIN — ARIPIPRAZOLE 5 MG: 10 TABLET ORAL at 17:11

## 2019-01-08 RX ADMIN — IBUPROFEN 600 MG: 600 TABLET, FILM COATED ORAL at 17:16

## 2019-01-08 RX ADMIN — CEFDINIR 300 MG: 300 CAPSULE ORAL at 17:11

## 2019-01-08 ASSESSMENT — PAIN SCALES - GENERAL: PAINLEVEL_OUTOF10: 6

## 2019-01-08 NOTE — ED NOTES
"Pt calm and cooperative, pt states \"I'd rather check myself in before I totally lose it\". Pt calling [facility] to inform them of situation.   "

## 2019-01-08 NOTE — ED NOTES
"Pt seen at [facility] today, pt with bipolar. Pt states thoughts of suicide with no plan. Pt sent from [facility], believe medications are not working. SAD score 5. Pt states she's in the \"[facility]\". Pt states [facility] where she is treated believes she needs to go to [facility]. Charge aware.   "

## 2019-01-09 VITALS
RESPIRATION RATE: 16 BRPM | SYSTOLIC BLOOD PRESSURE: 120 MMHG | DIASTOLIC BLOOD PRESSURE: 77 MMHG | WEIGHT: 262.13 LBS | TEMPERATURE: 98 F | HEART RATE: 89 BPM | HEIGHT: 67 IN | OXYGEN SATURATION: 96 % | BODY MASS INDEX: 41.14 KG/M2

## 2019-01-09 PROCEDURE — A9270 NON-COVERED ITEM OR SERVICE: HCPCS | Performed by: EMERGENCY MEDICINE

## 2019-01-09 PROCEDURE — 700102 HCHG RX REV CODE 250 W/ 637 OVERRIDE(OP): Performed by: EMERGENCY MEDICINE

## 2019-01-09 RX ADMIN — CEFDINIR 300 MG: 300 CAPSULE ORAL at 06:00

## 2019-01-09 RX ADMIN — ARIPIPRAZOLE 5 MG: 10 TABLET ORAL at 06:00

## 2019-01-09 NOTE — DISCHARGE PLANNING
Kathryn from [facility] called they will accept Pt.  Dr. Patton will be admitting physician.    JOEL called and Raegan confirmed Pt does not have transportation benefits  PCS completed and faxed to [facility].  Transport Time arranged for 0930    Transfer Packet completed with Original Legal Hold placed inside.  RN updated on transfer and transfer time.    Kathryn at [facility] notified that pt will be picked up here by [facility] at 0930.

## 2019-01-09 NOTE — CONSULTS
[facility]   TRIAGE ASSESSMENT    Name: Dunia Sahni  MRN: [id number]  : 1985  Age: 32 y.o.  Date of assessment: 19  PCP: Alban macdonald/[facility]  Persons in attendance: Patient    CHIEF COMPLAINT/PRESENTING ISSUE (as stated by patient, rn, erp): This pt presents in the er with complaints of major depression with si and a plan to cut her wrist. She suffers from bipolar disorder, depression and schizoaffective disorder. She denies any psychosis at this time. She has suffered from psychiatric issues since she was a child and has a hx of self mutilation. She receives therapy at the [facility] and staff there where concerned that she was decompensating and she subsequently was brought to the er for evaluation.  Chief Complaint   Patient presents with   • Flank Pain   • Suicidal Ideation        CURRENT LIVING SITUATION/SOCIAL SUPPORT: Patient currently lives with a roomate  States she manages her ssd checks and has no contact with her dad and her mother passed away when she was a child. She is single and has no children. She was raised in the foster care system. She has a maternal uncle living locally who is involved in her life.    BEHAVIORAL HEALTH TREATMENT HISTORY  Does patient/parent report a history of prior behavioral health treatment for patient?   Yes: Patient has extensive mental health treatment. Patient's last inpatient hospitalization was on 18 at [facility]. Prior to that she has also had a number of inpt admissions and outpt tx at [facility].She also received adolescent inpt tx and residential tx as a child and adolescent in [facility].       SAFETY ASSESSMENT - SELF  Does patient acknowledge current or past symptoms of dangerousness to self?yes  Does parent/significant other report patient has current or past symptoms of dangerousness to self? na  Does presenting problem suggest symptoms of dangerousness to self? Yes   pt states she has thoughts of cutting her wrist.  "She has a hx of self mutilation and an overdose. She denies access to a firearm and states her si has been building for over a few days.    SAFETY ASSESSMENT - OTHERS  Does patient acknowledge current or past symptoms of aggressive behavior or risk to others? no  Does parent/significant other report patient has current or past symptoms of aggressive behavior or risk to others?  N/A  Does presenting problem suggest symptoms of dangerousness to others? No    Crisis Safety Plan completed and copy given to patient? No pt is on a legal hold    ABUSE/NEGLECT SCREENING  Does patient report feeling “unsafe” in his/her home, or afraid of anyone?  no  Does patient report any history of physical, sexual, or emotional abuse?  no  Does parent or significant other report any of the above? N\A  Is there evidence of neglect by self?  no  Is there evidence of neglect by a caregiver? no  Does the patient/parent report any history of CPS/APS/police involvement related to suspected abuse/neglect or domestic violence? no  Based on the information provided during the current assessment, is a mandated report of suspected abuse/neglect being made?  No    SUBSTANCE USE SCREENING  Yes:  Chauncey all substances used in the past 30 days:Patient denies substance use      Last Use Amount   []   Alcohol     []   Marijuana     []   Heroin     []   Prescription Opioids  (used without prescription, for    recreation, or in excess of prescribed amount)     []   Other Prescription  (used without prescription, for    recreation, or in excess of prescribed amount)     []   Cocaine      []   Methamphetamine     []   \"\" drugs (ectasy, MDMA)     []   Other substances        UDS results: neg  Breathalyzer results: 0.0    What consequences does the patient associate with any of the above substance use and or addictive behaviors? None    Risk factors for detox (check all that apply):  []  Seizures   []  Diaphoretic (sweating)   []  Tremors   []  " Hallucinations   []  Increased blood pressure   []  Decreased blood pressure   []  Other   [x]  None      [] Patient education on risk factors for detoxification and instructed to return to ER as needed.na    MENTAL STATUS   Participation: active verbal communication  Grooming: Casual  Orientation: Alert, Fully Oriented and Evidence of delusions present  Behavior: Calm  Eye contact: Good  Mood: calm  Affect: Expansive  Thought process: clear  Thought content: normal with no psychosis  Speech: Volume within normal limits  Perception: normal  Memory: no deficits noted  Insight: Poor  Judgment:  Poor  Other:    Collateral information:   Source:  [] Significant other present in person:   [] Significant other by telephone  [] [facility]   [x] [facility]  [x] [facility]  [x] Other:erp     [] Unable to complete full assessment due to:  [] Acute intoxication  [] Patient declined to participate/engage  [] Patient verbally unresponsive  [] Significant cognitive deficits  [] Significant perceptual distortions or behavioral disorganization  [x] Other:na      CLINICAL IMPRESSIONS:  Primary: mdd with si/plan  Secondary: anxiety       IDENTIFIED NEEDS/PLAN:  [Trigger DISPOSITION list for any items marked]    [x]  Imminent safety risk - self [] Imminent safety risk - others   []  Acute substance withdrawal []  Psychosis/Impaired reality testing   [x]  Mood/anxiety []  Substance use/Addictive behavior   [x]  Maladaptive behaviro []  Parent/child conflict   []  Family/Couples conflict []  Biomedical   []  Housing [x]  Financial   []   Legal  Occupational/Educational   []  Domestic violence []  Other:     Disposition: Refer to Legal Hold    Does patient express agreement with the above plan? yes    Referral appointment(s) scheduled? N\A    Alert team only:   I have discussed findings and recommendations with  who is in agreement with these recommendations. 33y female presents in the er with  si/plan. She has been placed on a legal hold pending transfer to [facility] psychiatric tx.    Referral information sent to the following community providers :na    If applicable : Referred  to : [facility] for legal hold follow up at 2000      Chauncey Warren R.N.  1/08/2019

## 2019-01-09 NOTE — DISCHARGE PLANNING
Alert Team  Pt quietly resting on gurney, wakens easily.  Reports poor sleep, feeling tired but has appetite for breakfast.  She denies hallucinations and HI; continues to have SI.    Pt to be transferred to [facility] this morning for inpatient psychiatric care.

## 2019-01-09 NOTE — ED NOTES
Pt medicated per orders. Given IBU for bladder and low back pain. Pt calm and cooperative. Sitter continues to monitor.

## 2019-01-09 NOTE — ED NOTES
Assist RN: Pt transported to [facility] by EMS. All belongings returned from safe keeping and transported with patient. VSS. Report given to EMS.

## 2019-01-09 NOTE — ED PROVIDER NOTES
ED Provider Note    CHIEF COMPLAINT  Chief Complaint   Patient presents with   • Flank Pain   • Suicidal Ideation       HPI  Dunia Sahni is a 33 y.o. female who presents to emerge from today with complaints of suicidal ideation.  Patient states she has had suicidal thoughts for the past several days she is taking her medications as prescribed for bipolar disorder.  She had a history of previous suicide attempts in the past and has plan to possibly cut her wrist.  She also admits to burning and frequency of urination.  Denies chest pain no shortness of breath no changes to bowel or bladder other than stated above.    REVIEW OF SYSTEMS  See HPI for further details. All other systems are negative.     PAST MEDICAL HISTORY  Past Medical History:   Diagnosis Date   • Abscess 10/12/2017    right AC arm area   • Bipolar affective (HCC)    • Depression    • Kidney infection    • Suicide attempt (HCC)        FAMILY HISTORY  No family history on file.    SOCIAL HISTORY  Social History     Social History   • Marital status:      Spouse name: N/A   • Number of children: N/A   • Years of education: N/A     Social History Main Topics   • Smoking status: Never Smoker   • Smokeless tobacco: Never Used   • Alcohol use No   • Drug use: No   • Sexual activity: Not on file     Other Topics Concern   • Not on file     Social History Narrative   • No narrative on file       SURGICAL HISTORY  Past Surgical History:   Procedure Laterality Date   • GEREMIAS BY LAPAROSCOPY N/A 7/6/2018    Procedure: GEREMIAS BY LAPAROSCOPY;  Surgeon: Leroy Goodson M.D.;  Location: [facility];  Service: General   • ERCP IN OR N/A 7/5/2018    Procedure: ERCP IN OR;  Surgeon: Nathan Maravilla M.D.;  [facility]: [facility]ERY [facility];  Service: Gastroenterology   • APPENDECTOMY     • PB C-SEC ONLY,PBEV C-SEC         CURRENT MEDICATIONS  Home Medications    **Home medications have not yet been reviewed for this encounter**    "      ALLERGIES  Allergies   Allergen Reactions   • Vicodin [Hydrocodone-Acetaminophen] Anaphylaxis     RXN=9 years ago   • Pcn [Penicillins] Nausea     RXN=8 years ago  Toleartes rocephin   • Bee Venom    • Blackberry [Rubus Fruticosus] Rash     \"rash\"   • Haldol [Haloperidol] Rash     Pt states, \"I have a rash and my lips were swollen.\"   • Raspberry        PHYSICAL EXAM  VITAL SIGNS: /58   Pulse 63   Temp 35.9 °C (96.6 °F) (Temporal)   Resp 19   Ht 1.702 m (5' 7\")   Wt 118.9 kg (262 lb 2 oz)   SpO2 97%   BMI 41.05 kg/m²       Constitutional: Well developed, Well nourished, mild acute distress, Non-toxic appearance.   HENT: Normocephalic, Atraumatic, Bilateral external ears normal, Oropharynx moist, No oral exudates, Nose normal.   Eyes: PERRLA, EOMI, Conjunctiva normal, No discharge.   Neck: Normal range of motion, No tenderness, Supple, No stridor.   Cardiovascular: Normal heart rate, Normal rhythm, No murmurs, No rubs, No gallops.   Thorax & Lungs: Normal breath sounds, No respiratory distress, No wheezing, No chest tenderness.  Abdomen: Bowel sounds normal, Soft, No tenderness, No masses, No pulsatile masses.    Skin: Warm, Dry, No erythema, No rash.   Extremities: Intact distal pulses, No edema, No tenderness, No cyanosis, No clubbing.   Neurologic: No neurological deficits  Psychiatric: Suicidal ideation      COURSE & MEDICAL DECISION MAKING  Pertinent Labs & Imaging studies reviewed. (See chart for details)  Patient does have urinary tract infection started on Omnicef here in the emergency room.  She has been seen by [facility] continues to be suicidal she is placed on a legal hold and be transferred to mental health.  Continue on her medications.    FINAL IMPRESSION  1.  Acute suicidal ideation  2.  Bipolar disorder by history  3.  UTI      Electronically signed by: Homero Leo, 1/8/2019 9:53 PM    "

## 2019-01-09 NOTE — ED NOTES
Pt. changed into hospital gown. Belongings taken, security searched and and placed in locker 6.   Dangerous items removed and sitter to monitor patient.

## 2019-01-09 NOTE — DISCHARGE PLANNING
Medical Social Work    Referral: Legal Hold    Intervention: Legal Hold Paperwork given to SW by [employer] RN: Chauncey    Legal Hold Initiated: Date: 01/08/2019  Time: 1415    Legal Hold faxed: Date: 01/09/2019  Time: 0310    Patient’s Insurance Listed on Face Sheet: Medicare and Medicaid FFS    [facility]: [facility], [facility], [facility]    Plan: Patient will transfer to mental health facility once acceptance is obtained.

## 2019-01-09 NOTE — DISCHARGE PLANNING
Medical Social Work    MSW received a call from Paulina with [facility] who states that they received pt's referral; however, they have no female beds a this time.

## 2019-01-10 LAB
BACTERIA UR CULT: NORMAL
SIGNIFICANT IND 70042: NORMAL
SITE SITE: NORMAL
SOURCE SOURCE: NORMAL

## 2019-01-29 ENCOUNTER — HOSPITAL ENCOUNTER (EMERGENCY)
Dept: HOSPITAL 8 - ED | Age: 34
Discharge: HOME | End: 2019-01-29
Payer: MEDICARE

## 2019-01-29 VITALS — DIASTOLIC BLOOD PRESSURE: 68 MMHG | SYSTOLIC BLOOD PRESSURE: 117 MMHG

## 2019-01-29 VITALS — HEIGHT: 66 IN | BODY MASS INDEX: 47.09 KG/M2 | WEIGHT: 293 LBS

## 2019-01-29 DIAGNOSIS — F41.1: ICD-10-CM

## 2019-01-29 DIAGNOSIS — Z86.718: ICD-10-CM

## 2019-01-29 DIAGNOSIS — N12: Primary | ICD-10-CM

## 2019-01-29 LAB
ALBUMIN SERPL-MCNC: 3.4 G/DL (ref 3.4–5)
ANION GAP SERPL CALC-SCNC: 7 MMOL/L (ref 5–15)
BASOPHILS # BLD AUTO: 0.07 X10^3/UL (ref 0–0.1)
BASOPHILS NFR BLD AUTO: 1 % (ref 0–1)
CALCIUM SERPL-MCNC: 8.7 MG/DL (ref 8.5–10.1)
CHLORIDE SERPL-SCNC: 109 MMOL/L (ref 98–107)
CREAT SERPL-MCNC: 0.75 MG/DL (ref 0.55–1.02)
CULTURE INDICATED?: YES
EOSINOPHIL # BLD AUTO: 0.12 X10^3/UL (ref 0–0.4)
EOSINOPHIL NFR BLD AUTO: 1 % (ref 1–7)
ERYTHROCYTE [DISTWIDTH] IN BLOOD BY AUTOMATED COUNT: 13.1 % (ref 9.6–15.2)
HCG UR SG: 1.02 (ref 1–1.03)
LYMPHOCYTES # BLD AUTO: 2.15 X10^3/UL (ref 1–3.4)
LYMPHOCYTES NFR BLD AUTO: 18 % (ref 22–44)
MCH RBC QN AUTO: 30.3 PG (ref 27–34.8)
MCHC RBC AUTO-ENTMCNC: 33.9 G/DL (ref 32.4–35.8)
MCV RBC AUTO: 89.3 FL (ref 80–100)
MD: NO
MICROSCOPIC: (no result)
MONOCYTES # BLD AUTO: 0.57 X10^3/UL (ref 0.2–0.8)
MONOCYTES NFR BLD AUTO: 5 % (ref 2–9)
NEUTROPHILS # BLD AUTO: 9.37 X10^3/UL (ref 1.8–6.8)
NEUTROPHILS NFR BLD AUTO: 76 % (ref 42–75)
PLATELET # BLD AUTO: 396 X10^3/UL (ref 130–400)
PMV BLD AUTO: 7.2 FL (ref 7.4–10.4)
RBC # BLD AUTO: 4.53 X10^6/UL (ref 3.82–5.3)

## 2019-01-29 PROCEDURE — 99284 EMERGENCY DEPT VISIT MOD MDM: CPT

## 2019-01-29 PROCEDURE — 85025 COMPLETE CBC W/AUTO DIFF WBC: CPT

## 2019-01-29 PROCEDURE — 96372 THER/PROPH/DIAG INJ SC/IM: CPT

## 2019-01-29 PROCEDURE — 81001 URINALYSIS AUTO W/SCOPE: CPT

## 2019-01-29 PROCEDURE — 82040 ASSAY OF SERUM ALBUMIN: CPT

## 2019-01-29 PROCEDURE — 80048 BASIC METABOLIC PNL TOTAL CA: CPT

## 2019-01-29 PROCEDURE — 81025 URINE PREGNANCY TEST: CPT

## 2019-01-29 PROCEDURE — 87086 URINE CULTURE/COLONY COUNT: CPT

## 2019-01-29 PROCEDURE — 74176 CT ABD & PELVIS W/O CONTRAST: CPT

## 2019-01-29 PROCEDURE — 36415 COLL VENOUS BLD VENIPUNCTURE: CPT

## 2019-03-05 ENCOUNTER — HOSPITAL ENCOUNTER (EMERGENCY)
Dept: HOSPITAL 8 - ED | Age: 34
Discharge: HOME | End: 2019-03-05
Payer: MEDICARE

## 2019-03-05 VITALS — DIASTOLIC BLOOD PRESSURE: 74 MMHG | SYSTOLIC BLOOD PRESSURE: 132 MMHG

## 2019-03-05 VITALS — WEIGHT: 248.68 LBS | BODY MASS INDEX: 39.97 KG/M2 | HEIGHT: 66 IN

## 2019-03-05 DIAGNOSIS — R10.32: ICD-10-CM

## 2019-03-05 DIAGNOSIS — Z88.8: ICD-10-CM

## 2019-03-05 DIAGNOSIS — Z86.718: ICD-10-CM

## 2019-03-05 DIAGNOSIS — Z90.49: ICD-10-CM

## 2019-03-05 DIAGNOSIS — Z86.59: ICD-10-CM

## 2019-03-05 DIAGNOSIS — R10.31: Primary | ICD-10-CM

## 2019-03-05 DIAGNOSIS — R10.30: ICD-10-CM

## 2019-03-05 DIAGNOSIS — Z98.890: ICD-10-CM

## 2019-03-05 DIAGNOSIS — F41.1: ICD-10-CM

## 2019-03-05 DIAGNOSIS — R07.89: ICD-10-CM

## 2019-03-05 DIAGNOSIS — Z72.9: ICD-10-CM

## 2019-03-05 DIAGNOSIS — Z88.5: ICD-10-CM

## 2019-03-05 DIAGNOSIS — F32.9: ICD-10-CM

## 2019-03-05 DIAGNOSIS — Z88.0: ICD-10-CM

## 2019-03-05 LAB
ALBUMIN SERPL-MCNC: 3.4 G/DL (ref 3.4–5)
ALP SERPL-CCNC: 73 U/L (ref 45–117)
ALT SERPL-CCNC: 24 U/L (ref 12–78)
ANION GAP SERPL CALC-SCNC: 5 MMOL/L (ref 5–15)
BASOPHILS # BLD AUTO: 0.21 X10^3/UL (ref 0–0.1)
BASOPHILS NFR BLD AUTO: 2 % (ref 0–1)
BILIRUB SERPL-MCNC: 0.4 MG/DL (ref 0.2–1)
CALCIUM SERPL-MCNC: 8.6 MG/DL (ref 8.5–10.1)
CHLORIDE SERPL-SCNC: 109 MMOL/L (ref 98–107)
CREAT SERPL-MCNC: 0.72 MG/DL (ref 0.55–1.02)
CULTURE INDICATED?: NO
EOSINOPHIL # BLD AUTO: 0.14 X10^3/UL (ref 0–0.4)
EOSINOPHIL NFR BLD AUTO: 1 % (ref 1–7)
ERYTHROCYTE [DISTWIDTH] IN BLOOD BY AUTOMATED COUNT: 13.6 % (ref 9.6–15.2)
HCG UR SG: 1.01 (ref 1–1.03)
LYMPHOCYTES # BLD AUTO: 2.39 X10^3/UL (ref 1–3.4)
LYMPHOCYTES NFR BLD AUTO: 19 % (ref 22–44)
MCH RBC QN AUTO: 30.6 PG (ref 27–34.8)
MCHC RBC AUTO-ENTMCNC: 34.1 G/DL (ref 32.4–35.8)
MCV RBC AUTO: 89.8 FL (ref 80–100)
MD: NO
MICROSCOPIC: (no result)
MONOCYTES # BLD AUTO: 0.87 X10^3/UL (ref 0.2–0.8)
MONOCYTES NFR BLD AUTO: 7 % (ref 2–9)
NEUTROPHILS # BLD AUTO: 8.97 X10^3/UL (ref 1.8–6.8)
NEUTROPHILS NFR BLD AUTO: 71 % (ref 42–75)
PLATELET # BLD AUTO: 442 X10^3/UL (ref 130–400)
PMV BLD AUTO: 7.3 FL (ref 7.4–10.4)
PROT SERPL-MCNC: 6.5 G/DL (ref 6.4–8.2)
RBC # BLD AUTO: 4.3 X10^6/UL (ref 3.82–5.3)
TROPONIN I SERPL-MCNC: < 0.015 NG/ML (ref 0–0.04)

## 2019-03-05 PROCEDURE — 80053 COMPREHEN METABOLIC PANEL: CPT

## 2019-03-05 PROCEDURE — 81025 URINE PREGNANCY TEST: CPT

## 2019-03-05 PROCEDURE — 84484 ASSAY OF TROPONIN QUANT: CPT

## 2019-03-05 PROCEDURE — 36415 COLL VENOUS BLD VENIPUNCTURE: CPT

## 2019-03-05 PROCEDURE — 99284 EMERGENCY DEPT VISIT MOD MDM: CPT

## 2019-03-05 PROCEDURE — 83690 ASSAY OF LIPASE: CPT

## 2019-03-05 PROCEDURE — 81003 URINALYSIS AUTO W/O SCOPE: CPT

## 2019-03-05 PROCEDURE — 85025 COMPLETE CBC W/AUTO DIFF WBC: CPT

## 2019-03-05 PROCEDURE — 71046 X-RAY EXAM CHEST 2 VIEWS: CPT

## 2019-03-05 PROCEDURE — 93005 ELECTROCARDIOGRAM TRACING: CPT

## 2019-03-05 NOTE — NUR
PT HERE FOR PAIN. PT SAYS SHE FEELS A LUMP IN HER CHEST. UA COLLECTED. PT TO 
XRAY. CALL LIGHT IN REACH

## 2019-03-09 ENCOUNTER — APPOINTMENT (OUTPATIENT)
Dept: RADIOLOGY | Facility: MEDICAL CENTER | Age: 34
End: 2019-03-09
Attending: STUDENT IN AN ORGANIZED HEALTH CARE EDUCATION/TRAINING PROGRAM
Payer: MEDICARE

## 2019-03-09 ENCOUNTER — HOSPITAL ENCOUNTER (EMERGENCY)
Facility: MEDICAL CENTER | Age: 34
End: 2019-03-09
Attending: EMERGENCY MEDICINE
Payer: MEDICARE

## 2019-03-09 VITALS
RESPIRATION RATE: 16 BRPM | OXYGEN SATURATION: 100 % | DIASTOLIC BLOOD PRESSURE: 62 MMHG | HEIGHT: 66 IN | SYSTOLIC BLOOD PRESSURE: 124 MMHG | WEIGHT: 252.21 LBS | TEMPERATURE: 97.9 F | HEART RATE: 94 BPM | BODY MASS INDEX: 40.53 KG/M2

## 2019-03-09 DIAGNOSIS — J02.0 STREP PHARYNGITIS: ICD-10-CM

## 2019-03-09 LAB
ALBUMIN SERPL BCP-MCNC: 3.4 G/DL (ref 3.2–4.9)
ALBUMIN/GLOB SERPL: 1.4 G/DL
ALP SERPL-CCNC: 60 U/L (ref 30–99)
ALT SERPL-CCNC: 10 U/L (ref 2–50)
ANION GAP SERPL CALC-SCNC: 7 MMOL/L (ref 0–11.9)
AST SERPL-CCNC: 9 U/L (ref 12–45)
BASOPHILS # BLD AUTO: 0.5 % (ref 0–1.8)
BASOPHILS # BLD: 0.08 K/UL (ref 0–0.12)
BILIRUB SERPL-MCNC: 0.2 MG/DL (ref 0.1–1.5)
BUN SERPL-MCNC: 11 MG/DL (ref 8–22)
CALCIUM SERPL-MCNC: 8.5 MG/DL (ref 8.5–10.5)
CHLORIDE SERPL-SCNC: 106 MMOL/L (ref 96–112)
CO2 SERPL-SCNC: 22 MMOL/L (ref 20–33)
CREAT SERPL-MCNC: 0.63 MG/DL (ref 0.5–1.4)
EKG IMPRESSION: NORMAL
EOSINOPHIL # BLD AUTO: 0.1 K/UL (ref 0–0.51)
EOSINOPHIL NFR BLD: 0.6 % (ref 0–6.9)
ERYTHROCYTE [DISTWIDTH] IN BLOOD BY AUTOMATED COUNT: 43.6 FL (ref 35.9–50)
FLUAV RNA SPEC QL NAA+PROBE: NEGATIVE
FLUBV RNA SPEC QL NAA+PROBE: NEGATIVE
GLOBULIN SER CALC-MCNC: 2.5 G/DL (ref 1.9–3.5)
GLUCOSE SERPL-MCNC: 116 MG/DL (ref 65–99)
HCT VFR BLD AUTO: 39.8 % (ref 37–47)
HGB BLD-MCNC: 13 G/DL (ref 12–16)
IMM GRANULOCYTES # BLD AUTO: 0.08 K/UL (ref 0–0.11)
IMM GRANULOCYTES NFR BLD AUTO: 0.5 % (ref 0–0.9)
LYMPHOCYTES # BLD AUTO: 3.05 K/UL (ref 1–4.8)
LYMPHOCYTES NFR BLD: 19 % (ref 22–41)
MCH RBC QN AUTO: 30.5 PG (ref 27–33)
MCHC RBC AUTO-ENTMCNC: 32.7 G/DL (ref 33.6–35)
MCV RBC AUTO: 93.4 FL (ref 81.4–97.8)
MONOCYTES # BLD AUTO: 1.11 K/UL (ref 0–0.85)
MONOCYTES NFR BLD AUTO: 6.9 % (ref 0–13.4)
NEUTROPHILS # BLD AUTO: 11.62 K/UL (ref 2–7.15)
NEUTROPHILS NFR BLD: 72.5 % (ref 44–72)
NRBC # BLD AUTO: 0 K/UL
NRBC BLD-RTO: 0 /100 WBC
PLATELET # BLD AUTO: 388 K/UL (ref 164–446)
PMV BLD AUTO: 9.2 FL (ref 9–12.9)
POTASSIUM SERPL-SCNC: 3.9 MMOL/L (ref 3.6–5.5)
PROT SERPL-MCNC: 5.9 G/DL (ref 6–8.2)
RBC # BLD AUTO: 4.26 M/UL (ref 4.2–5.4)
S PYO DNA SPEC NAA+PROBE: DETECTED
SODIUM SERPL-SCNC: 135 MMOL/L (ref 135–145)
WBC # BLD AUTO: 16 K/UL (ref 4.8–10.8)

## 2019-03-09 PROCEDURE — 87651 STREP A DNA AMP PROBE: CPT

## 2019-03-09 PROCEDURE — 85025 COMPLETE CBC W/AUTO DIFF WBC: CPT

## 2019-03-09 PROCEDURE — 93005 ELECTROCARDIOGRAM TRACING: CPT | Performed by: EMERGENCY MEDICINE

## 2019-03-09 PROCEDURE — 700102 HCHG RX REV CODE 250 W/ 637 OVERRIDE(OP): Performed by: EMERGENCY MEDICINE

## 2019-03-09 PROCEDURE — 87502 INFLUENZA DNA AMP PROBE: CPT

## 2019-03-09 PROCEDURE — A9270 NON-COVERED ITEM OR SERVICE: HCPCS | Performed by: EMERGENCY MEDICINE

## 2019-03-09 PROCEDURE — 71045 X-RAY EXAM CHEST 1 VIEW: CPT

## 2019-03-09 PROCEDURE — 80053 COMPREHEN METABOLIC PANEL: CPT

## 2019-03-09 PROCEDURE — 94640 AIRWAY INHALATION TREATMENT: CPT

## 2019-03-09 PROCEDURE — 99284 EMERGENCY DEPT VISIT MOD MDM: CPT

## 2019-03-09 PROCEDURE — 93005 ELECTROCARDIOGRAM TRACING: CPT

## 2019-03-09 PROCEDURE — 700101 HCHG RX REV CODE 250: Performed by: EMERGENCY MEDICINE

## 2019-03-09 RX ORDER — CLINDAMYCIN HYDROCHLORIDE 300 MG/1
300 CAPSULE ORAL 3 TIMES DAILY
Qty: 30 CAP | Refills: 0 | Status: SHIPPED | OUTPATIENT
Start: 2019-03-09 | End: 2019-03-19

## 2019-03-09 RX ORDER — CLINDAMYCIN HYDROCHLORIDE 150 MG/1
300 CAPSULE ORAL ONCE
Status: COMPLETED | OUTPATIENT
Start: 2019-03-09 | End: 2019-03-09

## 2019-03-09 RX ADMIN — CLINDAMYCIN HYDROCHLORIDE 300 MG: 150 CAPSULE ORAL at 03:44

## 2019-03-09 RX ADMIN — ALBUTEROL SULFATE 2.5 MG: 2.5 SOLUTION RESPIRATORY (INHALATION) at 02:49

## 2019-03-09 ASSESSMENT — ENCOUNTER SYMPTOMS
COUGH: 1
FEVER: 0
SPUTUM PRODUCTION: 1
SORE THROAT: 1
SHORTNESS OF BREATH: 1
HALLUCINATIONS: 0

## 2019-03-09 NOTE — ED PROVIDER NOTES
"ED Provider Note    Scribed for Elif Jc M.D. by Jose Gee. 3/9/2019, 2:15 AM.    Primary care provider: Sam Monroy M.D.  Means of arrival: Walk In  History obtained from: Patient  History limited by: None    CHIEF COMPLAINT  Chief Complaint   Patient presents with   • Psych Eval     Pt. states she was seen at [place] 3 days ago and was told she has a \"knot\" on her heart and that this knot is traveling down her heart further causing centralized chest pain for the past three days. Pt. has a previous hx of mental illness and psychosis. Pt. denies any other cardiac hx.    • Flu Like Symptoms     Pt. states generalized body aches, cough, sore thorat, and yellow phlegm. No cough noted upon presentation to triage. Pt. is warm, pink, and dry.       LINDEN Sahni is a 33 y.o. Female with a history of bipolar disorder and schizo-effective disorder who presents to the Emergency Department for evaluation of flu like symptoms onset 3 days ago. The patient endorses experiencing chest pain, productive cough, mild sore throat, and intermittent shortness of breath. She notes that she is coughing up a yellow sputum. She also states that she feels an associated lump on the left side of her chest that moves down with coughing. Her chest pain only occurs with coughing.  She denies experiencing fever.  Of note per triage note patient is here for psychiatric evaluation, however patient reports that she is followed by [facility].  She is currently off any psychiatric medications as they wanted to see how she would do off of her meds.  She has follow-up with [facility] in a few days.  She denies suicidal or homicidal ideation.  She is not having hallucinations.    REVIEW OF SYSTEMS  Review of Systems   Constitutional: Negative for fever.        Positive flu like symptoms.    HENT: Positive for sore throat.    Respiratory: Positive for cough, sputum production and shortness of breath.    Cardiovascular: " "Positive for chest pain.   Psychiatric/Behavioral: Negative for hallucinations and suicidal ideas.   All other systems reviewed and are negative.    PAST MEDICAL HISTORY   has a past medical history of Abscess (10/12/2017); Bipolar affective (HCC); Depression; Kidney infection; and Suicide attempt (HCC).    SURGICAL HISTORY   has a past surgical history that includes c-sec only,prev c-sec; appendectomy; ercp in or (N/A, 7/5/2018); and skip by laparoscopy (N/A, 7/6/2018).    SOCIAL HISTORY  Social History   Substance Use Topics   • Smoking status: Never Smoker   • Smokeless tobacco: Never Used   • Alcohol use No      History   Drug Use No       FAMILY HISTORY  History reviewed. No pertinent family history.    CURRENT MEDICATIONS  Home Medications    none         ALLERGIES  Allergies   Allergen Reactions   • Vicodin [Hydrocodone-Acetaminophen] Anaphylaxis     RXN=9 years ago   • Pcn [Penicillins] Nausea     RXN=8 years ago  Toleartes rocephin   • Bee Venom    • Blackberry [Rubus Fruticosus] Rash     \"rash\"   • Haldol [Haloperidol] Rash     Pt states, \"I have a rash and my lips were swollen.\"   • Raspberry        PHYSICAL EXAM  VITAL SIGNS: /62   Pulse 72   Temp 36 °C (96.8 °F) (Temporal)   Resp 16   Ht 1.676 m (5' 6\")   Wt 114.4 kg (252 lb 3.3 oz)   SpO2 97%   BMI 40.71 kg/m²   Vitals reviewed by myself.  Physical Exam   Nursing note and vitals reviewed.  Constitutional: Well-developed and well-nourished. No acute distress.   HENT: Head is normocephalic and atraumatic.  Posterior oropharynx is erythematous without exudate  Eyes: extra-ocular movements intact  Cardiovascular: Regular rate and regular rhythm. No murmur heard.  Pulmonary/Chest: Breath sounds normal. No wheezes or rales.   Abdominal: Soft and non-tender. No distention.    Musculoskeletal: Extremities exhibit normal range of motion without edema or tenderness.   Neurological: Awake and alert  Skin: Skin is warm and dry. No rash. "     DIAGNOSTIC STUDIES /  LABS  Labs Reviewed   CBC WITH DIFFERENTIAL - Abnormal; Notable for the following:        Result Value    WBC 16.0 (*)     MCHC 32.7 (*)     Neutrophils-Polys 72.50 (*)     Lymphocytes 19.00 (*)     Neutrophils (Absolute) 11.62 (*)     Monos (Absolute) 1.11 (*)     All other components within normal limits   COMP METABOLIC PANEL - Abnormal; Notable for the following:     Glucose 116 (*)     AST(SGOT) 9 (*)     Total Protein 5.9 (*)     All other components within normal limits   GROUP A STREP BY PCR - Abnormal; Notable for the following:     Group A Strep by PCR DETECTED (*)     All other components within normal limits   INFLUENZA A/B BY PCR   ESTIMATED GFR   RAPID STREP,CULT IF INDICATED       All labs reviewed by me.    EKG Interpretation:  Interpreted by myself    12 Lead EKG interpreted by me to show:  EKG at 0056: Normal sinus rhythm, heart rate 73, normal axis, normal intervals, , QRS 88, QTc 446, no acute ST-T segment changes, no evidence of acute arrhythmia or ischemia  My impression of this EKG: Does not indicate ischemia or arrhythmia at this time.    RADIOLOGY  DX-CHEST-PORTABLE (1 VIEW)   Final Result         1.  No acute cardiopulmonary disease.        The radiologist's interpretation of all radiological studies have been reviewed by me.    REASSESSMENT    2:315 AM Patient presents to the ED with flu like symptoms. I informed her that we will do blood work and a chest X ray for further evaluation of her symptoms. I also told her we will giver her a breathing treatment here in the ED to alleviate her symptoms. She understood and agreed to the plan of care.     COURSE & MEDICAL DECISION MAKING  Nursing notes, VS, PMSFHx reviewed in chart.    Patient is a 33-year-old female who comes in for cough, sore throat, and flulike symptoms.  Differential diagnosis includes upper respiratory infection, influenza, strep throat, pneumonia.  Diagnostic workup includes influenza swab,  strep swab, and chest x-ray.  Of note as patient did complain of chest pain with her cough I will obtain screening labs and EKG although I believe her chest pain is related to coughing as she does not have it when she is not coughing.    Patient's initial vitals are within normal limits.  Patient's EKG returns and demonstrates no evidence of acute arrhythmia or ischemia.  Labs returned and are notable for leukocytosis of 16.0.  Influenza swab is negative.  Chest x-ray demonstrates no acute cardiopulmonary processes.  Rapid strep returns and is positive for group A strep.  Therefore patient is reassured that her symptoms are likely secondary to strep pharyngitis.  I will start her on clindamycin given penicillin allergy.  Patient is agreeable to this plan.  She is given first dose of clindamycin in the emergency department and given strict return precautions.  She is then discharged home in stable condition.      FINAL IMPRESSION  1. Strep pharyngitis          Jose GARCIA (Scribe), am scribing for, and in the presence of, Elif Jc M.D..    Electronically signed by: Jose Gee ([place]), 3/9/2019    Elif GARCIA M.D. personally performed the services described in this documentation, as scribed by Jose Gee in my presence, and it is both accurate and complete. C.     The note accurately reflects work and decisions made by me.  Elif Jc  3/9/2019  4:55 AM

## 2019-03-09 NOTE — ED TRIAGE NOTES
"Dunia Sahni  33 y.o. female  Chief Complaint   Patient presents with   • Psych Eval     Pt. states she was seen at [place] 3 days ago and was told she has a \"knot\" on her heart and that this knot is traveling down her heart further causing centralized chest pain for the past three days. Pt. has a previous hx of mental illness and psychosis. Pt. denies any other cardiac hx.    • Flu Like Symptoms     Pt. states generalized body aches, cough, sore thorat, and yellow phlegm. No cough noted upon presentation to triage. Pt. is warm, pink, and dry.     /82   Pulse 80   Temp 36 °C (96.8 °F) (Temporal)   Resp 16   Ht 1.676 m (5' 6\")   Wt 114.4 kg (252 lb 3.3 oz)   SpO2 97%   BMI 40.71 kg/m²     Pt amb to triage with steady gait for above complaint. EKG to be completed in triage.   Pt is alert and oriented, speaking in full sentences, follows commands and responds appropriately to questions. NAD. Resp are even and unlabored.  Pt placed in lobby. Pt educated on triage process. Pt encouraged to alert staff for any changes.  "

## 2019-03-10 ENCOUNTER — HOSPITAL ENCOUNTER (EMERGENCY)
Dept: HOSPITAL 8 - ED | Age: 34
Discharge: HOME | End: 2019-03-10
Payer: MEDICARE

## 2019-03-10 VITALS — BODY MASS INDEX: 40.39 KG/M2 | WEIGHT: 251.33 LBS | HEIGHT: 66 IN

## 2019-03-10 VITALS — DIASTOLIC BLOOD PRESSURE: 83 MMHG | SYSTOLIC BLOOD PRESSURE: 127 MMHG

## 2019-03-10 DIAGNOSIS — Z90.49: ICD-10-CM

## 2019-03-10 DIAGNOSIS — Z90.710: ICD-10-CM

## 2019-03-10 DIAGNOSIS — J45.909: Primary | ICD-10-CM

## 2019-03-10 DIAGNOSIS — R05: ICD-10-CM

## 2019-03-10 LAB
ALBUMIN SERPL-MCNC: 3.3 G/DL (ref 3.4–5)
ALP SERPL-CCNC: 79 U/L (ref 45–117)
ALT SERPL-CCNC: 25 U/L (ref 12–78)
ANION GAP SERPL CALC-SCNC: 6 MMOL/L (ref 5–15)
BASOPHILS # BLD AUTO: 0.09 X10^3/UL (ref 0–0.1)
BASOPHILS NFR BLD AUTO: 1 % (ref 0–1)
BILIRUB SERPL-MCNC: 0.5 MG/DL (ref 0.2–1)
CALCIUM SERPL-MCNC: 8.5 MG/DL (ref 8.5–10.1)
CHLORIDE SERPL-SCNC: 108 MMOL/L (ref 98–107)
CREAT SERPL-MCNC: 0.66 MG/DL (ref 0.55–1.02)
EOSINOPHIL # BLD AUTO: 0.2 X10^3/UL (ref 0–0.4)
EOSINOPHIL NFR BLD AUTO: 1 % (ref 1–7)
ERYTHROCYTE [DISTWIDTH] IN BLOOD BY AUTOMATED COUNT: 13.6 % (ref 9.6–15.2)
LYMPHOCYTES # BLD AUTO: 2.39 X10^3/UL (ref 1–3.4)
LYMPHOCYTES NFR BLD AUTO: 15 % (ref 22–44)
MCH RBC QN AUTO: 29.6 PG (ref 27–34.8)
MCHC RBC AUTO-ENTMCNC: 32.8 G/DL (ref 32.4–35.8)
MCV RBC AUTO: 90 FL (ref 80–100)
MD: (no result)
MONOCYTES # BLD AUTO: 0.82 X10^3/UL (ref 0.2–0.8)
MONOCYTES NFR BLD AUTO: 5 % (ref 2–9)
NEUTROPHILS # BLD AUTO: 12.48 X10^3/UL (ref 1.8–6.8)
NEUTROPHILS NFR BLD AUTO: 78 % (ref 42–75)
PLATELET # BLD AUTO: 485 X10^3/UL (ref 130–400)
PMV BLD AUTO: 7.3 FL (ref 7.4–10.4)
PROT SERPL-MCNC: 6.7 G/DL (ref 6.4–8.2)
RBC # BLD AUTO: 4.37 X10^6/UL (ref 3.82–5.3)
TROPONIN I SERPL-MCNC: < 0.015 NG/ML (ref 0–0.04)

## 2019-03-10 PROCEDURE — 36415 COLL VENOUS BLD VENIPUNCTURE: CPT

## 2019-03-10 PROCEDURE — 80053 COMPREHEN METABOLIC PANEL: CPT

## 2019-03-10 PROCEDURE — 93005 ELECTROCARDIOGRAM TRACING: CPT

## 2019-03-10 PROCEDURE — 84703 CHORIONIC GONADOTROPIN ASSAY: CPT

## 2019-03-10 PROCEDURE — 71046 X-RAY EXAM CHEST 2 VIEWS: CPT

## 2019-03-10 PROCEDURE — 84443 ASSAY THYROID STIM HORMONE: CPT

## 2019-03-10 PROCEDURE — 84484 ASSAY OF TROPONIN QUANT: CPT

## 2019-03-10 PROCEDURE — 85025 COMPLETE CBC W/AUTO DIFF WBC: CPT

## 2019-03-10 PROCEDURE — 71045 X-RAY EXAM CHEST 1 VIEW: CPT

## 2019-03-10 PROCEDURE — 99284 EMERGENCY DEPT VISIT MOD MDM: CPT

## 2019-03-10 NOTE — NUR
PT HAS FLIGHT OF IDEAS    POOR HISTORIAN   CHANGES COMPLAINT AND STORY 
FREQUENTLY RE THE CURRENT SITUATION

## 2019-04-03 ENCOUNTER — HOSPITAL ENCOUNTER (EMERGENCY)
Dept: HOSPITAL 8 - ED | Age: 34
Discharge: HOME | End: 2019-04-03
Payer: MEDICARE

## 2019-04-03 ENCOUNTER — HOSPITAL ENCOUNTER (EMERGENCY)
Facility: MEDICAL CENTER | Age: 34
End: 2019-04-04
Attending: EMERGENCY MEDICINE
Payer: MEDICARE

## 2019-04-03 VITALS
HEIGHT: 66 IN | SYSTOLIC BLOOD PRESSURE: 133 MMHG | RESPIRATION RATE: 20 BRPM | BODY MASS INDEX: 40.5 KG/M2 | TEMPERATURE: 97.6 F | HEART RATE: 84 BPM | OXYGEN SATURATION: 98 % | WEIGHT: 252 LBS | DIASTOLIC BLOOD PRESSURE: 88 MMHG

## 2019-04-03 VITALS — HEIGHT: 66 IN | BODY MASS INDEX: 41.91 KG/M2 | WEIGHT: 260.81 LBS

## 2019-04-03 VITALS — DIASTOLIC BLOOD PRESSURE: 91 MMHG | SYSTOLIC BLOOD PRESSURE: 144 MMHG

## 2019-04-03 DIAGNOSIS — T65.891A: Primary | ICD-10-CM
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
## 2019-04-03 DIAGNOSIS — Z77.098 EXPOSURE TO CHEMICAL INHALATION: ICD-10-CM

## 2019-04-03 DIAGNOSIS — Z90.49: ICD-10-CM

## 2019-04-03 PROCEDURE — 99281 EMR DPT VST MAYX REQ PHY/QHP: CPT

## 2019-04-03 PROCEDURE — 99283 EMERGENCY DEPT VISIT LOW MDM: CPT
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
## 2019-04-04 NOTE — ED TRIAGE NOTES
"Pt states was in apartment breath in fresh paint smell.  Now has a sore throat.  denies dizziness, chest pain,sob, n/v.   States \" my doctor told me to drink alcohol due to my lymph node problems.    denies si/hi.     "

## 2019-04-04 NOTE — ED NOTES
Patient seen by Dr. Leija who cleared her to go home without interventions. Patient given dc instructions and ambulated out of department with steady gait.

## 2019-04-07 ENCOUNTER — HOSPITAL ENCOUNTER (EMERGENCY)
Dept: HOSPITAL 8 - ED | Age: 34
Discharge: HOME | End: 2019-04-07
Payer: MEDICARE

## 2019-04-07 VITALS — WEIGHT: 251.99 LBS | HEIGHT: 66 IN | BODY MASS INDEX: 40.5 KG/M2

## 2019-04-07 VITALS — SYSTOLIC BLOOD PRESSURE: 138 MMHG | DIASTOLIC BLOOD PRESSURE: 84 MMHG

## 2019-04-07 DIAGNOSIS — Z00.00: ICD-10-CM

## 2019-04-07 DIAGNOSIS — R05: Primary | ICD-10-CM

## 2019-04-07 DIAGNOSIS — R09.81: ICD-10-CM

## 2019-04-07 DIAGNOSIS — J45.909: ICD-10-CM

## 2019-04-07 DIAGNOSIS — F41.1: ICD-10-CM

## 2019-04-07 DIAGNOSIS — F43.10: ICD-10-CM

## 2019-04-07 DIAGNOSIS — Z86.718: ICD-10-CM

## 2019-04-07 DIAGNOSIS — Z90.49: ICD-10-CM

## 2019-04-07 DIAGNOSIS — Z90.89: ICD-10-CM

## 2019-04-07 DIAGNOSIS — R50.9: ICD-10-CM

## 2019-04-07 DIAGNOSIS — F22: ICD-10-CM

## 2019-04-07 LAB
ALBUMIN SERPL-MCNC: 3.8 G/DL (ref 3.4–5)
ALP SERPL-CCNC: 93 U/L (ref 45–117)
ALT SERPL-CCNC: 25 U/L (ref 12–78)
ANION GAP SERPL CALC-SCNC: 4 MMOL/L (ref 5–15)
BASOPHILS # BLD AUTO: 0.18 X10^3/UL (ref 0–0.1)
BASOPHILS NFR BLD AUTO: 2 % (ref 0–1)
BILIRUB SERPL-MCNC: 0.2 MG/DL (ref 0.2–1)
CALCIUM SERPL-MCNC: 9 MG/DL (ref 8.5–10.1)
CHLORIDE SERPL-SCNC: 107 MMOL/L (ref 98–107)
CREAT SERPL-MCNC: 0.82 MG/DL (ref 0.55–1.02)
EOSINOPHIL # BLD AUTO: 0.09 X10^3/UL (ref 0–0.4)
EOSINOPHIL NFR BLD AUTO: 1 % (ref 1–7)
ERYTHROCYTE [DISTWIDTH] IN BLOOD BY AUTOMATED COUNT: 14.1 % (ref 9.6–15.2)
LYMPHOCYTES # BLD AUTO: 3.63 X10^3/UL (ref 1–3.4)
LYMPHOCYTES NFR BLD AUTO: 30 % (ref 22–44)
MCH RBC QN AUTO: 30.6 PG (ref 27–34.8)
MCHC RBC AUTO-ENTMCNC: 33.7 G/DL (ref 32.4–35.8)
MCV RBC AUTO: 90.8 FL (ref 80–100)
MD: NO
MONOCYTES # BLD AUTO: 0.7 X10^3/UL (ref 0.2–0.8)
MONOCYTES NFR BLD AUTO: 6 % (ref 2–9)
NEUTROPHILS # BLD AUTO: 7.65 X10^3/UL (ref 1.8–6.8)
NEUTROPHILS NFR BLD AUTO: 62 % (ref 42–75)
PLATELET # BLD AUTO: 401 X10^3/UL (ref 130–400)
PMV BLD AUTO: 7.9 FL (ref 7.4–10.4)
PROT SERPL-MCNC: 6.9 G/DL (ref 6.4–8.2)
RBC # BLD AUTO: 4.44 X10^6/UL (ref 3.82–5.3)

## 2019-04-07 PROCEDURE — 36415 COLL VENOUS BLD VENIPUNCTURE: CPT

## 2019-04-07 PROCEDURE — 80053 COMPREHEN METABOLIC PANEL: CPT

## 2019-04-07 PROCEDURE — 84703 CHORIONIC GONADOTROPIN ASSAY: CPT

## 2019-04-07 PROCEDURE — 99283 EMERGENCY DEPT VISIT LOW MDM: CPT

## 2019-04-07 PROCEDURE — 85025 COMPLETE CBC W/AUTO DIFF WBC: CPT

## 2019-04-16 ENCOUNTER — HOSPITAL ENCOUNTER (EMERGENCY)
Facility: MEDICAL CENTER | Age: 34
End: 2019-04-17
Attending: EMERGENCY MEDICINE
Payer: MEDICARE

## 2019-04-16 DIAGNOSIS — R45.850 HOMICIDAL IDEATION: ICD-10-CM

## 2019-04-16 DIAGNOSIS — R45.851 SUICIDAL IDEATION: ICD-10-CM

## 2019-04-16 LAB
AMPHET UR QL SCN: NEGATIVE
APPEARANCE UR: CLEAR
BARBITURATES UR QL SCN: NEGATIVE
BENZODIAZ UR QL SCN: NEGATIVE
BILIRUB UR QL STRIP.AUTO: NEGATIVE
BZE UR QL SCN: NEGATIVE
CANNABINOIDS UR QL SCN: NEGATIVE
COLOR UR: YELLOW
GLUCOSE UR STRIP.AUTO-MCNC: NEGATIVE MG/DL
HCG UR QL: NEGATIVE
KETONES UR STRIP.AUTO-MCNC: NEGATIVE MG/DL
LEUKOCYTE ESTERASE UR QL STRIP.AUTO: NEGATIVE
METHADONE UR QL SCN: NEGATIVE
MICRO URNS: NORMAL
NITRITE UR QL STRIP.AUTO: NEGATIVE
OPIATES UR QL SCN: NEGATIVE
OXYCODONE UR QL SCN: NEGATIVE
PCP UR QL SCN: NEGATIVE
PH UR STRIP.AUTO: 8 [PH]
POC BREATHALIZER: 0 PERCENT (ref 0–0.01)
PROPOXYPH UR QL SCN: NEGATIVE
PROT UR QL STRIP: NEGATIVE MG/DL
RBC UR QL AUTO: NEGATIVE
SP GR UR REFRACTOMETRY: 1.01
SP GR UR STRIP.AUTO: 1.01
UROBILINOGEN UR STRIP.AUTO-MCNC: 0.2 MG/DL

## 2019-04-16 PROCEDURE — 81003 URINALYSIS AUTO W/O SCOPE: CPT | Mod: XU

## 2019-04-16 PROCEDURE — 302970 POC BREATHALIZER: Performed by: EMERGENCY MEDICINE

## 2019-04-16 PROCEDURE — 96372 THER/PROPH/DIAG INJ SC/IM: CPT

## 2019-04-16 PROCEDURE — 700111 HCHG RX REV CODE 636 W/ 250 OVERRIDE (IP): Performed by: EMERGENCY MEDICINE

## 2019-04-16 PROCEDURE — 99285 EMERGENCY DEPT VISIT HI MDM: CPT

## 2019-04-16 PROCEDURE — 80307 DRUG TEST PRSMV CHEM ANLYZR: CPT

## 2019-04-16 PROCEDURE — 81025 URINE PREGNANCY TEST: CPT

## 2019-04-16 RX ORDER — ZIPRASIDONE MESYLATE 20 MG/ML
20 INJECTION, POWDER, LYOPHILIZED, FOR SOLUTION INTRAMUSCULAR PRN
Status: DISCONTINUED | OUTPATIENT
Start: 2019-04-16 | End: 2019-04-17 | Stop reason: HOSPADM

## 2019-04-16 RX ORDER — LORAZEPAM 2 MG/ML
1 INJECTION INTRAMUSCULAR ONCE
Status: COMPLETED | OUTPATIENT
Start: 2019-04-17 | End: 2019-04-16

## 2019-04-16 RX ADMIN — LORAZEPAM 1 MG: 2 INJECTION INTRAMUSCULAR; INTRAVENOUS at 23:43

## 2019-04-17 VITALS
TEMPERATURE: 98 F | RESPIRATION RATE: 14 BRPM | WEIGHT: 257.06 LBS | BODY MASS INDEX: 41.31 KG/M2 | OXYGEN SATURATION: 97 % | DIASTOLIC BLOOD PRESSURE: 94 MMHG | SYSTOLIC BLOOD PRESSURE: 132 MMHG | HEART RATE: 76 BPM | HEIGHT: 66 IN

## 2019-04-17 PROCEDURE — 99284 EMERGENCY DEPT VISIT MOD MDM: CPT | Mod: GC | Performed by: PSYCHIATRY & NEUROLOGY

## 2019-04-17 RX ORDER — OLANZAPINE 5 MG/1
10 TABLET, ORALLY DISINTEGRATING ORAL EVERY EVENING
Status: DISCONTINUED | OUTPATIENT
Start: 2019-04-17 | End: 2019-04-17 | Stop reason: HOSPADM
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
## 2019-04-17 NOTE — DISCHARGE PLANNING
Felicia at [facility] called and they will accept Pt.  SW clarified with [facility] that they were aware Pt does not have Medicare Days available.,   Dr. Vera will be admitting Physician.    MTM contacted no transportation benefits per Eula MENDOSA  PCS completed and faxed to [facility]. Transportation time arranged for 1200.    Transportation Packet completed with Original Legal Hold placed inside. RN updated on transfer and transfer time.    Lashell at [facility] updated on 1200 [facility] transportation time.
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
## 2019-04-17 NOTE — DISCHARGE PLANNING
Medical Social Work    MSW received a call from Carrol with [facility] declining pt as pt is out of Medicare days.

## 2019-04-17 NOTE — ED NOTES
When giving the pt her IM lorazepam the pt insisted that she take a pill.  Pt informed on reason for IM route is for better absorption.  Pt then requested that a female RN administer her medication since the pt requested that the injection be in her gluteal muscle.  Female Rn called to room.  Pt then requested a smaller needle.  Smaller needle provided to female RN and rx was administered.

## 2019-04-17 NOTE — ED NOTES
While speaking to ERP, the patient stated that if discharged she would go out and shoot other people and that she would kill herself.

## 2019-04-17 NOTE — ED PROVIDER NOTES
"ED Provider Note    Scribed for Sheree Mcleod M.D. by Bud Dodge. 4/16/2019, 10:27 PM.    Primary care provider: No primary care provider on file.  Means of arrival: walk-in  History obtained from: patient  History limited by: none    CHIEF COMPLAINT  Chief Complaint   Patient presents with   • Suicidal Ideation       HPI  Dunia Sahni is a 33 y.o. female with a history of SI who presents to the Emergency Department with suicidal ideation. The patient endorses suicidal and homicidal thoughts. She states that she is currently three weeks pregnant, and was evaluated at [facility] for this. The patient denies any visual or auditory hallucinations. She denies taking any medications today. She states that she has been evaluated by many psychiatrists, but does not need to take any of the medications that they prescribe as she is \"royalty\". The patient would like to be admitted to the [facility] because she does not have a place to live. Denies recent drug or alcohol use.     REVIEW OF SYSTEMS  Pertinent positives include suicidal ideation and homicidal ideation. Pertinent negatives include no visual or auditory hallucinations. No recent illness or fevers. No medical complaints. No hyperglycemia.    PAST MEDICAL HISTORY   has a past medical history of Abscess (10/12/2017); Bipolar affective (HCC); Depression; Kidney infection; and Suicide attempt (HCC).    SURGICAL HISTORY   has a past surgical history that includes appendectomy; ercp in or (N/A, 7/5/2018); and skip by laparoscopy (N/A, 7/6/2018).    SOCIAL HISTORY  Social History   Substance Use Topics   • Smoking status: Never Smoker   • Smokeless tobacco: Never Used   • Alcohol use Yes      Comment: rarely      History   Drug Use   • Types: Inhaled     Comment: marijuana, medical, occasional       FAMILY HISTORY  No family history on file.    CURRENT MEDICATIONS  Home Medications    **Home medications have not yet been reviewed for this " "encounter**         ALLERGIES  Allergies   Allergen Reactions   • Vicodin [Hydrocodone-Acetaminophen] Anaphylaxis     RXN=9 years ago   • Pcn [Penicillins] Nausea     RXN=8 years ago  Toleartes rocephin   • Bee Venom    • Blackberry [Rubus Fruticosus] Rash     \"rash\"   • Haldol [Haloperidol] Rash     Pt states, \"I have a rash and my lips were swollen.\"   • Raspberry        PHYSICAL EXAM  VITAL SIGNS: /99   Pulse 82   Temp 36.6 °C (97.8 °F) (Temporal)   Resp 16   Ht 1.676 m (5' 6\")   Wt 116.6 kg (257 lb 0.9 oz)   SpO2 96%   BMI 41.49 kg/m²     Constitutional: patient sitting on gurney making different facial expression toward empty portions of the room, Alert in no apparent distress.  HENT: Normocephalic, Bilateral external ears normal. Nose normal.   Eyes: Pupils are equal and reactive. Conjunctiva normal, non-icteric.   Cardiovascular: Normal heart rate, Normal rhythm,  Thorax & Lungs: Easy unlabored respirations,Clear to auscultation throughout  Abdomen:  No gross signs of peritonitis, no pain with movement   Skin: Visualized skin is  Dry, No erythema, No rash.   Extremities:   No edema, No asymmetry  Neurologic: Alert, Grossly non-focal.   Psychiatric: Affect and Mood normal                                  DIAGNOSTIC STUDIES / PROCEDURES\    LABS  Results for orders placed or performed during the hospital encounter of 04/16/19   Urine Drug Screen   Result Value Ref Range    Amphetamines Urine Negative Negative    Barbiturates Negative Negative    Benzodiazepines Negative Negative    Cocaine Metabolite Negative Negative    Methadone Negative Negative    Opiates Negative Negative    Oxycodone Negative Negative    Phencyclidine -Pcp Negative Negative    Propoxyphene Negative Negative    Cannabinoid Metab Negative Negative   HCG QUALITATIVE UR (Lab)   Result Value Ref Range    Beta-Hcg Urine Negative Negative   URINALYSIS CULTURE, IF INDICATED   Result Value Ref Range    Color Yellow     Character Clear "     Specific Gravity 1.009 <1.035    Ph 8.0 5.0 - 8.0    Glucose Negative Negative mg/dL    Ketones Negative Negative mg/dL    Protein Negative Negative mg/dL    Bilirubin Negative Negative    Urobilinogen, Urine 0.2 Negative    Nitrite Negative Negative    Leukocyte Esterase Negative Negative    Occult Blood Negative Negative    Micro Urine Req see below    REFRACTOMETER SG   Result Value Ref Range    Specific Gravity 1.009    POC BREATHALIZER   Result Value Ref Range    POC Breathalizer 0.000 0.00 - 0.01 Percent     All labs reviewed by me.    COURSE & MEDICAL DECISION MAKING  Nursing notes, VS, PMSFHx reviewed in chart.     Patient presents with suicidal and homicidal ideation.  Review of the chart shows she has been here before for suicidal ideation.  She denies any pill ingestion.  There is no evidence of any self-harm or cutting on her body.  She denies any recent drug or alcohol use.  Patient be placed on a legal hold.  Will verify with laboratory studies if the patient is pregnant.  She is a benign abdominal exam.    10:27 PM Patient seen and examined at bedside.  Ordered for HCG QUAL, UA, Urine Drug Screen, POC Breathalizer, Refractometer to evaluate. Patient was treated with ativan for her symptoms.    11:05 PM Patient is not drunk.  Urine drug screen was negative.  Urine pregnancy test was also negative.  Will contact life skills for evaluation.  Patient is medically cleared for transfer to a psychiatric facility.      FINAL IMPRESSION  1. Suicidal ideation    2. Homicidal ideation          Bud GARCIA ([place]), am scribing for, and in the presence of, Sheree Mcleod M.D..    Electronically signed by: Bud Dodge ([place]), 4/16/2019    Sheree GARCIA M.D. personally performed the services described in this documentation, as scribed by Bud Dodge in my presence, and it is both accurate and complete.    The note accurately reflects work and decisions made by me.  Sheree Mcleod   4/16/2019  11:13 PM

## 2019-04-17 NOTE — ED NOTES
Report received pt care assumed, pt is currently sleeping raise and fall of chest observed, pt with security at door.

## 2019-04-17 NOTE — ED NOTES
Pt has continued her insistence that she is pregnant.  I showed the pt the HCG result of being negative at which time she asked me to call her father who is a doctor and explain it to him.  Pt did not want me to close her door as I left.      Pt was given 2 meal boxes, crackers, and water.  Pt continues to ask everyone who walks by to bring her more food.  Pt informed that she has a diet order already and that she needs to get some rest so psych can see her in the morning
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
## 2019-04-17 NOTE — DISCHARGE PLANNING
"Alert Team  Attempted to have pt sign KATE so we could get records from [facility] for last two inpt hospitalizations.  Pt refused, saying \"I'll go to court first.\"  Informed Dr Bailey.  "

## 2019-07-23 ENCOUNTER — HOSPITAL ENCOUNTER (EMERGENCY)
Dept: HOSPITAL 8 - ED | Age: 34
Discharge: HOME | End: 2019-07-23
Payer: MEDICARE

## 2019-07-23 VITALS — SYSTOLIC BLOOD PRESSURE: 142 MMHG | DIASTOLIC BLOOD PRESSURE: 77 MMHG

## 2019-07-23 VITALS — WEIGHT: 154.32 LBS | BODY MASS INDEX: 23.39 KG/M2 | HEIGHT: 68 IN

## 2019-07-23 DIAGNOSIS — F32.9: ICD-10-CM

## 2019-07-23 DIAGNOSIS — J45.909: ICD-10-CM

## 2019-07-23 DIAGNOSIS — F41.1: ICD-10-CM

## 2019-07-23 DIAGNOSIS — N92.4: Primary | ICD-10-CM

## 2019-07-23 PROCEDURE — 99283 EMERGENCY DEPT VISIT LOW MDM: CPT

## 2019-07-23 PROCEDURE — 36415 COLL VENOUS BLD VENIPUNCTURE: CPT

## 2019-07-23 PROCEDURE — 84703 CHORIONIC GONADOTROPIN ASSAY: CPT

## 2019-07-23 PROCEDURE — 85025 COMPLETE CBC W/AUTO DIFF WBC: CPT

## 2019-07-23 PROCEDURE — 80048 BASIC METABOLIC PNL TOTAL CA: CPT

## 2019-07-23 PROCEDURE — 82040 ASSAY OF SERUM ALBUMIN: CPT

## 2019-08-10 ENCOUNTER — HOSPITAL ENCOUNTER (EMERGENCY)
Dept: HOSPITAL 8 - ED | Age: 34
Discharge: HOME | End: 2019-08-10
Payer: MEDICARE

## 2019-08-10 ENCOUNTER — HOSPITAL ENCOUNTER (EMERGENCY)
Facility: MEDICAL CENTER | Age: 34
End: 2019-08-12
Attending: EMERGENCY MEDICINE
Payer: MEDICARE

## 2019-08-10 VITALS — DIASTOLIC BLOOD PRESSURE: 84 MMHG | SYSTOLIC BLOOD PRESSURE: 141 MMHG

## 2019-08-10 VITALS — WEIGHT: 276.02 LBS | BODY MASS INDEX: 41.83 KG/M2 | HEIGHT: 68 IN

## 2019-08-10 DIAGNOSIS — Z90.49: ICD-10-CM

## 2019-08-10 DIAGNOSIS — Z72.9: ICD-10-CM

## 2019-08-10 DIAGNOSIS — R45.851 SUICIDAL IDEATION: ICD-10-CM

## 2019-08-10 DIAGNOSIS — F60.0: ICD-10-CM

## 2019-08-10 DIAGNOSIS — Z86.718: ICD-10-CM

## 2019-08-10 DIAGNOSIS — J45.909: ICD-10-CM

## 2019-08-10 DIAGNOSIS — F20.0: Primary | ICD-10-CM

## 2019-08-10 LAB
AMPHET UR QL SCN: NEGATIVE
BARBITURATES UR QL SCN: NEGATIVE
BENZODIAZ UR QL SCN: NEGATIVE
BZE UR QL SCN: NEGATIVE
CANNABINOIDS UR QL SCN: NEGATIVE
METHADONE UR QL SCN: NEGATIVE
OPIATES UR QL SCN: NEGATIVE
OXYCODONE UR QL SCN: NEGATIVE
PCP UR QL SCN: NEGATIVE
POC BREATHALIZER: 0.02 PERCENT (ref 0–0.01)
PROPOXYPH UR QL SCN: NEGATIVE

## 2019-08-10 PROCEDURE — 99284 EMERGENCY DEPT VISIT MOD MDM: CPT

## 2019-08-10 PROCEDURE — 81025 URINE PREGNANCY TEST: CPT

## 2019-08-10 PROCEDURE — 81003 URINALYSIS AUTO W/O SCOPE: CPT | Mod: XU

## 2019-08-10 PROCEDURE — 302970 POC BREATHALIZER: Performed by: EMERGENCY MEDICINE

## 2019-08-10 PROCEDURE — 80307 DRUG TEST PRSMV CHEM ANLYZR: CPT

## 2019-08-10 PROCEDURE — 99285 EMERGENCY DEPT VISIT HI MDM: CPT

## 2019-08-10 ASSESSMENT — LIFESTYLE VARIABLES
CONSUMPTION TOTAL: INCOMPLETE
TOTAL SCORE: 0
HAVE PEOPLE ANNOYED YOU BY CRITICIZING YOUR DRINKING: NO
TOTAL SCORE: 0
EVER FELT BAD OR GUILTY ABOUT YOUR DRINKING: NO
DOES PATIENT WANT TO STOP DRINKING: NO
EVER HAD A DRINK FIRST THING IN THE MORNING TO STEADY YOUR NERVES TO GET RID OF A HANGOVER: NO
HAVE YOU EVER FELT YOU SHOULD CUT DOWN ON YOUR DRINKING: NO
TOTAL SCORE: 0
DO YOU DRINK ALCOHOL: NO

## 2019-08-11 LAB
APPEARANCE UR: CLEAR
BILIRUB UR QL STRIP.AUTO: NEGATIVE
COLOR UR: YELLOW
GLUCOSE UR STRIP.AUTO-MCNC: NEGATIVE MG/DL
HCG UR QL: NEGATIVE
KETONES UR STRIP.AUTO-MCNC: 15 MG/DL
LEUKOCYTE ESTERASE UR QL STRIP.AUTO: NEGATIVE
MICRO URNS: ABNORMAL
NITRITE UR QL STRIP.AUTO: NEGATIVE
PH UR STRIP.AUTO: 6.5 [PH] (ref 5–8)
PROT UR QL STRIP: NEGATIVE MG/DL
RBC UR QL AUTO: NEGATIVE
SP GR UR STRIP.AUTO: 1.02
UROBILINOGEN UR STRIP.AUTO-MCNC: 0.2 MG/DL

## 2019-08-11 PROCEDURE — 700102 HCHG RX REV CODE 250 W/ 637 OVERRIDE(OP): Performed by: EMERGENCY MEDICINE

## 2019-08-11 RX ORDER — NYSTATIN 100000 [USP'U]/G
POWDER TOPICAL 2 TIMES DAILY
Status: DISCONTINUED | OUTPATIENT
Start: 2019-08-11 | End: 2019-08-12 | Stop reason: HOSPADM

## 2019-08-11 RX ADMIN — NYSTATIN: 100000 POWDER TOPICAL at 15:03

## 2019-08-11 NOTE — ED NOTES
This writer and MD walked into room to assess redness to abdomen/pelvic region. Pt refusing to show MD, requesting this writer to go to bathroom with her to look at area.   Redness noted to lower abdomen underneath panis and upper pelvic region. Appears like possible yeast infection. Pt states this area is painful. No open wounds noted.     MD Santiago notified.

## 2019-08-11 NOTE — ED PROVIDER NOTES
"ED Provider Note    ED Provider Note    Scribed for Rupert Garrett D.O. by Rupert Garrett. 8/10/2019  10:25 PM    Primary care provider: No primary care provider on file.  Means of arrival: Private vehicle  History obtained from: Patient  History limited by: None    CHIEF COMPLAINT  Chief Complaint   Patient presents with   • Suicidal Ideation     pt states \"Im living in a situation that had me thinking I have no choice\"       HPI  Nancy Mendez is a 26 y.o. female who presents to the Emergency Department here for evaluation after it was noted that she was having delusional thoughts, in addition to thoughts of suicide. She denies any ingestion of drugs or alcohol, and denies any self harm such as cutting or other injuries.  The pt has no cp, no sob, no vomiting, no back pain, and no head or neck pain.  Nothing alleviates or exacerbates her symptoms.     REVIEW OF SYSTEMS  ROS   Psychiatric issues  O/w neg    PAST MEDICAL HISTORY   no bleeding disorders.     SURGICAL HISTORY  patient denies any surgical history    SOCIAL HISTORY  Social History     Tobacco Use   • Smoking status: Never Smoker   • Smokeless tobacco: Never Used   Substance Use Topics   • Alcohol use: Not Currently   • Drug use: Yes     Types: Inhaled     Comment: marijuana      Social History     Substance and Sexual Activity   Drug Use Yes   • Types: Inhaled    Comment: marijuana       FAMILY HISTORY  History reviewed. No pertinent family history.    CURRENT MEDICATIONS  Home Medications    **Home medications have not yet been reviewed for this encounter**         ALLERGIES  No Known Allergies    PHYSICAL EXAM  VITAL SIGNS: /93   Pulse (!) 104   Temp 36.2 °C (97.2 °F) (Temporal)   Resp 18   Ht 1.727 m (5' 8\")   Wt 124.6 kg (274 lb 11.1 oz)   SpO2 97%   BMI 41.77 kg/m²     Constitutional: No distress. Well nourished.    HENT: Head is atraumatic. Oropharynx is moist.   Eyes: Conjunctivae are normal. EOMI.   Respiratory: No respiratory " distress. Equal chest expansion.   Musculoskeletal: Normal range of motion. No edema.   Neurological: Alert. No focal deficits noted.    Skin: No rash. No Pallor.   Psych: flight of ideas, tangential speech, agitated, aggressive         The radiologist's interpretation of all radiological studies have been reviewed by me.    The pt will be seen by lifeskills and her legal paperwork is completed.     COURSE & MEDICAL DECISION MAKING  Nursing notes, VS, PMSFHx reviewed in chart.    3:20 AM  The pt remains comfortable and resting without any issues.       FINAL IMPRESSION  Psychosis        The note accurately reflects work and decisions made by me.  Rupert Garrett  8/10/2019  11:29 PM

## 2019-08-11 NOTE — ED NOTES
Pt showed this RN in triage a red area under her belly but pt wont share what happened or who did this.

## 2019-08-11 NOTE — ED NOTES
Patient sitting in bed, awake and interactive, bed in low position, sitter in hallway performing direct observation, unlabored breathing noted, delusions of grandeur centering around royalty noted, on room air, no cough noted, frequent rounding performed, will continue to assess patient.

## 2019-08-11 NOTE — ED NOTES
Patient's home medications have been reviewed by the pharmacy team.     History reviewed. No pertinent past medical history.    Patient's Medications    No medications on file          A:  Patient denies taking any medications, therefore medications do not appear to be contributing to current complaints.     P:    No recommendations at this time.       Nikki Baez, PHARMD

## 2019-08-11 NOTE — ED TRIAGE NOTES
"Chief Complaint   Patient presents with   • Suicidal Ideation     pt states \"Im living in a situation that had me thinking I have no choice\"     Pt ambulatory to triage for above complaint. Pt states she is fearful for her life in her living situation. Pt doesn't want to share too much information on her thoughts of suicide. Pt states \" I have all these identification cards and they are not mine, I have photos on my phone to prove that the ID does not match me so that immigration doesn't come for me\" when asking pt about a plan she states \"it is inevitable at this point I am at risk of committing suicide\". Pt states she is royalty and that she comes from [facility]. Pt has rambling thoughts and is mumbling many things in triage. Pt states \"I am severely burned by purity. There are cameras and audio everywhere in here I know it\".   Pt is in possession of a [place] drivers license and social security card and a letter for housing under another persons name than what is listed at this time. Pt denies alcohol use and only uses marijuana.   Charge aware of pt. Pt roomed.     /93   Pulse (!) 104   Temp 36.2 °C (97.2 °F) (Temporal)   Resp 18   Ht 1.727 m (5' 8\")   Wt 124.6 kg (274 lb 11.1 oz)   SpO2 97%   BMI 41.77 kg/m²     "

## 2019-08-11 NOTE — ED NOTES
Pt sitting up in stretcher with eyes closed. NAD. Equal and unlabored breathing noted. Sitter at bedside.

## 2019-08-11 NOTE — ED NOTES
Patient resting in bed, awake alert and interactive, speaking with Marce Anthony Team, at this time, bed in low position, frequent rounding performed, unlabored breathing noted on room air, eye contact good, English accent noted, will continue to assess patient.

## 2019-08-11 NOTE — ED PROVIDER NOTES
"ED Provider Note    11:26 AM    Subjective:  Patient is awaiting transfer to psychiatric facility.  She does not have any complaints.  Reports she has a rash on her abdomen.  No fevers or chills.  Reports she still suicidal    Objective: BP (!) 92/61   Pulse 74   Temp 36.2 °C (97.2 °F) (Temporal)   Resp 16   Ht 1.727 m (5' 8\")   Wt 124.6 kg (274 lb 11.1 oz)   SpO2 95%   BMI 41.77 kg/m² .  Calm and cooperative. Generally nontoxic. No respiratory distress. No abdominal tenderness.  Intertriginous erythema underneath the pannus.  Extremities unremarkable.    Laboratory data was reviewed.    Assesment/Plan:   1.  Suicidal ideation-proceed with plan for psychiatric care  2.  Candida intertrigo-ordered nystatin powder.  Will check urinalysis for glucose.  Obtain urine pregnancy test.      Electronically signed by: John Santiago, 8/11/2019 11:26 AM    "

## 2019-08-11 NOTE — DISCHARGE PLANNING
MSW attempted to talk to pt about her identity. Pt's ID and SS card list her as Dunia Sahni :[id number]. Pt denied being Dunia. Pt would not give MSW clear answer on her id's or where she received them from. Pt stated to just call immigration and homeland security for more information. MSW will continue to follow.

## 2019-08-11 NOTE — CONSULTS
"[facility]   TRIAGE ASSESSMENT    Name: Nancy Mendez  MRN: [id number]  : 1993  Age: 26 y.o.  Date of assessment: 2019  PCP: No primary care provider on file.  Persons in attendance: Patient    CHIEF COMPLAINT/PRESENTING ISSUE (as stated by Nancy Mendez): The patient presents as a 26 year-old female, ambulating to triage reporting that she is actively suicidal, reporting that it is \"inevitable\" that she would commit suicide. The patient presented with rambling, grandiose/delusional thoughts, relating in an English accent: Stating that she is the daughter of [facility] and that she is royalty \"sent here for my bloodline because I am from a race of righteousness\". The patient added that [facility] time during the week, stating \"I do believe they tried to poison me with eye drops\". The patient was noted by nursing staff as having some minor burns to her navel/abdomen area; when asked about this, the patient stated \"Those are because I am pure and [facility] never discusses that\". The patient denies AH/VH at this time; she states that his suicidal but with no definitive plan at this point, saying repeatedly that her \"Identity is at stake\" (The patient was noted by triage as having several documents that are under a different name than was listed by the patient). The patient rambled incoherent statements numerous times, presenting as guarded and defensive, occasionally mumbling to self and staring off blankly at the walls for extended periods of time. At this time the patient presents as unable to care for self/a danger to self and has been placed on a legal hold accordingly.   Chief Complaint   Patient presents with   • Suicidal Ideation     pt states \"Im living in a situation that had me thinking I have no choice\"        CURRENT LIVING SITUATION/SOCIAL SUPPORT: The patient reports that she is living in a monthly motel in the [place] area. She reports that her family " "lives in the [facility] in [place], naming nonsensical names \"My father is the Bob of [place], but he is of the third bloodline of the [facility]\" but otherwise has no friends or family in the area.     BEHAVIORAL HEALTH TREATMENT HISTORY  Does patient/parent report a history of prior behavioral health treatment for patient?   The patient reports being at [facility] for approximately two months ago, however she refused to detail what the circumstances were and otherwise denies any other behavioral history.     SAFETY ASSESSMENT - SELF  Does patient acknowledge current or past symptoms of dangerousness to self? yes  Does parent/significant other report patient has current or past symptoms of dangerousness to self? N\A  Does presenting problem suggest symptoms of dangerousness to self? Yes:     Past Current    Suicidal Thoughts: []  [x]    Suicidal Plans: []  []    Suicidal Intent: []  [x]    Suicide Attempts: []  []    Self-Injury []  []      For any boxes checked above, provide detail: The patient reports no previous suicidal behavior or ideation; Currently she is stating that she is suicidal but otherwise presents with no definitive plan.     History of suicide by family member: no  History of suicide by friend/significant other: no  Recent change in frequency/specificity/intensity of suicidal thoughts or self-harm behavior? yes - In the last 24 hours  Current access to firearms, medications, or other identified means of suicide/self-harm? no  If yes, willing to restrict access to means of suicide/self-harm? no  Protective factors present:  Willing to address in treatment    SAFETY ASSESSMENT - OTHERS  Does patient acknowledge current or past symptoms of aggressive behavior or risk to others? no  Does parent/significant other report patient has current or past symptoms of aggressive behavior or risk to others?  N\A  Does presenting problem suggest symptoms of dangerousness to others? No    Crisis " "Safety Plan completed and copy given to patient? N\A    ABUSE/NEGLECT SCREENING  Does patient report feeling “unsafe” in his/her home, or afraid of anyone?  Yes, patient reports she is \"fearful of her living situation\" but otherwise was guarded and would not state why this was the case.   Does patient report any history of physical, sexual, or emotional abuse?  no  Does parent or significant other report any of the above? N\A  Is there evidence of neglect by self?  no  Is there evidence of neglect by a caregiver? no  Does the patient/parent report any history of CPS/APS/police involvement related to suspected abuse/neglect or domestic violence? no  Based on the information provided during the current assessment, is a mandated report of suspected abuse/neglect being made?  No    SUBSTANCE USE SCREENING  Yes:  Chauncey all substances used in the past 30 days:      Last Use Amount   []   Alcohol     []   Marijuana     []   Heroin     []   Prescription Opioids  (used without prescription, for    recreation, or in excess of prescribed amount)     []   Other Prescription  (used without prescription, for    recreation, or in excess of prescribed amount)     []   Cocaine      []   Methamphetamine     []   \"\" drugs (ectasy, MDMA)     []   Other substances        UDS results: Negative  Breathalyzer results: 0.02    What consequences does the patient associate with any of the above substance use and or addictive behaviors? None    Risk factors for detox (check all that apply):  []  Seizures   []  Diaphoretic (sweating)   []  Tremors   []  Hallucinations   []  Increased blood pressure   []  Decreased blood pressure   []  Other   []  None      [] Patient education on risk factors for detoxification and instructed to return to ER as needed.      MENTAL STATUS   Participation: Active verbal participation, Guarded and Defensive  Grooming: Casual  Orientation: Alert and Evidence of delusions present  Behavior: Tense  Eye " contact: Limited  Mood: Euthymic  Affect: Expansive and Incongruent with content  Thought process: Flight of ideas  Thought content: Obsessions  Speech: Rate within normal limits and Volume within normal limits  Perception: Depersonalization  Memory:  Poor memory for chronology of events  Insight: Poor  Judgment:  Poor  Other:    Collateral information:   Source:  [] Significant other present in person:   [] Significant other by telephone  [] [facility]   [x] [facility] Nursing Staff  [x] [facility]  [] Other:     [] Unable to complete full assessment due to:  [] Acute intoxication  [] Patient declined to participate/engage  [] Patient verbally unresponsive  [] Significant cognitive deficits  [x] Significant perceptual distortions or behavioral disorganization  [] Other:      CLINICAL IMPRESSIONS:  Primary: Additional assessment needed to rule out Schizophrenia/Psychotic symptoms presented in evaluation  Secondary:         IDENTIFIED NEEDS/PLAN:  [Trigger DISPOSITION list for any items marked]    [x]  Imminent safety risk - self [] Imminent safety risk - others   []  Acute substance withdrawal [x]  Psychosis/Impaired reality testing   []  Mood/anxiety []  Substance use/Addictive behavior   []  Maladaptive behaviro []  Parent/child conflict   []  Family/Couples conflict []  Biomedical   []  Housing []  Financial   []   Legal  Occupational/Educational   []  Domestic violence []  Other:     Disposition: Refer to Legal Hold    Does patient express agreement with the above plan? yes    Referral appointment(s) scheduled? no    Alert team only:   I have discussed findings and recommendations with Dr. Garrett who is in agreement with these recommendations.     Referral information sent to the following community providers :    If applicable : Referred  to : Christa for legal hold follow up at (time): 1:18AM      ISH Eastman  8/11/2019

## 2019-08-11 NOTE — ED NOTES
Med rec complete per pt at bedside   Pt denies taking medications   NKDA  No oral ABX within last 14 days

## 2019-08-11 NOTE — ED NOTES
Requested Darion, ER tech, to room, patient showed reddened area on stomach, patient referred to reddened as purity burn, patient now resting in bed, awake alert and interactive, awaiting alert team at this time.

## 2019-08-11 NOTE — ED NOTES
Patient resting in bed, sleeping, no signs of pain or distress, unlabored breathing noted on room air, sitter in hallway performing direct observation, repositions self occasionally, bed in low position, safety room features in place, no cough noted, frequent rounding performed, will continue to assess patient, vital signs reassessed.

## 2019-08-11 NOTE — DISCHARGE PLANNING
States she is suicidal and declined to tell this writer the method she is thinking about. Continues to endorse that she is [facility].  Uncooperative in answering questions. Continue to wait for placement in a psychiatric facility.

## 2019-08-12 VITALS
RESPIRATION RATE: 16 BRPM | BODY MASS INDEX: 41.63 KG/M2 | HEART RATE: 70 BPM | DIASTOLIC BLOOD PRESSURE: 80 MMHG | HEIGHT: 68 IN | SYSTOLIC BLOOD PRESSURE: 116 MMHG | OXYGEN SATURATION: 93 % | TEMPERATURE: 97.5 F | WEIGHT: 274.69 LBS

## 2019-08-12 PROCEDURE — 99284 EMERGENCY DEPT VISIT MOD MDM: CPT | Performed by: PSYCHIATRY & NEUROLOGY

## 2019-08-12 RX ORDER — RISPERIDONE 2 MG/1
2 TABLET ORAL EVERY EVENING
Status: DISCONTINUED | OUTPATIENT
Start: 2019-08-12 | End: 2019-08-12 | Stop reason: HOSPADM

## 2019-08-12 RX ADMIN — NYSTATIN: 100000 POWDER TOPICAL at 18:00

## 2019-08-12 RX ADMIN — NYSTATIN: 100000 POWDER TOPICAL at 06:32

## 2019-08-12 ASSESSMENT — PAIN SCALES - WONG BAKER: WONGBAKER_NUMERICALRESPONSE: HURTS AS MUCH AS POSSIBLE

## 2019-08-12 NOTE — PSYCHIATRY
"PSYCHIATRIC CONSULTATION:  Reason for admission:   Psychosis   Reason for consult:  Psychosis   Requesting Physician: Pamela     Legal status:  On hold     Chief Complaint:\"I will tell the court, can you speed up the court\".     HPI:   Dunia Sahni is a 34 y.o. year old female with a PMH of schizoaffective disorder who presented to [facility] complaining of fearing for her life. She stated she didn't want to share her thoughts. She stated her ID wasn't her and immigration might come for her and she was \"at risk of committing suicide\". She is saying she is royalty and from .  She stated \"I am severly burned by purity. There are cameras and audio everywhere\".  She had a reddened area on her abdomen she called a \"purity burn\". She was speaking in an English accent. Yesterday evening she said she is \"Purity of the third incest, daughter of Queen Liliam\".  She demanded she be returned to [place] and [employer] should be notified of her arrival. She is not taking outpatient medications.   She is speaking to me in a [place] accent. She tells me she is Queen Nancy and zachary and purity is not fair, she would like to present her case in court. She isn't willing to answer many psychiatric questions. She is eating and states there is nothing else our team can do for her, denies any psychiatric or medical problems.   Well known to this provider, her baseline on medication can actually be quite good.     Review of Systems:  Psychiatric:  Depression:      Denies depression but states she has been feeling suicidal.affect not congruent   Natasha: grandiose. Speech not pressured   Anxiety/Panic Attacks denies   PTSD symptom: denies   Psychosis:+delusional.   Other:  Constitutional: weight gain   Neurologic:  No focal neurologic problems   ENT:  No redness, no discharge   Skin:  Red rash on abdomen.   Musculoskeletal:  No pain, no myalgia   CV:  No cp no palpitations   Lungs:  No sob no cough   GI:  No abdominal pain, no " "nausea, no vomiting, no constipation, no diarrhea   :    No dysuria, no suprapubic pain   All other systems reviewed and are negative.        Psychiatric Examination: observed phenomenon:  Vitals: /51   Pulse 76   Temp 36.8 °C (98.3 °F) (Temporal)   Resp 20   Ht 1.727 m (5' 8\")   Wt 124.6 kg (274 lb 11.1 oz)   SpO2 95%   BMI 41.77 kg/m²  Body mass index is 41.77 kg/m².      Appearance: grooming fair. Has gained weight since last admission   Muscle Strength/Tone: no psychomotor agitation or retardation. No increase in tone.   Gait/Station: wnl   Speech:  Using a strange english-type accent. Tevin, sparse speech.   Thought Process: unwilling to speak much . Able to talk in complete sentences.   Associations:no loose associations.   Abnormal/Psychotic Thoughts (ex): +delusions   Insight/Judgement: good   Orientation: not oriented to self, place, situation   Memory: not able to fully assess . doesn''t remember this provider, who has seen her many times in the past.   Attention/Concentration: decreased  Language: doesn't appear to be currently fluent, not baseline.   Fund of Knowledge: decreased  Mood:          Happy but suicidal per her report.   Affect:         Guarded   SI/HI:   Reports she may kill herself, won't say why.     Neurological Testing:( ie clock, cube drawing, MMSE, MOCA,etc.)       Past Psychiatric Hx:   Multiple previous hospitalizations.   Can be quite stable on medication.     Family Psychiatric Hx:  Unknown     Social Hx:  Social History     Socioeconomic History   • Marital status: Single     Spouse name: Not on file   • Number of children: Not on file   • Years of education: Not on file   • Highest education level: Not on file   Occupational History   • Not on file   Social Needs   • Financial resource strain: Not on file   • Food insecurity:     Worry: Not on file     Inability: Not on file   • Transportation needs:     Medical: Not on file     Non-medical: Not on file   Tobacco Use "   • Smoking status: Never Smoker   • Smokeless tobacco: Never Used   Substance and Sexual Activity   • Alcohol use: Not Currently   • Drug use: Yes     Types: Inhaled     Comment: marijuana   • Sexual activity: Not on file   Lifestyle   • Physical activity:     Days per week: Not on file     Minutes per session: Not on file   • Stress: Not on file   Relationships   • Social connections:     Talks on phone: Not on file     Gets together: Not on file     Attends Yazdanism service: Not on file     Active member of club or organization: Not on file     Attends meetings of clubs or organizations: Not on file     Relationship status: Not on file   • Intimate partner violence:     Fear of current or ex partner: Not on file     Emotionally abused: Not on file     Physically abused: Not on file     Forced sexual activity: Not on file   Other Topics Concern   • Not on file   Social History Narrative   • Not on file       Drug/Alcohol/Tobacco Hx:   Drugs:   Alcohol:   Tobacco:    Medical Hx: labs, MARS, medications, etc were reviewed. Only those findings of potential interest to psychiatry are noted below:  Neurological:    TBIs:   SZs:   Strokes:   Other:  Other medical:   Thyroid:   Diabetes:   Cardiovascular disease:  History reviewed. No pertinent past medical history.  History reviewed. No pertinent surgical history.    Allergies:   No Known Allergies    Medications:  Current Facility-Administered Medications   Medication Dose Route Frequency Provider Last Rate Last Dose   • nystatin (MYCOSTATIN) powder   Topical BID John Santiago M.D.         No current outpatient medications on file.       Labs/ Testing:  Recent Results (from the past 48 hour(s))   POC BREATHALIZER    Collection Time: 08/10/19  9:00 PM   Result Value Ref Range    POC Breathalizer 0.02 (A) 0.00 - 0.01 Percent   URINE DRUG SCREEN    Collection Time: 08/10/19  9:00 PM   Result Value Ref Range    Amphetamines Urine Negative Negative    Barbiturates  Negative Negative    Benzodiazepines Negative Negative    Cocaine Metabolite Negative Negative    Methadone Negative Negative    Opiates Negative Negative    Oxycodone Negative Negative    Phencyclidine -Pcp Negative Negative    Propoxyphene Negative Negative    Cannabinoid Metab Negative Negative   BETA-HCG QUALITATIVE URINE    Collection Time: 08/10/19  9:00 PM   Result Value Ref Range    Beta-Hcg Urine Negative Negative   URINALYSIS CULTURE, IF INDICATED    Collection Time: 08/10/19  9:00 PM   Result Value Ref Range    Color Yellow     Character Clear     Specific Gravity 1.025 <1.035    Ph 6.5 5.0 - 8.0    Glucose Negative Negative mg/dL    Ketones 15 (A) Negative mg/dL    Protein Negative Negative mg/dL    Bilirubin Negative Negative    Urobilinogen, Urine 0.2 Negative    Nitrite Negative Negative    Leukocyte Esterase Negative Negative    Occult Blood Negative Negative    Micro Urine Req see below        No orders to display       ASSESSMENT:   Schizoaffective disroder     PLAN:  Legal status: on hold, extended  Adding risperdal, she is unlikely to take it   Please transfer to inpatient psychiatry when bed available.     Thank you for the consult.

## 2019-08-12 NOTE — DISCHARGE PLANNING
Medical Social Work    Referral: Legal Hold    Intervention: Legal Hold Paperwork given to SW by Timoteo Kim    Legal Hold Initiated: Date: 08/10/19 Time: 2120    Patient’s Insurance Listed on Face Sheet: Medicare    Referrals sent to: [facility], [facility], [facility], [facility], [facility]    This referral contains the following information:  1) Face sheet _x___  2) Page 1 and Page 2 of Legal Hold __x__  3) Alert Team Assessment/Psych Assessment __x__  4) Head to toe physical exam _x___  5) Urine Drug Screen __x__  6) Blood Alcohol __x__  7) Vital signs _x___  8) Pregnancy test when applicable _x__  9) Medications list __x__    Plan: Patient will transfer to mental health facility once acceptance is obtained

## 2019-08-12 NOTE — DISCHARGE PLANNING
"Alert Team    Patient lying in bed resting at time of rounding; the patient continues to make delusional/grandiose/nonsensical statements, relating that she is \"Purity, of the third incest, daughter of Queen Liliam\". The patient is demanding that she return to [place] and that [employer] should be notified of her arrival and other bizarre comments. It should be noted that SW attempted to discuss her identity earlier in the day, finding that she had identification cards in her possession that did not belong to her. Patient stating she is SI but will not divulge any details beyond that to this writer, presenting as defensive and guarded. The patient is currently awaiting transfer to an inpatient psychiatric facility; Alert Team to continue to monitor.   "

## 2019-08-12 NOTE — ED NOTES
Pt ambulated to & from restroom with RN supervision. Per pt request bed linen changed, new gown & pad provided for menses start. Pt medicated per EMAR. Sitter at bedside. Will continue to monitor.

## 2019-08-12 NOTE — ED NOTES
"Pt asking to see this writer. Went into pt room, pt states \"I want to let you know that when I go to the bathroom air is only coming out.\" this writer asked where the air is coming from, pt states \"my bowels.\" pt than states \"since I am 100% royal blood the scale is off by about 40%.\" pt states \"the altitude changes it.\"   " 103.6

## 2019-08-12 NOTE — ED NOTES
Patient requesting to take shower, updated patient on policy regarding showers. Patient verbalizes understanding. Provided patient with crackers and juice - sitter outside door.

## 2019-08-12 NOTE — ED TRIAGE NOTES
"ED provider note    Subjective: Patient still awaiting transfer to psychiatric facility.  She reports that she feels better today.  She thinks that her rash is getting better.  She refuses to let me look at it.  No fevers or chills.    Objective: /51   Pulse 76   Temp 36.8 °C (98.3 °F) (Temporal)   Resp 20   Ht 1.727 m (5' 8\")   Wt 124.6 kg (274 lb 11.1 oz)   SpO2 95%   BMI 41.77 kg/m²   Calm and cooperative.  No respiratory distress.  No abdominal tenderness.  Normal heart rate.    Assessment/plan:  1.  Suicidal ideation-proceed with plan for psychiatric care    Electronically signed: John Santiago MD  8/12/19  11:59am   "

## 2019-08-12 NOTE — ED NOTES
Report received from CHRISTIE Lopez, assuming care of patient at this time. Patient resting in bed, sleeping, no signs of pain or distress, unlabored breathing noted, bed in low position, sitter at bedside. Will continue to monitor.

## 2019-08-13 NOTE — ED NOTES
Received report from Chiquita Moss. Pt care responsibilities assumed. Pt sitting in bed, no s/s of distress, will continue to monitor.

## 2019-08-13 NOTE — ED NOTES
[facility] Pt belongings given to EMS. Pt Given discharge instructions/ Pt educated on POC: Pt verbalized understanding of instructions given, Pt refused final vital signs and refused to sign d/c instructions. pt ambulatory to [facility].

## 2019-08-13 NOTE — DISCHARGE PLANNING
Medical Social Work    LSW received call from Sita mcdaniel/ [facility] stating the pt has been accepted by Dr. Baptiste. PCS form and Facesheet faxed to [facility]. Transportation arranged w/ Wang @ [facility] for 2030. LSW completed transfer packet and placed original LH in packet and placed on pt's chart. Bedside RN and [facility] notified of 2030 transport time.

## 2019-08-13 NOTE — ED NOTES
"Patient updated on POC, awaiting dinner trays.     Patient refusing PO risperidone at this time. Noted in MAR. Patient states that she is not clinically insane and that I can't make her take it. She goes on to \"prove\" that she is not insane by spelling words backwards. Also hyper-focuses on the fact that this medication could make her unable to bear children.    Will continue to monitor.   "

## 2019-08-13 NOTE — ED NOTES
Pt was given juice after complaining of being thirsty. Pt denied any other needs at this time. Sitter at bedside.

## 2019-08-30 ENCOUNTER — HOSPITAL ENCOUNTER (EMERGENCY)
Facility: MEDICAL CENTER | Age: 34
End: 2019-08-30
Payer: MEDICARE

## 2019-08-30 VITALS
DIASTOLIC BLOOD PRESSURE: 77 MMHG | HEIGHT: 68 IN | SYSTOLIC BLOOD PRESSURE: 109 MMHG | OXYGEN SATURATION: 96 % | WEIGHT: 274.91 LBS | BODY MASS INDEX: 41.67 KG/M2 | RESPIRATION RATE: 16 BRPM | HEART RATE: 80 BPM | TEMPERATURE: 97.9 F

## 2019-08-30 PROCEDURE — 302449 STATCHG TRIAGE ONLY (STATISTIC)

## 2019-08-31 PROCEDURE — 99284 EMERGENCY DEPT VISIT MOD MDM: CPT

## 2019-08-31 NOTE — ED NOTES
Attempted to locate patient at this time, unsuccessful, search included restroom, ER lobby, and [facility].

## 2019-08-31 NOTE — ED TRIAGE NOTES
"Chief Complaint   Patient presents with   • N/V   • Abdominal Cramping     Abdominal cramping x3 weeks     /77   Pulse 80   Temp 36.6 °C (97.9 °F) (Temporal)   Resp 16   Ht 1.727 m (5' 8\")   Wt 124.7 kg (274 lb 14.6 oz)   LMP 08/06/2019   SpO2 96%   BMI 41.80 kg/m²     35 y/o female presents to ED with complaint of N/V over past ~24 hours and abdominal cramping over the past three weeks.  She states that nothing seems to alleviate or exacerbate her sxs, however she does state, \"drank a whole gallon of milk today.\" Denies F/C, flank pain or urinary issues.     Very tangential in triage and requires frequent redirection.     Educated on triage process. Placed back in lobby. Advised to notify triage RN with any changes. Apologized for wait times.     "

## 2019-09-01 ENCOUNTER — HOSPITAL ENCOUNTER (EMERGENCY)
Facility: MEDICAL CENTER | Age: 34
End: 2019-09-01
Attending: EMERGENCY MEDICINE
Payer: MEDICARE

## 2019-09-01 ENCOUNTER — APPOINTMENT (OUTPATIENT)
Dept: RADIOLOGY | Facility: MEDICAL CENTER | Age: 34
End: 2019-09-01
Attending: EMERGENCY MEDICINE
Payer: MEDICARE

## 2019-09-01 VITALS
OXYGEN SATURATION: 97 % | DIASTOLIC BLOOD PRESSURE: 55 MMHG | TEMPERATURE: 97 F | WEIGHT: 276.9 LBS | BODY MASS INDEX: 41.97 KG/M2 | HEIGHT: 68 IN | SYSTOLIC BLOOD PRESSURE: 111 MMHG | HEART RATE: 65 BPM | RESPIRATION RATE: 18 BRPM

## 2019-09-01 DIAGNOSIS — N30.00 ACUTE CYSTITIS WITHOUT HEMATURIA: ICD-10-CM

## 2019-09-01 DIAGNOSIS — R10.9 ABDOMINAL PAIN, UNSPECIFIED ABDOMINAL LOCATION: ICD-10-CM

## 2019-09-01 LAB
ALBUMIN SERPL BCP-MCNC: 3.9 G/DL (ref 3.2–4.9)
ALBUMIN/GLOB SERPL: 1.4 G/DL
ALP SERPL-CCNC: 62 U/L (ref 30–99)
ALT SERPL-CCNC: 16 U/L (ref 2–50)
ANION GAP SERPL CALC-SCNC: 8 MMOL/L (ref 0–11.9)
APPEARANCE UR: ABNORMAL
AST SERPL-CCNC: 13 U/L (ref 12–45)
BACTERIA #/AREA URNS HPF: ABNORMAL /HPF
BASOPHILS # BLD AUTO: 0.2 % (ref 0–1.8)
BASOPHILS # BLD: 0.03 K/UL (ref 0–0.12)
BILIRUB SERPL-MCNC: 0.4 MG/DL (ref 0.1–1.5)
BILIRUB UR QL STRIP.AUTO: NEGATIVE
BUN SERPL-MCNC: 15 MG/DL (ref 8–22)
CALCIUM SERPL-MCNC: 8.8 MG/DL (ref 8.5–10.5)
CAOX CRY #/AREA URNS HPF: ABNORMAL /HPF
CHLORIDE SERPL-SCNC: 106 MMOL/L (ref 96–112)
CO2 SERPL-SCNC: 23 MMOL/L (ref 20–33)
COLOR UR: YELLOW
CREAT SERPL-MCNC: 0.72 MG/DL (ref 0.5–1.4)
EOSINOPHIL # BLD AUTO: 0.11 K/UL (ref 0–0.51)
EOSINOPHIL NFR BLD: 0.8 % (ref 0–6.9)
EPI CELLS #/AREA URNS HPF: ABNORMAL /HPF
ERYTHROCYTE [DISTWIDTH] IN BLOOD BY AUTOMATED COUNT: 42.8 FL (ref 35.9–50)
GLOBULIN SER CALC-MCNC: 2.8 G/DL (ref 1.9–3.5)
GLUCOSE SERPL-MCNC: 104 MG/DL (ref 65–99)
GLUCOSE UR STRIP.AUTO-MCNC: NEGATIVE MG/DL
HCG UR QL: NEGATIVE
HCT VFR BLD AUTO: 37.7 % (ref 37–47)
HGB BLD-MCNC: 11.9 G/DL (ref 12–16)
HYALINE CASTS #/AREA URNS LPF: ABNORMAL /LPF
IMM GRANULOCYTES # BLD AUTO: 0.05 K/UL (ref 0–0.11)
IMM GRANULOCYTES NFR BLD AUTO: 0.4 % (ref 0–0.9)
KETONES UR STRIP.AUTO-MCNC: NEGATIVE MG/DL
LEUKOCYTE ESTERASE UR QL STRIP.AUTO: ABNORMAL
LIPASE SERPL-CCNC: 6 U/L (ref 11–82)
LYMPHOCYTES # BLD AUTO: 3.22 K/UL (ref 1–4.8)
LYMPHOCYTES NFR BLD: 23.4 % (ref 22–41)
MCH RBC QN AUTO: 28.3 PG (ref 27–33)
MCHC RBC AUTO-ENTMCNC: 31.6 G/DL (ref 33.6–35)
MCV RBC AUTO: 89.8 FL (ref 81.4–97.8)
MICRO URNS: ABNORMAL
MONOCYTES # BLD AUTO: 0.82 K/UL (ref 0–0.85)
MONOCYTES NFR BLD AUTO: 6 % (ref 0–13.4)
MUCOUS THREADS #/AREA URNS HPF: ABNORMAL /HPF
NEUTROPHILS # BLD AUTO: 9.51 K/UL (ref 2–7.15)
NEUTROPHILS NFR BLD: 69.2 % (ref 44–72)
NITRITE UR QL STRIP.AUTO: NEGATIVE
NRBC # BLD AUTO: 0 K/UL
NRBC BLD-RTO: 0 /100 WBC
PH UR STRIP.AUTO: 5.5 [PH] (ref 5–8)
PLATELET # BLD AUTO: 340 K/UL (ref 164–446)
PMV BLD AUTO: 9.2 FL (ref 9–12.9)
POTASSIUM SERPL-SCNC: 3.4 MMOL/L (ref 3.6–5.5)
PROT SERPL-MCNC: 6.7 G/DL (ref 6–8.2)
PROT UR QL STRIP: NEGATIVE MG/DL
RBC # BLD AUTO: 4.2 M/UL (ref 4.2–5.4)
RBC # URNS HPF: ABNORMAL /HPF
RBC UR QL AUTO: NEGATIVE
SODIUM SERPL-SCNC: 137 MMOL/L (ref 135–145)
SP GR UR REFRACTOMETRY: 1.04
UROBILINOGEN UR STRIP.AUTO-MCNC: 1 MG/DL
WBC # BLD AUTO: 13.7 K/UL (ref 4.8–10.8)
WBC #/AREA URNS HPF: ABNORMAL /HPF

## 2019-09-01 PROCEDURE — 81025 URINE PREGNANCY TEST: CPT

## 2019-09-01 PROCEDURE — 80053 COMPREHEN METABOLIC PANEL: CPT

## 2019-09-01 PROCEDURE — 87086 URINE CULTURE/COLONY COUNT: CPT

## 2019-09-01 PROCEDURE — 81001 URINALYSIS AUTO W/SCOPE: CPT

## 2019-09-01 PROCEDURE — 85025 COMPLETE CBC W/AUTO DIFF WBC: CPT

## 2019-09-01 PROCEDURE — 76705 ECHO EXAM OF ABDOMEN: CPT

## 2019-09-01 PROCEDURE — 83690 ASSAY OF LIPASE: CPT

## 2019-09-01 RX ORDER — SULFAMETHOXAZOLE AND TRIMETHOPRIM 800; 160 MG/1; MG/1
1 TABLET ORAL EVERY 12 HOURS
Qty: 10 TAB | Refills: 0 | Status: SHIPPED | OUTPATIENT
Start: 2019-09-01 | End: 2019-09-06

## 2019-09-01 RX ORDER — SULFAMETHOXAZOLE AND TRIMETHOPRIM 800; 160 MG/1; MG/1
1 TABLET ORAL ONCE
Status: DISCONTINUED | OUTPATIENT
Start: 2019-09-01 | End: 2019-09-01 | Stop reason: HOSPADM

## 2019-09-01 NOTE — ED PROVIDER NOTES
ED Provider Note    ED Provider Note      Primary care provider: Pcp Pt States None    CHIEF COMPLAINT  Chief Complaint   Patient presents with   • Abdominal Cramping     2-3 weeks of intermittent abdominal cramping       HPI  Dunia Sahni is a 34 y.o. female who presents to the Emergency Department with chief complaint of epigastric abdominal pain and cramping over the last 2 to 3 weeks.  Patient does report that this seems to get worse with any food intake.  She is had nausea no emesis no fevers no chills she has no shortness of breath no urinary complaints reports that she does have bowel issues and feels as though her bowels are cramping intermittently she denies diarrhea or constipation no melena no tea at this time.  Pain is rated as moderate.    REVIEW OF SYSTEMS  10 systems reviewed and otherwise negative, pertinent positives and negatives listed in the history of present illness.    PAST MEDICAL HISTORY   has a past medical history of Abscess (10/12/2017), Bipolar affective (HCC), Depression, Kidney infection, and Suicide attempt (HCC).    SURGICAL HISTORY   has a past surgical history that includes appendectomy; ercp in or (N/A, 7/5/2018); and skip by laparoscopy (N/A, 7/6/2018).    SOCIAL HISTORY  Social History     Tobacco Use   • Smoking status: Never Smoker   • Smokeless tobacco: Never Used   Substance Use Topics   • Alcohol use: Not Currently     Comment: rarely   • Drug use: Yes     Types: Inhaled     Comment: marijuana, medical, occasional      Social History     Substance and Sexual Activity   Drug Use Yes   • Types: Inhaled    Comment: marijuana, medical, occasional       FAMILY HISTORY  Non-Contributory    CURRENT MEDICATIONS  Home Medications     Reviewed by Jos Flanagan R.N. (Registered Nurse) on 08/31/19 at 2254  Med List Status: Not Addressed   Medication Last Dose Status        Patient Corwin Taking any Medications                       ALLERGIES  Allergies   Allergen Reactions   •  "Vicodin [Hydrocodone-Acetaminophen] Anaphylaxis     RXN=9 years ago   • Pcn [Penicillins] Nausea     RXN=8 years ago  Toleartes rocephin   • Bee Venom    • Blackberry [Rubus Fruticosus] Rash     \"rash\"   • Haldol [Haloperidol] Rash     Pt states, \"I have a rash and my lips were swollen.\"   • Raspberry        PHYSICAL EXAM  VITAL SIGNS: /77   Pulse 95   Temp 36.7 °C (98.1 °F) (Temporal)   Resp 16   Ht 1.727 m (5' 8\")   Wt (!) 125.6 kg (276 lb 14.4 oz)   LMP 08/13/2019   SpO2 97%   BMI 42.10 kg/m²   Pulse ox interpretation: I interpret this pulse ox as normal.  Constitutional: Alert and oriented x 3, no acute distress  HEENT: Atraumatic normocephalic, pupils are equal round reactive to light extraocular movements are intact. The nares is clear, external ears are normal, mouth shows moist mucous membranes  Neck: Supple, no JVD no tracheal deviation  Cardiovascular: R borderline tachycardic no murmur rub or gallop 2+ pulses peripherally x4  Thorax & Lungs: No respiratory distress, no wheezes rales or rhonchi, No chest tenderness.   GI: Obese tender to palpation in the epigastrium the right upper quadrant no rebound no guarding no peritoneal   Skin: Warm dry no acute rash or lesion  Musculoskeletal: Moving all extremities with full range and 5 of 5 strength, no acute  deformity  Neurologic: Cranial nerves III through XII are grossly intact, no sensory deficit, no cerebellar dysfunction   Psychiatric: Appropriate affect for situation at this time      DIAGNOSTIC STUDIES / PROCEDURES  LABS      Results for orders placed or performed during the hospital encounter of 09/01/19   CBC WITH DIFFERENTIAL   Result Value Ref Range    WBC 13.7 (H) 4.8 - 10.8 K/uL    RBC 4.20 4.20 - 5.40 M/uL    Hemoglobin 11.9 (L) 12.0 - 16.0 g/dL    Hematocrit 37.7 37.0 - 47.0 %    MCV 89.8 81.4 - 97.8 fL    MCH 28.3 27.0 - 33.0 pg    MCHC 31.6 (L) 33.6 - 35.0 g/dL    RDW 42.8 35.9 - 50.0 fL    Platelet Count 340 164 - 446 K/uL    MPV " 9.2 9.0 - 12.9 fL    Neutrophils-Polys 69.20 44.00 - 72.00 %    Lymphocytes 23.40 22.00 - 41.00 %    Monocytes 6.00 0.00 - 13.40 %    Eosinophils 0.80 0.00 - 6.90 %    Basophils 0.20 0.00 - 1.80 %    Immature Granulocytes 0.40 0.00 - 0.90 %    Nucleated RBC 0.00 /100 WBC    Neutrophils (Absolute) 9.51 (H) 2.00 - 7.15 K/uL    Lymphs (Absolute) 3.22 1.00 - 4.80 K/uL    Monos (Absolute) 0.82 0.00 - 0.85 K/uL    Eos (Absolute) 0.11 0.00 - 0.51 K/uL    Baso (Absolute) 0.03 0.00 - 0.12 K/uL    Immature Granulocytes (abs) 0.05 0.00 - 0.11 K/uL    NRBC (Absolute) 0.00 K/uL   COMP METABOLIC PANEL   Result Value Ref Range    Sodium 137 135 - 145 mmol/L    Potassium 3.4 (L) 3.6 - 5.5 mmol/L    Chloride 106 96 - 112 mmol/L    Co2 23 20 - 33 mmol/L    Anion Gap 8.0 0.0 - 11.9    Glucose 104 (H) 65 - 99 mg/dL    Bun 15 8 - 22 mg/dL    Creatinine 0.72 0.50 - 1.40 mg/dL    Calcium 8.8 8.5 - 10.5 mg/dL    AST(SGOT) 13 12 - 45 U/L    ALT(SGPT) 16 2 - 50 U/L    Alkaline Phosphatase 62 30 - 99 U/L    Total Bilirubin 0.4 0.1 - 1.5 mg/dL    Albumin 3.9 3.2 - 4.9 g/dL    Total Protein 6.7 6.0 - 8.2 g/dL    Globulin 2.8 1.9 - 3.5 g/dL    A-G Ratio 1.4 g/dL   LIPASE   Result Value Ref Range    Lipase 6 (L) 11 - 82 U/L   URINALYSIS CULTURE, IF INDICATED   Result Value Ref Range    Color Yellow     Character Cloudy (A)     Ph 5.5 5.0 - 8.0    Glucose Negative Negative mg/dL    Ketones Negative Negative mg/dL    Protein Negative Negative mg/dL    Bilirubin Negative Negative    Urobilinogen, Urine 1.0 Negative    Nitrite Negative Negative    Leukocyte Esterase Trace (A) Negative    Occult Blood Negative Negative    Micro Urine Req Microscopic    HCG QUALITATIVE UR (Lab)   Result Value Ref Range    Beta-Hcg Urine Negative Negative   REFRACTOMETER SG   Result Value Ref Range    Specific Gravity 1.037    URINE MICROSCOPIC (W/UA)   Result Value Ref Range    WBC 20-50 (A) /hpf    RBC 5-10 (A) /hpf    Bacteria Many (A) None /hpf    Epithelial Cells  "Moderate (A) /hpf    Mucous Threads Moderate /hpf    Ca Oxalate Crystal Few /hpf    Hyaline Cast 0-2 /lpf   ESTIMATED GFR   Result Value Ref Range    GFR If African American >60 >60 mL/min/1.73 m 2    GFR If Non African American >60 >60 mL/min/1.73 m 2       All labs reviewed by me.      RADIOLOGY  US-RUQ   Final Result         1.  Hepatomegaly and echogenic liver compatible with fatty change versus fibrosis, otherwise unremarkable right upper quadrant sonogram.        The radiologist's interpretation of all radiological studies have been reviewed by me.    COURSE & MEDICAL DECISION MAKING  Pertinent Labs & Imaging studies reviewed. (See chart for details)    3:21 AM - Patient seen and examined at bedside.         Patient noted to have slightly elevated blood pressure likely circumstantial secondary to presenting complaint. Referred to primary care physician for further evaluation.      Medical Decision Making: Labs unremarkable with the exception of what appears to be urinary tract infection I discussed this with patient she is adamant that she does not have a urinary tract infection and does not want to take antibiotics she is convinced that she still has stones in her gallbladder despite a negative ultrasound.  Her vital signs are unremarkable repeat abdominal exam is unremarkable patient given a prescription for Bactrim for UTI and offered a tablet here and she denied given instructions to return for worsening abdominal pain fevers chills nausea vomiting any other acute symptoms or concerns otherwise follow-up with primary care discharged stable and improved condition.    /77   Pulse 95   Temp 36.7 °C (98.1 °F) (Temporal)   Resp 16   Ht 1.727 m (5' 8\")   Wt (!) 125.6 kg (276 lb 14.4 oz)   LMP 08/13/2019   SpO2 97%   BMI 42.10 kg/m²     [facility], Emergency Dept  [address] [address] [phone]    in 12-24 hours if symptoms persist,, immediately if " symptoms worsen    [address] [address]  [phone]  Schedule an appointment as soon as possible for a visit   for establishment of primary care, for blood pressure management      Discharge Medication List as of 9/1/2019  5:28 AM      START taking these medications    Details   sulfamethoxazole-trimethoprim (BACTRIM DS) 800-160 MG tablet Take 1 Tab by mouth every 12 hours for 5 days., Disp-10 Tab, R-0, Print Rx Paper             FINAL IMPRESSION  1. Acute cystitis without hematuria Active   2. Abdominal pain, unspecified abdominal location Active       This dictation has been created using voice recognition software and/or scribes. The accuracy of the dictation is limited by the abilities of the software and the expertise of the scribes. I expect there may be some errors of grammar and possibly content. I made every attempt to manually correct the errors within my dictation. However, errors related to voice recognition software and/or scribes may still exist and should be interpreted within the appropriate context.

## 2019-09-01 NOTE — ED TRIAGE NOTES
"Chief Complaint   Patient presents with   • Abdominal Cramping     2-3 weeks of intermittent abdominal cramping     /77   Pulse 95   Temp 36.7 °C (98.1 °F) (Temporal)   Resp 16   Ht 1.727 m (5' 8\")   Wt (!) 125.6 kg (276 lb 14.4 oz)   LMP 08/13/2019   SpO2 97%   BMI 42.10 kg/m²     33 y/o female presents to ED with 2-3 weeks of generalized abdominal cramping.  States eating exacerbates her pain and she tried to, \"drink a gallon of milk yesterday to help\". No N/V/D, no F/C, no dysuria.    Tangential speech in triage. Requires frequent redirection.    Educated on triage process. Placed back in lobby. Advised to notify triage RN with any changes. Apologized for wait times.     "

## 2019-09-03 LAB
BACTERIA UR CULT: NORMAL
SIGNIFICANT IND 70042: NORMAL
SITE SITE: NORMAL
SOURCE SOURCE: NORMAL

## 2019-11-05 ENCOUNTER — HOSPITAL ENCOUNTER (INPATIENT)
Dept: HOSPITAL 8 - ED | Age: 34
LOS: 1 days | Discharge: HOME | DRG: 871 | End: 2019-11-06
Attending: INTERNAL MEDICINE | Admitting: HOSPITALIST
Payer: MEDICARE

## 2019-11-05 VITALS — DIASTOLIC BLOOD PRESSURE: 65 MMHG | SYSTOLIC BLOOD PRESSURE: 98 MMHG

## 2019-11-05 VITALS — HEIGHT: 68 IN | WEIGHT: 290.57 LBS | BODY MASS INDEX: 44.04 KG/M2

## 2019-11-05 DIAGNOSIS — Z87.11: ICD-10-CM

## 2019-11-05 DIAGNOSIS — G47.00: ICD-10-CM

## 2019-11-05 DIAGNOSIS — F43.10: ICD-10-CM

## 2019-11-05 DIAGNOSIS — R09.02: ICD-10-CM

## 2019-11-05 DIAGNOSIS — A41.9: Primary | ICD-10-CM

## 2019-11-05 DIAGNOSIS — J98.11: ICD-10-CM

## 2019-11-05 DIAGNOSIS — J18.9: ICD-10-CM

## 2019-11-05 DIAGNOSIS — E66.01: ICD-10-CM

## 2019-11-05 LAB
ALBUMIN SERPL-MCNC: 3.6 G/DL (ref 3.4–5)
ALP SERPL-CCNC: 95 U/L (ref 45–117)
ALT SERPL-CCNC: 28 U/L (ref 12–78)
ANION GAP SERPL CALC-SCNC: 6 MMOL/L (ref 5–15)
BASOPHILS # BLD AUTO: 0.21 X10^3/UL (ref 0–0.1)
BASOPHILS NFR BLD AUTO: 1 % (ref 0–1)
BILIRUB SERPL-MCNC: 0.2 MG/DL (ref 0.2–1)
CALCIUM SERPL-MCNC: 8.6 MG/DL (ref 8.5–10.1)
CHLORIDE SERPL-SCNC: 109 MMOL/L (ref 98–107)
CREAT SERPL-MCNC: 0.98 MG/DL (ref 0.55–1.02)
CULTURE INDICATED?: NO
EOSINOPHIL # BLD AUTO: 0.16 X10^3/UL (ref 0–0.4)
EOSINOPHIL NFR BLD AUTO: 1 % (ref 1–7)
ERYTHROCYTE [DISTWIDTH] IN BLOOD BY AUTOMATED COUNT: 13.5 % (ref 9.6–15.2)
LYMPHOCYTES # BLD AUTO: 2.71 X10^3/UL (ref 1–3.4)
LYMPHOCYTES NFR BLD AUTO: 15 % (ref 22–44)
MCH RBC QN AUTO: 28.6 PG (ref 27–34.8)
MCHC RBC AUTO-ENTMCNC: 32.7 G/DL (ref 32.4–35.8)
MCV RBC AUTO: 87.3 FL (ref 80–100)
MD: NO
MICROSCOPIC: (no result)
MONOCYTES # BLD AUTO: 0.83 X10^3/UL (ref 0.2–0.8)
MONOCYTES NFR BLD AUTO: 5 % (ref 2–9)
NEUTROPHILS # BLD AUTO: 13.9 X10^3/UL (ref 1.8–6.8)
NEUTROPHILS NFR BLD AUTO: 78 % (ref 42–75)
PLATELET # BLD AUTO: 440 X10^3/UL (ref 130–400)
PMV BLD AUTO: 7.6 FL (ref 7.4–10.4)
PROT SERPL-MCNC: 7.2 G/DL (ref 6.4–8.2)
RBC # BLD AUTO: 4.28 X10^6/UL (ref 3.82–5.3)

## 2019-11-05 PROCEDURE — 36415 COLL VENOUS BLD VENIPUNCTURE: CPT

## 2019-11-05 PROCEDURE — 84703 CHORIONIC GONADOTROPIN ASSAY: CPT

## 2019-11-05 PROCEDURE — 80053 COMPREHEN METABOLIC PANEL: CPT

## 2019-11-05 PROCEDURE — 85025 COMPLETE CBC W/AUTO DIFF WBC: CPT

## 2019-11-05 PROCEDURE — 87040 BLOOD CULTURE FOR BACTERIA: CPT

## 2019-11-05 PROCEDURE — 71045 X-RAY EXAM CHEST 1 VIEW: CPT

## 2019-11-05 PROCEDURE — 96375 TX/PRO/DX INJ NEW DRUG ADDON: CPT

## 2019-11-05 PROCEDURE — 81003 URINALYSIS AUTO W/O SCOPE: CPT

## 2019-11-05 PROCEDURE — 96361 HYDRATE IV INFUSION ADD-ON: CPT

## 2019-11-05 PROCEDURE — 83605 ASSAY OF LACTIC ACID: CPT

## 2019-11-05 PROCEDURE — 74177 CT ABD & PELVIS W/CONTRAST: CPT

## 2019-11-05 PROCEDURE — 83690 ASSAY OF LIPASE: CPT

## 2019-11-05 PROCEDURE — 96365 THER/PROPH/DIAG IV INF INIT: CPT

## 2019-11-05 RX ADMIN — SODIUM CHLORIDE SCH MLS/HR: 0.9 INJECTION, SOLUTION INTRAVENOUS at 22:33

## 2019-11-05 RX ADMIN — HEPARIN SODIUM SCH UNITS: 5000 INJECTION, SOLUTION INTRAVENOUS; SUBCUTANEOUS at 22:34

## 2019-11-05 NOTE — NUR
PT PRESENTING TO ER FOR ABD PAIN THAT STARTED LAST NIGHT AND NAUSEA. STATES 
"THERE IS A THROBBING ON THE RIGHT SIDE AND ITS MAKING ME NAUSEOUS." PT 
CURRENTLY UP TO RESTROOM TO PUT ON GOWN AND GET SAMPLE FOR UA. CALL LIGHT 
WITHIN REACH. AWAITING MD ASSESSMENT AND ORDERS AT THIS TIME

## 2019-11-05 NOTE — NUR
UA COLLECTED AND SENT TO LAB. PT BACK IN BED STATES FEELING A BIT BETTER. CALL 
LIGHT WITHIN REACH. AWAITING TEST RESULTS AND RECHECK

## 2019-11-05 NOTE — NUR
BLOOD CUTLURES BEING DRAWN AT THIS TIME. MD TO BEDSIDE TO UPDATE PT ON POC. PT 
TO BE ADMITTED TO [facility]

## 2019-11-05 NOTE — NUR
BACK FROM CT, STATES PAIN HAS DECREASED BUT STILL HURTS. PT STATES STILL CANNOT 
GET UP TO VOID FOR SAMPLE. STRAIGHT CATH OFFERED, PT REFUSED. VSS AT THIS TIME. 
CALL LIGHT WITHN REACH

## 2019-11-05 NOTE — NUR
PT STILL WONT MD FUNMILAYO UPDATED. ORDERS RECEIVED FOR FLUIDS AND MEDS FOR PAIN. PT 
MEDICATED PER MAR. CALL LIGHT WITHIN REACH

## 2019-11-06 VITALS — SYSTOLIC BLOOD PRESSURE: 99 MMHG | DIASTOLIC BLOOD PRESSURE: 65 MMHG

## 2019-11-06 VITALS — SYSTOLIC BLOOD PRESSURE: 109 MMHG | DIASTOLIC BLOOD PRESSURE: 67 MMHG

## 2019-11-06 LAB
ALBUMIN SERPL-MCNC: 2.8 G/DL (ref 3.4–5)
ALP SERPL-CCNC: 75 U/L (ref 45–117)
ALT SERPL-CCNC: 22 U/L (ref 12–78)
ANION GAP SERPL CALC-SCNC: 4 MMOL/L (ref 5–15)
BASOPHILS # BLD AUTO: 0.04 X10^3/UL (ref 0–0.1)
BASOPHILS NFR BLD AUTO: 0 % (ref 0–1)
BILIRUB SERPL-MCNC: 0.4 MG/DL (ref 0.2–1)
CALCIUM SERPL-MCNC: 7.9 MG/DL (ref 8.5–10.1)
CHLORIDE SERPL-SCNC: 113 MMOL/L (ref 98–107)
CREAT SERPL-MCNC: 0.73 MG/DL (ref 0.55–1.02)
EOSINOPHIL # BLD AUTO: 0.37 X10^3/UL (ref 0–0.4)
EOSINOPHIL NFR BLD AUTO: 4 % (ref 1–7)
ERYTHROCYTE [DISTWIDTH] IN BLOOD BY AUTOMATED COUNT: 14.2 % (ref 9.6–15.2)
LYMPHOCYTES # BLD AUTO: 2.54 X10^3/UL (ref 1–3.4)
LYMPHOCYTES NFR BLD AUTO: 26 % (ref 22–44)
MCH RBC QN AUTO: 29.1 PG (ref 27–34.8)
MCHC RBC AUTO-ENTMCNC: 32.5 G/DL (ref 32.4–35.8)
MCV RBC AUTO: 89.6 FL (ref 80–100)
MD: NO
MONOCYTES # BLD AUTO: 0.55 X10^3/UL (ref 0.2–0.8)
MONOCYTES NFR BLD AUTO: 6 % (ref 2–9)
NEUTROPHILS # BLD AUTO: 6.21 X10^3/UL (ref 1.8–6.8)
NEUTROPHILS NFR BLD AUTO: 64 % (ref 42–75)
PLATELET # BLD AUTO: 354 X10^3/UL (ref 130–400)
PMV BLD AUTO: 7.7 FL (ref 7.4–10.4)
PROT SERPL-MCNC: 6 G/DL (ref 6.4–8.2)
RBC # BLD AUTO: 4 X10^6/UL (ref 3.82–5.3)

## 2019-11-06 RX ADMIN — SODIUM CHLORIDE SCH MLS/HR: 0.9 INJECTION, SOLUTION INTRAVENOUS at 08:27

## 2019-11-06 RX ADMIN — HEPARIN SODIUM SCH UNITS: 5000 INJECTION, SOLUTION INTRAVENOUS; SUBCUTANEOUS at 06:27

## 2019-11-07 ENCOUNTER — HOSPITAL ENCOUNTER (EMERGENCY)
Facility: MEDICAL CENTER | Age: 34
End: 2019-11-07
Attending: EMERGENCY MEDICINE
Payer: MEDICARE

## 2019-11-07 ENCOUNTER — APPOINTMENT (OUTPATIENT)
Dept: RADIOLOGY | Facility: MEDICAL CENTER | Age: 34
End: 2019-11-07
Attending: EMERGENCY MEDICINE
Payer: MEDICARE

## 2019-11-07 VITALS
DIASTOLIC BLOOD PRESSURE: 75 MMHG | BODY MASS INDEX: 43.44 KG/M2 | HEART RATE: 82 BPM | WEIGHT: 286.6 LBS | SYSTOLIC BLOOD PRESSURE: 120 MMHG | HEIGHT: 68 IN | OXYGEN SATURATION: 96 % | TEMPERATURE: 98.4 F | RESPIRATION RATE: 16 BRPM

## 2019-11-07 DIAGNOSIS — J18.9 PNEUMONIA DUE TO INFECTIOUS ORGANISM, UNSPECIFIED LATERALITY, UNSPECIFIED PART OF LUNG: ICD-10-CM

## 2019-11-07 PROCEDURE — 99283 EMERGENCY DEPT VISIT LOW MDM: CPT

## 2019-11-07 PROCEDURE — 71046 X-RAY EXAM CHEST 2 VIEWS: CPT

## 2019-11-07 RX ORDER — DOXYCYCLINE 100 MG/1
100 CAPSULE ORAL 2 TIMES DAILY
Qty: 14 CAP | Refills: 0 | Status: SHIPPED | OUTPATIENT
Start: 2019-11-07 | End: 2019-11-14

## 2019-11-08 NOTE — ED TRIAGE NOTES
Chief Complaint   Patient presents with   • Difficulty Swallowing     Pt reports recent dx of pneumonia from [facility] 2 days ago and sent home on Abx, pt cannot remember the name of medication but thinks it was a (cillin) medication that she has an allergy to. pt with no difficulty speaking full sentences or increased WOB.   • Fever   • Body Aches

## 2019-11-08 NOTE — ED NOTES
Pt discharged home. Explained discharge and medication instructions. Questions and comments addressed. Pt verbalized understanding of instructions. Pt advised to follow-up with PCP or return to ED for any new or worsening of symptoms. Pt is ambulating well and steady on feet. VS stable.

## 2019-11-08 NOTE — ED PROVIDER NOTES
ED Provider Note    ER PROVIDER NOTE      Primary Care Provider: Pcp Pt States None  Means of Arrival: self.  History obtained from: patient.     CHIEF COMPLAINT  Chief Complaint   Patient presents with   • Difficulty Swallowing     Pt reports recent dx of pneumonia from [facility] 2 days ago and sent home on Abx, pt cannot remember the name of medication but thinks it was a (cillin) medication that she has an allergy to. pt with no difficulty speaking full sentences or increased WOB.   • Fever   • Body Aches       HPI  Dunia Sahni is a 34 y.o. female who presents to the emergency department complaining of difficulty swallowing/fever/body aches.  She reports she was hospitalized at [place] overnight 2 days ago and was treated for pneumonia.  She was discharged with an antibiotic that she believes is a penicillin, which she did not take because she states she has an allergy to penicillins (severe nausea).  Since her discharge she reports she has been feeling worse including continued subjective, body aches, minimal cough.  This afternoon she took a nap and upon waking found that she had difficulty swallowing, although this was transient and resolved when she woke up. she also reports feeling her lymph nodes.  Denies nausea/vomiting, acid reflux, wheezing, recent trauma to her throat, sick contacts.  Does report one episode of diarrhea today.    REVIEW OF SYSTEMS  Pertinent positives include fever/chills, minimal cough, difficulty swallowing, diarrhea. Pertinent negatives include no nausea/vomiting, acid reflux, wheezing, chest pain, productive cough. See HPI for details. All other systems reviewed and are negative.    PAST MEDICAL HISTORY   has a past medical history of Abscess (10/12/2017), Bipolar affective (HCC), Depression, Kidney infection, and Suicide attempt ([facility]).    SURGICAL HISTORY   has a past surgical history that includes appendectomy; ercp in or (N/A, 7/5/2018); and skip by laparoscopy (N/A,  7/6/2018).    FAMILY HISTORY  No family history on file.    SOCIAL HISTORY  Social History     Socioeconomic History   • Marital status:      Spouse name: Not on file   • Number of children: Not on file   • Years of education: Not on file   • Highest education level: Not on file   Occupational History   • Not on file   Social Needs   • Financial resource strain: Not on file   • Food insecurity:     Worry: Not on file     Inability: Not on file   • Transportation needs:     Medical: Not on file     Non-medical: Not on file   Tobacco Use   • Smoking status: Never Smoker   • Smokeless tobacco: Never Used   Substance and Sexual Activity   • Alcohol use: Not Currently     Comment: rarely   • Drug use: Yes     Types: Inhaled     Comment: marijuana, medical, occasional   • Sexual activity: Not on file   Lifestyle   • Physical activity:     Days per week: Not on file     Minutes per session: Not on file   • Stress: Not on file   Relationships   • Social connections:     Talks on phone: Not on file     Gets together: Not on file     Attends [employer] service: Not on file     Active member of club or organization: Not on file     Attends meetings of clubs or organizations: Not on file     Relationship status: Not on file   • Intimate partner violence:     Fear of current or ex partner: Not on file     Emotionally abused: Not on file     Physically abused: Not on file     Forced sexual activity: Not on file   Other Topics Concern   • Not on file   Social History Narrative    ** Merged History Encounter **           Social History     Substance and Sexual Activity   Drug Use Yes   • Types: Inhaled    Comment: marijuana, medical, occasional       CURRENT MEDICATIONS  Home Medications    **Home medications have not yet been reviewed for this encounter**         ALLERGIES  Allergies   Allergen Reactions   • Vicodin [Hydrocodone-Acetaminophen] Anaphylaxis     RXN=9 years ago   • Pcn [Penicillins] Nausea     RXN=8 years  "ago  Toleartes rocephin   • Bee Venom    • Blackberry [Rubus Fruticosus] Rash     \"rash\"   • Haldol [Haloperidol] Rash     Pt states, \"I have a rash and my lips were swollen.\"   • Raspberry        PHYSICAL EXAM  VITAL SIGNS: /75   Pulse 82   Temp 36.9 °C (98.4 °F) (Oral)   Resp 16   Ht 1.727 m (5' 8\")   Wt (!) 130 kg (286 lb 9.6 oz)   SpO2 96%   BMI 43.58 kg/m²   Pulse ox interpretation: I interpret this pulse ox as normal.    Constitutional: Alert in no apparent distress.  Obese.  HENT: No signs of trauma, Bilateral external ears normal, Nose normal.  Posterior pharynx is clear, no erythema, no exudate, uvula is midline, no lingual elevation or pooled secretions, no trismus, submandibular area is nontender and there is no appreciated fullness  Eyes: Pupils are equal and reactive, Conjunctiva normal, Non-icteric.   Neck: Normal range of motion, No tenderness, thick, No stridor.   Lymphatic: Left-sided anterior cervical lymph nodes palpated, less than 1 cm.  Cardiovascular: Regular rate and rhythm, no murmurs.   Thorax & Lungs: No respiratory distress, mild expiratory wheezing, No chest tenderness.   Abdomen: Bowel sounds normal, Soft, No tenderness, No masses, No pulsatile masses. No peritoneal signs.  Skin: Warm, Dry, No erythema, No rash.   Extremities: Intact distal pulses.  bilateral pitting edema, chronic.  No tenderness.  Neurologic: Alert , Normal motor function, Normal sensory function, No focal deficits noted.   Psychiatric: Anxious.    DIAGNOSTIC STUDIES / PROCEDURES    RADIOLOGY  DX-CHEST-2 VIEWS   Final Result         1.  No acute cardiopulmonary disease.        The radiologist's interpretation of all radiological studies have been reviewed by me.    COURSE & MEDICAL DECISION MAKING  Nursing notes, VS, PMSFHx reviewed in chart.    7:36 PM Patient seen and examined at bedside.  Ordered for chest x-ray to evaluate her symptoms.     Decision Making:  This is a 34 y.o. female here with " subjective fever, body aches.  Differential includes flu, pneumonia, viral URI, esophagitis.  Though possible patient may have the flu, will not swab her flu given that she is not immunosuppressed and so would not treat her with Tamiflu with her duration of symptoms.  Clinically patient appears anxious and tired, though not toxic appearing.  Chest x-ray was unremarkable.  Patient likely with mild partially treated pneumonia as she did not finish the antibiotics prescribed at [place].  Given above, patient prescribed doxycyline 100 mg twice daily x7 days.  She is instructed to complete this course and follow up with a PCP.  She is discharged home in stable condition.        I independently evaluated the patient and repeated the important components of the history and physical. I discussed the management with the resident. I have reviewed and agree with the pertinent clinical information above including history, exam, study findings, and recommendations.   Electronically signed by: Sam Leija, 11/7/2019 11:20 PM     The patient will return for new or worsening symptoms and is stable at the time of discharge.    The patient is referred to a primary physician for blood pressure management, diabetic screening, and for all other preventative health concerns.      DISPOSITION:  Patient will be discharged home in stable condition.    FOLLOW UP:  [address] [address]  [phone]  In 1 week        OUTPATIENT MEDICATIONS:  Discharge Medication List as of 11/7/2019  8:51 PM      START taking these medications    Details   doxycycline (MONODOX) 100 MG capsule Take 1 Cap by mouth 2 times a day for 7 days., Disp-14 Cap, R-0, Print Rx Paper             FINAL IMPRESSION  1. Pneumonia due to infectious organism, unspecified laterality, unspecified part of lung         The note accurately reflects work and decisions made by me.  Sam Leija  11/7/2019  11:45 PM

## 2019-11-08 NOTE — ED NOTES
Pt to rm 60 from triage.  Agree with triage note.  resp easy, skin p/w/d.  Speaks clearly, controls own secretions.

## 2019-11-21 ENCOUNTER — APPOINTMENT (OUTPATIENT)
Dept: RADIOLOGY | Facility: MEDICAL CENTER | Age: 34
End: 2019-11-21
Attending: EMERGENCY MEDICINE
Payer: MEDICARE

## 2019-11-21 ENCOUNTER — HOSPITAL ENCOUNTER (EMERGENCY)
Facility: MEDICAL CENTER | Age: 34
End: 2019-11-27
Attending: EMERGENCY MEDICINE
Payer: MEDICARE

## 2019-11-21 DIAGNOSIS — R10.9 ACUTE ABDOMINAL PAIN: ICD-10-CM

## 2019-11-21 DIAGNOSIS — N39.0 ACUTE UTI: ICD-10-CM

## 2019-11-21 DIAGNOSIS — R45.851 SUICIDAL IDEATION: ICD-10-CM

## 2019-11-21 DIAGNOSIS — R07.9 ACUTE CHEST PAIN: ICD-10-CM

## 2019-11-21 LAB
ALBUMIN SERPL BCP-MCNC: 3.9 G/DL (ref 3.2–4.9)
ALBUMIN/GLOB SERPL: 1.5 G/DL
ALP SERPL-CCNC: 75 U/L (ref 30–99)
ALT SERPL-CCNC: 18 U/L (ref 2–50)
ANION GAP SERPL CALC-SCNC: 7 MMOL/L (ref 0–11.9)
AST SERPL-CCNC: 15 U/L (ref 12–45)
BASOPHILS # BLD AUTO: 0.2 % (ref 0–1.8)
BASOPHILS # BLD: 0.02 K/UL (ref 0–0.12)
BILIRUB SERPL-MCNC: 0.4 MG/DL (ref 0.1–1.5)
BUN SERPL-MCNC: 15 MG/DL (ref 8–22)
CALCIUM SERPL-MCNC: 8.5 MG/DL (ref 8.5–10.5)
CHLORIDE SERPL-SCNC: 107 MMOL/L (ref 96–112)
CO2 SERPL-SCNC: 25 MMOL/L (ref 20–33)
CREAT SERPL-MCNC: 0.76 MG/DL (ref 0.5–1.4)
EOSINOPHIL # BLD AUTO: 0.09 K/UL (ref 0–0.51)
EOSINOPHIL NFR BLD: 1 % (ref 0–6.9)
ERYTHROCYTE [DISTWIDTH] IN BLOOD BY AUTOMATED COUNT: 41.1 FL (ref 35.9–50)
ETHANOL BLD-MCNC: 0.01 G/DL
GLOBULIN SER CALC-MCNC: 2.6 G/DL (ref 1.9–3.5)
GLUCOSE SERPL-MCNC: 102 MG/DL (ref 65–99)
HCG SERPL QL: NEGATIVE
HCT VFR BLD AUTO: 37 % (ref 37–47)
HGB BLD-MCNC: 12.4 G/DL (ref 12–16)
IMM GRANULOCYTES # BLD AUTO: 0.03 K/UL (ref 0–0.11)
IMM GRANULOCYTES NFR BLD AUTO: 0.3 % (ref 0–0.9)
LYMPHOCYTES # BLD AUTO: 2.83 K/UL (ref 1–4.8)
LYMPHOCYTES NFR BLD: 32.8 % (ref 22–41)
MCH RBC QN AUTO: 29 PG (ref 27–33)
MCHC RBC AUTO-ENTMCNC: 33.5 G/DL (ref 33.6–35)
MCV RBC AUTO: 86.4 FL (ref 81.4–97.8)
MONOCYTES # BLD AUTO: 0.72 K/UL (ref 0–0.85)
MONOCYTES NFR BLD AUTO: 8.3 % (ref 0–13.4)
NEUTROPHILS # BLD AUTO: 4.95 K/UL (ref 2–7.15)
NEUTROPHILS NFR BLD: 57.4 % (ref 44–72)
NRBC # BLD AUTO: 0 K/UL
NRBC BLD-RTO: 0 /100 WBC
PLATELET # BLD AUTO: 325 K/UL (ref 164–446)
PMV BLD AUTO: 8.9 FL (ref 9–12.9)
POTASSIUM SERPL-SCNC: 3.5 MMOL/L (ref 3.6–5.5)
PROT SERPL-MCNC: 6.5 G/DL (ref 6–8.2)
RBC # BLD AUTO: 4.28 M/UL (ref 4.2–5.4)
SODIUM SERPL-SCNC: 139 MMOL/L (ref 135–145)
TROPONIN T SERPL-MCNC: <6 NG/L (ref 6–19)
WBC # BLD AUTO: 8.6 K/UL (ref 4.8–10.8)

## 2019-11-21 PROCEDURE — A9270 NON-COVERED ITEM OR SERVICE: HCPCS | Performed by: EMERGENCY MEDICINE

## 2019-11-21 PROCEDURE — 99285 EMERGENCY DEPT VISIT HI MDM: CPT

## 2019-11-21 PROCEDURE — 700102 HCHG RX REV CODE 250 W/ 637 OVERRIDE(OP): Performed by: EMERGENCY MEDICINE

## 2019-11-21 PROCEDURE — 93005 ELECTROCARDIOGRAM TRACING: CPT | Performed by: EMERGENCY MEDICINE

## 2019-11-21 PROCEDURE — 85025 COMPLETE CBC W/AUTO DIFF WBC: CPT

## 2019-11-21 PROCEDURE — 80307 DRUG TEST PRSMV CHEM ANLYZR: CPT

## 2019-11-21 PROCEDURE — 71045 X-RAY EXAM CHEST 1 VIEW: CPT

## 2019-11-21 PROCEDURE — 84484 ASSAY OF TROPONIN QUANT: CPT

## 2019-11-21 PROCEDURE — 84703 CHORIONIC GONADOTROPIN ASSAY: CPT

## 2019-11-21 PROCEDURE — 80053 COMPREHEN METABOLIC PANEL: CPT

## 2019-11-21 RX ADMIN — LIDOCAINE HYDROCHLORIDE 30 ML: 20 SOLUTION OROPHARYNGEAL at 23:36

## 2019-11-22 LAB
AMPHET UR QL SCN: NEGATIVE
APAP SERPL-MCNC: <10 UG/ML (ref 10–30)
BARBITURATES UR QL SCN: NEGATIVE
BENZODIAZ UR QL SCN: NEGATIVE
BZE UR QL SCN: NEGATIVE
CANNABINOIDS UR QL SCN: NEGATIVE
METHADONE UR QL SCN: NEGATIVE
OPIATES UR QL SCN: NEGATIVE
OXYCODONE UR QL SCN: NEGATIVE
PCP UR QL SCN: NEGATIVE
PROPOXYPH UR QL SCN: NEGATIVE
SALICYLATES SERPL-MCNC: 0 MG/DL (ref 15–25)

## 2019-11-22 PROCEDURE — 700117 HCHG RX CONTRAST REV CODE 255: Performed by: EMERGENCY MEDICINE

## 2019-11-22 PROCEDURE — 90791 PSYCH DIAGNOSTIC EVALUATION: CPT

## 2019-11-22 PROCEDURE — 80307 DRUG TEST PRSMV CHEM ANLYZR: CPT

## 2019-11-22 PROCEDURE — 99284 EMERGENCY DEPT VISIT MOD MDM: CPT | Mod: GC | Performed by: PSYCHIATRY & NEUROLOGY

## 2019-11-22 PROCEDURE — 74177 CT ABD & PELVIS W/CONTRAST: CPT

## 2019-11-22 RX ORDER — ARIPIPRAZOLE 10 MG/1
10 TABLET ORAL DAILY
Status: DISCONTINUED | OUTPATIENT
Start: 2019-11-22 | End: 2019-11-27 | Stop reason: HOSPADM

## 2019-11-22 RX ADMIN — IOHEXOL 100 ML: 350 INJECTION, SOLUTION INTRAVENOUS at 00:07

## 2019-11-22 ASSESSMENT — ENCOUNTER SYMPTOMS
DIARRHEA: 0
VOMITING: 0
ABDOMINAL PAIN: 1
NAUSEA: 1
DIZZINESS: 1
HEADACHES: 0
FEVER: 0
CHILLS: 0
DOUBLE VISION: 0
SHORTNESS OF BREATH: 0
COUGH: 0
HALLUCINATIONS: 0
DEPRESSION: 1
PALPITATIONS: 0
CONSTIPATION: 0
BLURRED VISION: 0
SORE THROAT: 1
WEAKNESS: 0
MYALGIAS: 0

## 2019-11-22 NOTE — ED PROVIDER NOTES
ED Provider Note    Patient was reevaluated.  She does not have any complaints.  I reviewed previous laboratory data which was unremarkable.  Vital signs are stable.  Patient aware of incidental findings on CT scan.  We will proceed with plan for psychiatric care.

## 2019-11-22 NOTE — ED PROVIDER NOTES
"ED Provider Note    Scribed for Jarett Aguilar M.D. by Kori Mccormick. 11/21/2019  10:35 PM    Primary care provider: Pcp Pt States None  Means of arrival: Ambulance  History obtained from: Patient  History limited by: None     CHIEF COMPLAINT  Chief Complaint   Patient presents with   • Flu Like Symptoms   • Suicidal Ideation       HPI  Dunia Sahni is a 34 y.o. female who presents to the Emergency Department for chest tightness onset 2 weeks ago. She also has right sided abdominal pain, cough and dry heaving. She denies dysuria, leg swelling, hemoptysis, or vaginal discharge. The patient was admitted to [facility] for pneumonia earlier this month. She has finished her antibiotics at this time. The patient has no history of myocardial infarction.     The patient has been feeling suicidal recently as her  is ill with cancer. She does not currently have a plan for suicide. She would like to be admitted as \"she is afraid of what she might do if she goes home.\"  Patient states \"if I go home I will hurt myself\".      Patient also reports abdominal pain.  Patient states that for the last 2 days she has had increasing right lower quadrant abdominal pain.  Patient denies any new or different vaginal discharge.  Patient denies any dysuria.  Patient denies any upper abdominal pain.  Patient denies any vomiting.    REVIEW OF SYSTEMS  Pertinent positives include: chest tightness, cough, dry heaving. Pertinent negatives include: dysuria or vaginal discharge. See history of present illness. All other systems are negative.     PAST MEDICAL HISTORY   has a past medical history of Abscess (10/12/2017), Bipolar affective (HCC), Depression, Kidney infection, and Suicide attempt (HCC).    SURGICAL HISTORY   has a past surgical history that includes appendectomy; ercp in or (N/A, 7/5/2018); and skip by laparoscopy (N/A, 7/6/2018).    SOCIAL HISTORY  Social History     Tobacco Use   • Smoking status: Never " "Smoker   • Smokeless tobacco: Never Used   Substance Use Topics   • Alcohol use: Not Currently     Comment: rarely   • Drug use: Yes     Types: Inhaled     Comment: marijuana, medical, occasional      Social History     Substance and Sexual Activity   Drug Use Yes   • Types: Inhaled    Comment: marijuana, medical, occasional       FAMILY HISTORY  No family history on file.    CURRENT MEDICATIONS  Home Medications     Reviewed by Zeina Gipson R.N. (Registered Nurse) on 11/21/19 at 2220  Med List Status: <None>   Medication Last Dose Status        Patient Corwin Taking any Medications                       ALLERGIES  Allergies   Allergen Reactions   • Vicodin [Hydrocodone-Acetaminophen] Anaphylaxis     RXN=9 years ago   • Pcn [Penicillins] Nausea     RXN=8 years ago  Toleartes rocephin   • Bee Venom    • Blackberry [Rubus Fruticosus] Rash     \"rash\"   • Haldol [Haloperidol] Rash     Pt states, \"I have a rash and my lips were swollen.\"   • Raspberry        PHYSICAL EXAM  VITAL SIGNS: /88   Pulse 86   Temp 36 °C (96.8 °F) (Temporal)   Resp 16   Ht 1.727 m (5' 8\")   Wt (!) 129.7 kg (286 lb)   SpO2 97%   BMI 43.49 kg/m²     Constitutional: Alert in no apparent distress.  HENT: No signs of trauma, Bilateral external ears normal, Nose normal. Uvula midline.   Eyes: Pupils are equal and reactive, Conjunctiva normal, Non-icteric.   Neck: Normal range of motion, No tenderness, Supple, No stridor.   Lymphatic: No lymphadenopathy noted.   Cardiovascular: Regular rate and rhythm, no murmurs.   Thorax & Lungs: Normal breath sounds, No respiratory distress, No wheezing, No chest tenderness.   Abdomen:  Soft, right lower quadrant ttp, No peritoneal signs, No masses, No pulsatile masses.   Skin: Warm, Dry, No erythema, No rash.   Back: No bony tenderness, No CVA tenderness.   Extremities: Intact distal pulses, No edema, No tenderness, No cyanosis.  Musculoskeletal: Good range of motion in all major joints. No " tenderness to palpation or major deformities noted.   Neurologic: Alert , Normal motor function, Normal sensory function, No focal deficits noted.   Psychiatric: Tearful. Affect normal, Judgment normal, Mood normal.       DIAGNOSTIC STUDIES / PROCEDURES    LABS  Labs Reviewed   DIAGNOSTIC ALCOHOL - Abnormal; Notable for the following components:       Result Value    Diagnostic Alcohol 0.01 (*)     All other components within normal limits    Narrative:     no red top   CBC WITH DIFFERENTIAL - Abnormal; Notable for the following components:    MCHC 33.5 (*)     MPV 8.9 (*)     All other components within normal limits    Narrative:     no red top   COMP METABOLIC PANEL - Abnormal; Notable for the following components:    Potassium 3.5 (*)     Glucose 102 (*)     All other components within normal limits    Narrative:     no red top   SALICYLATE - Abnormal; Notable for the following components:    Salicylates, Quant. 0 (*)     All other components within normal limits    Narrative:     no red top   URINE DRUG SCREEN   ACETAMINOPHEN    Narrative:     no red top   HCG QUAL SERUM    Narrative:     no red top   TROPONIN    Narrative:     no red top   ESTIMATED GFR    Narrative:     no red top      All labs reviewed by me.    EKG  12 Lead EKG interpreted by me to show:  Indication: chest pain  Normal sinus rhythm  Rate 67  Axis: Normal  Intervals: Normal  Normal T waves  Normal ST segments  My impression of this EKG: No STEMI.     RADIOLOGY  CT-ABDOMEN-PELVIS WITH   Final Result         1.  No acute abnormality.   2.  Nodular masses in the left posterior lateral flank soft tissues, of uncertain significance. Could correspond with injection granulomas. Correlate with history of injections. Otherwise consider radiographic follow-up in 3 months for evaluation of    stability.   3.  Hepatomegaly      DX-CHEST-PORTABLE (1 VIEW)   Final Result         1.  No acute cardiopulmonary disease.        The radiologist's interpretation  of all radiological studies have been reviewed by me.    COURSE & MEDICAL DECISION MAKING  Nursing notes, VS, PMSFHx reviewed in chart.    34 y.o. female p/w chief complaint of SI, acute chest pain and abdominal pain.    10:35 PM Patient seen and examined at bedside.      The differential diagnoses include but are not limited to:   #suicidal ideation  pt placed on hold due to suicidal ideation with intent to act  No homicidal ideation.  BMP negative for significant electrolyte abnormality.  ASA/APAP negative for ingestion.  EKG without significant new cardiac conduction abnormality  Placed on legal  HOLD by me  No obvious medial cause of patient's complaints, appropriate for psychiatric evaluation   Patient care signed out to Dr. Santiago pending psychiatric evaluation/transfer    #acute chest pain.   No STEMI  Trop neg  HEART score 0  Plan for f/u as outpt    #abdominal pain   CT scan of abdomen ordered in order to rule out appy  No e/o rash or zoster  Unclear etiology of abd pain.   Patient with incidental soft tissue nodular masses  Patient informed of abnormal CT findings and plan to follow-up as outpatient.      FINAL IMPRESSION  1. Suicidal ideation    2. Acute abdominal pain    3. Acute chest pain          Kori GARCIA (Scribe), am scribing for, and in the presence of, Jarett Aguilar M.D..    Electronically signed by: Kori Mccormick (Scribe), 11/21/2019    IJarett M.D. personally performed the services described in this documentation, as scribed by Kori Mccormick in my presence, and it is both accurate and complete.  C   The note accurately reflects work and decisions made by me.  Jarett Aguilar  11/22/2019  5:10 AM
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
## 2019-11-22 NOTE — DISCHARGE PLANNING
MSW received call from Aleshia at [facility]. Their doctor recommends [facility] for pt. Additionally, pt is out of Medicare days.

## 2019-11-22 NOTE — ED TRIAGE NOTES
"Chief Complaint   Patient presents with   • Flu Like Symptoms   • Suicidal Ideation      [facility] [address] after pt reported flu like sx x2 weeks. Pt reports chest pressure that has not subsided after being diagnosed with pneumonia at [facility] and taking her prescribed antibiotics.  Pt reports cough, fevers, and chills/nausea.      Pt also reports SI because \"my living situation makes me want to die.\" Pt denies plan or intent.  Belongings removed from room, pt changed into gown.    ERP to see.   "

## 2019-11-22 NOTE — CONSULTS
[facility]   TRIAGE ASSESSMENT    Name: Dunia Sahni  MRN: [id number]  : 1985  Age: 34 y.o.  Date of assessment: 19  PCP: [facility]  Persons in attendance: Patient    CHIEF COMPLAINT/PRESENTING ISSUE (as stated by patient, rn, erp): This 34y female pt presented in the er with a variety of physical complaints. However she also expressed some thoughts of si. She also presently appears very psychotic with both grandiose and paranoid delusions; about veins in her feet proving she belongs to a royal blood line in [place] and that she is a trained  on active duty. She is on a secret mission but can't disclose any details because that would be very dangerous. She also talks about her  of 9 years who suffers from cancer but is being treated at the best hospitals in the world. She claims they are waiting for him to heal before they have sex for the first time. Again she can't give any details. This pt has a hx of suffering from bipolar disorder, depression and schizoaffective disorder. She has suffered from psychiatric issues since she was a child and has a hx of self mutilation/od. [facility] In the past staff there where concerned that she was decompensating and she subsequently was brought to the er for evaluation. Today she appeared on her own. She claims she is staying in a local hotel.  Chief Complaint   Patient presents with   • Flu Like Symptoms   • Suicidal Ideation        CURRENT LIVING SITUATION/SOCIAL SUPPORT: Patient in the past, also claimed she  lived with a roomate and that she managed her own ssd checks. She also stated, in the past, that she had no contact with her dad and her mother passed away when she was a child. She is single and has no children. She has a hx of being raised in the foster care system. She had a maternal uncle living locally who was involved in her life, in the past. Presently she is too  "paranoid about disclosing anything about her social support system. It appears nominal at this point in time.     BEHAVIORAL HEALTH TREATMENT HISTORY  Does patient/parent report a history of prior behavioral health treatment for patient?   Yes: Patient has extensive mental health treatment, with hospitalizations at [facility] and a number of inpt admissions and outpt txs at [facility].She also received adolescent inpt tx and residential tx as a child and adolescent in [facility]. However, she presently is too paranoid to detail any tx history or current psy meds she is supposed to be on. In fact, she denies any psychiatric hx at all; period       SAFETY ASSESSMENT - SELF  Does patient acknowledge current or past symptoms of dangerousness to self?yes  Does parent/significant other report patient has current or past symptoms of dangerousness to self? na  Does presenting problem suggest symptoms of dangerousness to self? Yes   pt states she has thoughts of self harm. However, she  Is too delusion and paranoid to give any details. She has a hx of an od and cutting. She denies access to a firearm and states her si has been underlying for a while. But recently she is feeling overwhelmed and has thoughts of self harm and \"would not be safe to leave the hospital\", she claims.    SAFETY ASSESSMENT - OTHERS  Does patient acknowledge current or past symptoms of aggressive behavior or risk to others? no  Does parent/significant other report patient has current or past symptoms of aggressive behavior or risk to others?  N/A  Does presenting problem suggest symptoms of dangerousness to others? No    Crisis Safety Plan completed and copy given to patient? No pt is on a legal hold    ABUSE/NEGLECT SCREENING  Does patient report feeling “unsafe” in his/her home, or afraid of anyone?  no  Does patient report any history of physical, sexual, or emotional abuse?  no  Does parent or significant other report any of the above? N\A  Is there " "evidence of neglect by self?  no  Is there evidence of neglect by a caregiver? no  Does the patient/parent report any history of CPS/APS/police involvement related to suspected abuse/neglect or domestic violence? no  Based on the information provided during the current assessment, is a mandated report of suspected abuse/neglect being made?  No    SUBSTANCE USE SCREENING  Yes:  Chauncey all substances used in the past 30 days:Patient denies substance use      Last Use Amount   []   Alcohol     []   Marijuana     []   Heroin     []   Prescription Opioids  (used without prescription, for    recreation, or in excess of prescribed amount)     []   Other Prescription  (used without prescription, for    recreation, or in excess of prescribed amount)     []   Cocaine      []   Methamphetamine     []   \"\" drugs (ectasy, MDMA)     []   Other substances        UDS results: neg  Breathalyzer results: 0.01    What consequences does the patient associate with any of the above substance use and or addictive behaviors? None    Risk factors for detox (check all that apply):  []  Seizures   []  Diaphoretic (sweating)   []  Tremors   []  Hallucinations   []  Increased blood pressure   []  Decreased blood pressure   []  Other   [x]  None      [] Patient education on risk factors for detoxification and instructed to return to ER as needed.na    MENTAL STATUS   Participation: active verbal communication but extremely guarded   Grooming: Casual  Orientation: pt has delusions but is oriented to name and place   Behavior: labile but cooperative  Eye contact: generally good but limited at times  Mood: very despondent and anxious  Affect: Expansive  Thought process: active psychosis  Thought content: many delusions, some fixed and others are paranoid.  Speech: Volume within normal limits  Perception: active psychosis with many delusions/ internal stimuli possible                     Memory: deficits noted  Insight: Poor  Judgment:  " Poor  Other:    Collateral information:   Source:  [] Significant other present in person:   [] Significant other by telephone  [] [facility]   [x] [facility] Nursing Staff  [x] [facility]  [x] Other:erp     [] Unable to complete full assessment due to:  [] Acute intoxication  [x] Patient declined to participate/engage to some degree  [] Patient verbally unresponsive  [] Significant cognitive deficits  [x] Some perceptual distortions or behavioral disorganization  [] Other:na      CLINICAL IMPRESSIONS:  Primary: active psychosis with si  Secondary: anxiety       IDENTIFIED NEEDS/PLAN:  [Trigger DISPOSITION list for any items marked]    [x]  Imminent safety risk - self [] Imminent safety risk - others   []  Acute substance withdrawal [x]  Psychosis/Impaired reality testing   [x]  Mood/anxiety []  Substance use/Addictive behavior   [x]  Maladaptive behaviro []  Parent/child conflict   []  Family/Couples conflict []  Biomedical   [x]  Housing [x]  Financial   []   Legal  Occupational/Educational   []  Domestic violence []  Other:     Disposition: Refer to Legal Hold: transfer to inpt psy tx at either [facility] or [facility] for inpt tx.    Does patient express agreement with the above plan? yes    Referral appointment(s) scheduled? N\A    Alert team only:   I have discussed findings and recommendations with  who is in agreement with these recommendations. 34y  female presents in the er with active psychosis and si. She has been placed on a legal hold pending transfer to inpt psychiatric tx.    Referral information sent to the following community providers :na    If applicable : Referred  to : Christa for legal hold follow up at 0530      Chauncey Warren R.N.  11/22/2019

## 2019-11-22 NOTE — DISCHARGE PLANNING
Medical Social Work    Referral: Legal Hold    Intervention: Legal Hold Paperwork given to SW by [employer] RN Chauncey    Legal Hold Initiated: Date: 11/22/19 Time: 0430    Patient’s Insurance Listed on Face Sheet: Medicaid FFS and Medicare    Referrals sent to: [facility], [facility], [facility], [facility], and [facility]    This referral contains the following information:  1) Face sheet __x__  2) Page 1 and Page 2 of Legal Hold __x__  3) Alert Team Assessment/Psych Assessment __x__  4) Head to toe physical exam _x___  5) Urine Drug Screen ___x_  6) Blood Alcohol __x__  7) Vital signs __x__  8) Pregnancy test when applicable _na__  9) Medications list __x__    Plan: Patient will transfer to mental health facility once acceptance is obtained

## 2019-11-22 NOTE — DISCHARGE INSTRUCTIONS
Please discuss the following findings with your regular doctor:  CT-ABDOMEN-PELVIS WITH   Final Result         1.  No acute abnormality.   2.  Nodular masses in the left posterior lateral flank soft tissues, of uncertain significance. Could correspond with injection granulomas. Correlate with history of injections. Otherwise consider radiographic follow-up in 3 months for evaluation of    stability.   3.  Hepatomegaly      DX-CHEST-PORTABLE (1 VIEW)   Final Result         1.  No acute cardiopulmonary disease.

## 2019-11-23 LAB — EKG IMPRESSION: NORMAL

## 2019-11-23 PROCEDURE — 99282 EMERGENCY DEPT VISIT SF MDM: CPT | Performed by: PSYCHIATRY & NEUROLOGY

## 2019-11-23 NOTE — ED NOTES
"Assumed care of pt. Pt has dinner tray and is noted to be eating. Upon entering room pt states \"Eating is making my stomach hurt and I feel like I'm gonna vomit.\" Pt begins dry heaving. Pt provided with emesis bag. Pt requesting to speak to a doctor. MD made aware. Ambulatory to bathroom with steady gait.   "

## 2019-11-23 NOTE — ED NOTES
Accompanied pt to restroom and back to room.  Toothbrush and comb provided and bed linens changed. Pt is in view of nurse

## 2019-11-23 NOTE — ED NOTES
Pt reporting she has nodules in her stomach and she needs surgery. Pt reports stomach pain when she walks. Requesting to speak to MD. MD made aware.

## 2019-11-23 NOTE — PSYCHIATRY
"PSYCHIATRIC FOLLOW-UP:(established)  *Reason for admission:   recent hx of PNA at [facility] and given abx. 2 weeks ago. Returns  for   chest tightness onset 2 weeks ago. She also has right sided abdominal pain, cough and dry heaving. She is also SI.       *Legal Hold Status on Admission:  +                    *HPI:  Abdominal pain is better. Now says her  is dead but \"I wasn't allowed to know\". Says she found out yesterday: her  called her and told her. Highly unlikely that he would have called to say this, no phone call in notes. Refusing meds because \"I don't need them\"  . Slept.        *Psychiatric Examination:  Vitals: Blood pressure 112/53, pulse 62, temperature 36.8 °C (98.3 °F), temperature source Temporal, resp. rate 16, height 1.727 m (5' 8\"), weight (!) 129.7 kg (286 lb), SpO2 95 %, not currently breastfeeding.  General Appearance: obese, good eye contact  Abnormal Movements: none noted  Gait and Posture: lying in bed  Speech: wnl  Thought processes:normal rate  Associations: linear  Abnormal or Psychotic Thoughts: delusions  Judgement and Insight: impaired  Orientation: grossly oriented  Recent and Remote Memory: grossly intact  Attention Span and Concentration: intact  Language: fluid  Fund of Knowledge:not tested  Mood and Affect: she feels she is doing well/blunted  SI/HI:denies      *ASSESSMENT/PLAN:  1. Psychotic disorder unspc  -R/O Schizoaffective disorder etc  - actively psychotic  -abilify: 10 mg started: recommend long acting injectable: she is refusing meds.          2. alcohol use, R/O disorder  -breathalyzer 0.02     - CIWA       3. Medical:  - abdominal pain: also has hepatomegaly, splenomegaly, choledoliathiasis            *Legal hold: extended.              *Will Follow  *Thank you for the consult      "

## 2019-11-23 NOTE — ED PROVIDER NOTES
ED Provider Note    10:33 AM  Patient reevaluated.  Patient is on a legal hold, waiting transfer to psychiatric facility when a bed is available.  Please refer to initial note for complete details.  No concerns overnight.  Patient ambulates to the bathroom independently and tolerated a meal.  Medication reconciliation has been reviewed.

## 2019-11-23 NOTE — ED NOTES
Patient's home medications have been reviewed by the pharmacy team.     Pt presents with chest tightness onset 2 weeks ago. As well as right sided abdominal pain, dry heaving, and SI. She is afraid to go home as she states that she will hurt herself if she were to go home. Pt also expresses psychosis.     Past Medical History:   Diagnosis Date   • Abscess 10/12/2017    right AC arm area   • Bipolar affective (HCC)    • Depression    • Kidney infection    • Suicide attempt (HCC)        Patient's Medications    No medications on file     A:  Patient reports not currently taking any medications. Medications do not appear to be contributing to current complaints.     P:    Psychiatry has seen the patient and ordered arirpiprazole 10 mg daily.   No additonal recommendations at this time.       Christie Soto, PharmD

## 2019-11-24 LAB
ALBUMIN SERPL BCP-MCNC: 4.2 G/DL (ref 3.2–4.9)
ALBUMIN/GLOB SERPL: 1.6 G/DL
ALP SERPL-CCNC: 83 U/L (ref 30–99)
ALT SERPL-CCNC: 14 U/L (ref 2–50)
ANION GAP SERPL CALC-SCNC: 6 MMOL/L (ref 0–11.9)
APPEARANCE UR: CLEAR
AST SERPL-CCNC: 12 U/L (ref 12–45)
BACTERIA #/AREA URNS HPF: ABNORMAL /HPF
BASOPHILS # BLD AUTO: 0.2 % (ref 0–1.8)
BASOPHILS # BLD: 0.03 K/UL (ref 0–0.12)
BILIRUB SERPL-MCNC: 0.4 MG/DL (ref 0.1–1.5)
BILIRUB UR QL STRIP.AUTO: NEGATIVE
BUN SERPL-MCNC: 15 MG/DL (ref 8–22)
CALCIUM SERPL-MCNC: 9.2 MG/DL (ref 8.5–10.5)
CHLORIDE SERPL-SCNC: 105 MMOL/L (ref 96–112)
CO2 SERPL-SCNC: 26 MMOL/L (ref 20–33)
COLOR UR: YELLOW
CREAT SERPL-MCNC: 0.78 MG/DL (ref 0.5–1.4)
EOSINOPHIL # BLD AUTO: 0.08 K/UL (ref 0–0.51)
EOSINOPHIL NFR BLD: 0.5 % (ref 0–6.9)
EPI CELLS #/AREA URNS HPF: NEGATIVE /HPF
ERYTHROCYTE [DISTWIDTH] IN BLOOD BY AUTOMATED COUNT: 41.1 FL (ref 35.9–50)
FLUAV RNA SPEC QL NAA+PROBE: NEGATIVE
FLUBV RNA SPEC QL NAA+PROBE: NEGATIVE
GLOBULIN SER CALC-MCNC: 2.7 G/DL (ref 1.9–3.5)
GLUCOSE SERPL-MCNC: 92 MG/DL (ref 65–99)
GLUCOSE UR STRIP.AUTO-MCNC: NEGATIVE MG/DL
HCT VFR BLD AUTO: 43.4 % (ref 37–47)
HGB BLD-MCNC: 13.8 G/DL (ref 12–16)
HYALINE CASTS #/AREA URNS LPF: ABNORMAL /LPF
IMM GRANULOCYTES # BLD AUTO: 0.07 K/UL (ref 0–0.11)
IMM GRANULOCYTES NFR BLD AUTO: 0.5 % (ref 0–0.9)
KETONES UR STRIP.AUTO-MCNC: NEGATIVE MG/DL
LEUKOCYTE ESTERASE UR QL STRIP.AUTO: ABNORMAL
LIPASE SERPL-CCNC: 12 U/L (ref 11–82)
LYMPHOCYTES # BLD AUTO: 2.66 K/UL (ref 1–4.8)
LYMPHOCYTES NFR BLD: 17.6 % (ref 22–41)
MCH RBC QN AUTO: 28.3 PG (ref 27–33)
MCHC RBC AUTO-ENTMCNC: 31.8 G/DL (ref 33.6–35)
MCV RBC AUTO: 88.9 FL (ref 81.4–97.8)
MICRO URNS: ABNORMAL
MONOCYTES # BLD AUTO: 0.75 K/UL (ref 0–0.85)
MONOCYTES NFR BLD AUTO: 5 % (ref 0–13.4)
NEUTROPHILS # BLD AUTO: 11.49 K/UL (ref 2–7.15)
NEUTROPHILS NFR BLD: 76.2 % (ref 44–72)
NITRITE UR QL STRIP.AUTO: NEGATIVE
NRBC # BLD AUTO: 0 K/UL
NRBC BLD-RTO: 0 /100 WBC
PH UR STRIP.AUTO: 7 [PH] (ref 5–8)
PLATELET # BLD AUTO: 365 K/UL (ref 164–446)
PMV BLD AUTO: 9 FL (ref 9–12.9)
POTASSIUM SERPL-SCNC: 4.2 MMOL/L (ref 3.6–5.5)
PROT SERPL-MCNC: 6.9 G/DL (ref 6–8.2)
PROT UR QL STRIP: NEGATIVE MG/DL
RBC # BLD AUTO: 4.88 M/UL (ref 4.2–5.4)
RBC # URNS HPF: ABNORMAL /HPF
RBC UR QL AUTO: ABNORMAL
SODIUM SERPL-SCNC: 137 MMOL/L (ref 135–145)
SP GR UR STRIP.AUTO: 1.01
UROBILINOGEN UR STRIP.AUTO-MCNC: 0.2 MG/DL
WBC # BLD AUTO: 15.1 K/UL (ref 4.8–10.8)
WBC #/AREA URNS HPF: ABNORMAL /HPF

## 2019-11-24 PROCEDURE — 80053 COMPREHEN METABOLIC PANEL: CPT

## 2019-11-24 PROCEDURE — A9270 NON-COVERED ITEM OR SERVICE: HCPCS | Performed by: EMERGENCY MEDICINE

## 2019-11-24 PROCEDURE — 87086 URINE CULTURE/COLONY COUNT: CPT

## 2019-11-24 PROCEDURE — 700111 HCHG RX REV CODE 636 W/ 250 OVERRIDE (IP): Performed by: EMERGENCY MEDICINE

## 2019-11-24 PROCEDURE — 87186 SC STD MICRODIL/AGAR DIL: CPT

## 2019-11-24 PROCEDURE — 81001 URINALYSIS AUTO W/SCOPE: CPT

## 2019-11-24 PROCEDURE — 99282 EMERGENCY DEPT VISIT SF MDM: CPT | Performed by: PSYCHIATRY & NEUROLOGY

## 2019-11-24 PROCEDURE — 700102 HCHG RX REV CODE 250 W/ 637 OVERRIDE(OP): Performed by: EMERGENCY MEDICINE

## 2019-11-24 PROCEDURE — 87502 INFLUENZA DNA AMP PROBE: CPT

## 2019-11-24 PROCEDURE — 87077 CULTURE AEROBIC IDENTIFY: CPT

## 2019-11-24 PROCEDURE — 85025 COMPLETE CBC W/AUTO DIFF WBC: CPT

## 2019-11-24 PROCEDURE — 83690 ASSAY OF LIPASE: CPT

## 2019-11-24 RX ORDER — ONDANSETRON 4 MG/1
4 TABLET, ORALLY DISINTEGRATING ORAL ONCE
Status: COMPLETED | OUTPATIENT
Start: 2019-11-24 | End: 2019-11-24

## 2019-11-24 RX ORDER — ONDANSETRON 4 MG/1
4 TABLET, ORALLY DISINTEGRATING ORAL EVERY 6 HOURS PRN
Status: DISCONTINUED | OUTPATIENT
Start: 2019-11-24 | End: 2019-11-27 | Stop reason: HOSPADM

## 2019-11-24 RX ORDER — NITROFURANTOIN 25; 75 MG/1; MG/1
100 CAPSULE ORAL 2 TIMES DAILY WITH MEALS
Status: DISCONTINUED | OUTPATIENT
Start: 2019-11-24 | End: 2019-11-27 | Stop reason: HOSPADM

## 2019-11-24 RX ADMIN — ONDANSETRON 4 MG: 4 TABLET, ORALLY DISINTEGRATING ORAL at 10:30

## 2019-11-24 RX ADMIN — NITROFURANTOIN MONOHYDRATE/MACROCRYSTALLINE 100 MG: 25; 75 CAPSULE ORAL at 16:50

## 2019-11-24 RX ADMIN — ONDANSETRON 4 MG: 4 TABLET, ORALLY DISINTEGRATING ORAL at 16:50

## 2019-11-24 NOTE — ED PROVIDER NOTES
ED Provider Note    ERP addendum    The patient is currently being held in the ER waiting for placement in a psychiatric hospital.  The patient has been complaining of nausea and had an episode of vomiting this afternoon.  I have seen the patient and reviewed her chart she says that she is having discomfort with urination and is not currently having her menstrual period.  Urinalysis is abnormal with white blood cells red blood cells leukocyte Estrace and bacteria and I have started the patient on Macrobid and also Zofran if needed for nausea.  Repeat CBC showed an elevated white blood cell count of 15.  Influenza testing is pending

## 2019-11-24 NOTE — ED NOTES
Received report from jeremiah hernandez. Pt resting comfortably respirations even and unlabored sitter in place.

## 2019-11-24 NOTE — PSYCHIATRY
"PSYCHIATRIC FOLLOW-UP:(established)  *Reason for admission:recent hx of PNA at [facility] and given abx. 2 weeks ago. Returns  for   chest tightness onset 2 weeks ago. She also has right sided abdominal pain, cough and dry heaving. She is also SI.             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 Her name is \">>>>\" is was followed by so many names I could not keep up. \"I am royalty\"          *Psychiatric Examination:  Vitals: Blood pressure 124/60, pulse 68, temperature 37.1 °C (98.8 °F), temperature source Temporal, resp. rate 16, height 1.727 m (5' 8\"), weight (!) 129.7 kg (286 lb), SpO2 96 %, not currently breastfeeding.  General Appearance: good eye contact.  Abnormal Movements: none  Gait and Posture: lying in bed  Speech: wnl  Thought processes: normal rate  Associations: linear  Abnormal or Psychotic Thoughts: delusional  Judgement and Insight: impaired  Orientation: grossly intact  Recent and Remote Memory: grossly intact  Attention Span and Concentration: intact  Language: fluid  Fund of Knowledge:not tested  Mood and Affect: NAD and says is feeling better (physically because mentally she doesn't not think there is anything amiss )   SI/HI:denies      *ASSESSMENT/PLAN:  1. Psychotic disorder unspc  -R/O Schizoaffective disorder etc  - actively psychotic  -abilify: 10 mg started: recommend long acting injectable: she is refusing meds.     2. alcohol use, R/O disorder  -breathalyzer 0.02     - CIWA     3. Medical:  - abdominal pain: also has hepatomegaly, splenomegaly, choledoliathiasis: improved  -UA as she is complaining of pain on urination (not anything else)              *Legal hold: extended.  UA ordered for possible UTI             *Will Follow      "

## 2019-11-24 NOTE — ED NOTES
.Patient resting in bed, sleeping, no signs of pain or distress, unlabored breathing noted on room air, sitter in hallway performing direct observation, repositions self occasionally, bed in low position, safety room features in place, no cough noted, frequent rounding performed, will continue to assess patient.

## 2019-11-24 NOTE — ED NOTES
Patient awake alert and oriented x 4, Rui 15, bed in low position, unlabored breathing noted, no cough noted, on room air, sitter in hallway performing direct observation, denies pain, interactive with staff, interactions noted as appropriate, frequent rounding performed.

## 2019-11-24 NOTE — ED NOTES
.Patient resting in bed, sleeping, no signs of pain or distress, unlabored breathing noted, repositons self occasionally, sitter in line of sight performing direct observation, bed in low position, no cough noted, on room air, frequent rounding performed, safety room features in place.

## 2019-11-24 NOTE — ED NOTES
Pt up out of bed, combing hair brushing teeth. Hygiene items now placed back in patient storage. Given clean gown. Denies other needs.

## 2019-11-24 NOTE — PROGRESS NOTES
Patient reevaluated.  Patient is on a legal hold, waiting transfer to psychiatric facility when a bed is available.  Please refer to initial note for complete details.  No concerns overnight, although this morning patient complaining of some intermittent abdominal discomfort.  I evaluated patient at bedside, abdominal exam is benign, no reproducible or localized pain.  Pregnancy medical records reviewed, patient evaluated initially 11/21 for abdominal pain with normal labs and CT).  Remains hemodynamically stable without fever tachycardia.  She tolerated breakfast without difficulty..  Patient ambulates to the bathroom independently.  Medication reconciliation has been reviewed, add Zofran for nausea.

## 2019-11-25 PROCEDURE — 99282 EMERGENCY DEPT VISIT SF MDM: CPT | Performed by: PSYCHIATRY & NEUROLOGY

## 2019-11-25 PROCEDURE — A9270 NON-COVERED ITEM OR SERVICE: HCPCS | Performed by: EMERGENCY MEDICINE

## 2019-11-25 PROCEDURE — 700102 HCHG RX REV CODE 250 W/ 637 OVERRIDE(OP): Performed by: EMERGENCY MEDICINE

## 2019-11-25 RX ADMIN — NITROFURANTOIN MONOHYDRATE/MACROCRYSTALLINE 100 MG: 25; 75 CAPSULE ORAL at 06:38

## 2019-11-25 RX ADMIN — NITROFURANTOIN MONOHYDRATE/MACROCRYSTALLINE 100 MG: 25; 75 CAPSULE ORAL at 18:57

## 2019-11-25 NOTE — ED NOTES
Pt up to bathroom with steady gait. Pt is calm and has no complaints at this time. Q15 obs in place

## 2019-11-25 NOTE — ED NOTES
Patient remains in room. Sitter outside door. Patient denies needs at this time. Will continue to monitor patient.

## 2019-11-25 NOTE — PSYCHIATRY
"PSYCHIATRIC FOLLOW-UP:(established)  *Reason for admission:  recent hx of PNA at [facility] and given abx. 2 weeks ago. Returns  for   chest tightness onset 2 weeks ago. She also has right sided abdominal pain, cough and dry heaving. She is also SI.                 *Legal Hold Status on Admission: +                    *HPI: she feels much better physically  And tells me she was started on antibiotics. She feels ready for dc. She lives alone. 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 She has also been \"deemed (repeated twice with an intense look to make sure I understood) a world order\". she denies hallucinations but when I was taking a note, I would see her nodding and smiling toward the veliz. This happened twice, there was no one there. When asked she denied it and said I was making it up. It was quite obvious.   Her mood is a \"7\" or a \"3\" \"either one end or the other depending on which is good\" . In other words, she feels good.  Asked about SI on admit, \"oh, an uncle \" and she doesn't not show any sadness about it now. No mention of the  who was dying, dead, or ?.       *Psychiatric Examination:  Vitals: Blood pressure 124/60, pulse 68, temperature 37.1 °C (98.8 °F), temperature source Temporal, resp. rate 16, height 1.727 m (5' 8\"), weight (!) 129.7 kg (286 lb), SpO2 96 %, not currently breastfeeding.  General Appearance: good eye contact.  Abnormal Movements: none  Gait and Posture: lying in bed: I have yet to see her up  Speech: wnl  Thought processes: normal rate  Associations: linear  Abnormal or Psychotic Thoughts: delusional and is seeing things and probably hearing someone as well.  Judgement and Insight: impaired  Orientation: grossly intact  Recent and Remote Memory: grossly intact  Attention Span and Concentration: intact  Language: fluid  Fund of Knowledge:not tested  Mood and Affect: as noted  SI/HI:denies    Results for " EVIE ENGEL (MRN [id number]) as of 11/25/2019 13:03   Ref. Range 11/24/2019 14:02   Occult Blood Latest Ref Range: Negative  Moderate (A)   Protein Latest Ref Range: Negative mg/dL Negative   Nitrite Latest Ref Range: Negative  Negative   Leukocyte Esterase Latest Ref Range: Negative  Small (A)         *ASSESSMENT/PLAN:  1. Psychotic disorder unspc  -R/O Schizoaffective disorder etc  - actively psychotic  -abilify: 10 mg started: recommend long acting injectable: she is refusing meds.     2. alcohol use, R/O disorder  -breathalyzer 0.02        3. Medical:  - abdominal pain: also has hepatomegaly, splenomegaly, choledoliathiasis: resolved  -UTI, nitrofurantoin           *Legal hold: extended           *Will Follow

## 2019-11-25 NOTE — ED PROVIDER NOTES
ED Provider Note    The patient continues to await transfer for inpatient psychiatric evaluation and treatment.  Yesterday she was diagnosed as having a urinary tract infection and was started on Macrobid.  Her influenza test is come back negative.  Her vital signs remained normal.  She has been ambulatory.  She has been fed.  She is currently resting with no acute needs.

## 2019-11-25 NOTE — ED NOTES
Pt resting in bed. Pt calmly eating dinner tray with no complaints at this time. Pt expresses delusions and thoughts aren't clear expressed. Close observations in place by this RN.

## 2019-11-25 NOTE — ED NOTES
Patient in room with monitors attached and functioning. No acute distress noted. Bed is in low and locked position. Patient states no needs. Will continue to monitor patient.

## 2019-11-25 NOTE — ED NOTES
Report received from John.     Patient resting in stretcher. Sitter outside door. Patient is pleasant and cooperative at this time. Will continue to monitor.

## 2019-11-25 NOTE — DISCHARGE PLANNING
Legal Hold    Referral: Legal Hold Court     Intervention: Pt presented for legal hold meeting with .  advised pt will meet with court MD's via telemedicine monitor to contest the legal hold.      Plan: Pt will present to telemedicine mental health to meet with court physicians 11/26. Will call bedside RN once time has been determined

## 2019-11-26 PROCEDURE — 99282 EMERGENCY DEPT VISIT SF MDM: CPT | Mod: GC | Performed by: PSYCHIATRY & NEUROLOGY

## 2019-11-26 PROCEDURE — 700102 HCHG RX REV CODE 250 W/ 637 OVERRIDE(OP): Performed by: EMERGENCY MEDICINE

## 2019-11-26 PROCEDURE — A9270 NON-COVERED ITEM OR SERVICE: HCPCS | Performed by: EMERGENCY MEDICINE

## 2019-11-26 RX ADMIN — NITROFURANTOIN MONOHYDRATE/MACROCRYSTALLINE 100 MG: 25; 75 CAPSULE ORAL at 17:45

## 2019-11-26 RX ADMIN — NITROFURANTOIN MONOHYDRATE/MACROCRYSTALLINE 100 MG: 25; 75 CAPSULE ORAL at 09:30

## 2019-11-26 ASSESSMENT — ENCOUNTER SYMPTOMS
COUGH: 0
NECK PAIN: 0
DEPRESSION: 0
CHILLS: 0
NAUSEA: 0
VOMITING: 0
FEVER: 0
PALPITATIONS: 0
HEMOPTYSIS: 0
DIZZINESS: 0
BLURRED VISION: 0
SORE THROAT: 0
HEADACHES: 0
DIARRHEA: 0
MYALGIAS: 0
DOUBLE VISION: 0

## 2019-11-26 NOTE — PSYCHIATRY
"PSYCHIATRIC FOLLOW UP:    Reason for Admission: recent hx of PNA at [facility] and given abx. 2 weeks ago. Returns  for   chest tightness onset 2 weeks ago. She also has right sided abdominal pain, cough and dry heaving. She is also SI.    Requesting Physician: Christie Shepherd D.O.  Supervising Physician:Kerrie Manjarrez M.D.    Subjective:  Patient is a 34 y.o. female being seen for psychiatric follow up.  On interview, patient says that her name is not Dunia but that she is [place].  She reports she is leaving to court soon to contest her legal hold.  She denies any suicidal ideations or homicidal ideations.  She reports she slept well overnight.  She denies any medication side effects, but continues to refuse her medication saying that \"the atmospheric pressure is too high and the medication is not dosed correctly for my body weight.\"  She reports her mood as \"fine.\"  She reports an adequate appetite.  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     Review of Systems   Constitutional: Negative for chills and fever.   HENT: Negative for hearing loss and sore throat.    Eyes: Negative for blurred vision and double vision.   Respiratory: Negative for cough and hemoptysis.    Cardiovascular: Negative for chest pain and palpitations.   Gastrointestinal: Negative for diarrhea, nausea and vomiting.   Genitourinary: Negative for dysuria and urgency.   Musculoskeletal: Negative for myalgias and neck pain.   Skin: Negative for itching and rash.   Neurological: Negative for dizziness and headaches.   Psychiatric/Behavioral: Negative for depression and suicidal ideas.       Psychiatric Examination:   Vitals: /63   Pulse 67   Temp 36.8 °C (98.3 °F) (Oral)   Resp 18   Ht 1.727 m (5' 8\")   Wt (!) 129.7 kg (286 lb)   SpO2 98%   BMI 43.49 kg/m²   Musculoskeletal: No abnormal movements noted  Appearance: fair hygiene and obese, cooperative, evasive, poor eye contact and " "irritable  Thoughts: paranoid delusions, grandiose delusions and patient denies SI and HI, disorganized and Tangential and perseverative on delusional thought content  Speech: regular rate, rhythm, volume, tone, and syntax  Mood: \"Fine\"  Affect: dysthymic, restricted and congruent with mood  SI/HI: Denies SI and HI  Alert/Oriented: alert and oriented  Memory: no gross impairment in immediate, recent, or remote memory  Fund of Knowledge: adequate  Insight/Judgement into symptoms: poor  Neurological Testing: MMSE not performed during this encounter    Medications (currently prescribed at [facility]):  Current Facility-Administered Medications   Medication Dose Route Frequency Provider Last Rate Last Dose   • ondansetron (ZOFRAN ODT) dispertab 4 mg  4 mg Oral Q6HRS PRN Tanner Lyn M.D.   4 mg at 11/24/19 1650   • nitrofurantoin monohyd macro (MACROBID) capsule CAPS 100 mg  100 mg Oral BID WITH MEALS Tanner Lyn M.D.   100 mg at 11/26/19 0930   • ARIPiprazole (ABILIFY) tablet 10 mg  10 mg Oral DAILY Jean-Paul Shelley M.D.         No current outpatient medications on file.        Labs:  Recent Labs     11/24/19  1339   WBC 15.1*   RBC 4.88   HEMOGLOBIN 13.8   HEMATOCRIT 43.4   MCV 88.9   MCH 28.3   MCHC 31.8*   RDW 41.1   PLATELETCT 365   MPV 9.0     Recent Labs     11/24/19  1339   SODIUM 137   POTASSIUM 4.2   CHLORIDE 105   CO2 26   GLUCOSE 92   BUN 15   CREATININE 0.78   CALCIUM 9.2                        Recent Results (from the past 72 hour(s))   CBC WITH DIFFERENTIAL    Collection Time: 11/24/19  1:39 PM   Result Value Ref Range    WBC 15.1 (H) 4.8 - 10.8 K/uL    RBC 4.88 4.20 - 5.40 M/uL    Hemoglobin 13.8 12.0 - 16.0 g/dL    Hematocrit 43.4 37.0 - 47.0 %    MCV 88.9 81.4 - 97.8 fL    MCH 28.3 27.0 - 33.0 pg    MCHC 31.8 (L) 33.6 - 35.0 g/dL    RDW 41.1 35.9 - 50.0 fL    Platelet Count 365 164 - 446 K/uL    MPV 9.0 9.0 - 12.9 fL    Neutrophils-Polys 76.20 (H) 44.00 - 72.00 %    Lymphocytes 17.60 (L) 22.00 - 41.00 % "    Monocytes 5.00 0.00 - 13.40 %    Eosinophils 0.50 0.00 - 6.90 %    Basophils 0.20 0.00 - 1.80 %    Immature Granulocytes 0.50 0.00 - 0.90 %    Nucleated RBC 0.00 /100 WBC    Neutrophils (Absolute) 11.49 (H) 2.00 - 7.15 K/uL    Lymphs (Absolute) 2.66 1.00 - 4.80 K/uL    Monos (Absolute) 0.75 0.00 - 0.85 K/uL    Eos (Absolute) 0.08 0.00 - 0.51 K/uL    Baso (Absolute) 0.03 0.00 - 0.12 K/uL    Immature Granulocytes (abs) 0.07 0.00 - 0.11 K/uL    NRBC (Absolute) 0.00 K/uL   COMP METABOLIC PANEL    Collection Time: 11/24/19  1:39 PM   Result Value Ref Range    Sodium 137 135 - 145 mmol/L    Potassium 4.2 3.6 - 5.5 mmol/L    Chloride 105 96 - 112 mmol/L    Co2 26 20 - 33 mmol/L    Anion Gap 6.0 0.0 - 11.9    Glucose 92 65 - 99 mg/dL    Bun 15 8 - 22 mg/dL    Creatinine 0.78 0.50 - 1.40 mg/dL    Calcium 9.2 8.5 - 10.5 mg/dL    AST(SGOT) 12 12 - 45 U/L    ALT(SGPT) 14 2 - 50 U/L    Alkaline Phosphatase 83 30 - 99 U/L    Total Bilirubin 0.4 0.1 - 1.5 mg/dL    Albumin 4.2 3.2 - 4.9 g/dL    Total Protein 6.9 6.0 - 8.2 g/dL    Globulin 2.7 1.9 - 3.5 g/dL    A-G Ratio 1.6 g/dL   LIPASE    Collection Time: 11/24/19  1:39 PM   Result Value Ref Range    Lipase 12 11 - 82 U/L   ESTIMATED GFR    Collection Time: 11/24/19  1:39 PM   Result Value Ref Range    GFR If African American >60 >60 mL/min/1.73 m 2    GFR If Non African American >60 >60 mL/min/1.73 m 2   URINALYSIS,CULTURE IF INDICATED    Collection Time: 11/24/19  2:02 PM   Result Value Ref Range    Color Yellow     Character Clear     Specific Gravity 1.009 <1.035    Ph 7.0 5.0 - 8.0    Glucose Negative Negative mg/dL    Ketones Negative Negative mg/dL    Protein Negative Negative mg/dL    Bilirubin Negative Negative    Urobilinogen, Urine 0.2 Negative    Nitrite Negative Negative    Leukocyte Esterase Small (A) Negative    Occult Blood Moderate (A) Negative    Micro Urine Req Microscopic    URINE MICROSCOPIC (W/UA)    Collection Time: 11/24/19  2:02 PM   Result Value Ref  Range    WBC 10-20 (A) /hpf    RBC 20-50 (A) /hpf    Bacteria Few (A) None /hpf    Epithelial Cells Negative /hpf    Hyaline Cast 0-2 /lpf   URINE CULTURE(NEW)    Collection Time: 11/24/19  2:02 PM   Result Value Ref Range    Significant Indicator POS (POS)     Source UR     Site -     Culture Result - (A)     Culture Result Proteus mirabilis  ,000 cfu/mL   (A)        Susceptibility    Proteus mirabilis - LOKI     Ampicillin <=8 Sensitive mcg/mL     Ceftriaxone <=1 Sensitive mcg/mL     Ceftazidime <=1 Sensitive mcg/mL     Cefotaxime <=2 Sensitive mcg/mL     Cefazolin <=2 Sensitive mcg/mL     Ciprofloxacin <=1 Sensitive mcg/mL     Ampicillin/sulbactam <=8/4 Sensitive mcg/mL     Cefepime <=2 Sensitive mcg/mL     Tobramycin <=4 Sensitive mcg/mL     Cefotetan <=16 Sensitive mcg/mL     Nitrofurantoin >64 Resistant mcg/mL     Gentamicin <=4 Sensitive mcg/mL     Levofloxacin <=2 Sensitive mcg/mL     Pip/Tazobactam <=16 Sensitive mcg/mL     Trimeth/Sulfa <=2/38 Sensitive mcg/mL   Influenza By PCR, A/B    Collection Time: 11/24/19  2:07 PM   Result Value Ref Range    Influenza virus A RNA Negative Negative    Influenza virus B, PCR Negative Negative           *ASSESSMENT  Patient is a 34-year-old female who originally presents in the setting of suicidal ideations and psychotic symptoms.  Patient continues to remain delusional.  Patient contested her hold today in court and her legal hold has been released by the court.  Patient will be encouraged to stay voluntarily as she is actively psychotic, but she will but will be able to leave tomorrow once paperwork is received from court.  Patient continues to refuse medications.    Diagnosis:  -Unspecified schizophrenia spectrum disorder another psychotic disorder, rule out substance-induced psychotic disorder, rule out mood disorder with psychotic features     Medical:  - abdominal pain: also has hepatomegaly, splenomegaly, choledoliathiasis: resolved  -UTI, nitrofurantoin                         Plan:  1. Legal Hold: extended, awaiting paperwork from Court regarding discontinuation      2. Continue Abilify 10 mg for psychosis, patient currently refusing  3. No as needed medication at this time  4. Labs and imaging within normal limits  5. Disposition to be determined  6. No collateral obtained    Other:  Sitter: In place  Privileges/Restrictions: per protocol   - Reviewed risks, benefits, alternatives to include no treatment, therapy and medications    Thank you for this consult. Will continue to follow.

## 2019-11-26 NOTE — ED NOTES
Patient resting in bed, NAD. Respirations even and unlabored.Will continue to monitor    POC is for pt to go to court at 1200- pt updated

## 2019-11-26 NOTE — ED NOTES
Hourly rounding performed. Assessed patient complaints and Bathroom/comfort needs.  Patient resting.

## 2019-11-26 NOTE — NON-PROVIDER
"*MEDICAL STUDENT NOTE*      PSYCHIATRIC FOLLOW-UP:(established)  *Reason for admission:  \"impulsive thoughts said by the person I'm undercover for\"           *Legal Hold Status on Admission: +                     *HPI: Patient is a 34 year old female currently placed on a Legal Hold following suicidal remarks and paranoid delusions. She describes her current mood as \"fine\" and when asked to further describe her mood she reports that she is currently being held because of remarks that were made by another person pretending to be her. 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 She states her previous thoughts of suicide were the result of \"impulsive thoughts\" and that she \"woudn't commit suicide because that's a permanent solution to a temporary problem\". Currently she is refusing her psychiatric medications but is willingly taking antibiotics for her UTI. When further asked about why she is refusing her psychiatric medications, she states \"4 doctors said her blood pressure couldn't handle them\" and that the medications were inappropriately dosed \"for this altitude and a dose of 23.7 would be more appropriate\".      She currently denies SI or HI. Denies new rashes or difficulty swallowing after starting her antibiotics.     Patient is currently asking to see a  to \"be ruled completely sane\".      *Psychiatric Examination:  General Appearance: overweight female sitting up in bed. She makes good eye contact throughout the interview  Behavior: cooperatieve and engaged. She is guarded when discussing delusions  Psychomotor: Normal gait. No agitation or restlessness.  Speech: Fluent speech. Appropriate tone. Amount and speed wnl  Mood: \"fine\"  Affect: congruent with topics being discussed.   Thought process: non linear and tangential. Moderately disorganized.  Thought content: delusional thoughts describing that she \"works for the [employer]\" and \"is undercover as someone else\". " "Denies SI and HI at this time. Denies helplessness  Perceptions: Denies AVH at this time  Cognition: Awake, alert, oriented to place and time but states she \"is not Dunia\" though possibly the result of a delusion. Appropriate level of vocabulary.   Insight: poor insight into delusions  Judgement: poor judgement         *ASSESSMENT/PLAN:  1. Schizoaffective disorder vs schizophrenia vs schizophreniform  - Patient with delusions of working for the [employer] and working undercover as someone else. She describes herself as \"pure\" and currently denies SI/HI. She contributes to her previous SI statements as the result of impulsive thoughts. Impulsivity may be the result of a mood disorder, which alongside psychotic delusions may be explained by schizoaffective disorder or bipolar 1 with psychosis. Her appetite has been \"normal\" and she reports \"sleeping well\" which makes mood disorder less likely.     Patient's history of repeated hospitalizations for psychosis makes schizophreniform less likely because of the longer duration of symptoms. Poor insight, delusions, and paranoia make schizophrenia most likely explanation for current presentation.     2. Medical:  -UTI  - continue treating with Macrobid                  3. Legal Hold  Patient evaluated by court MD at 12:27 today who lifted the legal hold placed on her.      "

## 2019-11-26 NOTE — DISCHARGE PLANNING
Alert Team  Pt's legal hold was lifted by the legal hold court.  Pt will not be DC'd til tomorrow, when the stipulation from the court is received by Poonam Shelley, care coordinator.

## 2019-11-26 NOTE — ED NOTES
Pt sleeping comfortably in bed breathing is easy and unlabored.No new needs identified.  Will continue to monitor.

## 2019-11-26 NOTE — DISCHARGE PLANNING
Spoke to  Christie regarding patient's meeting with the court doctors today. Patient has been scheduled to meet with court doctors via telemedicine in the [place] [address] family room at 1200. Let CHRISTIE Dumont know of upcoming meeting this afternoon.

## 2019-11-26 NOTE — ED NOTES
Pt returned form court. Care coordination team states pt will be d/princess tomorrow    For questions call [phone]

## 2019-11-26 NOTE — ED NOTES
Pt sitting in bed watching tv comfortably, breathing is easy and unlabored.No new needs identified.  Will continue to monitor.

## 2019-11-26 NOTE — ED NOTES
Pt low risk- close ob's completed. Pt given water per request. No other requests at this time. Will continue to monitor  Patient resting in bed, NAD. Respirations even and unlabored. Will continue to monitor

## 2019-11-26 NOTE — ED NOTES
Received report from Chiquita SORIANO. Pt care responsibilities assumed. Pt resting in bed, sitter at bedside. Will continue to monitor.

## 2019-11-26 NOTE — ED NOTES
Report received from CHRISTIE Houston. Hourly rounding performed. Assessed patient complaints and Bathroom/comfort needs.  Patient resting.

## 2019-11-27 VITALS
RESPIRATION RATE: 18 BRPM | WEIGHT: 286 LBS | SYSTOLIC BLOOD PRESSURE: 117 MMHG | HEIGHT: 68 IN | DIASTOLIC BLOOD PRESSURE: 74 MMHG | BODY MASS INDEX: 43.35 KG/M2 | HEART RATE: 76 BPM | OXYGEN SATURATION: 95 % | TEMPERATURE: 96.8 F

## 2019-11-27 LAB
BACTERIA UR CULT: ABNORMAL
SIGNIFICANT IND 70042: ABNORMAL
SITE SITE: ABNORMAL
SOURCE SOURCE: ABNORMAL

## 2019-11-27 PROCEDURE — 99282 EMERGENCY DEPT VISIT SF MDM: CPT | Mod: GC | Performed by: PSYCHIATRY & NEUROLOGY

## 2019-11-27 PROCEDURE — 700102 HCHG RX REV CODE 250 W/ 637 OVERRIDE(OP): Performed by: EMERGENCY MEDICINE

## 2019-11-27 PROCEDURE — A9270 NON-COVERED ITEM OR SERVICE: HCPCS | Performed by: EMERGENCY MEDICINE

## 2019-11-27 RX ORDER — CEPHALEXIN 500 MG/1
500 CAPSULE ORAL 4 TIMES DAILY
Qty: 20 CAP | Refills: 0 | Status: SHIPPED | OUTPATIENT
Start: 2019-11-27 | End: 2019-12-02

## 2019-11-27 RX ADMIN — NITROFURANTOIN MONOHYDRATE/MACROCRYSTALLINE 100 MG: 25; 75 CAPSULE ORAL at 09:00

## 2019-11-27 ASSESSMENT — ENCOUNTER SYMPTOMS
WEAKNESS: 0
CONSTIPATION: 0
FLANK PAIN: 0
MYALGIAS: 0
DIARRHEA: 0
SHORTNESS OF BREATH: 0
SORE THROAT: 0
VOMITING: 0
DOUBLE VISION: 0
FEVER: 0
NAUSEA: 0
DEPRESSION: 0
HEADACHES: 0
CHILLS: 0
PALPITATIONS: 0
BLURRED VISION: 0
BACK PAIN: 0
COUGH: 0

## 2019-11-27 NOTE — ED PROVIDER NOTES
ED Provider Note    Patient signed out to me this morning while on a psychiatric hold.  Please see the previous notes for her care during her ED stay.  I was informed by the alert team this morning that patient's mental health hold has been lifted and she is cleared for discharge.  Patient was reevaluated and noted to be in no acute distress and nontoxic in appearance and ambulating without difficulty.  She denies any suicidal or homicidal ideations and is not exhibiting any signs of active hallucinations or delusions.  She is requesting antibiotic for her UTI.  Urine culture grew Proteus mirabilis that is pansensitive except to nitrofurantoin.  She will be prescribed Keflex.  Patient given outpatient follow-up and return to ED precautions.  She verbalized understanding and agreed with plan of care with no further questions or concerns.

## 2019-11-27 NOTE — ED NOTES
Report received from CHRISTIE Dumont, for continued care of pt. Patient is resting quietly, even and non-labored respirations noted.

## 2019-11-27 NOTE — DISCHARGE PLANNING
Received Stipulation and Order from the court releasing pt from legal hold. Gave copy to pt's RN.    Removed pt from legal hold as of today at [id number].

## 2019-11-27 NOTE — ED NOTES
Report provided to CHRISTIE Dumont, for continued care of pt. Patient is resting quietly, even and non-labored respirations noted. Sitter remains with patient within direct view.

## 2019-11-27 NOTE — DISCHARGE PLANNING
Awake and alert. Denies S/I or H/I. Denies A/H or V/H. States she is ready to go home and is willing to follow up with [facility].  Resources given.  Court Discharged her yesterday.  Discharge.

## 2019-11-27 NOTE — PSYCHIATRY
"PSYCHIATRIC FOLLOW UP:    Reason for Admission: recent hx of PNA at [facility] and given abx. 2 weeks ago. Returns for chest tightness onset 2 weeks ago. She also has right sided abdominal pain, cough and dry heaving. She is also SI.    Requesting Physician: Christie Shepherd D.O.  Supervising Physician:Kerrie Manjarrez M.D.    Subjective:  Patient is a 34 y.o. female being seen for psychiatric follow up. On interview, patient remains paranoid and delusional. However, she was released from her hold by the court and she plans on returning home to the [facility]. Patient denies any suicidal or homicidal ideations. She continues to refuse medications saying she does not need it. She reports she slept well overnight. She says her appetite is \"so-so.\" She is agreeable to following up with [facility].      Review of Systems   Constitutional: Negative for chills and fever.   HENT: Negative for hearing loss and sore throat.    Eyes: Negative for blurred vision and double vision.   Respiratory: Negative for cough and shortness of breath.    Cardiovascular: Negative for chest pain and palpitations.   Gastrointestinal: Negative for constipation, diarrhea, nausea and vomiting.   Genitourinary: Negative for dysuria and flank pain.   Musculoskeletal: Negative for back pain and myalgias.   Skin: Negative for itching and rash.   Neurological: Negative for weakness and headaches.   Psychiatric/Behavioral: Negative for depression and suicidal ideas.       Psychiatric Examination:   Vitals: /74   Pulse 76   Temp 36 °C (96.8 °F) (Temporal)   Resp 18   Ht 1.727 m (5' 8\")   Wt (!) 129.7 kg (286 lb)   SpO2 95%   BMI 43.49 kg/m²   Musculoskeletal: No abnormal movements noted  Appearance: no apparent distress and obese, cooperative  Thoughts: paranoid delusions and patient denies SI and HI, linear, coherent and organized  Speech: regular rate, rhythm, volume, tone, and syntax  Mood: \"good\"  Affect: inappropriate laughter and " smiling during interview   SI/HI: Denies SI and HI  Alert/Oriented: alert and oriented  Memory: no gross impairment in immediate, recent, or remote memory  Fund of Knowledge: adequate  Insight/Judgement into symptoms: poor  Neurological Testing: MMSE not performed during this encounter    Medications (currently prescribed at [facility]):  Current Facility-Administered Medications   Medication Dose Route Frequency Provider Last Rate Last Dose   • ondansetron (ZOFRAN ODT) dispertab 4 mg  4 mg Oral Q6HRS PRN Tanner Lyn M.D.   4 mg at 11/24/19 1650   • nitrofurantoin monohyd macro (MACROBID) capsule CAPS 100 mg  100 mg Oral BID WITH MEALS Tanner Lyn M.D.   100 mg at 11/26/19 1745   • ARIPiprazole (ABILIFY) tablet 10 mg  10 mg Oral DAILY Jean-Paul Shelley M.D.         Current Outpatient Medications   Medication Sig Dispense Refill   • cephALEXin (KEFLEX) 500 MG Cap Take 1 Cap by mouth 4 times a day for 5 days. 20 Cap 0        Labs:  Recent Labs     11/24/19  1339   WBC 15.1*   RBC 4.88   HEMOGLOBIN 13.8   HEMATOCRIT 43.4   MCV 88.9   MCH 28.3   MCHC 31.8*   RDW 41.1   PLATELETCT 365   MPV 9.0     Recent Labs     11/24/19  1339   SODIUM 137   POTASSIUM 4.2   CHLORIDE 105   CO2 26   GLUCOSE 92   BUN 15   CREATININE 0.78   CALCIUM 9.2                        Recent Results (from the past 72 hour(s))   CBC WITH DIFFERENTIAL    Collection Time: 11/24/19  1:39 PM   Result Value Ref Range    WBC 15.1 (H) 4.8 - 10.8 K/uL    RBC 4.88 4.20 - 5.40 M/uL    Hemoglobin 13.8 12.0 - 16.0 g/dL    Hematocrit 43.4 37.0 - 47.0 %    MCV 88.9 81.4 - 97.8 fL    MCH 28.3 27.0 - 33.0 pg    MCHC 31.8 (L) 33.6 - 35.0 g/dL    RDW 41.1 35.9 - 50.0 fL    Platelet Count 365 164 - 446 K/uL    MPV 9.0 9.0 - 12.9 fL    Neutrophils-Polys 76.20 (H) 44.00 - 72.00 %    Lymphocytes 17.60 (L) 22.00 - 41.00 %    Monocytes 5.00 0.00 - 13.40 %    Eosinophils 0.50 0.00 - 6.90 %    Basophils 0.20 0.00 - 1.80 %    Immature Granulocytes 0.50 0.00 - 0.90 %     Nucleated RBC 0.00 /100 WBC    Neutrophils (Absolute) 11.49 (H) 2.00 - 7.15 K/uL    Lymphs (Absolute) 2.66 1.00 - 4.80 K/uL    Monos (Absolute) 0.75 0.00 - 0.85 K/uL    Eos (Absolute) 0.08 0.00 - 0.51 K/uL    Baso (Absolute) 0.03 0.00 - 0.12 K/uL    Immature Granulocytes (abs) 0.07 0.00 - 0.11 K/uL    NRBC (Absolute) 0.00 K/uL   COMP METABOLIC PANEL    Collection Time: 11/24/19  1:39 PM   Result Value Ref Range    Sodium 137 135 - 145 mmol/L    Potassium 4.2 3.6 - 5.5 mmol/L    Chloride 105 96 - 112 mmol/L    Co2 26 20 - 33 mmol/L    Anion Gap 6.0 0.0 - 11.9    Glucose 92 65 - 99 mg/dL    Bun 15 8 - 22 mg/dL    Creatinine 0.78 0.50 - 1.40 mg/dL    Calcium 9.2 8.5 - 10.5 mg/dL    AST(SGOT) 12 12 - 45 U/L    ALT(SGPT) 14 2 - 50 U/L    Alkaline Phosphatase 83 30 - 99 U/L    Total Bilirubin 0.4 0.1 - 1.5 mg/dL    Albumin 4.2 3.2 - 4.9 g/dL    Total Protein 6.9 6.0 - 8.2 g/dL    Globulin 2.7 1.9 - 3.5 g/dL    A-G Ratio 1.6 g/dL   LIPASE    Collection Time: 11/24/19  1:39 PM   Result Value Ref Range    Lipase 12 11 - 82 U/L   ESTIMATED GFR    Collection Time: 11/24/19  1:39 PM   Result Value Ref Range    GFR If African American >60 >60 mL/min/1.73 m 2    GFR If Non African American >60 >60 mL/min/1.73 m 2   URINALYSIS,CULTURE IF INDICATED    Collection Time: 11/24/19  2:02 PM   Result Value Ref Range    Color Yellow     Character Clear     Specific Gravity 1.009 <1.035    Ph 7.0 5.0 - 8.0    Glucose Negative Negative mg/dL    Ketones Negative Negative mg/dL    Protein Negative Negative mg/dL    Bilirubin Negative Negative    Urobilinogen, Urine 0.2 Negative    Nitrite Negative Negative    Leukocyte Esterase Small (A) Negative    Occult Blood Moderate (A) Negative    Micro Urine Req Microscopic    URINE MICROSCOPIC (W/UA)    Collection Time: 11/24/19  2:02 PM   Result Value Ref Range    WBC 10-20 (A) /hpf    RBC 20-50 (A) /hpf    Bacteria Few (A) None /hpf    Epithelial Cells Negative /hpf    Hyaline Cast 0-2 /lpf   URINE  CULTURE(NEW)    Collection Time: 11/24/19  2:02 PM   Result Value Ref Range    Significant Indicator POS (POS)     Source UR     Site -     Culture Result - (A)     Culture Result Proteus mirabilis  ,000 cfu/mL   (A)        Susceptibility    Proteus mirabilis - LOKI     Ampicillin <=8 Sensitive mcg/mL     Ceftriaxone <=1 Sensitive mcg/mL     Ceftazidime <=1 Sensitive mcg/mL     Cefotaxime <=2 Sensitive mcg/mL     Cefazolin <=2 Sensitive mcg/mL     Ciprofloxacin <=1 Sensitive mcg/mL     Ampicillin/sulbactam <=8/4 Sensitive mcg/mL     Cefepime <=2 Sensitive mcg/mL     Tobramycin <=4 Sensitive mcg/mL     Cefotetan <=16 Sensitive mcg/mL     Nitrofurantoin >64 Resistant mcg/mL     Gentamicin <=4 Sensitive mcg/mL     Levofloxacin <=2 Sensitive mcg/mL     Pip/Tazobactam <=16 Sensitive mcg/mL     Trimeth/Sulfa <=2/38 Sensitive mcg/mL   Influenza By PCR, A/B    Collection Time: 11/24/19  2:07 PM   Result Value Ref Range    Influenza virus A RNA Negative Negative    Influenza virus B, PCR Negative Negative           *ASSESSMENT  Patient is a 34-year-old female who originally presents in the setting of suicidal ideations and psychotic symptoms.  Patient continues to remain delusional.  Patient contested her legal hold in court and it was discontinued.  Patient plans to return home to her motel and will follow up with [facility]. Patient continues to refuse medications.     Diagnosis:  -Unspecified schizophrenia spectrum disorder another psychotic disorder, rule out substance-induced psychotic disorder, rule out mood disorder with psychotic features     Medical:  - abdominal pain: also has hepatomegaly, splenomegaly, choledoliathiasis: resolved  -UTI, nitrofurantoin                        Plan:  1. Legal Hold: awaiting paperwork from Court regarding discontinuation      2. Continue Abilify 10 mg for psychosis, patient currently refusing  3. No as needed medication at this time  4. Labs and imaging within normal  limits  5. Disposition to home per court discharge, note patient has been encouraged to stay for continued treatment and she would likely benefit from inpatient psychiatric hospitalization  6. No collateral obtained     Other:  Sitter: In place  Privileges/Restrictions: per protocol   - Reviewed risks, benefits, alternatives to include no treatment, therapy and medications     Thank you for this consult. Signing off.

## 2019-11-27 NOTE — ED NOTES
Pt awake in room, provided with water and juice per request, assisted to adjust bed.   Pt requesting to know when legal hold will be lifted. Per SW, awaiting paperwork from court that documents lifting of legal hold. Pt informed, agreeable to plan. Denies needs at this time.

## 2019-11-27 NOTE — ED NOTES
Patient is resting quietly, even and non-labored respirations noted. Sitter remains with patient within direct view.

## 2019-11-28 NOTE — ED NOTES
ED Positive Culture Follow-up/Notification Note:    Date: 11/28/19     Patient seen in the ED on 11/21/2019 for SI, flu-like sx, chest tightness, and abdominal pain. Pt was placed on a psychiatric hold for SI. During hold, pt was given Macrobid x 6 doses for reported dysuria.     1. Suicidal ideation    2. Acute abdominal pain    3. Acute chest pain    4. Acute UTI       Discharge Medication List as of 11/27/2019  8:19 AM      START taking these medications    Details   cephALEXin (KEFLEX) 500 MG Cap Take 1 Cap by mouth 4 times a day for 5 days., Disp-20 Cap, R-0, Print Rx Paper             Allergies: Vicodin [hydrocodone-acetaminophen]; Pcn [penicillins]; Bee venom; Blackberry [rubus fruticosus]; Haldol [haloperidol]; and Raspberry     Vitals:    11/26/19 1747 11/26/19 2328 11/27/19 0330 11/27/19 0550   BP: 103/51 133/69  117/74   Pulse: 77 74  76   Resp: 16 18 18 18   Temp: 36.5 °C (97.7 °F) 36 °C (96.8 °F)  36 °C (96.8 °F)   TempSrc: Temporal Temporal  Temporal   SpO2:  94%  95%   Weight:       Height:           Final cultures:   Results     Procedure Component Value Units Date/Time    URINE CULTURE(NEW) [[id number]]  (Abnormal)  (Susceptibility) Collected:  11/24/19 1402    Order Status:  Completed Specimen:  Urine Updated:  11/27/19 1217     Significant Indicator POS     Source UR     Site -     Culture Result -      Proteus mirabilis  ,000 cfu/mL        Brevibacterium epidermidis  ,000 cfu/mL      Susceptibility     Proteus mirabilis (1)     Antibiotic Interpretation Microscan Method Status    Ampicillin Sensitive <=8 mcg/mL LOKI Final    Ceftriaxone Sensitive <=1 mcg/mL LOKI Final    Ceftazidime Sensitive <=1 mcg/mL LOKI Final    Cefotaxime Sensitive <=2 mcg/mL LOKI Final    Cefazolin Sensitive <=2 mcg/mL LOKI Final    Ciprofloxacin Sensitive <=1 mcg/mL LOKI Final    Ampicillin/sulbactam Sensitive <=8/4 mcg/mL LOKI Final    Cefepime Sensitive <=2 mcg/mL LOKI Final    Tobramycin Sensitive <=4 mcg/mL LOKI  Final    Cefotetan Sensitive <=16 mcg/mL LOKI Final    Nitrofurantoin Resistant >64 mcg/mL LOKI Final    Gentamicin Sensitive <=4 mcg/mL LOKI Final    Levofloxacin Sensitive <=2 mcg/mL LOKI Final    Pip/Tazobactam Sensitive <=16 mcg/mL LOKI Final    Trimeth/Sulfa Sensitive <=2/38 mcg/mL LOKI Final                   Influenza By PCR, A/B [[id number]] Collected:  11/24/19 1407    Order Status:  Completed Specimen:  Urine from Nasopharyngeal Updated:  11/24/19 1454     Influenza virus A RNA Negative     Influenza virus B, PCR Negative    URINALYSIS,CULTURE IF INDICATED [[id number]]  (Abnormal) Collected:  11/24/19 1402    Order Status:  Completed Specimen:  Urine Updated:  11/24/19 1417     Color Yellow     Character Clear     Specific Gravity 1.009     Ph 7.0     Glucose Negative mg/dL      Ketones Negative mg/dL      Protein Negative mg/dL      Bilirubin Negative     Urobilinogen, Urine 0.2     Nitrite Negative     Leukocyte Esterase Small     Occult Blood Moderate     Micro Urine Req Microscopic          Plan:   Pt was already notified of positive culture result and appropriate antibiotic therapy prescribed from discharging ERP.     No changes required based upon culture result.    Angelique Flores, MarkellD

## 2019-11-29 ENCOUNTER — HOSPITAL ENCOUNTER (EMERGENCY)
Dept: HOSPITAL 8 - ED | Age: 34
LOS: 6 days | Discharge: PSYCHIATRIC OR SUBSTANCE USE FACILITY | End: 2019-12-05
Payer: MEDICARE

## 2019-11-29 VITALS — BODY MASS INDEX: 42.77 KG/M2 | HEIGHT: 68 IN | WEIGHT: 282.19 LBS

## 2019-11-29 DIAGNOSIS — R45.851: Primary | ICD-10-CM

## 2019-11-29 DIAGNOSIS — R44.3: ICD-10-CM

## 2019-11-29 DIAGNOSIS — F31.9: ICD-10-CM

## 2019-11-29 LAB
ALBUMIN SERPL-MCNC: 3.5 G/DL (ref 3.4–5)
ALP SERPL-CCNC: 94 U/L (ref 45–117)
ALT SERPL-CCNC: 26 U/L (ref 12–78)
ANION GAP SERPL CALC-SCNC: 4 MMOL/L (ref 5–15)
BASOPHILS # BLD AUTO: 0.3 X10^3/UL (ref 0–0.1)
BASOPHILS NFR BLD AUTO: 2 % (ref 0–1)
BILIRUB SERPL-MCNC: 0.3 MG/DL (ref 0.2–1)
CALCIUM SERPL-MCNC: 8.7 MG/DL (ref 8.5–10.1)
CHLORIDE SERPL-SCNC: 110 MMOL/L (ref 98–107)
CREAT SERPL-MCNC: 0.92 MG/DL (ref 0.55–1.02)
EOSINOPHIL # BLD AUTO: 0.11 X10^3/UL (ref 0–0.4)
EOSINOPHIL NFR BLD AUTO: 1 % (ref 1–7)
ERYTHROCYTE [DISTWIDTH] IN BLOOD BY AUTOMATED COUNT: 13.7 % (ref 9.6–15.2)
LYMPHOCYTES # BLD AUTO: 2.26 X10^3/UL (ref 1–3.4)
LYMPHOCYTES NFR BLD AUTO: 17 % (ref 22–44)
MCH RBC QN AUTO: 28.5 PG (ref 27–34.8)
MCHC RBC AUTO-ENTMCNC: 32.2 G/DL (ref 32.4–35.8)
MCV RBC AUTO: 88.6 FL (ref 80–100)
MD: NO
MONOCYTES # BLD AUTO: 0.73 X10^3/UL (ref 0.2–0.8)
MONOCYTES NFR BLD AUTO: 5 % (ref 2–9)
NEUTROPHILS # BLD AUTO: 10.08 X10^3/UL (ref 1.8–6.8)
NEUTROPHILS NFR BLD AUTO: 75 % (ref 42–75)
PLATELET # BLD AUTO: 430 X10^3/UL (ref 130–400)
PMV BLD AUTO: 7.6 FL (ref 7.4–10.4)
PROT SERPL-MCNC: 7.2 G/DL (ref 6.4–8.2)
RBC # BLD AUTO: 4.35 X10^6/UL (ref 3.82–5.3)
VANCOMYCIN TROUGH SERPL-MCNC: < 1.7 MG/DL (ref 2.8–20)

## 2019-11-29 PROCEDURE — 82962 GLUCOSE BLOOD TEST: CPT

## 2019-11-29 PROCEDURE — 99285 EMERGENCY DEPT VISIT HI MDM: CPT

## 2019-11-29 PROCEDURE — 80307 DRUG TEST PRSMV CHEM ANLYZR: CPT

## 2019-11-29 PROCEDURE — 96372 THER/PROPH/DIAG INJ SC/IM: CPT

## 2019-11-29 PROCEDURE — 84703 CHORIONIC GONADOTROPIN ASSAY: CPT

## 2019-11-29 PROCEDURE — 36415 COLL VENOUS BLD VENIPUNCTURE: CPT

## 2019-11-29 PROCEDURE — 85025 COMPLETE CBC W/AUTO DIFF WBC: CPT

## 2019-11-29 PROCEDURE — 80053 COMPREHEN METABOLIC PANEL: CPT

## 2019-11-29 NOTE — NUR
PROVIDED PT WITH JUICE, WATER PER HER REQUEST, DENIES FURTHER NEEDS AT THIS 
TIME, ROOM REMAINS SECURED, SITTER AT DOORWAY FOR CONTINOUS MONITORING

## 2019-11-29 NOTE — NUR
PER ER PROVIDER PT PLACED ON LEGAL HOLD. ALL PT BELONGINGS REMOVED AND PLACED 
IN LOCKER, SECURITY CALLED TO SAFE KEEP PTS PURSE. PT RM SECURED. SITTER IN 
PLACE. 

-------------------------------------------------------------------------------

Addendum: 11/30/19 at 0657 by AgroSavfe

-------------------------------------------------------------------------------

PT REQUESTING HER MONEY AND CREDIT CARDS BE TAKEN BY SECURITY TO SAFE KEEPING. 
TICKET ISSUED AND PLACED ON CHART

## 2019-11-29 NOTE — NUR
DISCUSSED ORDERED MEDICATIONS WITH PT, PT REFUSED MEDICATION, STATED " I HAVE 
LUKEMIA AND MY OTHER DOCTORS TOLD ME NOT TO TAKE IT".

## 2019-11-29 NOTE — NUR
PT RESTING WITH EYES CLOSED, NADN, RESPIRATIONS EVEN AND UNLABORED, SITTER AT 
DOORWAY FOR MONITORING

## 2019-11-29 NOTE — NUR
THROUGHPUT RN: CALLED 3E AND PT DENIED D/T [facility]. PAPERWORK FAXED TO [facility], 
[facility], [facility] AND [facility].

## 2019-11-29 NOTE — NUR
THROUGHPUT RN: PT DENIED AT [facility] UNLESS PT IS ABLE TO SELF PAY. PER PROVIDER AILYN MORA AND MD FIGUEROA AND CIELO JULES, SELF PAY DOES NOT SEEM TO BE A FEASIBLE OPTION 
FOR THE PT.

## 2019-11-29 NOTE — NUR
SITTER IN PLACE, PT REFUSING TO PROVIDE UDS FOR THIS RN UNLESS PT COULD HAVE 
WATER, PER PROVIDER OK TO GIVE WATER. PT GIVEN CUP OF WATER, DINNER TRAY 
ORDERED FOR PT

## 2019-11-29 NOTE — NUR
PT MOVED FROM ROOM 24 TO ROOM 3. ON ENTRY TO ROOM, PT STATED "I AM SUICIDAL. I 
HAVE DUAL CITIZENSHIP (IN [employer] [place] [employer])."  PT DENIES PAT SIOR SA 
ATTEMPTS BUT IN HERE RECORDS HERE PT HAS HAD SI/SA IN THE PAST. PT DENIES 
CURRENT PLAN.  CHART UP FOR MD.

## 2019-11-29 NOTE — NUR
PT UP TO RR, URINE SAMPLE SENT. PT RESTING ON [facility], ROOM SECURED, SITTER AT 
DOORWAY FOR CONTINOUS MONITORING

-------------------------------------------------------------------------------

Addendum: 11/29/19 at 1930 by KI

-------------------------------------------------------------------------------

PT DENIES SI/HI THOUGHTS, ALSO DENIES HOME MEDICATIONS, WHEN THIS RN ASKED IF 
SHE WAS EVER PRESCRIBED MEDICATIONS, PT STATED " YES", BUT UNABLE TO TELL ME 
WHAT MEDICATIONS OR HOW LONG SHE HAS BEEN OUT OF MEDICATIONS, PT TEARFUL AND 
REFUSES TO ANSWER FURTHER QUESTIONS AT THIS TIME, REASSURED PT AND TOLD HER I 
WILL GIVE HER TIME AND CHECK BACK WITH HER IN FEW MINS, SITTER REMAINS AT 
DOORWAY FOR MONITORING

## 2019-11-29 NOTE — NUR
PT REFUSING TO PROVIDE THIS RN WITH PERSONAL BELONGINGS, DISCUSSED WITH PT THAT 
SHE IS ON A LEGAL HOLD AND IT'S HOSPITAL POLICY, PT CONTINUES TO REFUSE. AFTER 
MULTIPLE ATTEMPTS PT AGREED TO GIVE THIS RN PERSONAL BELONGINGS ( 1 BAG) AND PT 
AGREED TO TAKE  ORDERED MEDICATION, SHE REQUESTED THAT THE MEDICATION TO BE 
GIVEN IN HER ARM. PT MEDICATED PER MAR AND PERSONAL BELONGINGS BAG PLACED IN 
SECURITY LOCKER, PROVIDED PT WITH MESH UNDERPANTS, SITTER AT DOORWAY FOR 
CONTINOUS MONITORING.

## 2019-11-30 RX ADMIN — ZIPRASIDONE HCL SCH MG: 20 CAPSULE ORAL at 21:00

## 2019-11-30 NOTE — NUR
PT RESTING IN [facility]. PT'S AOX4. RESPS EVEN AND UNLABORED. SITTER MONITORING 
FROM HALLWAY FOR SAFETY. ROOM REMAINS SECURE.

## 2019-11-30 NOTE — NUR
Pt provided lunch tray. Xiomara GREENWOODN at bedside. NADN. No needs expressed. Pt 
sitting on hospital bed. Sitter near doorway in direct line of sight for 
observation.

## 2019-11-30 NOTE — NUR
PT LAYING ON GURNEY CALMLY SLEEPING, NAD WITH EQUAL CHEST RISE/FALL, NO NEEDS 
AT THIS TIME, PT REMAINS IN SAFE IN SAFE ENVIRONEMNT, SITTER IN VIEW.

## 2019-11-30 NOTE — NUR
PT RESTING WITH EYES CLOSED, REPOSITIONED SELF IN BED, EQUAL CHEST RISE/FALL 
OBSERVED, SITTER AT DOORWAY FOR MONITORING

## 2019-11-30 NOTE — NUR
Pt resting sitting on hospital bed watching TV. NADN. No needs expressed. 
Sitter near doorway in direct line of sight for observation.

## 2019-11-30 NOTE — NUR
Obtained vital signs, performed physical assessment, and performed suicide 
reassessment. Pt denies SI or HI. Pt states, "I have had appendicitis before. I 
think I have it again. I think that is what is causing my leukemia." Pt denies 
pain. Pt denies n/v/d, abdominal pain, or trauma.

## 2019-11-30 NOTE — NUR
Pt requesting water, juice, and lotion. Provided to pt. Pt apprecaitive. NADN. 
No other needs expressed. Room remains secured for SI/HI and sitter in direct 
line of sight for observation.

## 2019-11-30 NOTE — NUR
PT RESTING IN [facility]. PT'S AOX4. RESPS EVEN AND UNLABORED. SITTER MONITORING 
FROM HALLWAY FOR SAFETY. ROOM REMAINS SECURE.

## 2019-11-30 NOTE — NUR
RECEIVED REPORT FROM JOSE MANUEL. PT UPRIGHT ON GURNEY FINISHING LUNCH, AWAKE, CALM 
& COOPERATIVE, RESPONDS TO STAFF QUESTIONS, NAD, COMFORT MEASURES PROVIDED, PT 
REMAINS IN SAFE IN SAFE ENVIRONEMNT, SITTER IN VIEW.

## 2019-11-30 NOTE — NUR
PT RESTING IN [facility]. PT'S AOX4. RESPS EVEN AND UNLABORED. SITTER MONITORING 
FROM HALLWAY FOR SAFETY. ROOM REMAINS SECURE.

## 2019-11-30 NOTE — NUR
PT RESTING CALMLY WITH EYES CLOSED, EQUAL CHEST RISE/FALL OBSERVED, SITTER AT 
DOORWAY FOR CONTINOUS MONITORING

## 2019-11-30 NOTE — NUR
PT RESTING IN [facility]. PT'S AOX4. RESPS EVEN AND UNLABORED. SITTER MONITORING 
FROM HALLWAY FOR SAFETY. ROOM REMAINS SECURE.

## 2019-11-30 NOTE — NUR
Pt provided water, comb, and breakfast tray. Pt appreciative. NADN. No other 
needs expressed. Sitter near doorway in direct line of site for observation. 
Room remains secured for SI/HI.

## 2019-11-30 NOTE — NUR
Pt resting sitting on hospital bed. Room remains secured for SI/HI. Sitter near 
doorway in direct line of sight for observation. MARIIA

## 2019-11-30 NOTE — NUR
Pt resting on left lateral side on hospital bed with eyes closed. Pt has 
unlabored respirations with even chest rise and fall. NADN. No needs expressed 
at this time. Sitter in direct line of sight for observation. Room remains 
secured for SI/HI.

## 2019-11-30 NOTE — NUR
PT RESTING CALMLY WITH EYES CLOSED, NAD, EQUAL CHEST RISE/FALL NOTED, SITTER AT 
DOORWAY FOR CONTINOUS MONITORING

## 2019-11-30 NOTE — NUR
PT REQUESTING SNACK, PROVIDED PT WITH SNACK, SAFETY PRECAUTIONS MAINTAINED. PT 
DENIES FURTHER NEEDS AT THIS TIME, SITTER AT DOORWAY FOR CONTINOUS MONITORING

## 2019-11-30 NOTE — NUR
break RN note: pt sleeping on hospital bed, resps even and unlabored. sitter 
monitoring from [facility] for safety.

## 2019-11-30 NOTE — NUR
PT RESTING CALMLY, REPOSITIONED SELF TO LEFT SIDE, EQUAL CHEST RISE/FALL NOTED, 
SITTER AT DOORWAY FOR CONTINOUS MONITORING

## 2019-11-30 NOTE — NUR
PT ALLOWED VITALS CHECK, PT DID NOT WANT TO TAKE PO MEDICATIONS AS SHE SAID IT 
WILL AFFECT HER LEUKEMIA.

## 2019-11-30 NOTE — NUR
PT CONTINUES LAYING ON GURNEY CALMLY SLEEPING, NAD WITH EQUAL CHEST RISE/FALL, 
NO NEEDS AT THIS TIME, PT REMAINS IN SAFE IN SAFE ENVIRONEMNT, SITTER IN VIEW.

## 2019-12-01 RX ADMIN — ZIPRASIDONE HCL SCH MG: 20 CAPSULE ORAL at 07:59

## 2019-12-01 RX ADMIN — ZIPRASIDONE HCL SCH MG: 20 CAPSULE ORAL at 21:00

## 2019-12-01 NOTE — NUR
REPORT FROM KELIN BUCK, ASSUME CARE OF PT AT THIS TIME.  PT RESTING, NAD, SITTER 
AT DOORWAY FOR CLOSE OBS.

## 2019-12-01 NOTE — NUR
REPORT RECEIVED, POC DISCUSSED, CARE ASSUMED.  PT RESTING QUIETLY, NAD.  SITTER 
IN VIEW OF PT.  ROOM SECURE.

## 2019-12-01 NOTE — NUR
TASK RN: PT RESTING IN [facility] WITH EYES CLOSED. EVEN/REGULAR RESPIRATIONS 
NOTED. SITTER PRESENT. ROOM SECURE.

## 2019-12-01 NOTE — NUR
Suicide screening tool performed, pt answers no to all questions. Also denies 
HI. When asked why she thinks she's here she states, "I'm Lilly not Anna 
and I'm here b/c I have leukemia and no resources".

## 2019-12-01 NOTE — NUR
PT UPDATED ON POC.  DECAF COFFEE PROVIDED PER REQUEST.  PT COOPERATIVE AT THIS 
TIME.  SITTER AT DOORWAY FOR CLOSE OBS.

## 2019-12-01 NOTE — NUR
Awake, amb to restroom. Shower offered & declined. Pt states she can't take 
meds that are prescribed or shower because her doctor told her she has leukemia 
and those things will make it worse. Provided w/ toothbrush/toothpaste, po 
fluids.

## 2019-12-01 NOTE — NUR
Bedside report received from CIELO Sun. Pt is resting, eyes closed, room 
secured, sitter outside doorway.

## 2019-12-01 NOTE — NUR
PT AWAKE, ALL QUESTIONS ANSWERED.  PT REQUESTING SHOWER BUT REFUSING SITTER 
ESCORT TO SHOWER, STATING "I'M AN ADULT, I DON'T NEED A ".  PT 
INFORMED OF POC INCLUDING MAINTAINING SAFE ENVIRONMENT REQUIRING ESCORT TO 
SHOWER.  PT THEN STATED "I DON'T WANT A SHOWER THEN".  SITTER REMAINS AT BS FOR 
CLOSE OBS.

## 2019-12-02 RX ADMIN — ZIPRASIDONE HCL SCH MG: 20 CAPSULE ORAL at 21:00

## 2019-12-02 RX ADMIN — ZIPRASIDONE HCL SCH MG: 20 CAPSULE ORAL at 09:00

## 2019-12-02 NOTE — NUR
PT REFUSING GEODON. STATES "I HAVE LEUKEMIA AND MY DOCTOR TOLD ME THAT'S NOT 
GOOD FOR ME." PT CALM, COOPERATIVE IN OTHER AREAS. ERP NOTIFIED. MEDICATION 
PLACED ON HOLD TO TRY LATER. SITTER CONTINUES IN DIRECT LINE OF SIGHT. NO 
FURTHER NEEDS IDENTIFIED.

## 2019-12-02 NOTE — NUR
PT CALMLY SLEEPING ON GURNEY, NAD WITH EQUAL CHEST RISE/FALL, PT % OF 
LUNCH TRAY, NO NEEDS AT THIS TIME, PT REMAINS IN SAFE ENVIRONMENT, SIITER IN 
VIEW.

## 2019-12-02 NOTE — NUR
PT PROVIDED WITH COFFEE PER REQUEST. DENIES ANY FURTHER NEEDS OR CONCERNS. 
SITTER CONTINUES IN DIRECT LINE OF SIGHT.

## 2019-12-02 NOTE — NUR
pt requesting snack, provided pt with juice and crackers, safety precautions 
maintained. pt up to rr with sitter assist

## 2019-12-02 NOTE — NUR
PT RESTING IN BED, EYES CLOSED, RESPIRATIONS EVEN AND UNLABORED. NADN. SITTER 
CONTINUES IN DIRECT LINE OF SIGHT. DENIES ANY NEEDS.

## 2019-12-02 NOTE — NUR
PT UPDATED ON PLAN OF CARE. VERBALIZES UNDERSTANDING. DENIES ANY FURTHER NEEDS 
OR CONCERNS AT THIS TIME. SITTER CONTINUES IN DIRECT LINE OF SIGHT.

## 2019-12-02 NOTE — NUR
PT CONTINUES RESTING IN BED, EYES OPEN. DENIES ANY NEEDS OR CONCERNS AT THIS 
TIME. SITTER CONTINUES IN DIRECT LINE OF SIGHT.

## 2019-12-02 NOTE — NUR
PT REFUSING TO TAKE A SHOWER STATING THAT SHE IS NOT COMFORTABLE SHOWERING 
HERE. PT PROVIDED A CLEAN SET OF SHEETS AND A CLEAN GOWN.

## 2019-12-02 NOTE — NUR
Patient states she was dizzy and she has a history of hypoglycemia. BS 
performed and within normal range. Orange juice provided per patient request. 
Patient reports dizziness improved.

## 2019-12-02 NOTE — NUR
break rn: pt resting calmly, watching tv, denies needs at this time, sitter at 
doorway for continous monitoring

## 2019-12-02 NOTE — NUR
PT A BIT AGITATED. STATES THAT SHE IS NOT SCHIZO AFFECTIVE BECAUSE SHE CAN 
COUNT FORWARD AND BACKWARDS. REQUESTING TO CALL HER "ADVOCATE". SITTER ASSISTED 
PATIENT TO PHONE AT REGISTRATION DESK WITHOUT ISSUE. PT RETURNED TO BED AT THIS 
TIME. NO COMPLAINTS, CONCERNS, OR NEEDS VOICED.

## 2019-12-02 NOTE — NUR
PT PROVIDED WITH BOTTLED WATER PER REQUEST. REQUESTING TO SEE PSYCH. DENIES ANY 
FURTHER NEEDS OR CONCERNS, SITTER CONTINUES IN DIRECT LINE OF SIGHT.

## 2019-12-02 NOTE — NUR
BREAK RN: PT RESTING IN SECURED ROOM WITH EYES CLOSED AND LIGHTS DIMMED. SITTER 
AT DOORWAY FOR CONTINUOUS MONIOTRING. NO NEEDS EXPRESSED.

## 2019-12-03 RX ADMIN — ZIPRASIDONE HCL SCH MG: 20 CAPSULE ORAL at 21:37

## 2019-12-03 RX ADMIN — ZIPRASIDONE HCL SCH MG: 20 CAPSULE ORAL at 09:00

## 2019-12-03 NOTE — NUR
late entry 1905- pt resting calmly watching tv, room securred, denies needs, 
sitter at doorway for monitoring

## 2019-12-03 NOTE — NUR
PT CONTINUES SITTING UP ON GURNEY AWAKE, CALM & COMFORTABLE, WATCHING TV, 
RESPONDS APPROP TO STAFF, NAD, COMFORT MEASURES PROVIDED, PT REMAINS IN SAFE 
ENVIRONMENT, SIITER IN VIEW.

## 2019-12-03 NOTE — NUR
Pt demanding to talk to pt advocate about her court date, pt given meal tray & 
(decaf) coffee per pt request. Pt resting in bed in suicide secured room, 
sitter at doorway, NAD, WCTM.

## 2019-12-03 NOTE — NUR
-------------------------------------------------------------------------------

          *** Note undone in ED - 12/03/19 at 0149 by IRINA ***           

-------------------------------------------------------------------------------

PT DC'D HOME WITH RX X 3 AND UNDERSTANDING OF INSTRUCTIONS.  PT AMBULATED TO DC 
DESK WITHOUT DIFFICULTY.

## 2019-12-03 NOTE — NUR
PT RESTING IN BED, REPOSITIONED SELF, NAD, EQUAL CHEST RISE/FALL OBSERVED, 
SITTER AT DOORWAY FOR CONTINOUS MONITORING

## 2019-12-03 NOTE — NUR
Pt resting in bed in suicide secured room, sitter at doorway, NAD, Pt has been 
take to shower, given clean gown & fresh linens, WCTM.

## 2019-12-03 NOTE — NUR
pt has h/x esbl, isolation precautions in place. pt resting calmly on gurney, 
denies si/hi thoughts, room remains secured, sitter at doorway for continous 
monitoring

## 2019-12-03 NOTE — NUR
PT SITTING UP ON GURNEY AWAKE, CALM & COMFORTABLE, RESPONDS APPROP TO STAFF, 
NAD, COMFORT MEASURES PROVIDED, PT REMAINS IN SAFE ENVIRONMENT, SIITER IN VIEW.

## 2019-12-03 NOTE — NUR
pt resting calmly with eyes closed, nadn, respirations even and unlabored, 
sitter at doorway for continous monitoring

## 2019-12-03 NOTE — NUR
pt refusing scheduled medication, stated " i can't take that, doctor said not 
to because of my leukemia", discussed medication with pt, pt continues to 
refuse

## 2019-12-03 NOTE — NUR
PT RESTING CALMLY WITH EYES CLOSED,SAFETY PRECUATIONS MAINTAINED, EQUAL CHEST 
RISE/FALL OBSERVED, SITTER AT DOORWAY FOR MONITORING

## 2019-12-03 NOTE — NUR
PT CONTINUES TO CALMLY SLEEP ON GURNEY, NAD WITH EQUAL CHEST RISE/FALL, NO 
NEEDS AT THIS TIME, PT REMAINS IN SAFE ENVIRONMENT, SIITER IN VIEW.

## 2019-12-03 NOTE — NUR
Pt refusing Geodon, stating "I've had cancer and my doctor told me not to take 
that. I refuse." I educated pt that Geodon is not contraindicated for her, pt 
continues to refuse medication.

## 2019-12-03 NOTE — NUR
DORY (STEFF) CALLED RE: PT REQUEST TO SPEAK TO PT ADVOCATE. DORY UNABLE TO SEE PT 
AGAIN TODAY AS PT WAS SEEN BY HER THIS AM AFTER  VISIT.

## 2019-12-03 NOTE — NUR
PT CONTINUES SITTING UP ON GURNEY AWAKE, CALM & COMFORTABLE, RESPONDS APPROP TO 
STAFF, NAD, COMFORT MEASURES PROVIDED, PT REMAINS IN SAFE ENVIRONMENT, SIITER 
IN VIEW.

## 2019-12-03 NOTE — NUR
RECEIVED REPORT FROM JAYA. PT CALMLY SLEEPING ON GURNEY, NAD WITH EQUAL CHEST 
RISE/FALL, NO NEEDS AT THIS TIME, PT REMAINS IN SAFE ENVIRONMENT, SIITER IN 
VIEW.

## 2019-12-03 NOTE — NUR
Bedside report from Corinne BUCK, pt care assumed at this time. Pt in bed in 
suicide secured room w/ sitter at doorway, NAD, breakfast tray ordered. 
Awaiting placement at [facility]. NANCI.

## 2019-12-04 NOTE — NUR
REPORT RECIEVED FROM MISSY RN, PT SLEEPING ON HOSPITAL BED, VISIBLE CHEST RISE 
AND FALL NOTED. SI PRECAUTIONS IN PLACE, PT PLACED ON ISO FOR HX ESBL

## 2019-12-04 NOTE — NUR
PT RESTING IN BED WITH EYES CLOSED, NADN, EQUAL CHEST RISE/FALL OBSERVED, 
SITTER AT DOORWAY FOR MONITORING

## 2019-12-04 NOTE — NUR
PT RESTING CALMLY WITH EYES CLOSED, EQUAL CHEST RISE/FALL OBSERVED, SITTER AT 
DFOORWAY FOR CONTINOUS MONTIROING

## 2019-12-04 NOTE — NUR
PT RESTING IN BED, REPOSITIONED SELF, EQUAL CHEST RISE/FALL OBSERVED, SITTER AT 
DOORWAY FOR CONTINOUS MONITORING

## 2019-12-04 NOTE — NUR
PT PROVIDED WITH FRESH GOWN AND NEW MESH UNDERWEAR, ASSISTED TO BR FOR ADLS. PT 
BACK TO BED, NO OTHER NEEDS AT THIS TIME

## 2019-12-04 NOTE — NUR
PT RESTING IN BED WATCHING TV, DENIES NEEDS AT THIS TIME, SITTER AT DOORWAY FOR 
CONTINOUS MONITORING

## 2019-12-04 NOTE — NUR
LATE ENTRY 0125-PT RESTING CALMLY, NAD, EQUAL CHEST RISE/FALL OBSERVED, SITTER 
AT DOORWAY FOR CONTINOUS MONITORING

## 2019-12-04 NOTE — NUR
Received report and assumed patient care. Patient resting comfortably in bed, 
sitter in hallway within two steps of patient. Denies needs. Declining 
medications. Awaiting placement.

## 2019-12-05 VITALS — DIASTOLIC BLOOD PRESSURE: 66 MMHG | SYSTOLIC BLOOD PRESSURE: 112 MMHG

## 2019-12-05 RX ADMIN — ZIPRASIDONE HCL SCH MG: 20 CAPSULE ORAL at 07:03

## 2019-12-05 NOTE — NUR
REPORT RECEIVED FROM CIELO HARDING. PT RESTING ON HOSPITAL BED. NADN. ROOM SECURE. 
SITTER IN HALLWAY. BREAKFAST TRAY ORDERED.

## 2019-12-05 NOTE — NUR
VSS. PT REFUSING MEDICATIONS THIS MORNING. REQUESTING TO BE CALLED "HER ESTY 
QUEEN MARGA." PT ASKING WHEN BREAKFAST WILL BE HERE- THIS RN INFORMED PT IT 
HAS BEEN ORDERED AND WILL LIKELY BE ANOTHER HALF AN HOUR. PT ASKING WHEN SHE 
WILL BE LEAVING ER. THIS RN INFORMED PT THAT AROUND 1000 THE THROUGHPUT NURSE 
WILL CALL [facility] TO FIND OUT WHAT NUMBER PT IS IN LINE FOR GETTING INTO 
OUTPATIENT SERVICES. PT DENIES FURTHER NEEDS AT THIS TIME.

## 2019-12-05 NOTE — NUR
PT PROVIDED WITH BREAKFAST TRAY, WATER, AND COFFEE. EARLIER, AMBULATED WITH 
STEADY GAIT TO BATHROOM AND VOIDED. NOW RESTING ON HOSPITAL BED. MARIIA SITTER 
IN HALLWAY. ROOM SECURE.

## 2019-12-29 ENCOUNTER — APPOINTMENT (OUTPATIENT)
Dept: RADIOLOGY | Facility: MEDICAL CENTER | Age: 34
End: 2019-12-29
Attending: EMERGENCY MEDICINE
Payer: MEDICARE

## 2019-12-29 ENCOUNTER — HOSPITAL ENCOUNTER (EMERGENCY)
Facility: MEDICAL CENTER | Age: 34
End: 2019-12-29
Attending: EMERGENCY MEDICINE
Payer: MEDICARE

## 2019-12-29 VITALS
BODY MASS INDEX: 41.68 KG/M2 | HEART RATE: 64 BPM | TEMPERATURE: 98 F | SYSTOLIC BLOOD PRESSURE: 112 MMHG | WEIGHT: 275 LBS | DIASTOLIC BLOOD PRESSURE: 55 MMHG | HEIGHT: 68 IN | RESPIRATION RATE: 16 BRPM | OXYGEN SATURATION: 100 %

## 2019-12-29 DIAGNOSIS — R10.32 LEFT LOWER QUADRANT ABDOMINAL PAIN: ICD-10-CM

## 2019-12-29 LAB
ALBUMIN SERPL BCP-MCNC: 3.8 G/DL (ref 3.2–4.9)
ALBUMIN/GLOB SERPL: 1.5 G/DL
ALP SERPL-CCNC: 57 U/L (ref 30–99)
ALT SERPL-CCNC: 12 U/L (ref 2–50)
ANION GAP SERPL CALC-SCNC: 4 MMOL/L (ref 0–11.9)
APPEARANCE UR: CLEAR
AST SERPL-CCNC: 10 U/L (ref 12–45)
BASOPHILS # BLD AUTO: 0.4 % (ref 0–1.8)
BASOPHILS # BLD: 0.04 K/UL (ref 0–0.12)
BILIRUB SERPL-MCNC: 0.4 MG/DL (ref 0.1–1.5)
BILIRUB UR QL STRIP.AUTO: NEGATIVE
BUN SERPL-MCNC: 10 MG/DL (ref 8–22)
CALCIUM SERPL-MCNC: 8.4 MG/DL (ref 8.5–10.5)
CHLORIDE SERPL-SCNC: 108 MMOL/L (ref 96–112)
CO2 SERPL-SCNC: 26 MMOL/L (ref 20–33)
COLOR UR: YELLOW
CREAT SERPL-MCNC: 0.69 MG/DL (ref 0.5–1.4)
EOSINOPHIL # BLD AUTO: 0.08 K/UL (ref 0–0.51)
EOSINOPHIL NFR BLD: 0.8 % (ref 0–6.9)
ERYTHROCYTE [DISTWIDTH] IN BLOOD BY AUTOMATED COUNT: 42.5 FL (ref 35.9–50)
GLOBULIN SER CALC-MCNC: 2.5 G/DL (ref 1.9–3.5)
GLUCOSE SERPL-MCNC: 95 MG/DL (ref 65–99)
GLUCOSE UR STRIP.AUTO-MCNC: NEGATIVE MG/DL
HCG SERPL QL: NEGATIVE
HCT VFR BLD AUTO: 38.2 % (ref 37–47)
HGB BLD-MCNC: 12.3 G/DL (ref 12–16)
IMM GRANULOCYTES # BLD AUTO: 0.03 K/UL (ref 0–0.11)
IMM GRANULOCYTES NFR BLD AUTO: 0.3 % (ref 0–0.9)
KETONES UR STRIP.AUTO-MCNC: ABNORMAL MG/DL
LEUKOCYTE ESTERASE UR QL STRIP.AUTO: NEGATIVE
LIPASE SERPL-CCNC: 4 U/L (ref 11–82)
LYMPHOCYTES # BLD AUTO: 2.19 K/UL (ref 1–4.8)
LYMPHOCYTES NFR BLD: 20.8 % (ref 22–41)
MCH RBC QN AUTO: 28.3 PG (ref 27–33)
MCHC RBC AUTO-ENTMCNC: 32.2 G/DL (ref 33.6–35)
MCV RBC AUTO: 88 FL (ref 81.4–97.8)
MICRO URNS: ABNORMAL
MONOCYTES # BLD AUTO: 0.71 K/UL (ref 0–0.85)
MONOCYTES NFR BLD AUTO: 6.7 % (ref 0–13.4)
NEUTROPHILS # BLD AUTO: 7.48 K/UL (ref 2–7.15)
NEUTROPHILS NFR BLD: 71 % (ref 44–72)
NITRITE UR QL STRIP.AUTO: NEGATIVE
NRBC # BLD AUTO: 0 K/UL
NRBC BLD-RTO: 0 /100 WBC
PH UR STRIP.AUTO: 7 [PH] (ref 5–8)
PLATELET # BLD AUTO: 318 K/UL (ref 164–446)
PMV BLD AUTO: 9.3 FL (ref 9–12.9)
POTASSIUM SERPL-SCNC: 3.9 MMOL/L (ref 3.6–5.5)
PROT SERPL-MCNC: 6.3 G/DL (ref 6–8.2)
PROT UR QL STRIP: NEGATIVE MG/DL
RBC # BLD AUTO: 4.34 M/UL (ref 4.2–5.4)
RBC UR QL AUTO: NEGATIVE
SODIUM SERPL-SCNC: 138 MMOL/L (ref 135–145)
SP GR UR STRIP.AUTO: >=1.045
UROBILINOGEN UR STRIP.AUTO-MCNC: 0.2 MG/DL
WBC # BLD AUTO: 10.5 K/UL (ref 4.8–10.8)

## 2019-12-29 PROCEDURE — 84703 CHORIONIC GONADOTROPIN ASSAY: CPT

## 2019-12-29 PROCEDURE — 700105 HCHG RX REV CODE 258: Performed by: EMERGENCY MEDICINE

## 2019-12-29 PROCEDURE — 74177 CT ABD & PELVIS W/CONTRAST: CPT

## 2019-12-29 PROCEDURE — 81003 URINALYSIS AUTO W/O SCOPE: CPT

## 2019-12-29 PROCEDURE — 700102 HCHG RX REV CODE 250 W/ 637 OVERRIDE(OP): Performed by: EMERGENCY MEDICINE

## 2019-12-29 PROCEDURE — 85025 COMPLETE CBC W/AUTO DIFF WBC: CPT

## 2019-12-29 PROCEDURE — A9270 NON-COVERED ITEM OR SERVICE: HCPCS | Performed by: EMERGENCY MEDICINE

## 2019-12-29 PROCEDURE — 80053 COMPREHEN METABOLIC PANEL: CPT

## 2019-12-29 PROCEDURE — 700117 HCHG RX CONTRAST REV CODE 255: Performed by: EMERGENCY MEDICINE

## 2019-12-29 PROCEDURE — 99285 EMERGENCY DEPT VISIT HI MDM: CPT

## 2019-12-29 PROCEDURE — 76856 US EXAM PELVIC COMPLETE: CPT

## 2019-12-29 PROCEDURE — 36415 COLL VENOUS BLD VENIPUNCTURE: CPT

## 2019-12-29 PROCEDURE — 83690 ASSAY OF LIPASE: CPT

## 2019-12-29 RX ORDER — ACETAMINOPHEN 325 MG/1
650 TABLET ORAL ONCE
Status: COMPLETED | OUTPATIENT
Start: 2019-12-29 | End: 2019-12-29

## 2019-12-29 RX ORDER — SODIUM CHLORIDE 9 MG/ML
1000 INJECTION, SOLUTION INTRAVENOUS ONCE
Status: COMPLETED | OUTPATIENT
Start: 2019-12-29 | End: 2019-12-29

## 2019-12-29 RX ORDER — SODIUM CHLORIDE 9 MG/ML
1000 INJECTION, SOLUTION INTRAVENOUS ONCE
Status: DISCONTINUED | OUTPATIENT
Start: 2019-12-29 | End: 2019-12-29 | Stop reason: HOSPADM

## 2019-12-29 RX ORDER — NITROFURANTOIN 25; 75 MG/1; MG/1
CAPSULE ORAL
COMMUNITY
Start: 2019-11-27 | End: 2020-01-28

## 2019-12-29 RX ADMIN — IOHEXOL 100 ML: 350 INJECTION, SOLUTION INTRAVENOUS at 07:45

## 2019-12-29 RX ADMIN — SODIUM CHLORIDE 1000 ML: 9 INJECTION, SOLUTION INTRAVENOUS at 05:12

## 2019-12-29 RX ADMIN — ACETAMINOPHEN 650 MG: 325 TABLET, FILM COATED ORAL at 05:11

## 2019-12-29 NOTE — DISCHARGE PLANNING
JADEN informed that patient had been medically cleared to transfer back to [facility].  [facility] PCS form was completed and all required documents were faxed to [facility].  JADEN called [facility] and was informed by John that patient does not have non-emergency transportation benefits.  JADEN called [facility] and set up patient transport for [facility]. Transfer packet placed on patient medical chart.

## 2019-12-29 NOTE — ED NOTES
Pending remaining work up. Patient sleeping at this time. Equal chest rise and fall noted. Patient safety staff outside room for direct observation.

## 2019-12-29 NOTE — ED PROVIDER NOTES
ED Provider Note    Scribed for Robert Holt M.D. by Vibha Vanessa. 12/29/2019  4:48 AM    Primary care provider: Pcp Pt States None  Means of arrival: Ambulance  History obtained from: Patient  History limited by: none    CHIEF COMPLAINT  Chief Complaint   Patient presents with   • Abdominal Pain       HPI  Dunia Sahni is a 34 y.o. female who presents to the Emergency Department via ambulance as a transfer from [facility] for left lower quadrant abdominal pain onset around 5 pm last night. She has associated nausea and vomiting. Patient describes that pain as cramping pain with occasional sharp pains. She was told she had a nodule of unknown origin and needed to see a specialist but was unable to follow up.     REVIEW OF SYSTEMS  Pertinent positives include left lower quadrant abdominal pain, nausea, and vomiting.   Pertinent negatives include no urinary symptoms.    All other systems reviewed and negative. See HPI for further details.       PAST MEDICAL HISTORY   has a past medical history of Abscess (10/12/2017), Bipolar affective (HCC), Depression, Kidney infection, and Suicide attempt ([facility]).    SURGICAL HISTORY   has a past surgical history that includes appendectomy; ercp in or (N/A, 7/5/2018); and skip by laparoscopy (N/A, 7/6/2018).    SOCIAL HISTORY  Social History     Tobacco Use   • Smoking status: Never Smoker   • Smokeless tobacco: Never Used   Substance Use Topics   • Alcohol use: Not Currently     Comment: rarely   • Drug use: Yes     Types: Inhaled     Comment: marijuana, medical, occasional      Social History     Substance and Sexual Activity   Drug Use Yes   • Types: Inhaled    Comment: marijuana, medical, occasional       FAMILY HISTORY  No family history on file.    CURRENT MEDICATIONS  Home Medications     Reviewed by Elif Daugherty R.N. (Registered Nurse) on 12/29/19 at 0442  Med List Status: Not Addressed   Medication Last Dose Status        Patient  "Corwin Taking any Medications                       ALLERGIES  Allergies   Allergen Reactions   • Vicodin [Hydrocodone-Acetaminophen] Anaphylaxis     RXN=9 years ago   • Pcn [Penicillins] Nausea     RXN=8 years ago  Toleartes rocephin   • Bee Venom    • Blackberry [Rubus Fruticosus] Rash     \"rash\"   • Haldol [Haloperidol] Rash     Pt states, \"I have a rash and my lips were swollen.\"   • Raspberry        PHYSICAL EXAM  VITAL SIGNS: /73   Pulse 65   Temp 36.7 °C (98 °F) (Temporal)   Resp 16   Ht 1.727 m (5' 8\")   Wt 124.7 kg (275 lb)   LMP 12/09/2019   SpO2 98%   BMI 41.81 kg/m²     Nursing note and vitals reviewed.  Constitutional: Well-developed and well-nourished. No distress.   HENT: Head is normocephalic and atraumatic. Oropharynx is clear and moist without exudate or erythema.   Eyes: Pupils are equal, round, and reactive to light. Conjunctiva are normal.   Cardiovascular: Normal rate and regular rhythm. No murmur heard. Normal radial pulses.  Pulmonary/Chest: Breath sounds normal. No wheezes or rales.   Abdominal: Soft, Obese, tenderness left lower quadrant without rebound or guarding. Non-distended. Normal active bowel sounds. No guarding or peritoneal signs. No palpable abdominal aortic aneurysm. No masses. No tenderness at McBurney's point.   Musculoskeletal: Extremities exhibit normal range of motion without edema or tenderness.   Neurological: Awake, alert and oriented to person, place, and time. No focal deficits noted.  Skin: Skin is warm and dry. No rash.  Psychiatric: Normal mood and Odd affect. Appropriate for clinical situation      DIAGNOSTIC STUDIES / PROCEDURES    LABS  Results for orders placed or performed during the hospital encounter of 12/29/19   CBC WITH DIFFERENTIAL   Result Value Ref Range    WBC 10.5 4.8 - 10.8 K/uL    RBC 4.34 4.20 - 5.40 M/uL    Hemoglobin 12.3 12.0 - 16.0 g/dL    Hematocrit 38.2 37.0 - 47.0 %    MCV 88.0 81.4 - 97.8 fL    MCH 28.3 27.0 - 33.0 pg    MCHC " 32.2 (L) 33.6 - 35.0 g/dL    RDW 42.5 35.9 - 50.0 fL    Platelet Count 318 164 - 446 K/uL    MPV 9.3 9.0 - 12.9 fL    Neutrophils-Polys 71.00 44.00 - 72.00 %    Lymphocytes 20.80 (L) 22.00 - 41.00 %    Monocytes 6.70 0.00 - 13.40 %    Eosinophils 0.80 0.00 - 6.90 %    Basophils 0.40 0.00 - 1.80 %    Immature Granulocytes 0.30 0.00 - 0.90 %    Nucleated RBC 0.00 /100 WBC    Neutrophils (Absolute) 7.48 (H) 2.00 - 7.15 K/uL    Lymphs (Absolute) 2.19 1.00 - 4.80 K/uL    Monos (Absolute) 0.71 0.00 - 0.85 K/uL    Eos (Absolute) 0.08 0.00 - 0.51 K/uL    Baso (Absolute) 0.04 0.00 - 0.12 K/uL    Immature Granulocytes (abs) 0.03 0.00 - 0.11 K/uL    NRBC (Absolute) 0.00 K/uL   COMP METABOLIC PANEL   Result Value Ref Range    Sodium 138 135 - 145 mmol/L    Potassium 3.9 3.6 - 5.5 mmol/L    Chloride 108 96 - 112 mmol/L    Co2 26 20 - 33 mmol/L    Anion Gap 4.0 0.0 - 11.9    Glucose 95 65 - 99 mg/dL    Bun 10 8 - 22 mg/dL    Creatinine 0.69 0.50 - 1.40 mg/dL    Calcium 8.4 (L) 8.5 - 10.5 mg/dL    AST(SGOT) 10 (L) 12 - 45 U/L    ALT(SGPT) 12 2 - 50 U/L    Alkaline Phosphatase 57 30 - 99 U/L    Total Bilirubin 0.4 0.1 - 1.5 mg/dL    Albumin 3.8 3.2 - 4.9 g/dL    Total Protein 6.3 6.0 - 8.2 g/dL    Globulin 2.5 1.9 - 3.5 g/dL    A-G Ratio 1.5 g/dL   LIPASE   Result Value Ref Range    Lipase 4 (L) 11 - 82 U/L   URINALYSIS CULTURE, IF INDICATED   Result Value Ref Range    Color Yellow     Character Clear     Specific Gravity >=1.045 (A) <1.035    Ph 7.0 5.0 - 8.0    Glucose Negative Negative mg/dL    Ketones Trace (A) Negative mg/dL    Protein Negative Negative mg/dL    Bilirubin Negative Negative    Urobilinogen, Urine 0.2 Negative    Nitrite Negative Negative    Leukocyte Esterase Negative Negative    Occult Blood Negative Negative    Micro Urine Req see below    HCG QUAL SERUM   Result Value Ref Range    Beta-Hcg Qualitative Serum Negative Negative   ESTIMATED GFR   Result Value Ref Range    GFR If  >60 >60  mL/min/1.73 m 2    GFR If Non African American >60 >60 mL/min/1.73 m 2     All labs reviewed by me.    RADIOLOGY  US-PELVIC COMPLETE (TRANSABDOMINAL/TRANSVAGINAL) (COMBO)   Final Result      No significant abnormality seen.      CT-ABDOMEN-PELVIS WITH   Final Result         1. No acute inflammatory change identified in the abdomen or pelvis.        The radiologist's interpretation of all radiological studies have been reviewed by me.    COURSE & MEDICAL DECISION MAKING  Nursing notes, VS, PMSFHx reviewed in chart.     4:48 AM - Patient seen and examined at bedside. I discussed that we will need to obtain labs and imaging to further evaluate her symptoms. Patient will be treated with Tylenol 650 mg and IV fluids. IV fluids administered for CT with IV contrast. Ordered CT abdomen, CBC w/ diff, CMP, Lipase, UA, and HCG qual to evaluate her symptoms. The differential diagnoses include but are not limited to: diverticulitis vs constipation vs UTI vs ectopic pregnancy.     9:33 AM - CT was unremarkable. Ordered US pelvic.    11:06 AM - Informed the patient that all results came back normal. Advised that CT scan and ultrasound are reassuring.  Urinalysis demonstrates no evidence of infection.  CBC and metabolic panel are unremarkable. Recommended she return for worsening symptoms for follow with the [facility] clinic. Patient will be discharged at this time. She verbalizes agreement with discharge and plan of care.      HYDRATION: Based on the patient's presentation of Other need for IV contrast the patient was given IV fluids. IV Hydration was used because oral hydration was not adequate alone. Upon recheck following hydration, the patient was well improved.    The patient will return for new or worsening symptoms and is stable at the time of discharge.    DISPOSITION:  Patient will be discharged home in stable condition.    FOLLOW UP:  [facility], Emergency Dept  [address]  [address]  [phone]    If symptoms worsen    [address] [address]  [phone]        FINAL IMPRESSION  1. Left lower quadrant abdominal pain          IVibha (Gailibangel), am scribing for, and in the presence of, Robert Holt M.D..    Electronically signed by: Vibha Vanessa (Winston), 12/29/2019    IRobert M.D. personally performed the services described in this documentation, as scribed by Vibha Vanessa in my presence, and it is both accurate and complete. C    The note accurately reflects work and decisions made by me.  Robert Holt  12/29/2019  11:36 AM

## 2019-12-29 NOTE — ED NOTES
Lab called about processing labs; Lab states they are unable to locate pt labs at this time.     Repeat labs drawn and sent

## 2019-12-29 NOTE — ED TRIAGE NOTES
[facility] with complaints of bilateral lower abdomanl pain since last night. Pt states she has vomited one time since the pain started. Pt states she recently had her gallbladder removed here at [facility]. Pt states that eating and drinking both make the pain much worse. Pt states she is taking Abilify IM. Pt is currently on legal hold for SI.

## 2019-12-29 NOTE — ED NOTES
Patient arouses easier at this time. He is able to tell me his name and nods with eye contact when talking to him.  He has been titrated down from 8L O2 mask to 2L NC.  VSS.

## 2019-12-29 NOTE — ED NOTES
Ambulated to restroom and back to bed with RN present. Patient safety staff outside room.  Pending discharge

## 2019-12-29 NOTE — ED NOTES
Patient resting comfortably. She has been flagged for discharge and updated. Social work contacted for transportation back to [facility].

## 2019-12-29 NOTE — ED NOTES
The patient has been provided with discharge education and information.  The patient was also provided with instructions on follow up care and return precautions.  The patient verbalizes understanding of discharge instructions, follow up care, and return precautions.  All questions have been answered.  No RX written by KRYSTAL.  NAD, A/Mariusz, good color and appropriate at time of discharge.  Patient out of department with TESS.

## 2020-01-13 ENCOUNTER — HOSPITAL ENCOUNTER (EMERGENCY)
Dept: HOSPITAL 8 - ED | Age: 35
Discharge: HOME | End: 2020-01-13
Payer: MEDICARE

## 2020-01-13 VITALS — DIASTOLIC BLOOD PRESSURE: 59 MMHG | SYSTOLIC BLOOD PRESSURE: 111 MMHG

## 2020-01-13 VITALS — HEIGHT: 68 IN | WEIGHT: 279.11 LBS | BODY MASS INDEX: 42.3 KG/M2

## 2020-01-13 DIAGNOSIS — R10.9: Primary | ICD-10-CM

## 2020-01-13 LAB
ALBUMIN SERPL-MCNC: 3.5 G/DL (ref 3.4–5)
ALP SERPL-CCNC: 88 U/L (ref 45–117)
ALT SERPL-CCNC: 26 U/L (ref 12–78)
ANION GAP SERPL CALC-SCNC: 7 MMOL/L (ref 5–15)
BASOPHILS # BLD AUTO: 0.03 X10^3/UL (ref 0–0.1)
BASOPHILS NFR BLD AUTO: 0 % (ref 0–1)
BILIRUB SERPL-MCNC: 0.4 MG/DL (ref 0.2–1)
CALCIUM SERPL-MCNC: 8.9 MG/DL (ref 8.5–10.1)
CHLORIDE SERPL-SCNC: 107 MMOL/L (ref 98–107)
CREAT SERPL-MCNC: 0.78 MG/DL (ref 0.55–1.02)
CULTURE INDICATED?: YES
EOSINOPHIL # BLD AUTO: 0.07 X10^3/UL (ref 0–0.4)
EOSINOPHIL NFR BLD AUTO: 1 % (ref 1–7)
ERYTHROCYTE [DISTWIDTH] IN BLOOD BY AUTOMATED COUNT: 14.6 % (ref 9.6–15.2)
LYMPHOCYTES # BLD AUTO: 1.87 X10^3/UL (ref 1–3.4)
LYMPHOCYTES NFR BLD AUTO: 14 % (ref 22–44)
MCH RBC QN AUTO: 28.3 PG (ref 27–34.8)
MCHC RBC AUTO-ENTMCNC: 32.6 G/DL (ref 32.4–35.8)
MCV RBC AUTO: 86.6 FL (ref 80–100)
MD: NO
MICROSCOPIC: (no result)
MONOCYTES # BLD AUTO: 0.65 X10^3/UL (ref 0.2–0.8)
MONOCYTES NFR BLD AUTO: 5 % (ref 2–9)
NEUTROPHILS # BLD AUTO: 10.43 X10^3/UL (ref 1.8–6.8)
NEUTROPHILS NFR BLD AUTO: 80 % (ref 42–75)
PLATELET # BLD AUTO: 434 X10^3/UL (ref 130–400)
PMV BLD AUTO: 7.4 FL (ref 7.4–10.4)
PROT SERPL-MCNC: 7.4 G/DL (ref 6.4–8.2)
RBC # BLD AUTO: 4.52 X10^6/UL (ref 3.82–5.3)

## 2020-01-13 PROCEDURE — 80307 DRUG TEST PRSMV CHEM ANLYZR: CPT

## 2020-01-13 PROCEDURE — 36415 COLL VENOUS BLD VENIPUNCTURE: CPT

## 2020-01-13 PROCEDURE — 87086 URINE CULTURE/COLONY COUNT: CPT

## 2020-01-13 PROCEDURE — 81001 URINALYSIS AUTO W/SCOPE: CPT

## 2020-01-13 PROCEDURE — 83690 ASSAY OF LIPASE: CPT

## 2020-01-13 PROCEDURE — 84703 CHORIONIC GONADOTROPIN ASSAY: CPT

## 2020-01-13 PROCEDURE — 85025 COMPLETE CBC W/AUTO DIFF WBC: CPT

## 2020-01-13 PROCEDURE — 99283 EMERGENCY DEPT VISIT LOW MDM: CPT

## 2020-01-13 PROCEDURE — 80053 COMPREHEN METABOLIC PANEL: CPT

## 2020-01-13 NOTE — NUR
THIS IS A 33 YO FEMALE COMING IN FOR LOWER LEFT SIDED ABD PAIN WITHOUT 
RADIATION, TENDER TO PALPATION, RATED 6/10 STARTING 3 HOURS AGO, PATIENT STATES 
NO EVENT HAPPENED TO BRING IT ON, DENIES ANY TRAUMA. PATIENT C/O MILD NAUSEA, 
NO VOMITING. LAST BOWEL MOVEMENT WAS TODAY. DENIES PROBLEMS URINATING. A&OX4, 
FLAT AFFECT, PSYCH HX, VSS, NAD, CALL LIGHT IN REACH, DENIES NEEDS AT THIS 
TIME. SPO2 AND BP MONITORING IN PLACE. UA SENT.

## 2020-01-25 ENCOUNTER — HOSPITAL ENCOUNTER (EMERGENCY)
Dept: HOSPITAL 8 - ED | Age: 35
LOS: 1 days | Discharge: HOME | End: 2020-01-26
Payer: MEDICAID

## 2020-01-25 VITALS — HEIGHT: 68 IN | WEIGHT: 278.22 LBS | BODY MASS INDEX: 42.17 KG/M2

## 2020-01-25 VITALS — DIASTOLIC BLOOD PRESSURE: 62 MMHG | SYSTOLIC BLOOD PRESSURE: 133 MMHG

## 2020-01-25 DIAGNOSIS — R42: Primary | ICD-10-CM

## 2020-01-25 DIAGNOSIS — R06.02: ICD-10-CM

## 2020-01-25 DIAGNOSIS — R07.9: ICD-10-CM

## 2020-01-25 LAB
ALBUMIN SERPL-MCNC: 3.3 G/DL (ref 3.4–5)
ANION GAP SERPL CALC-SCNC: 7 MMOL/L (ref 5–15)
BASOPHILS # BLD AUTO: 0.07 X10^3/UL (ref 0–0.1)
BASOPHILS NFR BLD AUTO: 1 % (ref 0–1)
CALCIUM SERPL-MCNC: 8.4 MG/DL (ref 8.5–10.1)
CHLORIDE SERPL-SCNC: 108 MMOL/L (ref 98–107)
CREAT SERPL-MCNC: 1.04 MG/DL (ref 0.55–1.02)
EOSINOPHIL # BLD AUTO: 0.1 X10^3/UL (ref 0–0.4)
EOSINOPHIL NFR BLD AUTO: 1 % (ref 1–7)
ERYTHROCYTE [DISTWIDTH] IN BLOOD BY AUTOMATED COUNT: 14.5 % (ref 9.6–15.2)
LYMPHOCYTES # BLD AUTO: 2.42 X10^3/UL (ref 1–3.4)
LYMPHOCYTES NFR BLD AUTO: 21 % (ref 22–44)
MCH RBC QN AUTO: 28.6 PG (ref 27–34.8)
MCHC RBC AUTO-ENTMCNC: 33.1 G/DL (ref 32.4–35.8)
MCV RBC AUTO: 86.4 FL (ref 80–100)
MD: NO
MONOCYTES # BLD AUTO: 0.68 X10^3/UL (ref 0.2–0.8)
MONOCYTES NFR BLD AUTO: 6 % (ref 2–9)
NEUTROPHILS # BLD AUTO: 8.5 X10^3/UL (ref 1.8–6.8)
NEUTROPHILS NFR BLD AUTO: 72 % (ref 42–75)
PLATELET # BLD AUTO: 379 X10^3/UL (ref 130–400)
PMV BLD AUTO: 7.5 FL (ref 7.4–10.4)
RBC # BLD AUTO: 4.41 X10^6/UL (ref 3.82–5.3)

## 2020-01-25 PROCEDURE — 93005 ELECTROCARDIOGRAM TRACING: CPT

## 2020-01-25 PROCEDURE — 99284 EMERGENCY DEPT VISIT MOD MDM: CPT

## 2020-01-25 PROCEDURE — 85025 COMPLETE CBC W/AUTO DIFF WBC: CPT

## 2020-01-25 PROCEDURE — 36415 COLL VENOUS BLD VENIPUNCTURE: CPT

## 2020-01-25 PROCEDURE — 80048 BASIC METABOLIC PNL TOTAL CA: CPT

## 2020-01-25 PROCEDURE — 82040 ASSAY OF SERUM ALBUMIN: CPT

## 2020-01-25 PROCEDURE — 71045 X-RAY EXAM CHEST 1 VIEW: CPT

## 2020-01-25 NOTE — NUR
THIS IS A 34Y F THAT COMES IN W/ C/O WEAKNESS DURING EXERCISE. PT STS WHEN SHE 
DOES THE ELIPTICAL FOR A PERIOD OF TIME SHE FEELS SHORT OF BREATH AND PRESSURE 
IN HER CHEST. AT THIS TIME PT STS SHE DOES NOT HAVE CP. PT SPEAKING WITH MD 
STUDENT. PT NOT WILLING TO DISCLOSE MEDICAL PROVIDERS SHE IS CURRENTLY SEEING 
SHE STS ITS PRIVATE. PT RESTING ON KAMRYN ANTHONY. CALL LIGHT IN REACH

## 2020-01-28 ENCOUNTER — HOSPITAL ENCOUNTER (EMERGENCY)
Dept: HOSPITAL 8 - ED | Age: 35
End: 2020-01-28
Payer: MEDICARE

## 2020-01-28 ENCOUNTER — HOSPITAL ENCOUNTER (EMERGENCY)
Facility: MEDICAL CENTER | Age: 35
End: 2020-02-05
Attending: EMERGENCY MEDICINE
Payer: MEDICARE

## 2020-01-28 VITALS — DIASTOLIC BLOOD PRESSURE: 85 MMHG | SYSTOLIC BLOOD PRESSURE: 130 MMHG

## 2020-01-28 VITALS — BODY MASS INDEX: 47.09 KG/M2 | HEIGHT: 66 IN | WEIGHT: 293 LBS

## 2020-01-28 DIAGNOSIS — Z90.49: ICD-10-CM

## 2020-01-28 DIAGNOSIS — R45.851 SUICIDAL IDEATION: ICD-10-CM

## 2020-01-28 DIAGNOSIS — F23 ACUTE PSYCHOSIS (HCC): ICD-10-CM

## 2020-01-28 DIAGNOSIS — J45.909: ICD-10-CM

## 2020-01-28 DIAGNOSIS — J02.8: Primary | ICD-10-CM

## 2020-01-28 DIAGNOSIS — B97.89: ICD-10-CM

## 2020-01-28 LAB
AMPHET UR QL SCN: NEGATIVE
BARBITURATES UR QL SCN: NEGATIVE
BENZODIAZ UR QL SCN: NEGATIVE
BZE UR QL SCN: NEGATIVE
CANNABINOIDS UR QL SCN: NEGATIVE
HCG UR QL: NEGATIVE
METHADONE UR QL SCN: NEGATIVE
OPIATES UR QL SCN: NEGATIVE
OXYCODONE UR QL SCN: NEGATIVE
PCP UR QL SCN: NEGATIVE
POC BREATHALIZER: 0 PERCENT (ref 0–0.01)
PROPOXYPH UR QL SCN: NEGATIVE

## 2020-01-28 PROCEDURE — 99283 EMERGENCY DEPT VISIT LOW MDM: CPT

## 2020-01-28 PROCEDURE — 81025 URINE PREGNANCY TEST: CPT

## 2020-01-28 PROCEDURE — 80307 DRUG TEST PRSMV CHEM ANLYZR: CPT

## 2020-01-28 PROCEDURE — 302970 POC BREATHALIZER: Performed by: EMERGENCY MEDICINE

## 2020-01-28 PROCEDURE — 99285 EMERGENCY DEPT VISIT HI MDM: CPT

## 2020-01-28 PROCEDURE — 81025 URINE PREGNANCY TEST: CPT | Performed by: EMERGENCY MEDICINE

## 2020-01-28 PROCEDURE — 90791 PSYCH DIAGNOSTIC EVALUATION: CPT

## 2020-01-28 NOTE — NUR
Patient/Caregiver given discharge instructions and they have confirmed that 
they understand the instructions.  Patient ambulatory with steady gait. pT 
REFUSING TO LEAVE BECUASE SHE WAS ASKED TO.

## 2020-01-28 NOTE — NUR
RECEIVED REPORT FROM JAYA. PT UPRIGHT ON GURNEY AWAKE, CALM & COMFORTABLE, 
RESPONDS TO STAFF QUESTIONS, NAD, NO NEEDS AT THIS TIME, PT IN SAFE 
ENVIRONMENT, AWAITING DC INSTRUCTIONS.

## 2020-01-28 NOTE — NUR
MD Barrientos confirmed that pt is to be discharged w/o further psych evaluation. 
Report to CIELO Carballo to discharge pt

## 2020-01-29 PROCEDURE — 99284 EMERGENCY DEPT VISIT MOD MDM: CPT | Performed by: PSYCHIATRY & NEUROLOGY

## 2020-01-29 RX ORDER — RISPERIDONE 2 MG/1
2 TABLET ORAL EVERY EVENING
Status: DISCONTINUED | OUTPATIENT
Start: 2020-01-29 | End: 2020-02-05 | Stop reason: HOSPADM

## 2020-01-29 ASSESSMENT — LIFESTYLE VARIABLES: SUBSTANCE_ABUSE: 0

## 2020-01-29 ASSESSMENT — ENCOUNTER SYMPTOMS
DEPRESSION: 0
HALLUCINATIONS: 0
CONSTIPATION: 0
DIARRHEA: 0
NAUSEA: 0
VOMITING: 0
SHORTNESS OF BREATH: 0
ABDOMINAL PAIN: 0
HEADACHES: 0
PALPITATIONS: 0

## 2020-01-29 NOTE — ED NOTES
Pt sitting quietly on stretcher. Staring at staff. 1:1 observer in place. Per observer pt pleasant with female interactions.

## 2020-01-29 NOTE — ED NOTES
Pt resting quietly in bed with eyes closed. Respirations even and unlabored. 1:1 sitter observation in direct line of sight. No signs or symptoms of distress. Will continue to monitor.

## 2020-01-29 NOTE — DISCHARGE PLANNING
Medical Social Work    MSW received a call from Raegan at [facility] declining pt.  Raegan states that their doctor is refusing pt as she doesn't take meds and they didn't get paid for her last admission there.

## 2020-01-29 NOTE — ED NOTES
Pt report given to CHRISTIE Souza. All questions answered. He will take over pt care at this time.

## 2020-01-29 NOTE — ED NOTES
Pt updated on plan. Given box lunch per request. 1:1 observer in place. Pt reports feeling the same as when she checked in.

## 2020-01-29 NOTE — ED TRIAGE NOTES
"Chief Complaint   Patient presents with   • Psych Eval     Pt presents with a flight of ideas, delusional. States \"I'm going to comit suicide because of the position I'm in\" pt refused to elaborate and states I will tell my story to the supreme court.      Pulse 96   Temp 36.4 °C (97.5 °F) (Temporal)   Resp 16   Ht 1.727 m (5' 8\")   Wt 124.3 kg (274 lb 0.5 oz)   SpO2 98%   Breastfeeding? No   BMI 41.67 kg/m²     Charge notified. Pt taken to [facility] 30. Report given to primary RN.  "
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
## 2020-01-29 NOTE — ED PROVIDER NOTES
"ED Provider Note    Scribed for Kya Burns M.D. by Bobbi Odell. 1/28/2020  5:40 PM    Primary care provider: Pcp Pt States None  Means of arrival: Walk In  History obtained from: Patient  History limited by: Patient is poor historian    CHIEF COMPLAINT  Chief Complaint   Patient presents with   • Psych Eval     Pt presents with a flight of ideas, delusional. States \"I'm going to comit suicide because of the position I'm in\" pt refused to elaborate and states I will tell my story to the supreme court.        HPI  Dunia Sahni is a 34 y.o. female who presents to the Emergency Department for psychiatric evaluation. During interview, the patient is requesting to speak to a , and states \"I'm going to commit suicide\" and refuses to say anything else. Per RN notes, the patient presents with a flight of ideas and is delusional.  She appears anxious and paranoid. No acute medical complaints. There were no alleviating or exacerbating factors.    HPI unobtainable secondary to patient is poor historian      REVIEW OF SYSTEMS  Psychiatric:Suicidal ideation.     See history of present illness.  C.    ROS unobtainable secondary to patient is poor historian    PAST MEDICAL HISTORY   has a past medical history of Abscess (10/12/2017), Bipolar affective (HCC), Depression, Kidney infection, and Suicide attempt (HCC).    SURGICAL HISTORY   has a past surgical history that includes appendectomy; ercp in or (N/A, 7/5/2018); and skip by laparoscopy (N/A, 7/6/2018).    SOCIAL HISTORY  Social History     Tobacco Use   • Smoking status: Never Smoker   • Smokeless tobacco: Never Used   Substance Use Topics   • Alcohol use: Yes     Comment: rarely   • Drug use: Yes     Types: Inhaled     Comment: marijuana, cocaine      Social History     Substance and Sexual Activity   Drug Use Yes   • Types: Inhaled    Comment: marijuana, cocaine       FAMILY HISTORY  History reviewed. No pertinent family history.    CURRENT " "MEDICATIONS  Home Medications     Reviewed by Mariel Atkinson R.N. (Registered Nurse) on 01/28/20 at 1657  Med List Status: Complete   Medication Last Dose Status        Patient Corwin Taking any Medications                       ALLERGIES  Allergies   Allergen Reactions   • Vicodin [Hydrocodone-Acetaminophen] Anaphylaxis     RXN=9 years ago   • Pcn [Penicillins] Nausea     RXN=8 years ago  Toleartes rocephin   • Bee Venom    • Blackberry [Rubus Fruticosus] Rash     \"rash\"   • Haldol [Haloperidol] Rash     Pt states, \"I have a rash and my lips were swollen.\"   • Raspberry        PHYSICAL EXAM  VITAL SIGNS: Pulse 96   Temp 36.4 °C (97.5 °F) (Temporal)   Resp 16   Ht 1.727 m (5' 8\")   Wt 124.3 kg (274 lb 0.5 oz)   SpO2 98%   Breastfeeding? No   BMI 41.67 kg/m²     Constitutional: Well developed, Well nourished, No acute distress, Non-toxic appearance.   HEENT: Normocephalic, Atraumatic,  external ears normal, pharynx pink,  Mucous  Membranes moist, No rhinorrhea or mucosal edema  Eyes: PERRL, EOMI, Conjunctiva normal, No discharge.   Neck: Normal range of motion, No tenderness, Supple, No stridor.   Lymphatic: No lymphadenopathy    Cardiovascular: Regular Rate and Rhythm, No murmurs,  rubs, or gallops.   Thorax & Lungs: Lungs clear to auscultation bilaterally, No respiratory distress, No wheezes, rhales or rhonchi, No chest wall tenderness.   Abdomen: Bowel sounds normal, Soft, non tender, non distended,  No pulsatile masses., no rebound guarding or peritoneal signs.   Skin: Warm, Dry, No erythema, No rash,   Back:  No CVA tenderness,  No spinal tenderness, bony crepitance, step offs, or instability.   Neurologic: Alert & oriented x 3, Normal motor function, Normal sensory function, No focal deficits noted. Normal reflexes. Normal Cranial Nerves.  Extremities: Equal, intact distal pulses, No cyanosis, clubbing or edema,  No tenderness.   Musculoskeletal: Good range of motion in all major joints. No tenderness " to palpation or major deformities noted.   Psych: positive paranoids and psychotic behavior, admits to suicidal thoughts    DIAGNOSTIC STUDIES / PROCEDURES    LABS  Results for orders placed or performed during the hospital encounter of 01/28/20   URINE DRUG SCREEN (TRIAGE)   Result Value Ref Range    Amphetamines Urine Negative Negative    Barbiturates Negative Negative    Benzodiazepines Negative Negative    Cocaine Metabolite Negative Negative    Methadone Negative Negative    Opiates Negative Negative    Oxycodone Negative Negative    Phencyclidine -Pcp Negative Negative    Propoxyphene Negative Negative    Cannabinoid Metab Negative Negative   BETA-HCG QUALITATIVE URINE   Result Value Ref Range    Beta-Hcg Urine Negative Negative   POC BREATHALIZER   Result Value Ref Range    POC Breathalizer 0.000 0.00 - 0.01 Percent       All labs reviewed by me.      COURSE & MEDICAL DECISION MAKING  Nursing notes, VS, PMSFHx reviewed in chart.    5:40 PM Patient seen and examined at bedside. The patient presents for psychiatric evaluation. During interview, she is requesting to speak to . Ordered Beta HCG Qual, Urine drug screen, POC breathalizer, POC urine pregnancy,  to evaluate her symptoms. She will be evaluated by psychiatry. The differential diagnoses include but are not limited to:psychosis, depression, suicidal ideation, schizophrenia     6:00pm per lifeskills pt is suicidal and unable to care for self, she will be placed on a legal hold to see psychiatry.     FINAL IMPRESSION  1. Suicidal ideation    2. Acute psychosis (HCC)          Bobbi GARCIA (Scribe), am scribing for, and in the presence of, Kya Burns M.D..    Electronically signed by: Bobbi Odell (Scribe), 1/28/2020    Kya GARCIA M.D. personally performed the services described in this documentation, as scribed by Bobbi Odell in my presence, and it is both accurate and complete. C    The note accurately reflects work and  decisions made by me.  Kya Burns M.D.  1/28/2020  6:49 PM

## 2020-01-29 NOTE — DISCHARGE PLANNING
Medical Social Work    MSW spoke with Miguel at [facility]; they have declined pt as pt is out of Medicare days.

## 2020-01-29 NOTE — DISCHARGE PLANNING
Medical Social Work    Referral: Legal Hold    Intervention: Legal Hold Paperwork given to SW by [employer] RN Dalila Odom    Legal Hold Initiated: Date: 01/28/2020 Time: 1806    Patient’s Insurance Listed on Face Sheet: Medicare/Medicaid FFS    Referrals sent to: [facility], [facility], [facility], [facility], [facility]     This referral contains the following information:  1) Face sheet __x__  2) Page 1 and Page 2 of Legal Hold _x___  3) Alert Team Assessment/Psych Assessment __x__  4) Head to toe physical exam __x__  5) Urine Drug Screen __x__  6) Blood Alcohol _x___  7) Vital signs _x___  8) Pregnancy test when applicable _x__  9) Medications list __x__    Plan: Patient will transfer to mental health facility once acceptance is obtained.

## 2020-01-29 NOTE — ED NOTES
Pt resting quietly in bed. Respirations even and unlabored. 1:1 sitter observation in direct line of sight. No signs or symptoms of distress. Will continue to monitor.

## 2020-01-30 NOTE — ED NOTES
Pt asking if they are considering dc pt to home. Discussed that that was up to the psychiatrist who saw her today and that to doctor ordered medication for pt. Pt refusing to take medication. Attempted to educate pt on er process and that dc was up to the psychiatrist who will see her daily. 1:1 observer in place.

## 2020-01-30 NOTE — ED NOTES
Pt asked to use the phone to call landlord to let him know she is no longer living there. RN informed her as of now the phone is not available. Pt gave additional information that she was no longer suicidal.

## 2020-01-30 NOTE — ED NOTES
Patient remains in room with eyes closed. Patient requests to be on list for shower. Sitter remains at bedside.

## 2020-01-30 NOTE — ED NOTES
Patient in room with eyes closed. Denies any needs at this time. Equal respirations noted. Sitter remains at bedside

## 2020-01-30 NOTE — ED NOTES
Patient appears to be asleep in room with eyes closed. No needs at this time. Sitter remains at bedside

## 2020-01-30 NOTE — ED NOTES
Patient ambulated to restroom with steady gait. Patient requested female to escort patient to restroom.

## 2020-01-30 NOTE — ED NOTES
Pt ambulated to bathroom with RN supervision, pt was given clean gown, and clean bedding as well as toothbrush and toothpaste.

## 2020-01-30 NOTE — ED PROVIDER NOTES
ED Provider Note Addendum    Scribed for Robert Holt M.D. by Jus Mckeon on 1/30/2020 at 10:54 AM.     This is an addendum to the note on Dunia Sahni.  For further details and full chart information, see patient's initial note.       6:04 AM - I discussed the patient's case with Dr. Spencer ([facility]) who will transfer care of the patient to me at this time.        10:45 AM - The patient has done well during my period of observation. They are resting comfortably. They continue to await transfer to an inpatient psychiatric facility.     Disposition:  Patient will be transferred to an appropriate psychiatric facility in stable condition for further psychiatric care and evaluation.  Patient will be placed under the care of my partner awaiting transfer.    FINAL IMPRESSION  1. Suicidal ideation    2. Acute psychosis (HCC)         I, Jus Mckeon (Scribe), am scribing for, and in the presence of, Robert Holt M.D..    Electronically signed by: Jus Mckeon (Scribe), 1/30/2020    IRobert M.D. personally performed the services described in this documentation, as scribed by Jus Mckeon in my presence, and it is both accurate and complete.    The note accurately reflects work and decisions made by me.  Robert Holt M.D.  1/30/2020  11:17 AM

## 2020-01-30 NOTE — ED NOTES
Patient remains calm and cooperative in room with 1:1 sitter at bedside. Patient requested to have phone and was provided.

## 2020-01-30 NOTE — ED NOTES
Report received from Sj RN, assuming care at this time. Pt is resting, 1:1 observation continues, pt requesting a shower, will address when appropriate staff is available.

## 2020-01-30 NOTE — ED NOTES
Patient remains calm and asleep in room with eyes closed. No needs at this time. Sitter remains at bedside

## 2020-01-31 PROCEDURE — 99282 EMERGENCY DEPT VISIT SF MDM: CPT | Performed by: PSYCHIATRY & NEUROLOGY

## 2020-01-31 RX ORDER — DIPHENHYDRAMINE HYDROCHLORIDE 50 MG/ML
50 INJECTION INTRAMUSCULAR; INTRAVENOUS
Status: DISCONTINUED | OUTPATIENT
Start: 2020-01-31 | End: 2020-02-05 | Stop reason: HOSPADM

## 2020-01-31 RX ORDER — DIPHENHYDRAMINE HCL 25 MG
50 TABLET ORAL
Status: DISCONTINUED | OUTPATIENT
Start: 2020-01-31 | End: 2020-02-05 | Stop reason: HOSPADM

## 2020-01-31 RX ORDER — LORAZEPAM 2 MG/ML
2 INJECTION INTRAMUSCULAR
Status: DISCONTINUED | OUTPATIENT
Start: 2020-01-31 | End: 2020-02-05 | Stop reason: HOSPADM

## 2020-01-31 RX ORDER — LORAZEPAM 2 MG/1
2 TABLET ORAL
Status: DISCONTINUED | OUTPATIENT
Start: 2020-01-31 | End: 2020-02-05 | Stop reason: HOSPADM

## 2020-01-31 NOTE — ED NOTES
pateent up in room combing hair with plastic comb under direct observation clean socks provided at patient request

## 2020-01-31 NOTE — PSYCHIATRY
"PSYCHIATRIC FOLLOW-UP:(established)  *Reason for admission: Pt presents with a flight of ideas, delusional. States \"I'm going to comit suicide because of the position I'm in\" pt refused to elaborate and states I will tell my story to the supreme court.        *Legal Hold Status on Admission:  +               *HPI:  Pt is \"Majesty Young....\" she has a \"Pure patch in my race that means Carmella never had sex. Its that other girl\". (and that she is not mentally ill) When asked what this meant, she replies that she doesn't want to talk about it, she will present her case before the \"supreme court\" because she is suing.          *Psychiatric Examination:  Vitals: Blood pressure 127/77, pulse 65, temperature 36.7 °C (98.1 °F), temperature source Temporal, resp. rate 14, height 1.727 m (5' 8\"), weight 124.3 kg (274 lb 0.5 oz), SpO2 98 %, not currently breastfeeding.    General Appearance: clean, obese, good eye contact, cooperative to the degree to the degree she can.  Abnormal Movements: none  Gait and Posture: sitting up in bed  Speech: wnl  Thought processes: normal rate  Associations: linear  Abnormal or Psychotic Thoughts: psychotic  Judgement and Insight: impaired  Orientation: grossly intact  Recent and Remote Memory: grossly intact  Attention Span and Concentration: intact  Language: fluid  Fund of Knowledge:not tested  Mood and Affect: irritable  SI/HI:denies      *ASSESSMENT/PLAN:  1. Schizoaffective disorder acute  - has refused risperdol.       -needs a long acting injectable     -prns ordered        2. Medical  - none acutely         *Legal hold: extended.               *Will Follow      "

## 2020-01-31 NOTE — DISCHARGE PLANNING
Alert Team  Pt to continue on LH for inability to care for self; she now denies any SI.  She continues to refuse psych meds; showered yesterday with prompting from staff and exhibits adequate appetite.  She continues to await inpatient psychiatric hospitalization at [facility].

## 2020-01-31 NOTE — ED NOTES
Patient awake sittng up in the bed, no complaints of discomfort at this time, direst observation is in place throughout shift

## 2020-01-31 NOTE — ED NOTES
VSS. Pt sitting up awaiting breakfast. Airway is patent and breaths are even and unlabored. Sitter in place and checklist in use

## 2020-01-31 NOTE — ED NOTES
Pt up to bathroom with steady gait. Pt resting in bed with no complaints at this time. Airway is patent and breaths are even and unlabored. Sitter in place and checklist in use

## 2020-01-31 NOTE — ED PROVIDER NOTES
ED Provider Note Addendum   Scribed for Robert Holt M.D. by Abimael Garcia on 1/31/2020 at 10:55 AM     This is an addendum to the note on this patient.  For further details and full chart information, see the previously signed note.      The patient has done well during my period of observation. They are resting comfortably. They continue to await transfer to an inpatient psychiatric facility.     Disposition:    FINAL IMPRESSION  1. Suicidal ideation    2. Acute psychosis (HCC)          IAbimael (Gailibe), am scribing for, and in the presence of, Robert Holt M.D..    Electronically signed by: Abimael Garcia ([place]), 1/31/2020    Robert GARCIA M.D. personally perfromed the services described in this documentation, as scribed by Abimael Garcia in my presence, and it is both accurate and complete.     The note accurately reflects work and decisions made by me.  Robert Holt M.D.  1/31/2020  11:11 AM

## 2020-01-31 NOTE — ED NOTES
Patient's home medications have been reviewed by the pharmacy team.     Patient's Medications   New Prescriptions    No medications on file   Previous Medications    No medications on file   Modified Medications    No medications on file   Discontinued Medications    NITROFURANTOIN MONOHYD MACRO (MACROBID) 100 MG CAP             A:  Medications do not appear to be contributing to current complaints.   Denies meds    P:    No recommendations at this time. Defer to psychiatry.    Blas Taylor, PharmD

## 2020-01-31 NOTE — ED NOTES
Pt resting in bed with no complaints at this time. Airway is patent and breaths are even and unlabored. Sitter in place and checklist in use.

## 2020-01-31 NOTE — ED NOTES
Patient back to bathroom to change into clean gown and to wash up with cloth supervision provided by renee then patient returned to room with direct observer at bedside

## 2020-01-31 NOTE — ED NOTES
Rounded on Pt. Pt resting in bed with no needs at this time. Pt refuses to talk with this RN. Sitter in place. Check list in use.

## 2020-01-31 NOTE — ED NOTES
Patient is sitting up in the bed, continuous observation is in place, no complaints of discomfort at this time

## 2020-01-31 NOTE — ED NOTES
Explained to patient that physician would be seeing her today and to speak with her about discharge planning, also nurse stated that she would visisit with patient about if after physician visist

## 2020-01-31 NOTE — ED NOTES
Box lunch provided. Pt resting in bed with no complaints at this time. Airway is patent and breaths are even and unlabored. Sitter in place and checklist in use.

## 2020-01-31 NOTE — ED NOTES
Pt resting in bed with no complaints at this time. Airway is patent and breaths are even and unlabored. Sitter in place and checklist in use. Pt turns self side to side.

## 2020-01-31 NOTE — ED NOTES
Patient now has direct observation in place at bedside. No current complaints of discomfort at this time

## 2020-01-31 NOTE — ED NOTES
Patient asking for hotels in [facility] she states she ants to call and check for vacancies

## 2020-02-01 PROCEDURE — 99282 EMERGENCY DEPT VISIT SF MDM: CPT | Performed by: PSYCHIATRY & NEUROLOGY

## 2020-02-01 NOTE — ED NOTES
Patient up to bathroom with RN supervision. Brought patient coffee. No other needs at this time. Will continue to monitor.

## 2020-02-01 NOTE — ED NOTES
Patient resting in gurney, chest rising and falling. No needs at this time, will continue to monitor.

## 2020-02-01 NOTE — ED NOTES
Report received, assumed care of patient. Patient sitting up in bed brushing her hair. No needs at this time, will continue to monitor.

## 2020-02-01 NOTE — PSYCHIATRY
"PSYCHIATRIC FOLLOW-UP:(established)  *Reason for admission: Pt presents with a flight of ideas, delusional. States \"I'm going to comit suicide because of the position I'm in\" pt refused to elaborate and states I will tell my story to the supreme court.             *Legal Hold Status on Admission:  +                  *HPI: says she slept well and is doing fine. Delusions continue and she does not feel like discussing them. Not SI           *Psychiatric Examination:  Vitals: Blood pressure 109/70, pulse 90, temperature 37.6 °C (99.7 °F), temperature source Oral, resp. rate 18, height 1.727 m (5' 8\"), weight 124.3 kg (274 lb 0.5 oz), SpO2 97 %, not currently breastfeeding.  General Appearance:  good eye contact Abnormal Movements: none  Gait and Posture: sitting up in bed  Speech: wnl  Thought processes: normal rate  Associations: linear  Abnormal or Psychotic Thoughts: psychotic  Judgement and Insight: impaired  Orientation: grossly intact  Recent and Remote Memory: grossly intact  Attention Span and Concentration: intact  Language: fluid  Fund of Knowledge:not tested  Mood and Affect: more pleasant today but we did not address her delusions either  SI/HI:denies      *ASSESSMENT/PLAN:  1. Schizoaffective disorder acute  - continues to  refused risperdol.       -needs a long acting injectable     -prns ordered        2. Medical  - none acutely          *Legal hold: extended.               *Will Follow    "

## 2020-02-01 NOTE — ED PROVIDER NOTES
ED Provider Note    Patient seen by psychiatry, legal hold will be extended.  No new acute complaint at this time.  Patient awaits transfer to appropriate psychiatric facility when bed is available.

## 2020-02-01 NOTE — ED NOTES
Patient up to bathroom with RN supervision. Patient back to room and requesting more food. Gave patient boxed lunch. No other needs at this time, will continue to monitor.

## 2020-02-02 PROCEDURE — 99282 EMERGENCY DEPT VISIT SF MDM: CPT | Performed by: PSYCHIATRY & NEUROLOGY

## 2020-02-02 NOTE — ED PROVIDER NOTES
ED Provider Note    Patient continues to await transfer for inpatient psychiatric evaluation and treatment.  Vital signs remained normal.  Patient has been somewhat agitated and is medicated per PRN orders.  He is currently resting with no acute needs.

## 2020-02-02 NOTE — ED NOTES
Patient coming in and out of room demanding to talk with the  relted to [facility] her insurance and her [employer] beliefs. Patient is anxious, alert team is aware of patients actions

## 2020-02-02 NOTE — DISCHARGE PLANNING
Awake and Alert. Orientated date, time and president. Denies S/I or H/I.  Standing in doorway and appears to begin to get tearful. When asked if she is ok she states she is fine and is on hormones.  Denies A/H but appears to be experiencing internal stimuli.  Will also stand at the door and smile inappropriately at nothing and her lips are moving as she is having a conversation with herself.  When asked why she is here she states that she has mold in her home and it is unsafe to be there. Continue to wait for placement in a psychiatric facility.

## 2020-02-02 NOTE — ED NOTES
Patients talking alone in room, it appears she is believing there is someone in the the room with her. She is talking and smiling but there is no one in the room

## 2020-02-02 NOTE — ED NOTES
Pt ambulated to restroom and back.  No needs voiced.  Currently sitting at foot of bed.  No distress noted.

## 2020-02-02 NOTE — ED NOTES
Dinner tray delivered.     Pt is tearful.  When asked why, she just shook her head and held up her hand.  No explanation given.

## 2020-02-02 NOTE — ED NOTES
Spoke to alert team related to legal hold, which will  on Monday 2-3-2020 at 1525 from renewal on 2020 at 15:25

## 2020-02-02 NOTE — ED NOTES
Patient up to bathroom with supervision and then back to room. Patient standing at mirror talking

## 2020-02-02 NOTE — ED NOTES
Patient walking in and out of room. Patient states she is anxious and needs to know what is happening. Patient did no realize it is sunday

## 2020-02-02 NOTE — ED NOTES
Patient is resting I nthe bed side rails are up at patient request, no complaints of pain or discomfort at this time

## 2020-02-03 PROCEDURE — 700102 HCHG RX REV CODE 250 W/ 637 OVERRIDE(OP): Performed by: PSYCHIATRY & NEUROLOGY

## 2020-02-03 PROCEDURE — 99282 EMERGENCY DEPT VISIT SF MDM: CPT | Performed by: PSYCHIATRY & NEUROLOGY

## 2020-02-03 PROCEDURE — A9270 NON-COVERED ITEM OR SERVICE: HCPCS | Performed by: PSYCHIATRY & NEUROLOGY

## 2020-02-03 RX ADMIN — THERA TABS 1 TABLET: TAB at 06:22

## 2020-02-03 NOTE — ED PROVIDER NOTES
ED PROVIDER NOTE    Scribed for Angie Fitzgerald M.D. by Abimael Garcia. 2/3/2020, 9:04 AM.    This is an addendum to the note on Dunia Sahni. For further details and full chart entry, see the previously signed ED Provider Note written by Dr. Bartlett ([facility]).      12:43 PM - Patient continues to await transfer for inpatient psychiatric evaluation and treatment.  Vital signs remained normal.  Patient has been somewhat agitated and is medicated per PRN orders. She is currently resting with no acute needs.    The patient is delusional and appears somewhat paranoid. She is stating that she is offended by people talking about burns. She insists her name is Ángela, but her name is Dunia. Thinks she should go to a better hospital than [facility]. Otherwise no complaints, vitals are normal, and stable. She is eating and drinking awaiting transfer to psych facility.      FINAL IMPRESSION   1. Suicidal ideation    2. Acute psychosis (HCC)         IAbimael (Scribe), am scribing for, and in the presence of, Angie Fitzgerald M.D..    Electronically signed by: Abimael Garcia (Gailibe), 2/3/2020    Angie GARCIA M.D. personally performed the services described in this documentation, as scribed by Abimael Garcia in my presence, and it is both accurate and complete.    This dictation has been created using voice recognition software. The accuracy of the dictation is limited by the abilities of the software. I expect there may be some errors of grammar and possibly content. I made every attempt to manually correct the errors within my dictation. However, errors related to voice recognition software may still exist and should be interpreted within the appropriate context.    The note accurately reflects work and decisions made by me.  Angie Fitzgerald M.D.  2/3/2020  3:55 PM

## 2020-02-03 NOTE — ED NOTES
Patient resting comfortably. Chest rise and fall noted. Patient in direct observation of sitter. Will Continue to monitor.    SUBJECTIVE:   CC: Boo Medellin is an 55 year old male who presents for preventative health visit.     Physical   Annual:     Getting at least 3 servings of Calcium per day:  Yes    Bi-annual eye exam:  Yes    Dental care twice a year:  Yes    Sleep apnea or symptoms of sleep apnea:  None    Diet:  Regular (no restrictions)    Frequency of exercise:  2-3 days/week    Duration of exercise:  30-45 minutes    Taking medications regularly:  Not Applicable    Additional concerns today:  No            Today's PHQ-2 Score:   PHQ-2 ( 1999 [employer]) 8/24/2018   Q1: Little interest or pleasure in doing things 0   Q2: Feeling down, depressed or hopeless 0   PHQ-2 Score 0   Q1: Little interest or pleasure in doing things Not at all   Q2: Feeling down, depressed or hopeless Not at all   PHQ-2 Score 0       Abuse: Current or Past(Physical, Sexual or Emotional)- No  Do you feel safe in your environment - Yes    Social History   Substance Use Topics     Smoking status: Never Smoker     Smokeless tobacco: Never Used     Alcohol use Yes     Alcohol Use 8/24/2018   If you drink alcohol do you typically have greater than 3 drinks per day OR greater than 7 drinks per week? No   No flowsheet data found.    Last PSA: No results found for: PSA    Reviewed orders with patient. Reviewed health maintenance and updated orders accordingly - Yes      Reviewed and updated as needed this visit by clinical staff  Tobacco  Allergies  Meds  Med Hx  Surg Hx  Fam Hx  Soc Hx        Reviewed and updated as needed this visit by Provider            Review of Systems   Constitutional: Negative for chills and fever.   HENT: Positive for hearing loss. Negative for congestion, ear pain and sore throat.    Eyes: Negative for pain and visual disturbance.   Respiratory: Negative for cough and shortness of breath.    Cardiovascular: Negative for chest pain, palpitations and peripheral edema.   Gastrointestinal: Negative for abdominal pain, constipation,  "diarrhea, heartburn, hematochezia and nausea.   Genitourinary: Negative for discharge, dysuria, frequency, genital sores, hematuria, impotence and urgency.   Musculoskeletal: Negative for arthralgias, joint swelling and myalgias.   Skin: Negative for rash.   Neurological: Negative for dizziness, weakness, headaches and paresthesias.   Psychiatric/Behavioral: Negative for mood changes. The patient is not nervous/anxious.          OBJECTIVE:   /75  Pulse 52  Temp 97.4  F (36.3  C) (Oral)  Resp 16  Ht 5' 9\" (1.753 m)  Wt 186 lb (84.4 kg)  SpO2 98%  BMI 27.47 kg/m2    Physical Exam  GENERAL: healthy, alert and no distress  NECK: no adenopathy, no asymmetry, masses, or scars and thyroid normal to palpation  RESP: lungs clear to auscultation - no rales, rhonchi or wheezes  CV: regular rate and rhythm, normal S1 S2, no S3 or S4, no murmur, click or rub, no peripheral edema and peripheral pulses strong  ABDOMEN: soft, nontender, no hepatosplenomegaly, no masses and bowel sounds normal  MS: no gross musculoskeletal defects noted, no edema    Diagnostic Test Results:  none     ASSESSMENT/PLAN:       ICD-10-CM    1. Routine general medical examination at a health care facility Z00.00    2. Screen for colon cancer Z12.11 Fecal colorectal cancer screen FIT - Future (S+30)   3. Screening for HIV (human immunodeficiency virus) Z11.4        COUNSELING:   Reviewed preventive health counseling, as reflected in patient instructions       Regular exercise       Healthy diet/nutrition       Vision screening       Family planning       HIV screeninx in teen years, 1x in adult years, and at intervals if high risk       Prostate cancer screening    BP Readings from Last 1 Encounters:   18 115/75     Estimated body mass index is 27.47 kg/(m^2) as calculated from the following:    Height as of this encounter: 5' 9\" (1.753 m).    Weight as of this encounter: 186 lb (84.4 kg).      Weight management plan: Discussed " healthy diet and exercise guidelines and patient will follow up in 12 months in clinic to re-evaluate.     reports that he has never smoked. He has never used smokeless tobacco.      Counseling Resources:  ATP IV Guidelines  Pooled Cohorts Equation Calculator  FRAX Risk Assessment  ICSI Preventive Guidelines  Dietary Guidelines for Americans, 2010  USDA's MyPlate  ASA Prophylaxis  Lung CA Screening    Germán Bobo MD  [facility]  Answers for HPI/ROS submitted by the patient on 8/24/2018   PHQ-2 Score: 0  --------------------------------------------------------------------------------------------------------------------------------------    SUBJECTIVE:  Boo Medellin is a 55 year old male who presents to the clinic today for a routine physical exam.    The patient's last physical was 1 years ago.     Cholesterol   Date Value Ref Range Status   08/21/2018 220 (H) <200 mg/dL Final     Comment:     Desirable:       <200 mg/dl   05/26/2017 231 (H) <200 mg/dL Final     Comment:     Desirable:       <200 mg/dl     HDL Cholesterol   Date Value Ref Range Status   08/21/2018 55 >39 mg/dL Final   05/26/2017 58 >39 mg/dL Final     LDL Cholesterol Calculated   Date Value Ref Range Status   08/21/2018 135 (H) <100 mg/dL Final     Comment:     Above desirable:  100-129 mg/dl  Borderline High:  130-159 mg/dL  High:             160-189 mg/dL  Very high:       >189 mg/dl     05/26/2017 142 (H) <100 mg/dL Final     Comment:     Above desirable:  100-129 mg/dl   Borderline High:  130-159 mg/dL   High:             160-189 mg/dL   Very high:       >189 mg/dl       Triglycerides   Date Value Ref Range Status   08/21/2018 148 <150 mg/dL Final     Comment:     Fasting specimen   05/26/2017 154 (H) <150 mg/dL Final     Comment:     Borderline high:  150-199 mg/dl   High:             200-499 mg/dl   Very high:       >499 mg/dl   Fasting specimen       Cholesterol/HDL Ratio   Date Value Ref Range Status   11/17/2014 4.2  0.0 - 5.0 Final   11/21/2013 5.3 (H) 0.0 - 5.0 Final     The patient's last fasting lipid panel was done 3 days ago and the results are listed above.      The 10-year ASCVD risk score (Zara BONNER Jr, et al., 2013) is: 4%    Values used to calculate the score:      Age: 55 years      Sex: Male      Is Non- : No      Diabetic: No      Tobacco smoker: No      Systolic Blood Pressure: 115 mmHg      Is BP treated: No      HDL Cholesterol: 55 mg/dL      Total Cholesterol: 220 mg/dL        The patient reports that he has never been treated for high blood pressure.    The patient reports that he does not take a daily aspirin.    Lab Results   Component Value Date    HCVAB Negative 10/09/2012     The patient reports that he has been screened for Hepatitis C    (Screen all baby boomers once per CDC-- the generation born from 1945 through 1965)  He would not like to have an Hepatitis C test today    No results found for: HIAGAB  The patient reports that he has not been screened for HIV   (Screen all 15 years and older)  He would not like to have an HIV test today      Immunization History   Administered Date(s) Administered     Influenza (IIV3) PF 09/23/2011, 01/13/2013     TD (ADULT, 7+) 01/13/2003     TDAP Vaccine (Adacel) 09/23/2011     The patient's believes that his last tetanus shot was given 7 year(s) ago.   The patient believes that he has not had a Shingrix in the past  The patient believes that he has not had a PPSV23 in the past.  The patient believes that he has not had a PCV13 in the past.  The patient believes that he has had a seasonal flu vaccination this fall or winter.  The patient would like to have a no vaccinations today      No results found for this or any previous visit.]   The patient denies a family history of colon cancer.  The patient reports that he has not had a colonoscopy.     The patient reports that he does performs a self testicular exam monthly.  His currently used  contraception is  a vasectomy.  The patient reports a family history of diabetes in his father.  The patient denies a family history of prostate cancer. After discussing the pros and cons of checking a PSA he  Does not want to have this test drawn today.   The patient reports that he eats or drinks 3 servings of dairy products per day.   The patient reports that he has dental appointments approximately every 6 months.  The patient reports that he  has an eye examination approximately every 1.0 year(s).    Do you currently smoke? No  How many years have you smoked? 0   How many packs per day did you smoke on average? N/A  (if more than 30 pack year history and the patient is age 55-80 consider ordering an annual low dose radiation lung CT to screen for cancer)  (Do not order if patient has quit more than 15 years ago or has a health condition that limits life expectancy or could not tolerate curative lung surgery)  Are you interested having a lung CT to screen for lung cancer? N/A    If the patient has smoked more that 100 cigarettes, has the patient had an imaging study (US or CT) for an AAA between the ages of 65 and 75? N/A            Patient Active Problem List   Diagnosis     HDL lipoprotein deficiency     AR (allergic rhinitis)     High triglycerides     CARDIOVASCULAR SCREENING; LDL GOAL LESS THAN 160     BPH without obstruction/lower urinary tract symptoms       Past Surgical History:   Procedure Laterality Date     HERNIA REPAIR, INGUINAL RT/LT  1967    right     VASECTOMY       wisdom teeth  1983       Family History   Problem Relation Age of Onset     Cardiovascular Mother      Alzheimer Disease Mother      Cardiovascular Father      heart attack / pacemaker     Cerebrovascular Disease Father      C.A.D. Father      MI at age 64     Cancer Father      central chest     Alzheimer Disease Maternal Grandmother      Cancer - colorectal Maternal Grandfather      at age 85     Alzheimer Disease Paternal  "Grandfather      Cardiovascular Brother      Alzheimer Disease Paternal Grandmother      Prostate Cancer No family hx of      Anesthesia Reaction No family hx of      Blood Disease No family hx of        Social History     Social History     Marital status:      Spouse name: N/A     Number of children: N/A     Years of education: N/A     Occupational History     Not on file.     Social History Main Topics     Smoking status: Never Smoker     Smokeless tobacco: Never Used     Alcohol use Yes     Drug use: No     Sexual activity: Yes     Partners: Female     Birth control/ protection: Surgical     Other Topics Concern     Not on file     Social History Narrative       Current Outpatient Prescriptions   Medication Sig Dispense Refill     loratadine (CLARITIN) 10 MG tablet Take 10 mg by mouth daily       Multiple Vitamin (MULTI-VITAMIN) per tablet Take 1 tablet by mouth daily.       Omega-3 Fatty Acids (FISH OIL) 1200 MG capsule Take 2 capsules by mouth daily.         Review Of Systems    Genitourinary: negative      PHYSICAL EXAMINATION:  Blood pressure 115/75, pulse 52, temperature 97.4  F (36.3  C), temperature source Oral, resp. rate 16, height 5' 9\" (1.753 m), weight 186 lb (84.4 kg), SpO2 98 %.  General appearance - healthy, alert and no distress  Skin - Skin color, texture, turgor normal. No rashes or lesions.  Head - Normocephalic. No masses, lesions, tenderness or abnormalities  Eyes - conjunctivae/corneas clear. PERRL, EOM's intact. Fundi benign  Ears - External ears normal. Canals clear. TM's normal.  Nose/Sinuses - Nares normal. Septum midline. Mucosa normal. No drainage or sinus tenderness.  Oropharynx - Lips, mucosa, and tongue normal. Teeth and gums normal.  Neck - Neck supple. No adenopathy. Thyroid symmetric, normal size,  Lungs - Percussion normal. Good diaphragmatic excursion. Lungs clear  Heart - PMI normal. No lifts, heaves, or thrills. RRR. No murmurs, clicks gallops or rub  Abdomen - " Abdomen soft, non-tender. BS normal. No masses, organomegaly  Extremities - Extremities normal. No deformities, edema, or skin discoloration.  Musculoskeletal - Spine ROM normal. Muscular strength intact.  Peripheral pulses - radial=4/4, femoral=4/4, popliteal=4/4, dorsalis pedis=4/4,  Neuro - Gait normal. Reflexes normal and symmetric. Sensation grossly WNL.  Genitalia - Penis normal. No urethral discharge. Scrotum normal to palpation. No hernia.  Rectal - Rectal negative. Prostate palpation negative.  No rectal masses or abnormalities, positive findings: prostate 1+      Orders Only on 08/21/2018   Component Date Value Ref Range Status     Cholesterol 08/21/2018 220* <200 mg/dL Final    Desirable:       <200 mg/dl     Triglycerides 08/21/2018 148  <150 mg/dL Final    Fasting specimen     HDL Cholesterol 08/21/2018 55  >39 mg/dL Final     LDL Cholesterol Calculated 08/21/2018 135* <100 mg/dL Final    Comment: Above desirable:  100-129 mg/dl  Borderline High:  130-159 mg/dL  High:             160-189 mg/dL  Very high:       >189 mg/dl       Non HDL Cholesterol 08/21/2018 165* <130 mg/dL Final    Comment: Above Desirable:  130-159 mg/dl  Borderline high:  160-189 mg/dl  High:             190-219 mg/dl  Very high:       >219 mg/dl       Sodium 08/21/2018 140  133 - 144 mmol/L Final     Potassium 08/21/2018 4.4  3.4 - 5.3 mmol/L Final     Chloride 08/21/2018 106  94 - 109 mmol/L Final     Carbon Dioxide 08/21/2018 26  20 - 32 mmol/L Final     Anion Gap 08/21/2018 8  3 - 14 mmol/L Final     Glucose 08/21/2018 89  70 - 99 mg/dL Final    Fasting specimen     Urea Nitrogen 08/21/2018 13  7 - 30 mg/dL Final     Creatinine 08/21/2018 1.05  0.66 - 1.25 mg/dL Final     GFR Estimate 08/21/2018 74  >60 mL/min/1.7m2 Final    Non  GFR Calc     GFR Estimate If Black 08/21/2018 89  >60 mL/min/1.7m2 Final    African American GFR Calc     Calcium 08/21/2018 9.0  8.5 - 10.1 mg/dL Final     Bilirubin Total 08/21/2018 0.9   0.2 - 1.3 mg/dL Final     Albumin 08/21/2018 4.1  3.4 - 5.0 g/dL Final     Protein Total 08/21/2018 7.7  6.8 - 8.8 g/dL Final     Alkaline Phosphatase 08/21/2018 58  40 - 150 U/L Final     ALT 08/21/2018 21  0 - 70 U/L Final     AST 08/21/2018 20  0 - 45 U/L Final       ASSESSMENT:    ICD-10-CM    1. Routine general medical examination at a health care facility Z00.00    2. Screen for colon cancer Z12.11 Fecal colorectal cancer screen FIT - Future (S+30)   3. Screening for HIV (human immunodeficiency virus) Z11.4        Well-Adult Physical Exam.  Health Maintenance Due   Topic Date Due     PHQ-2 Q1 YR  12/21/2016     FIT Q1 YR  08/03/2018     Health Maintenance   Topic Date Due     PHQ-2 Q1 YR  12/21/2016     FIT Q1 YR  08/03/2018     INFLUENZA VACCINE (1) 09/01/2018     TETANUS IMMUNIZATION (SYSTEM ASSIGNED)  09/23/2021     LIPID SCREEN Q5 YR MALE (SYSTEM ASSIGNED)  08/21/2023     HIV SCREEN (SYSTEM ASSIGNED)  Addressed     HEPATITIS C SCREENING  Completed         HEALTH CARE MAINTENENCE: The recommended screening tests and vaccinatons for this patient have been discussed as above.  The appropriate tests and vaccinations  have been ordered or declined by the patient. Please see the orders in EPIC.The patient specifically declines: n/a     Immunization Status:  up to date and documented except for Shingrix    Patient Active Problem List   Diagnosis     HDL lipoprotein deficiency     AR (allergic rhinitis)     High triglycerides     CARDIOVASCULAR SCREENING; LDL GOAL LESS THAN 160     BPH without obstruction/lower urinary tract symptoms        ATP III Guidelines  ICSI Preventive Guidelines    PLAN:   HIV testing was discussed but the pt declined  Shingrix recommended but declined  FIT/stool screen for colon cancer recommended  Testicular self-exam encouraged  Checking a PSA was discussed but the pt declined  Discussed calcium intake, vitamins and supplements. Recommended 1000 mg of calcium daily  Weight loss through  diet and exercise was recommended  Sunscreen use was recommended especially in the area of tatoos  Recommended dental exams every 6 months  Recommended eye exam every 1-2 years  Follow up in 1 year for the next preventative medical visit        Body mass index is 27.47 kg/(m^2).

## 2020-02-03 NOTE — ED NOTES
Patient resting comfortably. Chest rise and fall noted. Patient in direct observation of sitter. Will Continue to monitor.

## 2020-02-03 NOTE — ED NOTES
"Patient ambulated to restroom with RN escort. Patient requesting to speak to,\" my , social work and the DR all at once so everyone is on the same page and to where I want to go and why I will under no circumstance take the medications.\"   "

## 2020-02-03 NOTE — PSYCHIATRY
"PSYCHIATRIC FOLLOW-UP:(established)  *Reason for admission:  Pt presents with a flight of ideas, delusional. States \"I'm going to comit suicide because of the position I'm in\" pt refused to elaborate and states I will tell my story to the supreme court.                  *Legal Hold Status on Admission: +                    *HPI:  Asked why she is Majesty Motta when we have her as Dunia. \"Ill argue that before the supreme court\".  She would also like more snacks, female observers, and MVI. She doesn't want o go to [facility]. She has slept according to the \"5 hour law. Im severely incest\" (not grammatically correct)           *Psychiatric Examination:  Vitals: Blood pressure (!) 94/67, pulse 64, temperature 36.4 °C (97.6 °F), temperature source Temporal, resp. rate 14, height 1.727 m (5' 8\"), weight 124.3 kg (274 lb 0.5 oz), SpO2 98 %, not currently breastfeeding.  General Appearance:  good eye contact   Abnormal Movements: none  Gait and Posture: sitting on edge of bed  Speech: wnl  Thought processes: normal rate  Associations: linear  Abnormal or Psychotic Thoughts: psychotic  Judgement and Insight: impaired  Orientation: grossly intact  Recent and Remote Memory: grossly intact  Attention Span and Concentration: intact  Language: fluid  Fund of Knowledge:not tested  Mood and Affect:  feels good  SI/HI:denies      *ASSESSMENT/PLAN:  1. Schizoaffective disorder acute  - continues to  refused risperdol.       -needs a long acting injectable     -prns ordered  -MVI ordered  -female monitors if possible     2. Medical  - none acutely             *Legal hold: extended                *Will Follow      "

## 2020-02-03 NOTE — ED NOTES
Pt requests shower, security available, Pt offered shower, Pt reports she would rather wait for female staff this afternoon.

## 2020-02-03 NOTE — DISCHARGE PLANNING
"Alert Team  Pt continues to await inpatient psychiatric hospitalization at [facility].  Pt perseverating on needing to speak with SW about her insurance.  I attempted to intercept, educating pt that our SW department does not do insurance changes for people in the ER; advised she would need to f/u on that with an outpt  or through the Medicaid office proper.    Pt hoping to get \"better insurance where I can go somewhere better than here.\"    I attempted to redirect her concerns and divert her request for SW.    She continues to request she be called \"Nancy Menedz,\" confusing staff when they attempt to identify her for meals, medications, etc using her real name.  "

## 2020-02-03 NOTE — ED NOTES
Patient resting comfortably. Chest rise and fall noted. Patient in direct observation of sitter. Will Continue to monitor. Patient resting comfortably. Chest rise and fall noted. Patient in direct observation of sitter. Will Continue to monitor.

## 2020-02-03 NOTE — ED NOTES
Introduced self to pt, answered questions, addressed needs, Pt sitting at end of bed appears nervous and slightly agitated. Withdrawn when spoken to. Pt also reportedly refusing her psychiatric medications

## 2020-02-03 NOTE — ED NOTES
Patient ambulated to restroom with RN, patient requested new gown, underwear and socks. Patient also given coffee and a magazine to read.

## 2020-02-03 NOTE — ED NOTES
"Patient ambulated to restroom with patient, patient in direct view. RN asked patient why she isn't taking her meds, patient replies,\" I don't want anything to ruin my purity, I dont want to hurt myself or anything else, Im not doing drugs so I don't want anything in my body. Ill take a multivitamin but I want to see the label.\" Patient sitting at edge of bed and denies needs at this time.   "

## 2020-02-04 PROCEDURE — A9270 NON-COVERED ITEM OR SERVICE: HCPCS | Performed by: PSYCHIATRY & NEUROLOGY

## 2020-02-04 PROCEDURE — 700102 HCHG RX REV CODE 250 W/ 637 OVERRIDE(OP): Performed by: PSYCHIATRY & NEUROLOGY

## 2020-02-04 PROCEDURE — 99282 EMERGENCY DEPT VISIT SF MDM: CPT | Performed by: PSYCHIATRY & NEUROLOGY

## 2020-02-04 RX ADMIN — THERA TABS 1 TABLET: TAB at 06:21

## 2020-02-04 NOTE — ED NOTES
Patient brushing toe nails with tooth brush. Threw toothbrush and comb into garbage at this time.

## 2020-02-04 NOTE — DISCHARGE PLANNING
Alert team:    Patient is observed resting with eyes closed, appears in no acute distress. Sitter outside door. Patient had refused Risperidone this evening. Continues on extended legal hold, awaiting placement at [facility]. Will continue to monitor.

## 2020-02-04 NOTE — ED NOTES
Pt resting in bed.  Pt appears comfortable in room.  No visible signs or symptoms of distress noted at this time.  Pt denies needs or concerns at this time.   Rounding ongoing.     Pt provided hospital meal tray

## 2020-02-04 NOTE — ED NOTES
Patient awake aler, Rui 15, bed in low position, unlabored breathing noted, no cough noted, on room air, sitter in hallway performing direct observation, denies pain, interactive with staff, interactions noted as appropriate, frequent rounding performed.

## 2020-02-04 NOTE — DISCHARGE PLANNING
Alert Team  Pt continues to wait for an inpatient psychiatric bed at [facility].  Pt still experiencing grandiose delusions and unable to care for self.  Pt met with public defenders and plans to contest her legal hold; she will go to court on [address] 6 tomorrow.    I have contacted SW and requested an update on pt's transfer status to [facility].    Per psychiatry, pt can go w/o a sitter and be 15 minutes checks by the bedside RN.  Sitter can be reinitiated at any time per nursing discretion for attempting to elope.

## 2020-02-04 NOTE — DISCHARGE PLANNING
Legal Hold    Referral: Legal Hold Court     Intervention: Pt presented for legal hold meeting with .  advised pt will meet with court MD's via telemedicine monitor to contest the legal hold.      Plan: Pt will present to telemedicine mental health to meet with court physicians 2/5. Will call bedside RN once time has been determined

## 2020-02-04 NOTE — ED NOTES
Pt brushing her hair and teeth before bed  Pt appears comfortable in room.  No visible signs or symptoms of distress noted at this time.  Pt denies needs or concerns at this time.   Rounding ongoing.

## 2020-02-04 NOTE — ED NOTES
Patient awake alert, bed in low position, unlabored breathing noted, no cough noted, on room air, sitter in hallway performing direct observation, denies pain, interactive with staff,  frequent rounding performed.

## 2020-02-04 NOTE — ED NOTES
Patient sleeping in bed, appears comfortable, no signs of pain or distress, unlabored breathing noted, frequent rounding performed.

## 2020-02-04 NOTE — ED NOTES
Pt appears comfortable in room.  Pt eyes closed.  No visible signs or symptoms of distress noted at this time.  Equal rise and fall of chest noted- breaths equal and non-labored.   Rounding ongoing.   Sitter in place

## 2020-02-04 NOTE — ED NOTES
Pt appears comfortable in room.  Pt eyes closed.  No visible signs or symptoms of distress noted at this time.  Equal rise and fall of chest noted- breaths equal and non-labored.   Rounding ongoing.

## 2020-02-04 NOTE — ED PROVIDER NOTES
ED Provider Note    Patient CARE signed out from nighttime ERP.  Patient is been here multiple times with similar symptoms.  She presents with suicidal ideations and acute psychosis.  She is here on a legal hold awaiting transfer to a psychiatric facility.  Patient seen at the bedside.  She sitting up.  She has no complaints and no requests.  Tolerating a regular diet.  There have been no events under my care.  Patient care will be signed out to oncoming ERP as I suspect, she will be here at the close of my shift.

## 2020-02-04 NOTE — ED NOTES
Patient ambulatory to bathroom. Patient requesting gown change, and linens. Patient currently combing hair.

## 2020-02-05 VITALS
RESPIRATION RATE: 17 BRPM | WEIGHT: 274.03 LBS | BODY MASS INDEX: 41.53 KG/M2 | TEMPERATURE: 97.2 F | OXYGEN SATURATION: 98 % | DIASTOLIC BLOOD PRESSURE: 58 MMHG | SYSTOLIC BLOOD PRESSURE: 104 MMHG | HEIGHT: 68 IN | HEART RATE: 74 BPM

## 2020-02-05 PROCEDURE — 700102 HCHG RX REV CODE 250 W/ 637 OVERRIDE(OP): Performed by: PSYCHIATRY & NEUROLOGY

## 2020-02-05 PROCEDURE — A9270 NON-COVERED ITEM OR SERVICE: HCPCS | Performed by: PSYCHIATRY & NEUROLOGY

## 2020-02-05 PROCEDURE — 99282 EMERGENCY DEPT VISIT SF MDM: CPT | Mod: GC | Performed by: PSYCHIATRY & NEUROLOGY

## 2020-02-05 RX ADMIN — THERA TABS 1 TABLET: TAB at 06:29

## 2020-02-05 NOTE — ED NOTES
Pt ambulatory to restroom to change gowns and brush teeth.  Pt returned to room without distress or complaints.

## 2020-02-05 NOTE — ED NOTES
Pt sleeping comfortably in bed, breathing is easy and unlabored, sitter at bedside. No new needs identified, will continue to monitor.

## 2020-02-05 NOTE — ED NOTES
Report to lincoln hernandez at [facility] at this time.  Awaiting updates with .  Will call back [phone] with remsa and transport updates.

## 2020-02-05 NOTE — ED NOTES
Pt resting in bed.  Pt appears comfortable in room.  No visible signs or symptoms of distress noted at this time.  Pt denies needs or concerns at this time.   Rounding ongoing.

## 2020-02-05 NOTE — PSYCHIATRY
"PSYCHIATRIC FOLLOW UP:    Reason for Admission: SI, psychosis  Requesting Physician: Cari Ray D.O.  Supervising Physician:Kerrie Manjarrez M.D.    Subjective:  Patient continues to be delusional this morning. Patient states her name is Clara Mendez and is the daughter of Princess Ricketts.  Patient states that she is upset because she is being accused of being Dunia Crutsinger.  Patient took off her socks and pointed to the soles of her feet and stated because of the shape of her soles it was proves that she was not Dunia and she was a virgin.  Patient was fixated on telling the team that a vaginal exam was done on Dunia and that is how they know she is not the same person.  Patient continues to refuse psychiatric medication.  Patient look forward to speaking to the court doctors this morning.      Medical Review of Systems:  Constitutional: Negative for fever, chills, weight loss  HENT: Negative for hearing loss, sore throat, neck pain  Eyes: Negative for blurred vision, pain, redness.   Respiratory: Negative for cough, shortness of breath, wheezing   Cardiovascular: Negative for chest pain, palpitations  Gastrointestinal: Negative for nausea, vomiting, diarrhea, constipation, blood in stool  Genitourinary: Negative for dysuria, urgency, frequency, hematuria  Musculoskeletal: Negative for myalgias,  joint pain  Skin: Negative for itching and rash.  Neurological: Negative for dizziness, tingling, tremors, weakness and headaches.   Psychiatric/Behavioral: See above for psych review of systems      Psychiatric Examination:   Vitals: /58   Pulse 74   Temp 36.2 °C (97.2 °F) (Temporal)   Resp 17   Ht 1.727 m (5' 8\")   Wt 124.3 kg (274 lb 0.5 oz)   SpO2 98%   Breastfeeding? No   BMI 41.67 kg/m²   Musculoskeletal: wnl  Abnormal Movements: None noted  Gait and Posture: Gait and posture were both normal  Appearance: well-developed, well-nourished, appears stated age, fair hygiene, no apparent " "distress and obese, hostile, disorganized, poor eye contact and irritable  Thought Process:  linear, coherent, goal-oriented, disorganized and perseverative  Abnormal or Psychotic Thoughts: No overt delusions noted and patient denies SI and HI  Speech: regular rate, rhythm, volume, tone, and syntax  Mood: \"angry\"  Affect: flat and congruent with mood  SI/HI: Denies SI and HI  Orientation: alert and oriented  Recent and Remote Memory: no gross impairment in immediate, recent, or remote memory  Fund of Knowledge: adequate  Attention Span and Concentration: Was unable to answer questions without getting lost  Insight/Judgement into symptoms: poor  Neurological Testing (MSSE Score and/or clock drawing): MMSE not performed during this encounter    Medications (currently prescribed at [facility]):    Current Facility-Administered Medications:   •  multivitamin (THERAGRAN) tablet 1 Tab, 1 Tab, Oral, DAILY, Diamond Farrar M.D., 1 Tab at 02/05/20 0629  •  diphenhydrAMINE (BENADRYL) tablet/capsule 50 mg, 50 mg, Oral, Q HOUR PRN **OR** diphenhydrAMINE (BENADRYL) injection 50 mg, 50 mg, Intramuscular, Q30 MIN PRN, Diamond Farrar M.D.  •  LORazepam (ATIVAN) tablet 2 mg, 2 mg, Oral, Q HOUR PRN **OR** LORazepam (ATIVAN) injection 2 mg, 2 mg, Intramuscular, Q30 MIN PRN, Diamond Farrar M.D.  •  risperiDONE (RISPERDAL) tablet 2 mg, 2 mg, Oral, Q EVENING, Kerrie Manjarrez M.D.  No current outpatient medications on file.  Labs:                                 Assessment/Formulation:   Patient continues to be psychotic and unable to care for self.  Patient denies any suicidality.  Patient refuses any medication so we will continue the current dose.    Diagnosis:  Psychiatric:   -Schizoaffective disorder      Plan:  Disposition: Patient meets criteria for acute psychiatric hospitalization    Medications:  -Continue Risperdal 2 mg p.o. nightly for psychosis  - Reviewed risks, benefits, alternatives to include no " treatment, therapy and medications    -Legal Hold: extended   -Sitter:yes  -Restrictions:              -phone:no              -visitors:no              -personal items: no              -finger foods required: no, plastic utensils only              -personal/undergarments clothes: no              -medical bed:yes     Will Follow  Thank you for the Consult.

## 2020-02-05 NOTE — ED NOTES
Patient awake alert, bed in low position, unlabored breathing noted, no cough noted, on room air, sitter in hallway performing direct observation, denies pain, interactive with staff, interactions noted as appropriate, frequent rounding performed.

## 2020-02-05 NOTE — DISCHARGE PLANNING
Legal Hold    Referral: Legal Hold Court     Intervention: Pt met with court MD's via telemedicine monitor to contest the legal hold.     Court MD's did not release pt from legal hold. Pt will meet with  at [facility] 2/6. Pt has been accepted to [facility] 2/5, will be transferring at 1330. Spoke to Rupert at [facility] and was advised they will take pt to court for 0900 appt with .

## 2020-02-05 NOTE — PSYCHIATRY
"PSYCHIATRIC FOLLOW-UP:(established)   *Reason for admission:  SI, psychosis                  *Reason for consult: legal hold for SI and psychosis       *Requesting Physician/APN: Kya Burns M.D.               *HPI:     Patient again states that her father is Jean Mendez but also carries the the title of Jr. And Sr.  Her god-mother is Princess Liliam Holland.  She states that she will not take medications because she is the child of pure incest.  She defines incest as being of a pure bloodline and not of being sexually abused.  This was clarified as she states she has never had sex and that is how she has remained pure.  She believes she is 19 today, although the state of [place] has declared her as only 16 due to her \"race.\"  She states her race is that of being pure \"[place].\"  An additional reason she will not take medications is that her father has arranged a treaty between [facility] and [place] with a stipulation that she not take medications.  When asked if she would take medication if she were in control of this decision, she stated that she would not as this would show honor to her father.     She states her doctor is Dr. Gunderson and he is in charge of a team of [phone] doctors who are charged with her medical care.  It was clarified that the Dr. Gunderson she speaks of is the one from television.  He is not here currently, but they speak daily by phone.     She says she slept well last evening, although the bed is uncomfortable and there is a lot of activity around her.  Denies SI, HI and AVH.       Medical ROS (as pertinent):                     General: Alert, disoriented to person and time but oriented to place   HEENT: Denies H/A, hearing change, dysphagia or vision changes   Neck: Denies pain or discomfort     Chest: Denies C/P, palpitations or chest pressure   Lungs: Denies SOB, cough, wheeze or sputum production   ABD: Denies pain, N/V/D/C   EXT: Denies pain to extremities   Neuro: Denies numbness, " "weakness or tingling     *Psychiatric Examination:     Vitals:    02/04/20 1100   BP: 107/67   Pulse: 90   Resp: 15   Temp: 36.7 °C (98.1 °F)   SpO2: 95%     General Appearance: Makes good eye contact, grooming normal, wearing hospital gown, calm, pleasant, no response to internal stimuli today, answers to questions are bizarre yet in line with questions being asked; guarded   Abnormal Movements: None noted   Gait and Posture: Laying in bed   Speech: Normal, non-pressured   Thought processes: disorganized  Associations:loose associations   Abnormal or Psychotic Thoughts: delusional with possible AH   Judgement and Insight: Poor/Poor   Orientation: Place only   Recent and Remote Memory: Impaired   Attention Span and Concentration: Intact   Language: Fluid   Fund of Knowledge: Not Tested   Mood and Affect: \"good\", euthymic  SI/HI: Denies SI/HI     Current Facility-Administered Medications   Medication Dose Route Frequency Provider Last Rate Last Dose   • multivitamin (THERAGRAN) tablet 1 Tab  1 Tab Oral DAILY Diamond Farrar M.D.   1 Tab at 02/04/20 0621   • diphenhydrAMINE (BENADRYL) tablet/capsule 50 mg  50 mg Oral Q HOUR PRN Diamond Farrar M.D.        Or   • diphenhydrAMINE (BENADRYL) injection 50 mg  50 mg Intramuscular Q30 MIN PRN Diamond Farrar M.D.       • LORazepam (ATIVAN) tablet 2 mg  2 mg Oral Q HOUR PRN Diamond Farrar M.D.        Or   • LORazepam (ATIVAN) injection 2 mg  2 mg Intramuscular Q30 MIN PRN Diamond Farrar M.D.       • risperiDONE (RISPERDAL) tablet 2 mg  2 mg Oral Q EVENING Kerrie Manjarrez M.D.         No current outpatient medications on file.     No results found for this or any previous visit (from the past 24 hour(s)).      No orders to display           Assessment: Patient is unable to care for herself as she remains psychotic.  No SI/HI currently.     Dx: Schizoaffective D/O, Acute     Plan:  1- Legal hold extended  2- Risperdal 2mg Po QPM for " psychosis; continues to refuse medication, and won't accept any other alternative antipsychotic.  3- Please transfer pt to inpatient psychiatric hospital when bed is available  4- Will continue to follow

## 2020-02-05 NOTE — ED NOTES
Pt sitting on edge of bed drinking tea. Denies any needs at this time. Sitter at bedside. Will continue to monitor.

## 2020-02-05 NOTE — ED NOTES
Security called after Pt requested a shower. Security sent an officer. Pt escorted to shower by ED tech and Security staff.

## 2020-02-05 NOTE — ED NOTES
paty on scene for pt transport to [facility].  All paperwork and pt's belongings given to [facility].  Pt ambulatory out of department with transferring team, no distress noted.

## 2020-02-05 NOTE — ED NOTES
Pt appears comfortable in room.  Pt eyes closed.  No visible signs or symptoms of distress noted at this time.  Equal rise and fall of chest noted- breaths equal and non-labored.   Rounding ongoing.   Report received.     Sitter in place.

## 2020-02-05 NOTE — DISCHARGE PLANNING
Spoke to  Felix regarding patient's meeting with the court doctors today. Patient has been scheduled to meet with court doctors via telemedicine in the [address] room at 1200. Let CHRISTIE Lara know of upcoming meeting this afternoon.

## 2020-02-05 NOTE — ED NOTES
Pt resting in bed.  Pt appears comfortable in room.  No visible signs or symptoms of distress noted at this time.  Pt denies needs or concerns at this time.   Rounding ongoing.     Sitter in place

## 2020-02-05 NOTE — DISCHARGE PLANNING
Joshua from [facility] called and they will accept Pt.  Dr. Rosas will be admitting physician.    MTM contacted and per Elfi Pt does not have transportation benefits.PCS completed and faxed to [facility]. Transportation time arranged for 1330.    Transfer Packet completed with Original Legal Hold placed inside.  RN updated on transfer and transfer time. RN aware Pt has items in [facility]. There were no SafeKeeping or Pharmacy Tags in Pt chart.     Joshua at [facility] updated on 1330 transfer time.

## 2020-02-05 NOTE — ED NOTES
Report received, assuming care of pt at this time.  Pt sitting on edge of bed with blanket looking at sitter.  Sitter present outside door.

## 2020-04-07 ENCOUNTER — HOSPITAL ENCOUNTER (EMERGENCY)
Dept: HOSPITAL 8 - ED | Age: 35
Discharge: HOME | End: 2020-04-07
Payer: MEDICARE

## 2020-04-07 VITALS — BODY MASS INDEX: 45.12 KG/M2 | HEIGHT: 67 IN | WEIGHT: 287.48 LBS

## 2020-04-07 VITALS — SYSTOLIC BLOOD PRESSURE: 120 MMHG | DIASTOLIC BLOOD PRESSURE: 80 MMHG

## 2020-04-07 DIAGNOSIS — D72.820: ICD-10-CM

## 2020-04-07 DIAGNOSIS — J45.909: ICD-10-CM

## 2020-04-07 DIAGNOSIS — R42: Primary | ICD-10-CM

## 2020-04-07 DIAGNOSIS — Z86.718: ICD-10-CM

## 2020-04-07 DIAGNOSIS — R00.2: ICD-10-CM

## 2020-04-07 DIAGNOSIS — N30.00: ICD-10-CM

## 2020-04-07 DIAGNOSIS — R55: ICD-10-CM

## 2020-04-07 DIAGNOSIS — Z90.89: ICD-10-CM

## 2020-04-07 DIAGNOSIS — Z90.49: ICD-10-CM

## 2020-04-07 LAB
<PLATELET ESTIMATE>: (no result)
<PLT MORPHOLOGY>: (no result)
ANION GAP SERPL CALC-SCNC: 7 MMOL/L (ref 5–15)
BAND#(MANUAL): 1.03 X10^3/UL
BILIRUB DIRECT SERPL-MCNC: NORMAL MG/DL
CALCIUM SERPL-MCNC: 8.7 MG/DL (ref 8.5–10.1)
CHLORIDE SERPL-SCNC: 108 MMOL/L (ref 98–107)
CREAT SERPL-MCNC: 0.93 MG/DL (ref 0.55–1.02)
CULTURE INDICATED?: YES
ERYTHROCYTE [DISTWIDTH] IN BLOOD BY AUTOMATED COUNT: 14.3 % (ref 9.6–15.2)
LYMPH#(MANUAL): 3.1 X10^3/UL (ref 1–3.4)
LYMPHS% (MANUAL): 18 % (ref 22–44)
MCH RBC QN AUTO: 28.5 PG (ref 27–34.8)
MCHC RBC AUTO-ENTMCNC: 32.7 G/DL (ref 32.4–35.8)
MCV RBC AUTO: 87.2 FL (ref 80–100)
MD: YES
MICROSCOPIC: (no result)
MONOS#(MANUAL): 0.52 X10^3/UL (ref 0.3–2.7)
MONOS% (MANUAL): 3 % (ref 2–9)
NEUTS BAND NFR BLD: 6 % (ref 0–7)
PLATELET # BLD AUTO: 437 X10^3/UL (ref 130–400)
PMV BLD AUTO: 7.3 FL (ref 7.4–10.4)
RBC # BLD AUTO: 4.32 X10^6/UL (ref 3.82–5.3)
SEG#(MANUAL): 12.56 X10^3/UL (ref 1.8–6.8)
SEGS% (MANUAL): 73 % (ref 42–75)
TROPONIN I SERPL-MCNC: < 0.015 NG/ML (ref 0–0.04)

## 2020-04-07 PROCEDURE — 99285 EMERGENCY DEPT VISIT HI MDM: CPT

## 2020-04-07 PROCEDURE — 85025 COMPLETE CBC W/AUTO DIFF WBC: CPT

## 2020-04-07 PROCEDURE — 84443 ASSAY THYROID STIM HORMONE: CPT

## 2020-04-07 PROCEDURE — 87086 URINE CULTURE/COLONY COUNT: CPT

## 2020-04-07 PROCEDURE — 80048 BASIC METABOLIC PNL TOTAL CA: CPT

## 2020-04-07 PROCEDURE — 84703 CHORIONIC GONADOTROPIN ASSAY: CPT

## 2020-04-07 PROCEDURE — 36415 COLL VENOUS BLD VENIPUNCTURE: CPT

## 2020-04-07 PROCEDURE — 84484 ASSAY OF TROPONIN QUANT: CPT

## 2020-04-07 PROCEDURE — 81001 URINALYSIS AUTO W/SCOPE: CPT

## 2020-04-07 PROCEDURE — 71045 X-RAY EXAM CHEST 1 VIEW: CPT

## 2020-04-07 PROCEDURE — 93005 ELECTROCARDIOGRAM TRACING: CPT

## 2020-04-07 NOTE — NUR
Pt presents from [facility] d/t feeling "weak". Pt is alert, oriented and in NAD. 
VSS. Ortho BP's done and charted. During ortho BP's pt reports that she is 
"symmetrical" and her BP shouldn't be high when she stands. Pt refused to take 
tops off for gown, and has placed gown over shirts. Pt provided with blanket. 
Aware of wait for labs and plan for EKG. Pt on pulse ox/HR monitor.

## 2020-04-07 NOTE — NUR
Pt d/c'd to self care.  Pt alert, oriented and ambulatory. NAD. Pt educated on 
OTC meds, prescription, follow-up and home care. Pt VU. Pt ambulated out of ER.

## 2020-04-13 ENCOUNTER — HOSPITAL ENCOUNTER (EMERGENCY)
Dept: HOSPITAL 8 - ED | Age: 35
Discharge: HOME | End: 2020-04-13
Payer: MEDICARE

## 2020-04-13 VITALS — DIASTOLIC BLOOD PRESSURE: 60 MMHG | SYSTOLIC BLOOD PRESSURE: 132 MMHG

## 2020-04-13 DIAGNOSIS — Z90.49: ICD-10-CM

## 2020-04-13 DIAGNOSIS — Z86.718: ICD-10-CM

## 2020-04-13 DIAGNOSIS — R94.31: ICD-10-CM

## 2020-04-13 DIAGNOSIS — N93.8: Primary | ICD-10-CM

## 2020-04-13 DIAGNOSIS — J45.909: ICD-10-CM

## 2020-04-13 LAB
ALBUMIN SERPL-MCNC: 3.3 G/DL (ref 3.4–5)
ANION GAP SERPL CALC-SCNC: 4 MMOL/L (ref 5–15)
BASOPHILS # BLD AUTO: 0.08 X10^3/UL (ref 0–0.1)
BASOPHILS NFR BLD AUTO: 1 % (ref 0–1)
CALCIUM SERPL-MCNC: 9.2 MG/DL (ref 8.5–10.1)
CHLORIDE SERPL-SCNC: 109 MMOL/L (ref 98–107)
CREAT SERPL-MCNC: 0.91 MG/DL (ref 0.55–1.02)
CULTURE INDICATED?: NO
EOSINOPHIL # BLD AUTO: 0.12 X10^3/UL (ref 0–0.4)
EOSINOPHIL NFR BLD AUTO: 1 % (ref 1–7)
ERYTHROCYTE [DISTWIDTH] IN BLOOD BY AUTOMATED COUNT: 14.4 % (ref 9.6–15.2)
LYMPHOCYTES # BLD AUTO: 2.31 X10^3/UL (ref 1–3.4)
LYMPHOCYTES NFR BLD AUTO: 16 % (ref 22–44)
MCH RBC QN AUTO: 28.6 PG (ref 27–34.8)
MCHC RBC AUTO-ENTMCNC: 32.9 G/DL (ref 32.4–35.8)
MCV RBC AUTO: 86.8 FL (ref 80–100)
MD: NO
MICROSCOPIC: (no result)
MONOCYTES # BLD AUTO: 0.68 X10^3/UL (ref 0.2–0.8)
MONOCYTES NFR BLD AUTO: 5 % (ref 2–9)
NEUTROPHILS # BLD AUTO: 11.43 X10^3/UL (ref 1.8–6.8)
NEUTROPHILS NFR BLD AUTO: 78 % (ref 42–75)
PLATELET # BLD AUTO: 409 X10^3/UL (ref 130–400)
PMV BLD AUTO: 7.3 FL (ref 7.4–10.4)
RBC # BLD AUTO: 4.29 X10^6/UL (ref 3.82–5.3)

## 2020-04-13 PROCEDURE — 99284 EMERGENCY DEPT VISIT MOD MDM: CPT

## 2020-04-13 PROCEDURE — 82040 ASSAY OF SERUM ALBUMIN: CPT

## 2020-04-13 PROCEDURE — 36415 COLL VENOUS BLD VENIPUNCTURE: CPT

## 2020-04-13 PROCEDURE — 81001 URINALYSIS AUTO W/SCOPE: CPT

## 2020-04-13 PROCEDURE — 80048 BASIC METABOLIC PNL TOTAL CA: CPT

## 2020-04-13 PROCEDURE — 93005 ELECTROCARDIOGRAM TRACING: CPT

## 2020-04-13 PROCEDURE — 84703 CHORIONIC GONADOTROPIN ASSAY: CPT

## 2020-04-13 PROCEDURE — 85025 COMPLETE CBC W/AUTO DIFF WBC: CPT

## 2020-04-13 NOTE — NUR
LABS DRAWN
PT C/O HEAVY MENSTRUAL FLOW X 4 DAYS, LOW BLOOD PRESSURE "IT GOES UP AND DOWN", 
"AND I MIGHT BE PREGANT."  PT CHANGES THE REPORT INTERMITTENTLY.  STATES HER 
[facility] WANTS HER LAB WORK "IN CASE I'M PREGNANT".  STATES BASSEM IS FEMALE. 
 INFORMED PT THAT WE MAY NOT GIVE HER REPORTS TO THE [facility] AND THAT SHE MAY 
GET REPORTS FROM MEDICAL RECORDS.
PT FULLY DRESSED AND WALKING AROUND ED EXAM ROOM, AWAITING DC.
shortness of breath

## 2020-04-24 ENCOUNTER — HOSPITAL ENCOUNTER (EMERGENCY)
Dept: HOSPITAL 8 - ED | Age: 35
LOS: 1 days | Discharge: HOME | End: 2020-04-25
Payer: MEDICARE

## 2020-04-24 VITALS — BODY MASS INDEX: 45.26 KG/M2 | HEIGHT: 67 IN | WEIGHT: 288.36 LBS

## 2020-04-24 DIAGNOSIS — R11.0: ICD-10-CM

## 2020-04-24 DIAGNOSIS — Z86.718: ICD-10-CM

## 2020-04-24 DIAGNOSIS — D72.829: ICD-10-CM

## 2020-04-24 DIAGNOSIS — R10.32: Primary | ICD-10-CM

## 2020-04-24 PROCEDURE — 80053 COMPREHEN METABOLIC PANEL: CPT

## 2020-04-24 PROCEDURE — 84703 CHORIONIC GONADOTROPIN ASSAY: CPT

## 2020-04-24 PROCEDURE — 36415 COLL VENOUS BLD VENIPUNCTURE: CPT

## 2020-04-24 PROCEDURE — 87086 URINE CULTURE/COLONY COUNT: CPT

## 2020-04-24 PROCEDURE — 83690 ASSAY OF LIPASE: CPT

## 2020-04-24 PROCEDURE — 85025 COMPLETE CBC W/AUTO DIFF WBC: CPT

## 2020-04-24 PROCEDURE — 99283 EMERGENCY DEPT VISIT LOW MDM: CPT

## 2020-04-24 PROCEDURE — 81001 URINALYSIS AUTO W/SCOPE: CPT

## 2020-04-25 VITALS — DIASTOLIC BLOOD PRESSURE: 84 MMHG | SYSTOLIC BLOOD PRESSURE: 114 MMHG

## 2020-04-25 LAB
ALBUMIN SERPL-MCNC: 3.6 G/DL (ref 3.4–5)
ALP SERPL-CCNC: 94 U/L (ref 45–117)
ALT SERPL-CCNC: 25 U/L (ref 12–78)
ANION GAP SERPL CALC-SCNC: 8 MMOL/L (ref 5–15)
BASOPHILS # BLD AUTO: 0.03 X10^3/UL (ref 0–0.1)
BASOPHILS NFR BLD AUTO: 0 % (ref 0–1)
BILIRUB SERPL-MCNC: 0.1 MG/DL (ref 0.2–1)
CALCIUM SERPL-MCNC: 9.2 MG/DL (ref 8.5–10.1)
CHLORIDE SERPL-SCNC: 104 MMOL/L (ref 98–107)
CREAT SERPL-MCNC: 0.89 MG/DL (ref 0.55–1.02)
CULTURE INDICATED?: YES
EOSINOPHIL # BLD AUTO: 0.11 X10^3/UL (ref 0–0.4)
EOSINOPHIL NFR BLD AUTO: 1 % (ref 1–7)
ERYTHROCYTE [DISTWIDTH] IN BLOOD BY AUTOMATED COUNT: 14.3 % (ref 9.6–15.2)
LYMPHOCYTES # BLD AUTO: 3.27 X10^3/UL (ref 1–3.4)
LYMPHOCYTES NFR BLD AUTO: 18 % (ref 22–44)
MCH RBC QN AUTO: 28.6 PG (ref 27–34.8)
MCHC RBC AUTO-ENTMCNC: 33.1 G/DL (ref 32.4–35.8)
MCV RBC AUTO: 86.4 FL (ref 80–100)
MD: (no result)
MICROSCOPIC: (no result)
MONOCYTES # BLD AUTO: 0.14 X10^3/UL (ref 0.2–0.8)
MONOCYTES NFR BLD AUTO: 1 % (ref 2–9)
NEUTROPHILS # BLD AUTO: 14.75 X10^3/UL (ref 1.8–6.8)
NEUTROPHILS NFR BLD AUTO: 81 % (ref 42–75)
PLATELET # BLD AUTO: 494 X10^3/UL (ref 130–400)
PMV BLD AUTO: 7.4 FL (ref 7.4–10.4)
PROT SERPL-MCNC: 7.8 G/DL (ref 6.4–8.2)
RBC # BLD AUTO: 4.75 X10^6/UL (ref 3.82–5.3)

## 2020-04-25 NOTE — NUR
Pt presented to room unwilling to change clothes for examination. Pt was 
initially making requests for blankets and increased privacy as opposed to 
answering questions about her symptoms. 

When asked again about symptoms. Pt reported intermittent LLQ for 2 days. Pt 
describes the pain as "an air bubble". Pt goes on to state she has lost weight. 
Pt cannot report how much weight she has lost but states it has been over the 
past few weeks "due to the coronavirus". Pt denies UTI symptoms but states it 
is more difficult to push urine out at this time.

## 2020-05-23 ENCOUNTER — HOSPITAL ENCOUNTER (EMERGENCY)
Dept: HOSPITAL 8 - ED | Age: 35
Discharge: HOME | End: 2020-05-23
Payer: MEDICARE

## 2020-05-23 VITALS — DIASTOLIC BLOOD PRESSURE: 70 MMHG | SYSTOLIC BLOOD PRESSURE: 114 MMHG

## 2020-05-23 VITALS — BODY MASS INDEX: 44.29 KG/M2 | HEIGHT: 67 IN | WEIGHT: 282.19 LBS

## 2020-05-23 DIAGNOSIS — N30.00: Primary | ICD-10-CM

## 2020-05-23 DIAGNOSIS — R10.32: ICD-10-CM

## 2020-05-23 DIAGNOSIS — Z90.89: ICD-10-CM

## 2020-05-23 DIAGNOSIS — I51.9: ICD-10-CM

## 2020-05-23 DIAGNOSIS — R11.0: ICD-10-CM

## 2020-05-23 DIAGNOSIS — Z90.49: ICD-10-CM

## 2020-05-23 DIAGNOSIS — R50.9: ICD-10-CM

## 2020-05-23 DIAGNOSIS — J45.909: ICD-10-CM

## 2020-05-23 DIAGNOSIS — Z86.718: ICD-10-CM

## 2020-05-23 LAB
ALBUMIN SERPL-MCNC: 3.3 G/DL (ref 3.4–5)
ALP SERPL-CCNC: 87 U/L (ref 45–117)
ALT SERPL-CCNC: 22 U/L (ref 12–78)
ANION GAP SERPL CALC-SCNC: 3 MMOL/L (ref 5–15)
BASOPHILS # BLD AUTO: 0.05 X10^3/UL (ref 0–0.1)
BASOPHILS NFR BLD AUTO: 0 % (ref 0–1)
BILIRUB SERPL-MCNC: 0.2 MG/DL (ref 0.2–1)
CALCIUM SERPL-MCNC: 8.5 MG/DL (ref 8.5–10.1)
CHLORIDE SERPL-SCNC: 106 MMOL/L (ref 98–107)
CREAT SERPL-MCNC: 0.91 MG/DL (ref 0.55–1.02)
EOSINOPHIL # BLD AUTO: 0.09 X10^3/UL (ref 0–0.4)
EOSINOPHIL NFR BLD AUTO: 1 % (ref 1–7)
ERYTHROCYTE [DISTWIDTH] IN BLOOD BY AUTOMATED COUNT: 13.7 % (ref 9.6–15.2)
HCG UR SG: >= 1.03 (ref 1–1.03)
LYMPHOCYTES # BLD AUTO: 3.13 X10^3/UL (ref 1–3.4)
LYMPHOCYTES NFR BLD AUTO: 22 % (ref 22–44)
MCH RBC QN AUTO: 28.5 PG (ref 27–34.8)
MCHC RBC AUTO-ENTMCNC: 32.6 G/DL (ref 32.4–35.8)
MCV RBC AUTO: 87.3 FL (ref 80–100)
MD: NO
MICROSCOPIC: (no result)
MONOCYTES # BLD AUTO: 0.81 X10^3/UL (ref 0.2–0.8)
MONOCYTES NFR BLD AUTO: 6 % (ref 2–9)
NEUTROPHILS # BLD AUTO: 10.36 X10^3/UL (ref 1.8–6.8)
NEUTROPHILS NFR BLD AUTO: 72 % (ref 42–75)
PLATELET # BLD AUTO: 403 X10^3/UL (ref 130–400)
PMV BLD AUTO: 7.4 FL (ref 7.4–10.4)
PROT SERPL-MCNC: 7 G/DL (ref 6.4–8.2)
RBC # BLD AUTO: 4.39 X10^6/UL (ref 3.82–5.3)

## 2020-05-23 PROCEDURE — 87086 URINE CULTURE/COLONY COUNT: CPT

## 2020-05-23 PROCEDURE — 80053 COMPREHEN METABOLIC PANEL: CPT

## 2020-05-23 PROCEDURE — 83690 ASSAY OF LIPASE: CPT

## 2020-05-23 PROCEDURE — 99283 EMERGENCY DEPT VISIT LOW MDM: CPT

## 2020-05-23 PROCEDURE — 81001 URINALYSIS AUTO W/SCOPE: CPT

## 2020-05-23 PROCEDURE — 81025 URINE PREGNANCY TEST: CPT

## 2020-05-23 PROCEDURE — 85025 COMPLETE CBC W/AUTO DIFF WBC: CPT

## 2020-05-23 PROCEDURE — 36415 COLL VENOUS BLD VENIPUNCTURE: CPT

## 2020-05-23 NOTE — NUR
FIRST CONTACT WITH PT. PT STATES THAT SHE IS HAVING LLQ ABD PAIN FOR THREE 
WEEKS. DENIES N/V/D. PT'S AOX4. RESPS EVEN AND UNLABORED. BP/SPO2 MONITORS IN 
PLACE. CALL LIGHT WITHIN REACH. EDMD AT BEDSIDE TO EVALUATE AT THIS TIME.

## 2020-05-23 NOTE — NUR
PT SLEEPING IN [facility]. RESPS EVEN AND UNLABORED. BP/SPO2 MONITORS IN PLACE. 
CALL LIGHT WITHIN REACH.

## 2020-05-24 ENCOUNTER — APPOINTMENT (OUTPATIENT)
Dept: RADIOLOGY | Facility: MEDICAL CENTER | Age: 35
End: 2020-05-24
Attending: EMERGENCY MEDICINE
Payer: MEDICARE

## 2020-05-24 ENCOUNTER — HOSPITAL ENCOUNTER (EMERGENCY)
Facility: MEDICAL CENTER | Age: 35
End: 2020-05-24
Attending: EMERGENCY MEDICINE
Payer: MEDICARE

## 2020-05-24 VITALS
OXYGEN SATURATION: 95 % | BODY MASS INDEX: 44.19 KG/M2 | DIASTOLIC BLOOD PRESSURE: 69 MMHG | WEIGHT: 281.53 LBS | HEART RATE: 80 BPM | HEIGHT: 67 IN | SYSTOLIC BLOOD PRESSURE: 120 MMHG | TEMPERATURE: 98.4 F | RESPIRATION RATE: 16 BRPM

## 2020-05-24 DIAGNOSIS — R10.12 LEFT UPPER QUADRANT ABDOMINAL PAIN: ICD-10-CM

## 2020-05-24 LAB
ALBUMIN SERPL BCP-MCNC: 4 G/DL (ref 3.2–4.9)
ALBUMIN/GLOB SERPL: 1.4 G/DL
ALP SERPL-CCNC: 87 U/L (ref 30–99)
ALT SERPL-CCNC: 19 U/L (ref 2–50)
ANION GAP SERPL CALC-SCNC: 10 MMOL/L (ref 7–16)
APPEARANCE UR: CLEAR
AST SERPL-CCNC: 11 U/L (ref 12–45)
BACTERIA #/AREA URNS HPF: ABNORMAL /HPF
BASOPHILS # BLD AUTO: 0.3 % (ref 0–1.8)
BASOPHILS # BLD: 0.05 K/UL (ref 0–0.12)
BILIRUB SERPL-MCNC: 0.2 MG/DL (ref 0.1–1.5)
BILIRUB UR QL STRIP.AUTO: NEGATIVE
BUN SERPL-MCNC: 12 MG/DL (ref 8–22)
CALCIUM SERPL-MCNC: 9.1 MG/DL (ref 8.5–10.5)
CHLORIDE SERPL-SCNC: 103 MMOL/L (ref 96–112)
CO2 SERPL-SCNC: 25 MMOL/L (ref 20–33)
COLOR UR: YELLOW
CREAT SERPL-MCNC: 0.78 MG/DL (ref 0.5–1.4)
EKG IMPRESSION: NORMAL
EOSINOPHIL # BLD AUTO: 0.15 K/UL (ref 0–0.51)
EOSINOPHIL NFR BLD: 1 % (ref 0–6.9)
EPI CELLS #/AREA URNS HPF: ABNORMAL /HPF
ERYTHROCYTE [DISTWIDTH] IN BLOOD BY AUTOMATED COUNT: 43.1 FL (ref 35.9–50)
GLOBULIN SER CALC-MCNC: 2.8 G/DL (ref 1.9–3.5)
GLUCOSE SERPL-MCNC: 81 MG/DL (ref 65–99)
GLUCOSE UR STRIP.AUTO-MCNC: NEGATIVE MG/DL
HCG UR QL: NEGATIVE
HCT VFR BLD AUTO: 39.3 % (ref 37–47)
HGB BLD-MCNC: 12.5 G/DL (ref 12–16)
IMM GRANULOCYTES # BLD AUTO: 0.09 K/UL (ref 0–0.11)
IMM GRANULOCYTES NFR BLD AUTO: 0.6 % (ref 0–0.9)
KETONES UR STRIP.AUTO-MCNC: NEGATIVE MG/DL
LEUKOCYTE ESTERASE UR QL STRIP.AUTO: ABNORMAL
LIPASE SERPL-CCNC: 18 U/L (ref 11–82)
LYMPHOCYTES # BLD AUTO: 4.13 K/UL (ref 1–4.8)
LYMPHOCYTES NFR BLD: 26.5 % (ref 22–41)
MCH RBC QN AUTO: 28.3 PG (ref 27–33)
MCHC RBC AUTO-ENTMCNC: 31.8 G/DL (ref 33.6–35)
MCV RBC AUTO: 89.1 FL (ref 81.4–97.8)
MICRO URNS: ABNORMAL
MONOCYTES # BLD AUTO: 0.93 K/UL (ref 0–0.85)
MONOCYTES NFR BLD AUTO: 6 % (ref 0–13.4)
NEUTROPHILS # BLD AUTO: 10.23 K/UL (ref 2–7.15)
NEUTROPHILS NFR BLD: 65.6 % (ref 44–72)
NITRITE UR QL STRIP.AUTO: NEGATIVE
NRBC # BLD AUTO: 0 K/UL
NRBC BLD-RTO: 0 /100 WBC
PH UR STRIP.AUTO: 5.5 [PH] (ref 5–8)
PLATELET # BLD AUTO: 382 K/UL (ref 164–446)
PMV BLD AUTO: 9.2 FL (ref 9–12.9)
POTASSIUM SERPL-SCNC: 3.9 MMOL/L (ref 3.6–5.5)
PROT SERPL-MCNC: 6.8 G/DL (ref 6–8.2)
PROT UR QL STRIP: NEGATIVE MG/DL
RBC # BLD AUTO: 4.41 M/UL (ref 4.2–5.4)
RBC # URNS HPF: ABNORMAL /HPF
RBC UR QL AUTO: NEGATIVE
SODIUM SERPL-SCNC: 138 MMOL/L (ref 135–145)
SP GR UR STRIP.AUTO: 1.03
UROBILINOGEN UR STRIP.AUTO-MCNC: 0.2 MG/DL
WBC # BLD AUTO: 15.6 K/UL (ref 4.8–10.8)
WBC #/AREA URNS HPF: ABNORMAL /HPF

## 2020-05-24 PROCEDURE — 96374 THER/PROPH/DIAG INJ IV PUSH: CPT | Mod: XU

## 2020-05-24 PROCEDURE — 700117 HCHG RX CONTRAST REV CODE 255: Performed by: EMERGENCY MEDICINE

## 2020-05-24 PROCEDURE — 700102 HCHG RX REV CODE 250 W/ 637 OVERRIDE(OP): Performed by: EMERGENCY MEDICINE

## 2020-05-24 PROCEDURE — 93005 ELECTROCARDIOGRAM TRACING: CPT | Performed by: EMERGENCY MEDICINE

## 2020-05-24 PROCEDURE — 93005 ELECTROCARDIOGRAM TRACING: CPT

## 2020-05-24 PROCEDURE — 99285 EMERGENCY DEPT VISIT HI MDM: CPT

## 2020-05-24 PROCEDURE — 81001 URINALYSIS AUTO W/SCOPE: CPT

## 2020-05-24 PROCEDURE — 83690 ASSAY OF LIPASE: CPT

## 2020-05-24 PROCEDURE — 85025 COMPLETE CBC W/AUTO DIFF WBC: CPT

## 2020-05-24 PROCEDURE — 700111 HCHG RX REV CODE 636 W/ 250 OVERRIDE (IP): Performed by: EMERGENCY MEDICINE

## 2020-05-24 PROCEDURE — 80053 COMPREHEN METABOLIC PANEL: CPT

## 2020-05-24 PROCEDURE — A9270 NON-COVERED ITEM OR SERVICE: HCPCS | Performed by: EMERGENCY MEDICINE

## 2020-05-24 PROCEDURE — 81025 URINE PREGNANCY TEST: CPT

## 2020-05-24 PROCEDURE — 74177 CT ABD & PELVIS W/CONTRAST: CPT

## 2020-05-24 RX ORDER — ONDANSETRON 2 MG/ML
4 INJECTION INTRAMUSCULAR; INTRAVENOUS ONCE
Status: COMPLETED | OUTPATIENT
Start: 2020-05-24 | End: 2020-05-24

## 2020-05-24 RX ADMIN — LIDOCAINE HYDROCHLORIDE 30 ML: 20 SOLUTION OROPHARYNGEAL at 01:25

## 2020-05-24 RX ADMIN — IOHEXOL 100 ML: 350 INJECTION, SOLUTION INTRAVENOUS at 03:15

## 2020-05-24 RX ADMIN — ONDANSETRON 4 MG: 2 INJECTION INTRAMUSCULAR; INTRAVENOUS at 01:25

## 2020-05-24 ASSESSMENT — FIBROSIS 4 INDEX: FIB4 SCORE: 0.3

## 2020-05-24 NOTE — ED NOTES
PT discharged. PIV removed and site bandaged. VSS on RA. All belongings accounted for. Pt ambulatory with steady gait to MAYRA escoto.

## 2020-05-24 NOTE — ED TRIAGE NOTES
"Dunia Sahni  35 y.o. female  Chief Complaint   Patient presents with   • Abdominal Pain     Left abdominal squeezing pain x 3 weeks, intermittent. Reports pt came in today because stomach is \"jolting\" with every squeeze. Similar issue 3 months ago, diagnosed with left ovarian cysts.       Pt ambulatory to triage with steady gait for above complaint.   Pt is alert and oriented, speaking in full sentences, follows commands and responds appropriately to questions. Resp are even and unlabored. No behavioral indicators of pain.   Pt placed in lobby. Pt educated on triage process. Pt encouraged to alert staff for any changes.    EKG ordered.    "

## 2020-05-24 NOTE — ED PROVIDER NOTES
"ED Provider Note      Means of Arrival: Private vehicle  History obtained from: Patient, medical record      CHIEF COMPLAINT  Chief Complaint   Patient presents with   • Abdominal Pain     Left abdominal squeezing pain x 3 weeks, intermittent. Reports pt came in today because stomach is \"jolting\" with every squeeze. Similar issue 3 months ago, diagnosed with left ovarian cysts.       HPI  Dunia Sahni is a 35 y.o. female who presents with left upper quadrant episodes of squeezing pain.  She reports that this has been intermittent, reports that it will last on the order of 5 to 10 seconds and describes it as grabbing and jolting.  Today it has been constant.  She also reports nausea after eating and new pain with walking today.  She denies any vomiting, diarrhea, dysuria, hematuria, vaginal discharge, or any history of sexual activity.    REVIEW OF SYSTEMS  CONSTITUTIONAL:  No fever.  CARDIOVASCULAR:  No chest discomfort.  RESPIRATORY:  No pleuritic chest pain.  GASTROINTESTINAL: See HPI  GENITOURINARY:   No dysuria.  MUSCULOSKELETAL:  No arthralgia.    See HPI for further details.   All other systems are negative.     PAST MEDICAL HISTORY  Past Medical History:   Diagnosis Date   • Abscess 10/12/2017    right AC arm area   • Bipolar affective (HCC)    • Depression    • Kidney infection    • Suicide attempt (HCC)        FAMILY HISTORY  No family history on file.    SOCIAL HISTORY   reports that she has never smoked. She has never used smokeless tobacco. She reports current alcohol use. She reports current drug use. Drug: Inhaled.    SURGICAL HISTORY  Past Surgical History:   Procedure Laterality Date   • GEREMIAS BY LAPAROSCOPY N/A 7/6/2018    Procedure: GEREMIAS BY LAPAROSCOPY;  Surgeon: Leroy Goodson M.D.;  Location: SURGERY [facility];  Service: General   • ERCP IN OR N/A 7/5/2018    Procedure: ERCP IN OR;  Surgeon: Nathan Maravilla M.D.;  Location: [facility];  Service: Gastroenterology " "  • APPENDECTOMY         CURRENT MEDICATIONS  Home Medications     Reviewed by eDe Purvis R.N. (Registered Nurse) on 05/24/20 at 0033  Med List Status: <None>   Medication Last Dose Status        Patient Corwin Taking any Medications                       ALLERGIES  Allergies   Allergen Reactions   • Vicodin [Hydrocodone-Acetaminophen] Anaphylaxis     RXN=9 years ago   • Pcn [Penicillins] Nausea     RXN=8 years ago  Toleartes rocephin   • Bee Venom    • Blackberry [Rubus Fruticosus] Rash     \"rash\"   • Haldol [Haloperidol] Rash     Pt states, \"I have a rash and my lips were swollen.\"   • Raspberry        PHYSICAL EXAM  VITAL SIGNS: /69   Pulse 80   Temp 36.9 °C (98.4 °F) (Temporal)   Resp 16   Ht 1.702 m (5' 7\")   Wt (!) 127.7 kg (281 lb 8.4 oz)   LMP 05/01/2020   SpO2 95%   BMI 44.09 kg/m²    Gen: Alert  HENT: ATNC  Eyes: Normal conjunctiva  Neck: trachea midline  Resp: no respiratory distress  CV: No JVD, regular rate and rhythm  Abd: non-distended, tenderness to palpation entire left side of abdomen, worse in left upper quadrant.  Mild left CVA tenderness.  Abdomen soft, no rebound or guarding  Ext: No deformities  Psych: normal mood  Neuro: speech fluent       RADIOLOGY/PROCEDURES  CT-ABDOMEN-PELVIS WITH   Final Result      1.  Normal appendix.   2.  No focal mesenteric inflammatory process.   3.  Probable LEFT ovarian follicles.   4.  Prior cholecystectomy.          LABS  Labs Reviewed   CBC WITH DIFFERENTIAL - Abnormal; Notable for the following components:       Result Value    WBC 15.6 (*)     MCHC 31.8 (*)     Neutrophils (Absolute) 10.23 (*)     Monos (Absolute) 0.93 (*)     All other components within normal limits   COMP METABOLIC PANEL - Abnormal; Notable for the following components:    AST(SGOT) 11 (*)     All other components within normal limits   URINALYSIS,CULTURE IF INDICATED - Abnormal; Notable for the following components:    Leukocyte Esterase Small (*)     All other " components within normal limits   URINE MICROSCOPIC (W/UA) - Abnormal; Notable for the following components:    Bacteria Few (*)     All other components within normal limits   LIPASE   HCG QUALITATIVE UR   ESTIMATED GFR        COURSE & MEDICAL DECISION MAKING  Pertinent Labs & Imaging studies reviewed. (See chart for details)    Patient resents with stuttering now constant left-sided abdominal pain.  Denies any vaginal discharge or history of any sexual activity, therefore low suspicion for PID.  She has had a prior cholecystectomy, however this is now left-sided.  Given the left upper quadrant zone, will trial GI cocktail.  Will perform screening labs as well as urinalysis to evaluate for pyelonephritis, ureterolithiasis, complications of pregnancy, such as ruptured ectopic.    2:02 AM  Patient did not have any relief after GI cocktail.  The patient also reports that she was seen earlier in the day at [facility] and was diagnosed with a urinary tract infection, however is not taken any of her antibiotics.  Her urinalysis here demonstrates no evidence of infection, so I suspect he may have had a contaminated specimen.  She does have some leukocytosis, however no left shift.  We will proceed with CT scan of the abdomen and pelvis.    Patient has a negative pregnancy test, reassuring labs.  She did not improve after GI cocktail, and the CAT scan was reassuring.  Etiology of her abdominal pain is unclear at this point, her does not appear to be dangerous.  She is advised to follow-up with her regular doctor.  Was given return precautions and anticipatory guidance.  She was found to not have evidence of urinary tract infection on urinalysis.  She was advised against taking the antibiotics as at this point they do not appear to be necessary.        Appropriate PPE were worn at this encounter.     FINAL IMPRESSION  1. Left upper quadrant abdominal pain           DISPOSITION:  Patient will be discharged home in stable  condition.    FOLLOW UP:  [facility], Emergency Dept  [address]  [place] [id number] [phone]    If symptoms worsen    Discharge to regular doctor.  To establish a primary care provider within our system, please call [phone]    Schedule an appointment as soon as possible for a visit         OUTPATIENT MEDICATIONS:  There are no discharge medications for this patient.

## 2020-05-24 NOTE — DISCHARGE INSTRUCTIONS
You were seen in the emergency department for abdominal pain. Your labs and physical exam were reassuring. Your imaging was also reassuring.  At this time, your pain does not appear to be dangerous.     Please follow up with your regular doctor.    Please seek medical care if your symptoms do not improve in 24 hours, if you develop worsening pain, ongoing vomiting, tarry or bloody stools, or for any other new or concerning findings.       ================================  Coronavirus Information    Your visit did NOT relate to coronavirus, but if you or your family develop symptoms that concern you for coronavirus (please see CDC website for symptoms), please contact the [facility] hotline (or your local health department)  or your healthcare provider before going to a medical facility:    [facility]  24hr hotline: [phone]    Information is available from the Centers for Disease Control and Prevention  www.CDC.gov    and     [facility]  [email or web address]    If you are severely ill or having a hard time breathing, please immediatly seek medical care. Notify the  or Emergency Department Triage about your symptoms.

## 2020-05-28 ENCOUNTER — HOSPITAL ENCOUNTER (EMERGENCY)
Facility: MEDICAL CENTER | Age: 35
End: 2020-05-28
Attending: EMERGENCY MEDICINE
Payer: MEDICARE

## 2020-05-28 VITALS
TEMPERATURE: 97.4 F | HEIGHT: 67 IN | RESPIRATION RATE: 18 BRPM | OXYGEN SATURATION: 95 % | DIASTOLIC BLOOD PRESSURE: 76 MMHG | HEART RATE: 75 BPM | WEIGHT: 283.73 LBS | BODY MASS INDEX: 44.53 KG/M2 | SYSTOLIC BLOOD PRESSURE: 144 MMHG

## 2020-05-28 DIAGNOSIS — N93.9 VAGINAL BLEEDING: ICD-10-CM

## 2020-05-28 LAB
ALBUMIN SERPL BCP-MCNC: 3.9 G/DL (ref 3.2–4.9)
ALBUMIN/GLOB SERPL: 1.3 G/DL
ALP SERPL-CCNC: 84 U/L (ref 30–99)
ALT SERPL-CCNC: 16 U/L (ref 2–50)
ANION GAP SERPL CALC-SCNC: 10 MMOL/L (ref 7–16)
AST SERPL-CCNC: 13 U/L (ref 12–45)
BASOPHILS # BLD AUTO: 0.4 % (ref 0–1.8)
BASOPHILS # BLD: 0.06 K/UL (ref 0–0.12)
BILIRUB SERPL-MCNC: 0.2 MG/DL (ref 0.1–1.5)
BUN SERPL-MCNC: 14 MG/DL (ref 8–22)
CALCIUM SERPL-MCNC: 9 MG/DL (ref 8.5–10.5)
CHLORIDE SERPL-SCNC: 102 MMOL/L (ref 96–112)
CO2 SERPL-SCNC: 25 MMOL/L (ref 20–33)
CREAT SERPL-MCNC: 0.76 MG/DL (ref 0.5–1.4)
EKG IMPRESSION: NORMAL
EOSINOPHIL # BLD AUTO: 0.11 K/UL (ref 0–0.51)
EOSINOPHIL NFR BLD: 0.7 % (ref 0–6.9)
ERYTHROCYTE [DISTWIDTH] IN BLOOD BY AUTOMATED COUNT: 43.1 FL (ref 35.9–50)
GLOBULIN SER CALC-MCNC: 3 G/DL (ref 1.9–3.5)
GLUCOSE SERPL-MCNC: 103 MG/DL (ref 65–99)
HCG SERPL QL: NEGATIVE
HCT VFR BLD AUTO: 40.1 % (ref 37–47)
HGB BLD-MCNC: 12.9 G/DL (ref 12–16)
IMM GRANULOCYTES # BLD AUTO: 0.1 K/UL (ref 0–0.11)
IMM GRANULOCYTES NFR BLD AUTO: 0.7 % (ref 0–0.9)
LIPASE SERPL-CCNC: 14 U/L (ref 11–82)
LYMPHOCYTES # BLD AUTO: 2.45 K/UL (ref 1–4.8)
LYMPHOCYTES NFR BLD: 16.1 % (ref 22–41)
MCH RBC QN AUTO: 28.5 PG (ref 27–33)
MCHC RBC AUTO-ENTMCNC: 32.2 G/DL (ref 33.6–35)
MCV RBC AUTO: 88.5 FL (ref 81.4–97.8)
MONOCYTES # BLD AUTO: 0.81 K/UL (ref 0–0.85)
MONOCYTES NFR BLD AUTO: 5.3 % (ref 0–13.4)
NEUTROPHILS # BLD AUTO: 11.72 K/UL (ref 2–7.15)
NEUTROPHILS NFR BLD: 76.8 % (ref 44–72)
NRBC # BLD AUTO: 0 K/UL
NRBC BLD-RTO: 0 /100 WBC
PLATELET # BLD AUTO: 400 K/UL (ref 164–446)
PMV BLD AUTO: 9 FL (ref 9–12.9)
POTASSIUM SERPL-SCNC: 3.8 MMOL/L (ref 3.6–5.5)
PROT SERPL-MCNC: 6.9 G/DL (ref 6–8.2)
RBC # BLD AUTO: 4.53 M/UL (ref 4.2–5.4)
SODIUM SERPL-SCNC: 137 MMOL/L (ref 135–145)
WBC # BLD AUTO: 15.3 K/UL (ref 4.8–10.8)

## 2020-05-28 PROCEDURE — A9270 NON-COVERED ITEM OR SERVICE: HCPCS | Performed by: EMERGENCY MEDICINE

## 2020-05-28 PROCEDURE — 83690 ASSAY OF LIPASE: CPT

## 2020-05-28 PROCEDURE — 700102 HCHG RX REV CODE 250 W/ 637 OVERRIDE(OP): Performed by: EMERGENCY MEDICINE

## 2020-05-28 PROCEDURE — 85025 COMPLETE CBC W/AUTO DIFF WBC: CPT

## 2020-05-28 PROCEDURE — 80053 COMPREHEN METABOLIC PANEL: CPT

## 2020-05-28 PROCEDURE — 99284 EMERGENCY DEPT VISIT MOD MDM: CPT

## 2020-05-28 PROCEDURE — 93005 ELECTROCARDIOGRAM TRACING: CPT | Performed by: EMERGENCY MEDICINE

## 2020-05-28 PROCEDURE — 84703 CHORIONIC GONADOTROPIN ASSAY: CPT

## 2020-05-28 RX ORDER — IBUPROFEN 600 MG/1
600 TABLET ORAL ONCE
Status: COMPLETED | OUTPATIENT
Start: 2020-05-28 | End: 2020-05-28

## 2020-05-28 RX ORDER — KETOROLAC TROMETHAMINE 30 MG/ML
15 INJECTION, SOLUTION INTRAMUSCULAR; INTRAVENOUS ONCE
Status: DISCONTINUED | OUTPATIENT
Start: 2020-05-28 | End: 2020-05-28

## 2020-05-28 RX ORDER — SODIUM CHLORIDE 9 MG/ML
1000 INJECTION, SOLUTION INTRAVENOUS ONCE
Status: DISCONTINUED | OUTPATIENT
Start: 2020-05-28 | End: 2020-05-28

## 2020-05-28 RX ADMIN — IBUPROFEN 600 MG: 600 TABLET ORAL at 17:30

## 2020-05-28 ASSESSMENT — FIBROSIS 4 INDEX: FIB4 SCORE: 0.22

## 2020-05-28 NOTE — ED TRIAGE NOTES
Ambulatory to triage. Pt states she is on her period and having heavy bleeding- reports soaking through 4 pads since 0800 this am. States that she normally has heavy menstrual cycles but today she has been feeling increasingly weak and fatigued. Reports lightheadedness when sitting or standing.     Pt/staff masked and in appropriate PPE during encounter.  Pt denies fever/travel or being in contact with anyone testing positive for COVID/Corona.  Explained to pt triage process, made pt aware to tell this RN/staff of any changes/concerns, pt verbalized understanding of process and instructions given. Pt to [facility] escoto.

## 2020-05-28 NOTE — ED PROVIDER NOTES
ED Provider Note    CHIEF COMPLAINT  Vaginal bleeding    [facility]  Dunia Sahni is a 35 y.o. female who presents to the emergency department for evaluation of vaginal bleeding.  The patient states that she started her period 3 days ago and has had worsening bleeding today.  She states that she is 4 pads throughout the day today.  She admits to feeling fatigued and lightheaded but has not had any syncope.  She admits to diffuse muscle pain as well but denies any abdominal pain.  She states that her last period was last month and normal for her.  She states that she is not sexually active and denies any history of STIs.  She denies any new or different vaginal discharge.  She has not had any fevers, coughing, wheezing, chest pain, shortness of breath, runny nose, sore throat, diarrhea, dysuria, or other complaints at this time.    REVIEW OF SYSTEMS  See HPI for further details. All other systems are negative.     PAST MEDICAL HISTORY   has a past medical history of Abscess (10/12/2017), Bipolar affective ([facility]), Depression, Kidney infection, Leukemia ([facility]) (12/2019), and Suicide attempt ([facility]).    SOCIAL HISTORY  Social History     Tobacco Use   • Smoking status: Never Smoker   • Smokeless tobacco: Never Used   Substance and Sexual Activity   • Alcohol use: Yes     Comment: rarely   • Drug use: Yes     Types: Inhaled     Comment: marijuana, cocaine   • Sexual activity: Not on file       SURGICAL HISTORY   has a past surgical history that includes appendectomy; ercp in or (N/A, 7/5/2018); and skip by laparoscopy (N/A, 7/6/2018).    CURRENT MEDICATIONS  Home Medications     Reviewed by Chiquita Hall R.N. (Registered Nurse) on 05/28/20 at 1541  Med List Status: <None>   Medication Last Dose Status        Patient Corwin Taking any Medications                       ALLERGIES  Allergies   Allergen Reactions   • Vicodin [Hydrocodone-Acetaminophen] Anaphylaxis     RXN=9 years ago   • Pcn [Penicillins] Nausea     RXN=8  "years ago  Toleartes rocephin   • Bee Venom    • Blackberry [Rubus Fruticosus] Rash     \"rash\"   • Haldol [Haloperidol] Rash     Pt states, \"I have a rash and my lips were swollen.\"   • Raspberry        PHYSICAL EXAM  VITAL SIGNS: /67   Pulse 70   Temp 36.6 °C (97.9 °F) (Temporal)   Resp 16   Ht 1.702 m (5' 7\")   Wt (!) 128.7 kg (283 lb 11.7 oz)   LMP 05/01/2020   SpO2 96%   BMI 44.44 kg/m²    Constitutional: Alert and in no apparent distress.  Morbidly obese female.  HENT: Normocephalic atraumatic. Bilateral external ears normal. Nose normal. Mucous membranes are moist.  Eyes: Pupils are equal and reactive. Conjunctiva normal. Non-icteric sclera.   Neck: Normal range of motion without tenderness. Supple. No meningeal signs.  Cardiovascular: Regular rate and rhythm. No murmurs, gallops or rubs.  Thorax & Lungs: Breath sounds are clear to auscultation bilaterally. No wheezing, rhonchi or rales.  Abdomen: I am somewhat limited secondary to obesity.  However, she does not have any obvious tenderness palpation and no distention.  Her abdomen is soft.  Skin: Warm and dry.   Back: No bony tenderness, No CVA tenderness.   Extremities: 2+ peripheral pulses. Cap refill is less than 2 seconds. No edema, cyanosis, or clubbing.  Musculoskeletal: Good range of motion in all major joints. No tenderness to palpation or major deformities noted.   Neurologic: Alert and oriented ×3. The patient moves all 4 extremities and follows commands.  Psychiatric: Affect is anxious. Judgment appears to be intact.    DIAGNOSTIC STUDIES / PROCEDURES    LABS  Results for orders placed or performed during the hospital encounter of 05/28/20   CBC WITH DIFFERENTIAL   Result Value Ref Range    WBC 15.3 (H) 4.8 - 10.8 K/uL    RBC 4.53 4.20 - 5.40 M/uL    Hemoglobin 12.9 12.0 - 16.0 g/dL    Hematocrit 40.1 37.0 - 47.0 %    MCV 88.5 81.4 - 97.8 fL    MCH 28.5 27.0 - 33.0 pg    MCHC 32.2 (L) 33.6 - 35.0 g/dL    RDW 43.1 35.9 - 50.0 fL    " Platelet Count 400 164 - 446 K/uL    MPV 9.0 9.0 - 12.9 fL    Neutrophils-Polys 76.80 (H) 44.00 - 72.00 %    Lymphocytes 16.10 (L) 22.00 - 41.00 %    Monocytes 5.30 0.00 - 13.40 %    Eosinophils 0.70 0.00 - 6.90 %    Basophils 0.40 0.00 - 1.80 %    Immature Granulocytes 0.70 0.00 - 0.90 %    Nucleated RBC 0.00 /100 WBC    Neutrophils (Absolute) 11.72 (H) 2.00 - 7.15 K/uL    Lymphs (Absolute) 2.45 1.00 - 4.80 K/uL    Monos (Absolute) 0.81 0.00 - 0.85 K/uL    Eos (Absolute) 0.11 0.00 - 0.51 K/uL    Baso (Absolute) 0.06 0.00 - 0.12 K/uL    Immature Granulocytes (abs) 0.10 0.00 - 0.11 K/uL    NRBC (Absolute) 0.00 K/uL   COMP METABOLIC PANEL   Result Value Ref Range    Sodium 137 135 - 145 mmol/L    Potassium 3.8 3.6 - 5.5 mmol/L    Chloride 102 96 - 112 mmol/L    Co2 25 20 - 33 mmol/L    Anion Gap 10.0 7.0 - 16.0    Glucose 103 (H) 65 - 99 mg/dL    Bun 14 8 - 22 mg/dL    Creatinine 0.76 0.50 - 1.40 mg/dL    Calcium 9.0 8.5 - 10.5 mg/dL    AST(SGOT) 13 12 - 45 U/L    ALT(SGPT) 16 2 - 50 U/L    Alkaline Phosphatase 84 30 - 99 U/L    Total Bilirubin 0.2 0.1 - 1.5 mg/dL    Albumin 3.9 3.2 - 4.9 g/dL    Total Protein 6.9 6.0 - 8.2 g/dL    Globulin 3.0 1.9 - 3.5 g/dL    A-G Ratio 1.3 g/dL   LIPASE   Result Value Ref Range    Lipase 14 11 - 82 U/L   HCG QUAL SERUM   Result Value Ref Range    Beta-Hcg Qualitative Serum Negative Negative   ESTIMATED GFR   Result Value Ref Range    GFR If African American >60 >60 mL/min/1.73 m 2    GFR If Non African American >60 >60 mL/min/1.73 m 2   EKG (NOW)   Result Value Ref Range    Report       [facility] Emergency Dept.    Test Date:  2020  Pt Name:    EVIE ENGEL           Department: ER  MRN:        [id number]                      Room:       [facility]  Gender:     Female                       Technician: [id number]  :        1985                   Requested By:CELIA ROMAN  Order #:    [id number]                    Katty MD: Celia  Carla    Measurements  Intervals                                Axis  Rate:       68                           P:          39  NV:         160                          QRS:        32  QRSD:       98                           T:          13  QT:         404  QTc:        430    Interpretive Statements  SINUS RHYTHM  No ST elevation or depression. Intervals are within noraml limits. Axis is  normal. No arrhythmias noted.  Compared to ECG 05/24/2020 00:37:45  No significant changes  Electronically Signed On 5- 17:21:26 PDT by Divina Aguirre       COURSE & MEDICAL DECISION MAKING  Pertinent Labs & Imaging studies reviewed. (See chart for details)    This is a 35-year-old female presenting to the emergency department for evaluation of vaginal bleeding.  On initial evaluation, the patient appeared well and in no acute distress.  Initial heart rate performed in triage did show some tachycardia, but upon my initial exam, her heart rate was normal.  Her mucous membranes were moist and she generally appeared well-hydrated.  Her abdominal exam was somewhat limited secondary to obesity but she did not have any obvious tenderness to palpation.  She refused a pelvic exam as well as vaginal ultrasound citing Holiness beliefs.  She also refused an IV.  She was seen here 4 days ago and had a CT at that time.  I did review it and it did not reveal any acute processes.  Labs were obtained today and her H&H were normal.  I have low clinical suspicion for significant hemorrhage given her normal blood pressure, heart rate, and perfusion in addition to her reassuring labs.  Her white blood cell count was slightly elevated at 15.  I did review her previous CBC from 4 days ago and it is unchanged.  I am less concerned for serious bacterial illness such as sepsis given the lack of fevers and otherwise normal vital signs and exam.  Pregnancy test was negative and I have low suspicion for ectopic pregnancy or miscarriage.  Her electrolytes  are within normal limits.  Is also obtain a screening EKG given the lightheadedness.  There was no evidence of ischemia or arrhythmia.  The patient was treated with ibuprofen and an oral challenge.  She tolerated this with no difficulty.  I do believe she is stable for discharge and encouraged her to follow-up with her primary care physician and/or OB/GYN.  She understands return to the ED with any worsening signs or symptoms.    I verified that the patient was wearing a mask and I was wearing appropriate PPE every time I entered the room. The patient's mask was on the patient at all times during my encounter except for a brief view of the oropharynx.    FINAL IMPRESSION  1. Vaginal bleeding      PRESCRIPTIONS  New Prescriptions    No medications on file     FOLLOW UP  [address] [id number] [phone]  Call in 1 day  To schedule a follow up appointment    [facility], Emergency Dept  [address] [facility] [id number] [phone]  Go to   As needed    -DISCHARGE-    Electronically signed by: Divina Aguirre D.O., 5/28/2020 4:38 PM

## 2020-05-29 NOTE — ED NOTES
Discharge instructions reviewed with pt. All questions answered. Pt verbalizes understanding. Pt declines wheelchair and is ambualtory to exit

## 2020-05-30 ENCOUNTER — HOSPITAL ENCOUNTER (EMERGENCY)
Facility: MEDICAL CENTER | Age: 35
End: 2020-05-30
Attending: EMERGENCY MEDICINE
Payer: MEDICARE

## 2020-05-30 VITALS
HEIGHT: 67 IN | OXYGEN SATURATION: 95 % | TEMPERATURE: 97 F | BODY MASS INDEX: 44.43 KG/M2 | SYSTOLIC BLOOD PRESSURE: 142 MMHG | WEIGHT: 283.07 LBS | HEART RATE: 98 BPM | RESPIRATION RATE: 17 BRPM | DIASTOLIC BLOOD PRESSURE: 75 MMHG

## 2020-05-30 DIAGNOSIS — F31.9 BIPOLAR 1 DISORDER (HCC): ICD-10-CM

## 2020-05-30 PROCEDURE — 99283 EMERGENCY DEPT VISIT LOW MDM: CPT

## 2020-05-30 ASSESSMENT — FIBROSIS 4 INDEX: FIB4 SCORE: 0.27

## 2020-05-31 NOTE — ED PROVIDER NOTES
ED Provider Note    CHIEF COMPLAINT  Chief Complaint   Patient presents with   • Other   • Psych Eval       HPI  Dunia Sahni is a 35 y.o. female who presents to the emergency room today with complaints of needing another place to live.  [facility] did not like the living situation and felt that there was dangerous situation with somebody trimming the trees nearby.  She denies any suicidal homicidal ideation no nausea no vomiting no chest pain or shortness of breath or other complaints at this time.    REVIEW OF SYSTEMS  See HPI for further details. All other systems are negative.     PAST MEDICAL HISTORY  Past Medical History:   Diagnosis Date   • Leukemia ([facility]) 12/2019   • Abscess 10/12/2017    right AC arm area   • Bipolar affective ([facility])    • Depression    • Kidney infection    • Suicide attempt ([facility])        FAMILY HISTORY  History reviewed. No pertinent family history.    SOCIAL HISTORY  Social History     Socioeconomic History   • Marital status:      Spouse name: Not on file   • Number of children: Not on file   • Years of education: Not on file   • Highest education level: Not on file   Occupational History   • Not on file   Social Needs   • Financial resource strain: Not on file   • Food insecurity     Worry: Not on file     Inability: Not on file   • Transportation needs     Medical: Not on file     Non-medical: Not on file   Tobacco Use   • Smoking status: Never Smoker   • Smokeless tobacco: Never Used   Substance and Sexual Activity   • Alcohol use: Yes     Comment: rarely   • Drug use: Not Currently     Types: Inhaled     Comment: marijuana, cocaine   • Sexual activity: Not on file   Lifestyle   • Physical activity     Days per week: Not on file     Minutes per session: Not on file   • Stress: Not on file   Relationships   • Social connections     Talks on phone: Not on file     Gets together: Not on file     Attends [employer] service: Not on file  "    Active member of club or organization: Not on file     Attends meetings of clubs or organizations: Not on file     Relationship status: Not on file   • Intimate partner violence     Fear of current or ex partner: Not on file     Emotionally abused: Not on file     Physically abused: Not on file     Forced sexual activity: Not on file   Other Topics Concern   • Not on file   Social History Narrative    ** Merged History Encounter **            SURGICAL HISTORY  Past Surgical History:   Procedure Laterality Date   • GEREMIAS BY LAPAROSCOPY N/A 7/6/2018    Procedure: GEREMIAS BY LAPAROSCOPY;  Surgeon: Leroy Goodson M.D.;  Location: SURGERY [facility];  Service: General   • ERCP IN OR N/A 7/5/2018    Procedure: ERCP IN OR;  Surgeon: Nathan Maravilla M.D.;  Location: SURGERY [facility];  Service: Gastroenterology   • APPENDECTOMY         CURRENT MEDICATIONS  Home Medications    **Home medications have not yet been reviewed for this encounter**         ALLERGIES  Allergies   Allergen Reactions   • Vicodin [Hydrocodone-Acetaminophen] Anaphylaxis     RXN=9 years ago   • Pcn [Penicillins] Nausea     RXN=8 years ago  Toleartes rocephin   • Bee Venom    • Blackberry [Rubus Fruticosus] Rash     \"rash\"   • Haldol [Haloperidol] Rash     Pt states, \"I have a rash and my lips were swollen.\"   • Raspberry        PHYSICAL EXAM  VITAL SIGNS: /75   Pulse 98   Temp 36.1 °C (97 °F)   Resp 17   Ht 1.702 m (5' 7\")   Wt (!) 128.4 kg (283 lb 1.1 oz)   LMP 05/01/2020   SpO2 95%   BMI 44.34 kg/m²       Constitutional: Well developed, Well nourished, No acute distress, Non-toxic appearance.   HENT: Normocephalic, Atraumatic, Bilateral external ears normal, Oropharynx moist, No oral exudates, Nose normal.   Eyes: PERRLA, EOMI, Conjunctiva normal, No discharge.   Neck: Normal range of motion, No tenderness, Supple, No stridor.   Cardiovascular: Normal heart rate, Normal rhythm, No murmurs, No rubs, No gallops.   Thorax " & Lungs: Normal breath sounds, No respiratory distress, No wheezing, No chest tenderness.  Abdomen: Bowel sounds normal, Soft, No tenderness, No masses, No pulsatile masses.    Skin: Warm, Dry, No erythema, No rash.   Extremities: Intact distal pulses, No edema, No tenderness, No cyanosis, No clubbing.   Neurologic: No neurological deficits  Psychiatric: No suicidal homicidal ideation patient does have history of bipolar disorder    COURSE & MEDICAL DECISION MAKING  Pertinent Labs & Imaging studies reviewed. (See chart for details)  Patient denies any suicidal homicidal ideation I did discuss with her that she can go to the shelter and we will be happy to director there and and arrange for transportation she declined.  She was released to home to follow-up with [facility] with her primary care physician,    FINAL IMPRESSION  1.  Bipolar disorder  2.  History of depression  3.      Electronically signed by: Homero Leo D.O., 5/31/2020 12:09 AM

## 2020-05-31 NOTE — ED NOTES
"Patient given resources for shelters if she feels unsafe at home. Patient unhappy that she was not placed on L2K and refused to sign papers. Patient mentioned to SW that she is going to \"commit suicide\", ERP notified. ERP reassessed. Patient informed RN she \"just doesn't want to go home or to a shelter\" due to the wires that are \"hanging at home\" and she denied SI/HI and hallucinations. Dr. Leo stated patient can be Dc'ed.   "

## 2020-05-31 NOTE — DISCHARGE PLANNING
SW met with Pt bedside and offered resources.  Patient accepted homeless resources and MTM information.  While meeting with Pt to provided resources Pt stated that she was going to kill herself.  ER RN notified that Pt verbalized Suicidal ideation.

## 2020-05-31 NOTE — ED TRIAGE NOTES
"Chief Complaint   Patient presents with   • Other   • Psych Eval   Pt to triage in NAD.  Pt reports she is here because she is in \"unsafe housing\".  Pt declines to further explain stating she will \"wait until her  is present\".  Pt denies SI/HI but states she needs to go to [facility].  Pt educated on triage process and instructed to notify triage RN of any change in status.          "

## 2020-06-04 ENCOUNTER — HOSPITAL ENCOUNTER (EMERGENCY)
Dept: HOSPITAL 8 - ED | Age: 35
Discharge: HOME | End: 2020-06-04
Payer: MEDICARE

## 2020-06-04 VITALS — DIASTOLIC BLOOD PRESSURE: 57 MMHG | SYSTOLIC BLOOD PRESSURE: 93 MMHG

## 2020-06-04 VITALS — WEIGHT: 281.53 LBS | BODY MASS INDEX: 44.19 KG/M2 | HEIGHT: 67 IN

## 2020-06-04 DIAGNOSIS — R94.31: ICD-10-CM

## 2020-06-04 DIAGNOSIS — R07.2: Primary | ICD-10-CM

## 2020-06-04 DIAGNOSIS — Z86.718: ICD-10-CM

## 2020-06-04 LAB
ALBUMIN SERPL-MCNC: 3.5 G/DL (ref 3.4–5)
ANION GAP SERPL CALC-SCNC: 10 MMOL/L (ref 5–15)
BASOPHILS # BLD AUTO: 0.1 X10^3/UL (ref 0–0.1)
BASOPHILS NFR BLD AUTO: 1 % (ref 0–1)
CALCIUM SERPL-MCNC: 8.1 MG/DL (ref 8.5–10.1)
CHLORIDE SERPL-SCNC: 109 MMOL/L (ref 98–107)
CREAT SERPL-MCNC: 0.84 MG/DL (ref 0.55–1.02)
EOSINOPHIL # BLD AUTO: 0.05 X10^3/UL (ref 0–0.4)
EOSINOPHIL NFR BLD AUTO: 0 % (ref 1–7)
ERYTHROCYTE [DISTWIDTH] IN BLOOD BY AUTOMATED COUNT: 14.1 % (ref 9.6–15.2)
LYMPHOCYTES # BLD AUTO: 1.79 X10^3/UL (ref 1–3.4)
LYMPHOCYTES NFR BLD AUTO: 14 % (ref 22–44)
MCH RBC QN AUTO: 28.8 PG (ref 27–34.8)
MCHC RBC AUTO-ENTMCNC: 33.1 G/DL (ref 32.4–35.8)
MCV RBC AUTO: 86.9 FL (ref 80–100)
MD: NO
MONOCYTES # BLD AUTO: 0.52 X10^3/UL (ref 0.2–0.8)
MONOCYTES NFR BLD AUTO: 4 % (ref 2–9)
NEUTROPHILS # BLD AUTO: 10.26 X10^3/UL (ref 1.8–6.8)
NEUTROPHILS NFR BLD AUTO: 81 % (ref 42–75)
PLATELET # BLD AUTO: 417 X10^3/UL (ref 130–400)
PMV BLD AUTO: 7.3 FL (ref 7.4–10.4)
RBC # BLD AUTO: 4.18 X10^6/UL (ref 3.82–5.3)

## 2020-06-04 PROCEDURE — 36415 COLL VENOUS BLD VENIPUNCTURE: CPT

## 2020-06-04 PROCEDURE — 99285 EMERGENCY DEPT VISIT HI MDM: CPT

## 2020-06-04 PROCEDURE — 80048 BASIC METABOLIC PNL TOTAL CA: CPT

## 2020-06-04 PROCEDURE — 93005 ELECTROCARDIOGRAM TRACING: CPT

## 2020-06-04 PROCEDURE — 84703 CHORIONIC GONADOTROPIN ASSAY: CPT

## 2020-06-04 PROCEDURE — 82040 ASSAY OF SERUM ALBUMIN: CPT

## 2020-06-04 PROCEDURE — 71045 X-RAY EXAM CHEST 1 VIEW: CPT

## 2020-06-04 PROCEDURE — 85025 COMPLETE CBC W/AUTO DIFF WBC: CPT

## 2020-06-04 NOTE — NUR
PT HERE STATING THAT SHE DRANK TOO MUCH TODAY BECAUSE SHE JUST FOUND OUT THAT 
HER  PASSED AWAY A FEW WEEKS AGO.  PT IS TEARFUL, VAGUE COMPLAINT CHEST 
PAIN,L EKG DONE FOR ABUNDANCE OF SAFETY. PT PLACED ON CARDIAC MONITOR, VS 
STABLE. OFFERED PT ODT [place], PT REFUSED. WILL CONTINUE TO MONITOR PT.

## 2020-06-05 ENCOUNTER — HOSPITAL ENCOUNTER (EMERGENCY)
Facility: MEDICAL CENTER | Age: 35
End: 2020-06-06
Attending: EMERGENCY MEDICINE
Payer: MEDICARE

## 2020-06-05 ENCOUNTER — HOSPITAL ENCOUNTER (EMERGENCY)
Dept: HOSPITAL 8 - ED | Age: 35
Discharge: HOME | End: 2020-06-05
Payer: MEDICARE

## 2020-06-05 VITALS — HEIGHT: 67 IN | WEIGHT: 280.43 LBS | BODY MASS INDEX: 44.01 KG/M2

## 2020-06-05 VITALS — HEIGHT: 67 IN | WEIGHT: 279.99 LBS | BODY MASS INDEX: 43.94 KG/M2

## 2020-06-05 VITALS — SYSTOLIC BLOOD PRESSURE: 129 MMHG | DIASTOLIC BLOOD PRESSURE: 72 MMHG

## 2020-06-05 VITALS — SYSTOLIC BLOOD PRESSURE: 126 MMHG | DIASTOLIC BLOOD PRESSURE: 72 MMHG

## 2020-06-05 DIAGNOSIS — R45.851 SUICIDAL IDEATION: ICD-10-CM

## 2020-06-05 DIAGNOSIS — R07.89: Primary | ICD-10-CM

## 2020-06-05 DIAGNOSIS — R20.2: ICD-10-CM

## 2020-06-05 DIAGNOSIS — J45.909: ICD-10-CM

## 2020-06-05 DIAGNOSIS — R94.31: ICD-10-CM

## 2020-06-05 DIAGNOSIS — Z86.718: ICD-10-CM

## 2020-06-05 DIAGNOSIS — Z90.89: ICD-10-CM

## 2020-06-05 DIAGNOSIS — R22.2: Primary | ICD-10-CM

## 2020-06-05 DIAGNOSIS — Z90.49: ICD-10-CM

## 2020-06-05 LAB
POC BREATHALIZER: 0 PERCENT (ref 0–0.01)
POC BREATHALIZER: 0 PERCENT (ref 0–0.01)

## 2020-06-05 PROCEDURE — 99282 EMERGENCY DEPT VISIT SF MDM: CPT

## 2020-06-05 PROCEDURE — 302970 POC BREATHALIZER: Performed by: EMERGENCY MEDICINE

## 2020-06-05 PROCEDURE — 90791 PSYCH DIAGNOSTIC EVALUATION: CPT

## 2020-06-05 PROCEDURE — 99285 EMERGENCY DEPT VISIT HI MDM: CPT

## 2020-06-05 PROCEDURE — 99283 EMERGENCY DEPT VISIT LOW MDM: CPT

## 2020-06-05 PROCEDURE — 93005 ELECTROCARDIOGRAM TRACING: CPT

## 2020-06-05 RX ORDER — ARIPIPRAZOLE 300 MG
300 KIT INTRAMUSCULAR
COMMUNITY
End: 2020-08-14

## 2020-06-05 ASSESSMENT — FIBROSIS 4 INDEX: FIB4 SCORE: 0.27

## 2020-06-05 NOTE — NUR
PT AMBULATORY TO ROOM 4 W/ C/O R CHEST NODULE SHE STATES SHE NOTICED A FEW DAYS 
AGO AND NOW IS GETTING BIGGER. WAS SEEN AT THE ED 2 TIMES IN THE LAST 24 HRS 
AND WAS TOLD BY HER PCP TO COME BACK TO ED. PT RESTING ON GURNEY. FINDING IT 
DIFFICULT TO CONCENTRATE. NOT ANSWERING QUESTIONS STATING "I'D PREFER IT TO BE 
PRIVATE".

## 2020-06-05 NOTE — NUR
PT REPORTS "MY RIGHT HAND HURTS AND ITS NUMB, I WOKE UP WITH IT LIKE THIS, AND 
MY CHEST POPPED, I THINK I POPPED A MUSCLE, AND SANAM BEEN CONGESTED" PT DENIES 
CURRENT CP OR RADIATION OF PAIN. PT CONNECTED TO MONITORING, CALL LIGHT WITHIN 
REACH, ALL SAFETY MEASURES IN PLACE.

## 2020-06-06 VITALS
OXYGEN SATURATION: 99 % | SYSTOLIC BLOOD PRESSURE: 125 MMHG | TEMPERATURE: 98 F | RESPIRATION RATE: 17 BRPM | WEIGHT: 282.63 LBS | HEIGHT: 67 IN | DIASTOLIC BLOOD PRESSURE: 82 MMHG | BODY MASS INDEX: 44.36 KG/M2 | HEART RATE: 68 BPM

## 2020-06-06 LAB
AMPHET UR QL SCN: NEGATIVE
BARBITURATES UR QL SCN: NEGATIVE
BENZODIAZ UR QL SCN: NEGATIVE
BZE UR QL SCN: NEGATIVE
CANNABINOIDS UR QL SCN: NEGATIVE
METHADONE UR QL SCN: NEGATIVE
OPIATES UR QL SCN: NEGATIVE
OXYCODONE UR QL SCN: NEGATIVE
PCP UR QL SCN: NEGATIVE
PROPOXYPH UR QL SCN: NEGATIVE

## 2020-06-06 PROCEDURE — 80307 DRUG TEST PRSMV CHEM ANLYZR: CPT

## 2020-06-06 PROCEDURE — 99284 EMERGENCY DEPT VISIT MOD MDM: CPT | Mod: GC | Performed by: PSYCHIATRY & NEUROLOGY

## 2020-06-06 RX ORDER — ARIPIPRAZOLE 10 MG/1
5 TABLET ORAL DAILY
Status: DISCONTINUED | OUTPATIENT
Start: 2020-06-06 | End: 2020-06-06 | Stop reason: HOSPADM

## 2020-06-06 ASSESSMENT — ENCOUNTER SYMPTOMS
CARDIOVASCULAR NEGATIVE: 1
EYES NEGATIVE: 1
GASTROINTESTINAL NEGATIVE: 1
NEUROLOGICAL NEGATIVE: 1
CONSTITUTIONAL NEGATIVE: 1
RESPIRATORY NEGATIVE: 1
MUSCULOSKELETAL NEGATIVE: 1

## 2020-06-06 ASSESSMENT — FIBROSIS 4 INDEX: FIB4 SCORE: 0.27

## 2020-06-06 NOTE — ED NOTES
Pt provided water upon request, denies further needs at this time. Sitter outside pt's doorway for observation. Pt informs she still cannot urinate at this time. Will continue to monitor.

## 2020-06-06 NOTE — ED NOTES
"Assist RN: pt politely refused medication stating, \"I already get a shot once a month and I'm a cancer survivor, I don't know if my doctor would be ok with that medication.\"  "

## 2020-06-06 NOTE — ED NOTES
"Pt reports she is unable to urinate \"I may need a diuretic.\"  She also endorses having some chest pain since yesterday.  EKG done per protocol.      Pt endorses h/o leukemia.    "

## 2020-06-06 NOTE — ED PROVIDER NOTES
"ED Provider Note    Scribed for Dr. Jean-Paul Carr M.D. by Rosa Trinh. 6/5/2020  9:57 PM    Primary care provider: Pcp Pt States None  Means of arrival: Walk-in  History obtained from: Patient  History limited by: None    CHIEF COMPLAINT  Chief Complaint   Patient presents with   • Suicidal Ideation     Pt arrives with flat and distant affect.  She says \"Thisis the anniversary of my dad leaving and my family made me come here.\"  She does endorse SI, unable to state if she has a plan.  She is paranoid and states \"they may be after me in the position I am in.\"  She continues to utter, \"in the position I am in.\"       HPI  Dunia Sahni is a 35 y.o. female who presents to the Emergency Department for evaluation of suicidal ideation onset today. Patient states tomorrow is the anniversary of her father leaving. She is unable to state if she has a plan. At bedside, she reports decreased urinary output. Patient states, \"They gave me a swelling pill to retain water at [facility].\" She denies any fevers, cough, or abdominal pain.     Patient reports history of leukemia and states she is in remission.    REVIEW OF SYSTEMS  Pertinent positives include SI, decreased urinary output. Pertinent negatives include no SI with plan, fevers, cough, or abdominal pain. As above, all other systems reviewed and are negative.   See HPI for further details.     PAST MEDICAL HISTORY   has a past medical history of Abscess (10/12/2017), Bipolar affective ([facility]), Depression, Kidney infection, Leukemia ([facility]) (12/2019), and Suicide attempt ([facility]).    SURGICAL HISTORY   has a past surgical history that includes appendectomy; ercp in or (N/A, 7/5/2018); and skip by laparoscopy (N/A, 7/6/2018).    SOCIAL HISTORY  Social History     Tobacco Use   • Smoking status: Never Smoker   • Smokeless tobacco: Never Used   Substance Use Topics   • Alcohol use: Yes     Comment: rarely   • Drug use: Not Currently     Types: Inhaled     Comment: " "marijuana, cocaine      Social History     Substance and Sexual Activity   Drug Use Not Currently   • Types: Inhaled    Comment: marijuana, cocaine       FAMILY HISTORY  History reviewed. No pertinent family history.    CURRENT MEDICATIONS  Home Medications     Reviewed by Terese Lizarraga R.N. (Registered Nurse) on 06/05/20 at 2136  Med List Status: Complete   Medication Last Dose Status   ARIPiprazole ER (ABILIFY MAINTENA) 300 MG Prefilled Syringe 5/20/2020 Active                ALLERGIES  Allergies   Allergen Reactions   • Vicodin [Hydrocodone-Acetaminophen] Anaphylaxis     RXN=9 years ago   • Pcn [Penicillins] Nausea     RXN=8 years ago  Toleartes rocephin   • Bee Venom    • Blackberry [Rubus Fruticosus] Rash     \"rash\"   • Haldol [Haloperidol] Rash     Pt states, \"I have a rash and my lips were swollen.\"   • Raspberry        PHYSICAL EXAM  VITAL SIGNS: /93   Pulse 86   Temp 36.7 °C (98 °F) (Oral)   Resp 18   Wt (!) 128.2 kg (282 lb 10.1 oz)   SpO2 95%   BMI 44.27 kg/m²     Constitutional: Well developed, Well nourished, Non-toxic appearance.   HENT: Normocephalic, Atraumatic, Bilateral external ears normal   Thorax & Lungs: Non labored breathing   Skin: Warm, Dry  Extremities:, No edema   Musculoskeletal: No major deformities noted.   Neurologic: Awake, alert. Moves all extremities spontaneously.  Psychiatric: Angry affect. Suicidal ideation.       LABS  Results for orders placed or performed during the hospital encounter of 06/05/20   POC BREATHALIZER   Result Value Ref Range    POC Breathalizer 0.00 0.00 - 0.01 Percent   POC Breathalizer   Result Value Ref Range    POC Breathalizer 0.00 0.00 - 0.01 Percent     All labs reviewed by me.    EKG  Results for orders placed or performed during the hospital encounter of 05/28/20   EKG (NOW)   Result Value Ref Range    Report       [facility] Emergency Dept.    Test Date:  2020-05-28  Pt Name:    EVIE LOERAISAIAS           " Department: ER  MRN:        [id number]                      Room:       MT 73  Gender:     Female                       Technician: [id number]  :        1985                   Requested By:DIVINA AGUIRRE  Order #:    [id number]                    Reading MD: Divina Aguirre    Measurements  Intervals                                Axis  Rate:       68                           P:          39  MT:         160                          QRS:        32  QRSD:       98                           T:          13  QT:         404  QTc:        430    Interpretive Statements  SINUS RHYTHM  No ST elevation or depression. Intervals are within noraml limits. Axis is  normal. No arrhythmias noted.  Compared to ECG 2020 00:37:45  No significant changes  Electronically Signed On 2020 17:21:26 PDT by Divina Aguirre         COURSE & MEDICAL DECISION MAKING  Pertinent Labs & Imaging studies reviewed. (See chart for details)    9:57 PM - Patient seen and examined at bedside. Ordered urine drug screen, POC breathalizer, EKG, and old EKG to evaluate her symptoms.     PPE Note: I personally donned full PPE for all patient encounters during this visit, including being clean-shaven with a mask and goggles.     Scribe remained outside the patient's room and did not have any contact with the patient for the duration of patient encounter.     Decision Making:  Somewhat difficult to evaluate she is angry at times and evasive.  However she is reporting suicidal ideation she appears possibly psychotic.  And paranoid.  [place] that she should be evaluated further by psychiatry so we will put her on a legal hold for now.  Care will be assumed by 1 of my partners  FINAL IMPRESSION  1. Suicidal ideation          Rosa GARCIA), am scribing for, and in the presence of, Jean-Paul Carr M.D..    Electronically signed by: Rosa Olivares), 2020    Jean-Paul GARCIA M.D. personally performed the services described in this  documentation, as scribed by Rosa Trinh in my presence, and it is both accurate and complete. C.     The note accurately reflects work and decisions made by me.  Jean-Paul Carr M.D.  6/6/2020  12:13 AM

## 2020-06-06 NOTE — ED TRIAGE NOTES
"Chief Complaint   Patient presents with   • Suicidal Ideation     Pt arrives with flat and distant affect.  She says \"Thisis the anniversary of my dad leaving and my family made me come here.\"  She does endorse SI, unable to state if she has a plan.  She is paranoid and states \"they may be after me in the position I am in.\"  She continues to utter, \"in the position I am in.\"     Pt reports she takes 300 mg IM abilify injection monthly.  Self reports last dose was 5/20/20.  She is flat affect and her conversation does not always make sense.      [place] Scale is high risk for SI.  Reported to CN, room assignment received.  "

## 2020-06-06 NOTE — ED NOTES
TESS at pt bedside to transport patient to [facility]. Pt aware of admission to [facility], agreeable. Pt denies questions/concerns. Pt walked out EMS bay without difficulty. Pt calm/cooperative.

## 2020-06-06 NOTE — ED NOTES
Received report from CHRISTIE Mcpherson. Assumed care of patient. Patient awake, sitting up in bed. RR even and unlabored. NAD. No needs at this time.

## 2020-06-06 NOTE — DISCHARGE PLANNING
Medical Social Work    Referral: Legal Hold    Intervention: Legal Hold Paperwork given to SW by Life Skills RN Raj    Legal Hold Initiated: Date: 6/5/2020 Time: 2245    Patient’s Insurance Listed on Face Sheet: Medicare and Medicaid FFS    Referrals sent to: [facility], [facility], [facility], and [facility].     This referral contains the following information:  1) Face sheet __x__  2) Page 1 and Page 2 of Legal Hold __x__  3) Alert Team Assessment/Psych Assessment __x__  4) Head to toe physical exam __x__  5) Urine Drug Screen __x__  6) Blood Alcohol __x__  7) Vital signs __x__  8) Pregnancy test when applicable _x__  9) Medications list __x__    Plan: Patient will transfer to mental health facility once acceptance is obtained

## 2020-06-06 NOTE — ED NOTES
Pt ambulated to restroom and back. Pt brushed teeth and hair independently. Pt calm and cooperative. Still reports SI. Pt A&OX4. NAD. No needs at this time.

## 2020-06-06 NOTE — ED NOTES
Patient resting quietly in bed without distress, denies any complaints or needs at this time.  Sitter present outside patient room

## 2020-06-06 NOTE — DISCHARGE PLANNING
Medical Social Work    Referral: Legal hold Transfer to Mental Health Facility    Intervention: JADEN received call from Sheree at [facility] stating that they have accepted the patient for admission.     Pt's accepting physcian is Dr. Oliva STANLEY arranged for transportation to be set up through [facility]    The pt will be picked up at 1400.     JADEN notified the RN of the departure time as well as accepting facility.     JADEN created transfer packet and placed on chart.     Plan: Pt will transfer back to [facility] at 1400  .

## 2020-06-06 NOTE — ED NOTES
Pt ambulatory to bathroom with steady gait to obtain urine sample. Pt is calm and cooperative at this time. Pt requests crackers for hunger. Sitter outside pt's doorway for patient monitoring.

## 2020-06-06 NOTE — ED NOTES
Pt appears agitated with flat affect at this time. Pt denies needs. Will continue to monitor. Pt outside sitter door.

## 2020-06-06 NOTE — ED NOTES
Med Rec Updated and Complete per Pt at bedside  Allergies Reviewed (Pt states she is not allergic to anything on her allergy list)  No PO ABX last 14 days.    Pt denies OTC medication at this time.       Jennifer
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
## 2020-06-06 NOTE — ED NOTES
1:1 sitter at pt doorway. Pt sleeping in bed, RR even and unlabored. NAD. Will continue to monitor.
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
## 2020-06-06 NOTE — ED NOTES
Patient resting quietly in bed with eyes closed without distress, no apparent needs at this time.  Good chest rise and fall.  Sitter outside of room watching patient

## 2020-06-12 ENCOUNTER — HOSPITAL ENCOUNTER (EMERGENCY)
Facility: MEDICAL CENTER | Age: 35
End: 2020-06-12
Attending: EMERGENCY MEDICINE
Payer: OTHER GOVERNMENT

## 2020-06-12 ENCOUNTER — APPOINTMENT (OUTPATIENT)
Dept: RADIOLOGY | Facility: MEDICAL CENTER | Age: 35
End: 2020-06-12
Attending: EMERGENCY MEDICINE
Payer: OTHER GOVERNMENT

## 2020-06-12 VITALS
WEIGHT: 282 LBS | DIASTOLIC BLOOD PRESSURE: 75 MMHG | RESPIRATION RATE: 16 BRPM | SYSTOLIC BLOOD PRESSURE: 115 MMHG | BODY MASS INDEX: 44.26 KG/M2 | HEART RATE: 70 BPM | TEMPERATURE: 98.2 F | HEIGHT: 67 IN | OXYGEN SATURATION: 98 %

## 2020-06-12 DIAGNOSIS — R45.851 SUICIDAL IDEATION: ICD-10-CM

## 2020-06-12 DIAGNOSIS — R07.9 CHEST PAIN, UNSPECIFIED TYPE: ICD-10-CM

## 2020-06-12 LAB
AMPHET UR QL SCN: NEGATIVE
ANION GAP SERPL CALC-SCNC: 10 MMOL/L (ref 7–16)
APPEARANCE UR: CLEAR
BARBITURATES UR QL SCN: NEGATIVE
BASOPHILS # BLD AUTO: 0.4 % (ref 0–1.8)
BASOPHILS # BLD: 0.06 K/UL (ref 0–0.12)
BENZODIAZ UR QL SCN: NEGATIVE
BILIRUB UR QL STRIP.AUTO: NEGATIVE
BUN SERPL-MCNC: 17 MG/DL (ref 8–22)
BZE UR QL SCN: NEGATIVE
CALCIUM SERPL-MCNC: 9.1 MG/DL (ref 8.5–10.5)
CANNABINOIDS UR QL SCN: NEGATIVE
CHLORIDE SERPL-SCNC: 103 MMOL/L (ref 96–112)
CO2 SERPL-SCNC: 23 MMOL/L (ref 20–33)
COLOR UR: YELLOW
CREAT SERPL-MCNC: 0.74 MG/DL (ref 0.5–1.4)
EKG IMPRESSION: NORMAL
EOSINOPHIL # BLD AUTO: 0.08 K/UL (ref 0–0.51)
EOSINOPHIL NFR BLD: 0.5 % (ref 0–6.9)
ERYTHROCYTE [DISTWIDTH] IN BLOOD BY AUTOMATED COUNT: 43.8 FL (ref 35.9–50)
GLUCOSE SERPL-MCNC: 109 MG/DL (ref 65–99)
GLUCOSE UR STRIP.AUTO-MCNC: NEGATIVE MG/DL
HCG SERPL QL: NEGATIVE
HCT VFR BLD AUTO: 37.9 % (ref 37–47)
HGB BLD-MCNC: 12.2 G/DL (ref 12–16)
IMM GRANULOCYTES # BLD AUTO: 0.09 K/UL (ref 0–0.11)
IMM GRANULOCYTES NFR BLD AUTO: 0.6 % (ref 0–0.9)
KETONES UR STRIP.AUTO-MCNC: NEGATIVE MG/DL
LEUKOCYTE ESTERASE UR QL STRIP.AUTO: NEGATIVE
LYMPHOCYTES # BLD AUTO: 2.31 K/UL (ref 1–4.8)
LYMPHOCYTES NFR BLD: 14.7 % (ref 22–41)
MCH RBC QN AUTO: 28.7 PG (ref 27–33)
MCHC RBC AUTO-ENTMCNC: 32.2 G/DL (ref 33.6–35)
MCV RBC AUTO: 89.2 FL (ref 81.4–97.8)
METHADONE UR QL SCN: NEGATIVE
MICRO URNS: NORMAL
MONOCYTES # BLD AUTO: 1.03 K/UL (ref 0–0.85)
MONOCYTES NFR BLD AUTO: 6.5 % (ref 0–13.4)
NEUTROPHILS # BLD AUTO: 12.18 K/UL (ref 2–7.15)
NEUTROPHILS NFR BLD: 77.3 % (ref 44–72)
NITRITE UR QL STRIP.AUTO: NEGATIVE
NRBC # BLD AUTO: 0.02 K/UL
NRBC BLD-RTO: 0.1 /100 WBC
OPIATES UR QL SCN: NEGATIVE
OXYCODONE UR QL SCN: NEGATIVE
PCP UR QL SCN: NEGATIVE
PH UR STRIP.AUTO: 6 [PH] (ref 5–8)
PLATELET # BLD AUTO: 355 K/UL (ref 164–446)
PMV BLD AUTO: 9.2 FL (ref 9–12.9)
POC BREATHALIZER: 0 PERCENT (ref 0–0.01)
POTASSIUM SERPL-SCNC: 4.1 MMOL/L (ref 3.6–5.5)
PROPOXYPH UR QL SCN: NEGATIVE
PROT UR QL STRIP: NEGATIVE MG/DL
RBC # BLD AUTO: 4.25 M/UL (ref 4.2–5.4)
RBC UR QL AUTO: NEGATIVE
SODIUM SERPL-SCNC: 136 MMOL/L (ref 135–145)
SP GR UR STRIP.AUTO: 1.03
TROPONIN T SERPL-MCNC: <6 NG/L (ref 6–19)
UROBILINOGEN UR STRIP.AUTO-MCNC: 0.2 MG/DL
WBC # BLD AUTO: 15.8 K/UL (ref 4.8–10.8)

## 2020-06-12 PROCEDURE — 99285 EMERGENCY DEPT VISIT HI MDM: CPT

## 2020-06-12 PROCEDURE — 80307 DRUG TEST PRSMV CHEM ANLYZR: CPT

## 2020-06-12 PROCEDURE — 90791 PSYCH DIAGNOSTIC EVALUATION: CPT

## 2020-06-12 PROCEDURE — 85025 COMPLETE CBC W/AUTO DIFF WBC: CPT

## 2020-06-12 PROCEDURE — A9270 NON-COVERED ITEM OR SERVICE: HCPCS | Performed by: EMERGENCY MEDICINE

## 2020-06-12 PROCEDURE — 99284 EMERGENCY DEPT VISIT MOD MDM: CPT | Mod: GC | Performed by: PSYCHIATRY & NEUROLOGY

## 2020-06-12 PROCEDURE — 302970 POC BREATHALIZER: Performed by: EMERGENCY MEDICINE

## 2020-06-12 PROCEDURE — 93005 ELECTROCARDIOGRAM TRACING: CPT | Performed by: EMERGENCY MEDICINE

## 2020-06-12 PROCEDURE — 71045 X-RAY EXAM CHEST 1 VIEW: CPT

## 2020-06-12 PROCEDURE — A9270 NON-COVERED ITEM OR SERVICE: HCPCS | Performed by: STUDENT IN AN ORGANIZED HEALTH CARE EDUCATION/TRAINING PROGRAM

## 2020-06-12 PROCEDURE — 700102 HCHG RX REV CODE 250 W/ 637 OVERRIDE(OP): Performed by: STUDENT IN AN ORGANIZED HEALTH CARE EDUCATION/TRAINING PROGRAM

## 2020-06-12 PROCEDURE — 700102 HCHG RX REV CODE 250 W/ 637 OVERRIDE(OP): Performed by: EMERGENCY MEDICINE

## 2020-06-12 PROCEDURE — 80048 BASIC METABOLIC PNL TOTAL CA: CPT

## 2020-06-12 PROCEDURE — 84484 ASSAY OF TROPONIN QUANT: CPT

## 2020-06-12 PROCEDURE — 81003 URINALYSIS AUTO W/O SCOPE: CPT | Mod: XU

## 2020-06-12 PROCEDURE — 84703 CHORIONIC GONADOTROPIN ASSAY: CPT

## 2020-06-12 PROCEDURE — 36415 COLL VENOUS BLD VENIPUNCTURE: CPT

## 2020-06-12 RX ORDER — IBUPROFEN 600 MG/1
600 TABLET ORAL ONCE
Status: COMPLETED | OUTPATIENT
Start: 2020-06-12 | End: 2020-06-12

## 2020-06-12 RX ORDER — PERPHENAZINE 2 MG/1
2 TABLET ORAL 2 TIMES DAILY
Status: DISCONTINUED | OUTPATIENT
Start: 2020-06-12 | End: 2020-06-12 | Stop reason: HOSPADM

## 2020-06-12 RX ADMIN — PERPHENAZINE 2 MG: 2 TABLET, FILM COATED ORAL at 11:12

## 2020-06-12 RX ADMIN — IBUPROFEN 600 MG: 600 TABLET ORAL at 02:38

## 2020-06-12 ASSESSMENT — ENCOUNTER SYMPTOMS
ABDOMINAL PAIN: 0
SEIZURES: 0
HEADACHES: 0
FEVER: 0
CHILLS: 0
VOMITING: 0
FOCAL WEAKNESS: 0
BLURRED VISION: 0
NECK PAIN: 0
BACK PAIN: 0
DEPRESSION: 1
EYE REDNESS: 0
SORE THROAT: 0
SHORTNESS OF BREATH: 0
COUGH: 0

## 2020-06-12 ASSESSMENT — FIBROSIS 4 INDEX: FIB4 SCORE: 0.27

## 2020-06-12 NOTE — ED PROVIDER NOTES
ED Provider Note  CHIEF COMPLAINT  Chief Complaint   Patient presents with   • Suicidal Ideation       HPI  Dunia Sahni is a 35 y.o. female with history of schizoaffective disorder who presents with suicidal ideation as well as complaints of chest pain.  The patient states that over the last week she has felt a lump on the right side of her anterior chest.  She describes dull pain that is that is painful to the touch.  She denies any significant shortness of breath, nausea or vomiting.  No fevers or cough.  No lower extremity edema.  Patient states she has a history of leukemia that is in her in remission and she is worried that it may be a lymph node.  The patient also is endorsing thoughts of suicidal ideation.  She states that she does not feel safe where she currently is living and this is causing her stress and the desire to kill herself.  She does not have a particular plan.  The patient denies any drugs or alcohol.  She also denies chance of pregnancy.    REVIEW OF SYSTEMS  See HPI for further details.   Review of Systems   Constitutional: Negative for chills and fever.   HENT: Negative for sore throat.    Eyes: Negative for blurred vision and redness.   Respiratory: Negative for cough and shortness of breath.    Cardiovascular: Positive for chest pain. Negative for leg swelling.   Gastrointestinal: Negative for abdominal pain and vomiting.   Genitourinary: Negative for dysuria and urgency.   Musculoskeletal: Negative for back pain and neck pain.   Skin: Negative for rash.   Neurological: Negative for focal weakness, seizures and headaches.   Psychiatric/Behavioral: Positive for depression and suicidal ideas.         PAST MEDICAL HISTORY   has a past medical history of Abscess (10/12/2017), Bipolar affective (HCC), Depression, Kidney infection, Leukemia ([facility]) (12/2019), and Suicide attempt ([facility]).    SOCIAL HISTORY  Social History     Tobacco Use   • Smoking status: Never Smoker   • Smokeless  "tobacco: Never Used   Substance and Sexual Activity   • Alcohol use: Yes     Comment: rarely   • Drug use: Not Currently     Types: Inhaled     Comment: marijuana, cocaine   • Sexual activity: Not on file       SURGICAL HISTORY   has a past surgical history that includes appendectomy; ercp in or (N/A, 7/5/2018); and skip by laparoscopy (N/A, 7/6/2018).    CURRENT MEDICATIONS  Home Medications     Reviewed by Maine Lane R.N. (Registered Nurse) on 06/12/20 at 0114  Med List Status: Complete   Medication Last Dose Status   ARIPiprazole ER (ABILIFY MAINTENA) 300 MG Prefilled Syringe  Active                ALLERGIES  Allergies   Allergen Reactions   • Vicodin [Hydrocodone-Acetaminophen] Anaphylaxis     RXN=9 years ago   • Pcn [Penicillins] Nausea     RXN=8 years ago  Toleartes rocephin   • Bee Venom    • Blackberry [Rubus Fruticosus] Rash     \"rash\"   • Haldol [Haloperidol] Rash     Pt states, \"I have a rash and my lips were swollen.\"   • Raspberry        PHYSICAL EXAM   VITAL SIGNS: /70   Pulse 74   Temp 36.7 °C (98.1 °F) (Temporal)   Resp 18   Ht 1.702 m (5' 7\")   Wt (!) 127.9 kg (282 lb)   SpO2 96%   BMI 44.17 kg/m²      Physical Exam   Constitutional: She is oriented to person, place, and time and well-developed, well-nourished, and in no distress. No distress.   Nontoxic-appearing young female   HENT:   Head: Normocephalic and atraumatic.   Eyes: Pupils are equal, round, and reactive to light. Conjunctivae are normal.   Neck: Normal range of motion. Neck supple.   Cardiovascular: Normal rate, regular rhythm and normal heart sounds.   Pulmonary/Chest: Effort normal and breath sounds normal. No respiratory distress. She exhibits tenderness.   Chest wall tenderness noted over the right anterior chest.  No obvious palpable mass noted.   Abdominal: Soft. She exhibits no distension. There is no abdominal tenderness.   Musculoskeletal: Normal range of motion.         General: No tenderness or edema. "   Neurological: She is alert and oriented to person, place, and time.   Moving all extremities spontaneously   Skin: Skin is warm and dry.   Psychiatric:   Flat affect.  Depressed mood.  Endorses suicidal ideation, denies homicidal ideation.  Not responding to external stimuli.         DIAGNOSTIC STUDIES    EKG  Results for orders placed or performed during the hospital encounter of 20   EKG (Now)   Result Value Ref Range    Report       [facility] Emergency Dept.    Test Date:  2020  Pt Name:    EVIE ENGEL           Department: ER  MRN:        [id number]                      Room:       [facility]  Gender:     Female                       Technician: [id number]  :        1985                   Requested By:CHARLEE IRIZARRY  Order #:    [id number]                    Reading MD: Charlee Irizarry MD    Measurements  Intervals                                Axis  Rate:       84                           P:          32  MO:         176                          QRS:        13  QRSD:       96                           T:          3  QT:         380  QTc:        450    Interpretive Statements  SINUS RHYTHM  Normal axis and intervals  RSR' IN V1 OR V2, RIGHT VCD OR RVH  No ectopy or hypertrophy  No ST or T wave change  Compared to ECG 2020 17:07:36  No significant change from prior  Electronically Signed On 2020 2:57:15 PDT by Charlee Irizarry MD             LABS  Personally reviewed by me  Labs Reviewed   CBC WITH DIFFERENTIAL - Abnormal; Notable for the following components:       Result Value    WBC 15.8 (*)     MCHC 32.2 (*)     Neutrophils-Polys 77.30 (*)     Lymphocytes 14.70 (*)     Neutrophils (Absolute) 12.18 (*)     Monos (Absolute) 1.03 (*)     All other components within normal limits   BASIC METABOLIC PANEL - Abnormal; Notable for the following components:    Glucose 109 (*)     All other components within normal limits   POC BREATHALIZER - Normal   URINE DRUG SCREEN  "  TROPONIN   HCG QUAL SERUM   URINALYSIS,CULTURE IF INDICATED   ESTIMATED GFR           RADIOLOGY  Personally reviewed by me  DX-CHEST-PORTABLE (1 VIEW)   Final Result         1.  No acute cardiopulmonary disease.          ED COURSE  Vitals:    06/12/20 0111 06/12/20 0112 06/12/20 0545   BP:  133/73 120/70   Pulse:  98 74   Resp:  18 18   Temp:  36.7 °C (98.1 °F)    TempSrc:  Temporal    SpO2:  96% 96%   Weight: (!) 127.9 kg (282 lb)     Height: 1.702 m (5' 7\")           Medications administered:  Medications   ibuprofen (MOTRIN) tablet 600 mg (600 mg Oral Given 6/12/20 0238)         Old records personally reviewed:  Patient is frequently seen here with reports of suicidal ideation.  The last time was earlier this month when she was placed on a legal hold for psychosis.      MEDICAL DECISION MAKING  Patient with history of schizoaffective disorder who is seen in the ER frequently for suicidal ideation who presents with chest pain and suicidal ideation.  She has normal vital signs on arrival and is nontoxic-appearing.  EKG does not show signs of ischemia or arrhythmia.  Labs are reassuring including a negative troponin.  Clinically doubt aortic dissection or pulmonary embolism based on history history and exam.  Chest x-ray does not show signs of pneumonia, pneumothorax, pulmonary edema.  She does have reproducible tenderness on the chest wall which I feel may be musculoskeletal in etiology.  The patient does have leukocytosis which is unchanged from prior.  She has no infectious symptoms although her urinalysis is still pending at this time.    Patient was evaluated by [facility] behavioral health specialist.  She has a history of reporting suicidal ideation for secondary gain however she does have underlying psychiatric illness and could be at risk of actually harming herself therefore she will be placed on a legal hold at this time.    Patient was signed out to my oncoming partner pending transfer to inpatient " psychiatric care for psychiatric evaluation.        IMPRESSION  (R45.851) Suicidal ideation  (R07.9) Chest pain, unspecified type    Disposition: pending transfer to psychiatric care  Results, diagnoses, and treatment options were discussed with the patient and/or family. Patient verbalized understanding of plan of care.    Patient referred to primary care provider for monitoring and treatment of blood pressure.      New Prescriptions    No medications on file           Electronically signed by: Charlee Syed M.D., 6/12/2020 2:53 AM

## 2020-06-12 NOTE — ED NOTES
Pt transferred to [facility] via [facility]. All belongings given to [facility] staff from locker 3. Pt ambulatory to ambulance bay with steady gait noted.

## 2020-06-12 NOTE — ED NOTES
VS done. Patient now willing to provide urine sample. Patient up to bathroom with steady gait. UA sent to lab

## 2020-06-12 NOTE — ED NOTES
Patient's home medications have been reviewed by the pharmacy team.     Past Medical History:   Diagnosis Date   • Abscess 10/12/2017    right AC arm area   • Bipolar affective (HCC)    • Depression    • Kidney infection    • Leukemia (HCC) 12/2019   • Suicide attempt (HCC)        Patient's Medications   New Prescriptions    No medications on file   Previous Medications    ARIPIPRAZOLE ER (ABILIFY MAINTENA) 300 MG PREFILLED SYRINGE    300 mg by Intramuscular route Q30 DAYS. Indications: MIXED BIPOLAR AFFECTIVE DISORDER   Modified Medications    No medications on file   Discontinued Medications    No medications on file          A:  Medications do not appear to be contributing to current complaints. On Abilify Maintena is a once monthly injection.      P:    No recommendations at this time. Home medications have been reordered as appropriate.        Dayan Contreras, PharmD

## 2020-06-12 NOTE — ED NOTES
"Pt medicated per MAR, while giving pt medication pt states \"I will take it against my will.\"     Pt took medication without difficulty.   "

## 2020-06-12 NOTE — ED NOTES
Pt resting in stretcher. No needs at this time. Sitter 1:1. Pt to be transferred to [facility] at approx. 1130

## 2020-06-12 NOTE — PSYCHIATRY
Brief psych note: Patient seen, full note to follow      Patient continues to be psychotic, responding to internal stimuli, paranoid, she believes people are after her and have a gun outside her window.  Patient endorsing suicidal ideations without a clear plan.  Patient is very guarded and is a poor historian.  Patient keeps perseverating about going to see a  and needing a .      Psych:  1.)  Schizoaffective disorder, bipolar type (by hx)     Medical:  No acute issues        PLAN:  1- Legal status: Extended  2- Meds:     Start perphenazine 2 mg p.o. twice daily for clear thinking.     Per patient, she received Abilify maintain a 300 mg IM on 6/11/2020.  Administered at [facility]. (Not verified)      Per chart, patient actually received Abilify maintain a 5/19 and due next 6/19.  3- PRNs:     Hydroxyzine 50 mg p.o. every 6 hours as needed for anxiety  4- Gather lateral information  5-patient accepted to [facility] inpatient psych will transfer today  6- Will follow  Other:  Sitter: In place  Phone: No  Belongings: No  Family: No     Thank you for the consult.

## 2020-06-12 NOTE — DISCHARGE PLANNING
Medical Social Work    Referral: Legal Hold    Intervention: Legal Hold Paperwork given to SW by [employer] [facility]ON[facility] Anthony    Legal Hold Initiated: Date: 6/12/2020 Time: 0227    Patient’s Insurance Listed on Face Sheet: Medicare/FFS    Referrals sent to: ,[facility], [facility], [place], [facility]    This referral contains the following information:  1) Face sheet _x___  2) Page 1 and Page 2 of Legal Hold _x___  3) Alert Team Assessment/Psych Assessment ___x_  4) Head to toe physical exam _x___  5) Urine Drug Screen _x___  6) Blood Alcohol ___x_  7) Vital signs __x__  8) Pregnancy test when applicable _x__  9) Medications list __x__    Plan: Patient will transfer to mental health facility once acceptance is obtained

## 2020-06-12 NOTE — ED NOTES
Med rec completed historically from med rec done on 6/6/2020    Med rec from 6/6/2020  Med Rec Updated and Complete per Pt at bedside  Allergies Reviewed (Pt states she is not allergic to anything on her allergy list)  No PO ABX last 14 days.     Pt denies OTC medication at this time.

## 2020-06-12 NOTE — DISCHARGE PLANNING
Alert Team     Legal hold completed by ERP; copies made and delivered to Social Work for follow-up; patient currently awaiting transfer to an inpatient psychiatric facility; Alert Team to continue to monitor, nothing further to note at this time.
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
## 2020-06-12 NOTE — DISCHARGE PLANNING
Medical Social Work    Referral: Legal hold Transfer to Mental Health Facility    Intervention: JADEN received call from Nikki at [facility] stating that they have accepted the patient for admission.     Pt's accepting physcian is Dr. Oliva STANLEY arranged for transportation to be set up through [facility]    The pt will be picked up at 1130.     JADEN notified the RN of the departure time as well as accepting facility.     JADEN created transfer packet and placed on chart.     Plan: Pt will transfer back to [facility] at 1130    MSW spoke to Nikki over at [facility]. MSW asked if 2 negative COVID tests need to be completed before transfer. Nikki stated as long as the pt is not having symptoms and they do not feel a need for testing pt does not need it for transfer.

## 2020-06-12 NOTE — ED PROVIDER NOTES
ED Provider Note    5:58 AM-patient signed out to me for observation while on legal hold for possible suicidal ideation.  Briefly she is a 35-year-old female with schizoaffective disorder, bipolar disorder, frequent visits to the emergency department who presents today with chest pain and suicidal ideation.  She was recently placed on legal hold on 6/5 and transferred to a mental health facility.  Prior to that she was seen 5/30 for quasi suicidal ideation, predominantly a chief complaint of homelessness.  She was discharged at that point.    Work-up thus far is unremarkable with exception of stable mild leukocytosis, presumably due to reported diagnosis of CLL.
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
## 2020-06-12 NOTE — ED NOTES
Patient declines being able to provide urine sample at this time. Educated patient to inform RN when she can provide sample

## 2020-07-06 ENCOUNTER — HOSPITAL ENCOUNTER (EMERGENCY)
Dept: HOSPITAL 8 - ED | Age: 35
Discharge: HOME | End: 2020-07-06
Payer: MEDICARE

## 2020-07-06 VITALS — DIASTOLIC BLOOD PRESSURE: 65 MMHG | SYSTOLIC BLOOD PRESSURE: 118 MMHG

## 2020-07-06 VITALS — WEIGHT: 289.91 LBS | BODY MASS INDEX: 43.94 KG/M2 | HEIGHT: 68 IN

## 2020-07-06 DIAGNOSIS — Z13.1: ICD-10-CM

## 2020-07-06 DIAGNOSIS — Z87.11: ICD-10-CM

## 2020-07-06 DIAGNOSIS — R11.0: Primary | ICD-10-CM

## 2020-07-06 LAB — HCG UR SG: 1.02 (ref 1–1.03)

## 2020-07-06 PROCEDURE — 82962 GLUCOSE BLOOD TEST: CPT

## 2020-07-06 PROCEDURE — 99283 EMERGENCY DEPT VISIT LOW MDM: CPT

## 2020-07-06 PROCEDURE — 81025 URINE PREGNANCY TEST: CPT

## 2020-07-06 NOTE — NUR
pt presents to ED with c/o nausea, seeking pregnancy test. pt has not taken 
home pregnancy test. pt is a&o, resps even and unlabored. urine sample sent to 
lab, fsbs 96. bp and spo2 monitors. call light in reach. warm blanket provided. 
awaiting urine results and dispo.

## 2020-07-08 ENCOUNTER — HOSPITAL ENCOUNTER (EMERGENCY)
Dept: HOSPITAL 8 - ED | Age: 35
Discharge: HOME | End: 2020-07-08
Payer: MEDICARE

## 2020-07-08 VITALS — DIASTOLIC BLOOD PRESSURE: 62 MMHG | SYSTOLIC BLOOD PRESSURE: 101 MMHG

## 2020-07-08 VITALS — WEIGHT: 287.04 LBS | HEIGHT: 67 IN | BODY MASS INDEX: 45.05 KG/M2

## 2020-07-08 DIAGNOSIS — J45.909: ICD-10-CM

## 2020-07-08 DIAGNOSIS — R10.9: Primary | ICD-10-CM

## 2020-07-08 DIAGNOSIS — Z86.718: ICD-10-CM

## 2020-07-08 DIAGNOSIS — Z72.9: ICD-10-CM

## 2020-07-08 LAB
ALBUMIN SERPL-MCNC: 3.3 G/DL (ref 3.4–5)
ALP SERPL-CCNC: 94 U/L (ref 45–117)
ALT SERPL-CCNC: 26 U/L (ref 12–78)
ANION GAP SERPL CALC-SCNC: 4 MMOL/L (ref 5–15)
BASOPHILS # BLD AUTO: 0.04 X10^3/UL (ref 0–0.1)
BASOPHILS NFR BLD AUTO: 0 % (ref 0–1)
BILIRUB SERPL-MCNC: 0.2 MG/DL (ref 0.2–1)
CALCIUM SERPL-MCNC: 8.5 MG/DL (ref 8.5–10.1)
CHLORIDE SERPL-SCNC: 109 MMOL/L (ref 98–107)
CREAT SERPL-MCNC: 0.82 MG/DL (ref 0.55–1.02)
EOSINOPHIL # BLD AUTO: 0.13 X10^3/UL (ref 0–0.4)
EOSINOPHIL NFR BLD AUTO: 1 % (ref 1–7)
ERYTHROCYTE [DISTWIDTH] IN BLOOD BY AUTOMATED COUNT: 14.1 % (ref 9.6–15.2)
LYMPHOCYTES # BLD AUTO: 3.06 X10^3/UL (ref 1–3.4)
LYMPHOCYTES NFR BLD AUTO: 25 % (ref 22–44)
MCH RBC QN AUTO: 27.9 PG (ref 27–34.8)
MCHC RBC AUTO-ENTMCNC: 32.1 G/DL (ref 32.4–35.8)
MCV RBC AUTO: 87 FL (ref 80–100)
MD: NO
MONOCYTES # BLD AUTO: 0.53 X10^3/UL (ref 0.2–0.8)
MONOCYTES NFR BLD AUTO: 4 % (ref 2–9)
NEUTROPHILS # BLD AUTO: 8.74 X10^3/UL (ref 1.8–6.8)
NEUTROPHILS NFR BLD AUTO: 70 % (ref 42–75)
PLATELET # BLD AUTO: 410 X10^3/UL (ref 130–400)
PMV BLD AUTO: 7.3 FL (ref 7.4–10.4)
PROT SERPL-MCNC: 6.9 G/DL (ref 6.4–8.2)
RBC # BLD AUTO: 4.31 X10^6/UL (ref 3.82–5.3)

## 2020-07-08 PROCEDURE — 80053 COMPREHEN METABOLIC PANEL: CPT

## 2020-07-08 PROCEDURE — 36415 COLL VENOUS BLD VENIPUNCTURE: CPT

## 2020-07-08 PROCEDURE — 83690 ASSAY OF LIPASE: CPT

## 2020-07-08 PROCEDURE — 85025 COMPLETE CBC W/AUTO DIFF WBC: CPT

## 2020-07-08 PROCEDURE — 84703 CHORIONIC GONADOTROPIN ASSAY: CPT

## 2020-07-08 PROCEDURE — 99283 EMERGENCY DEPT VISIT LOW MDM: CPT

## 2020-07-08 NOTE — NUR
PATIENT REFUSED EKG IN TRIAGE. PATIENT STATED "IM NOT EVEN HERE FOR THAT". "I 
DONT TAKE MY CLOTHES OFF". I INFORMED PATIENT THAT IF SHE WAS HAVING A PROBLEM 
WITH HER HEART IT'S VITAL WE GET AN EKG ASAP. PATIENT STILL REFUSED. LYNETTE CARDENAS RN WITNESSED.

## 2020-07-08 NOTE — NUR
C/O LIGHTHEADED X 1 DAY AND CRAMPING. STATES SHE TOOK A PREGNANCY TEST AND IT 
WAS POSITIVE. LMP 3 WEEKS AGO. PROVIDER AT BEDSIDE FOR EVAL.

## 2020-07-19 ENCOUNTER — HOSPITAL ENCOUNTER (EMERGENCY)
Facility: MEDICAL CENTER | Age: 35
End: 2020-07-19
Attending: EMERGENCY MEDICINE | Admitting: EMERGENCY MEDICINE
Payer: MEDICARE

## 2020-07-19 VITALS
HEART RATE: 83 BPM | HEIGHT: 66 IN | OXYGEN SATURATION: 99 % | TEMPERATURE: 97.1 F | RESPIRATION RATE: 18 BRPM | BODY MASS INDEX: 46.02 KG/M2 | WEIGHT: 286.38 LBS | SYSTOLIC BLOOD PRESSURE: 115 MMHG | DIASTOLIC BLOOD PRESSURE: 70 MMHG

## 2020-07-19 DIAGNOSIS — F41.9 ANXIETY: ICD-10-CM

## 2020-07-19 DIAGNOSIS — N91.2 AMENORRHEA: ICD-10-CM

## 2020-07-19 LAB
AMPHET UR QL SCN: NEGATIVE
APPEARANCE UR: ABNORMAL
BACTERIA #/AREA URNS HPF: ABNORMAL /HPF
BARBITURATES UR QL SCN: NEGATIVE
BENZODIAZ UR QL SCN: NEGATIVE
BILIRUB UR QL STRIP.AUTO: NEGATIVE
BZE UR QL SCN: NEGATIVE
CANNABINOIDS UR QL SCN: NEGATIVE
COLOR UR: YELLOW
EPI CELLS #/AREA URNS HPF: ABNORMAL /HPF
GLUCOSE UR STRIP.AUTO-MCNC: NEGATIVE MG/DL
HCG SERPL QL: NEGATIVE
HYALINE CASTS #/AREA URNS LPF: ABNORMAL /LPF
KETONES UR STRIP.AUTO-MCNC: NEGATIVE MG/DL
LEUKOCYTE ESTERASE UR QL STRIP.AUTO: ABNORMAL
METHADONE UR QL SCN: NEGATIVE
MICRO URNS: ABNORMAL
NITRITE UR QL STRIP.AUTO: NEGATIVE
OPIATES UR QL SCN: NEGATIVE
OXYCODONE UR QL SCN: NEGATIVE
PCP UR QL SCN: NEGATIVE
PH UR STRIP.AUTO: 6 [PH] (ref 5–8)
POC BREATHALIZER: 0 PERCENT (ref 0–0.01)
PROPOXYPH UR QL SCN: NEGATIVE
PROT UR QL STRIP: NEGATIVE MG/DL
RBC # URNS HPF: ABNORMAL /HPF
RBC UR QL AUTO: NEGATIVE
SP GR UR STRIP.AUTO: 1.02
UROBILINOGEN UR STRIP.AUTO-MCNC: 0.2 MG/DL
WBC #/AREA URNS HPF: ABNORMAL /HPF

## 2020-07-19 PROCEDURE — 80307 DRUG TEST PRSMV CHEM ANLYZR: CPT

## 2020-07-19 PROCEDURE — 302970 POC BREATHALIZER: Performed by: EMERGENCY MEDICINE

## 2020-07-19 PROCEDURE — 81001 URINALYSIS AUTO W/SCOPE: CPT | Mod: XU

## 2020-07-19 PROCEDURE — 99283 EMERGENCY DEPT VISIT LOW MDM: CPT

## 2020-07-19 PROCEDURE — 84703 CHORIONIC GONADOTROPIN ASSAY: CPT

## 2020-07-19 ASSESSMENT — FIBROSIS 4 INDEX: FIB4 SCORE: 0.3

## 2020-07-20 ENCOUNTER — HOSPITAL ENCOUNTER (EMERGENCY)
Dept: HOSPITAL 8 - ED | Age: 35
Discharge: HOME | End: 2020-07-20
Payer: MEDICARE

## 2020-07-20 VITALS — BODY MASS INDEX: 44.84 KG/M2 | WEIGHT: 285.72 LBS | HEIGHT: 67 IN

## 2020-07-20 VITALS — DIASTOLIC BLOOD PRESSURE: 77 MMHG | SYSTOLIC BLOOD PRESSURE: 126 MMHG

## 2020-07-20 DIAGNOSIS — Z87.11: ICD-10-CM

## 2020-07-20 DIAGNOSIS — R10.32: Primary | ICD-10-CM

## 2020-07-20 DIAGNOSIS — R11.2: ICD-10-CM

## 2020-07-20 LAB
ALBUMIN SERPL-MCNC: 3.2 G/DL (ref 3.4–5)
ANION GAP SERPL CALC-SCNC: 4 MMOL/L (ref 5–15)
BASOPHILS # BLD AUTO: 0.03 X10^3/UL (ref 0–0.1)
BASOPHILS NFR BLD AUTO: 0 % (ref 0–1)
CALCIUM SERPL-MCNC: 9 MG/DL (ref 8.5–10.1)
CHLORIDE SERPL-SCNC: 110 MMOL/L (ref 98–107)
CREAT SERPL-MCNC: 0.86 MG/DL (ref 0.55–1.02)
EOSINOPHIL # BLD AUTO: 0.09 X10^3/UL (ref 0–0.4)
EOSINOPHIL NFR BLD AUTO: 1 % (ref 1–7)
ERYTHROCYTE [DISTWIDTH] IN BLOOD BY AUTOMATED COUNT: 14 % (ref 9.6–15.2)
LYMPHOCYTES # BLD AUTO: 2.76 X10^3/UL (ref 1–3.4)
LYMPHOCYTES NFR BLD AUTO: 19 % (ref 22–44)
MCH RBC QN AUTO: 28.2 PG (ref 27–34.8)
MCHC RBC AUTO-ENTMCNC: 32.5 G/DL (ref 32.4–35.8)
MCV RBC AUTO: 86.8 FL (ref 80–100)
MD: NO
MICROSCOPIC: (no result)
MONOCYTES # BLD AUTO: 0.73 X10^3/UL (ref 0.2–0.8)
MONOCYTES NFR BLD AUTO: 5 % (ref 2–9)
NEUTROPHILS # BLD AUTO: 10.85 X10^3/UL (ref 1.8–6.8)
NEUTROPHILS NFR BLD AUTO: 75 % (ref 42–75)
PLATELET # BLD AUTO: 405 X10^3/UL (ref 130–400)
PMV BLD AUTO: 7.3 FL (ref 7.4–10.4)
RBC # BLD AUTO: 4.14 X10^6/UL (ref 3.82–5.3)

## 2020-07-20 PROCEDURE — 80048 BASIC METABOLIC PNL TOTAL CA: CPT

## 2020-07-20 PROCEDURE — 84703 CHORIONIC GONADOTROPIN ASSAY: CPT

## 2020-07-20 PROCEDURE — 82040 ASSAY OF SERUM ALBUMIN: CPT

## 2020-07-20 PROCEDURE — 85025 COMPLETE CBC W/AUTO DIFF WBC: CPT

## 2020-07-20 PROCEDURE — 87086 URINE CULTURE/COLONY COUNT: CPT

## 2020-07-20 PROCEDURE — 99283 EMERGENCY DEPT VISIT LOW MDM: CPT

## 2020-07-20 PROCEDURE — 81001 URINALYSIS AUTO W/SCOPE: CPT

## 2020-07-20 PROCEDURE — 36415 COLL VENOUS BLD VENIPUNCTURE: CPT

## 2020-07-20 NOTE — ED NOTES
Pt AOx4, skin pink warm and dry, airway patent, rr even and unlabored, nad noted. Denies SI/HI. No new complaints. Ambulates upon discharge without new incident or distress.

## 2020-07-20 NOTE — ED TRIAGE NOTES
"Chief Complaint   Patient presents with   • Psych Eval   Pt to triage in NAD.  Pt asked to get on the scale and states the number is wrong, that she is 37 lbs because \"you take and minus the purity and then I weigh that much\".  Pt states she may have been artificially inseminated and that she may be pregnant, that it has been six weeks since her last period.  Pt also wants lab work to prove that she is eating.  Pt denies SI/HI \"I've never been suicidal in my life\".  Pt educated on triage process and instructed to notify triage RN of any change in status.          "

## 2020-07-20 NOTE — ED NOTES
"Pt in room, stating she needs a pregnancy test because she has not had a menstrual cycle in 6 weeks. Pt states \"I need to know because medications can hurt the baby.\" Pt denies SI/HI, denies auditory or visual hallucinations.  "

## 2020-07-20 NOTE — ED PROVIDER NOTES
ED Provider Note    Scribed for Dr. Jean-Paul Carr M.D. by Jean-Paul Gao. 7/19/2020  8:09 PM    Primary care provider: Pcp Pt States None     CHIEF COMPLAINT  Chief Complaint   Patient presents with   • Psych Eval       HPI  Dunia Sahni is a 35 y.o. female with a history of bipolar disorder who presents to the Emergency Department for absence of period for 6 weeks. She says her periods are always very regular. She went to the Urgent Care two weeks ago to see if she was pregnant. The test came back negative, but she thinks it may have been too early. She is requesting a pregnancy test. She says she has pelvic and breast pain. She does not have any auditory or visual hallucinations.     PPE Note: I personally donned full PPE for all patient encounters during this visit, including being clean-shaven with an N95 respirator mask, gloves, and eye protection. Scribe remained outside the patient's room and did not have any contact with the patient for the duration of patient encounter.      REVIEW OF SYSTEMS  Pertinent positives include late period, pelvic pain and breast pain. Pertinent negatives include no auditory or visual hallucinations. As above, all other systems reviewed and are negative.   See HPI for further details.     PAST MEDICAL HISTORY   has a past medical history of Abscess (10/12/2017), Bipolar affective (HCC), Depression, Kidney infection, Leukemia (HCC) (12/2019), and Suicide attempt (HCC).    SURGICAL HISTORY   has a past surgical history that includes appendectomy; ercp in or (N/A, 7/5/2018); and skip by laparoscopy (N/A, 7/6/2018).    SOCIAL HISTORY  Social History     Tobacco Use   • Smoking status: Never Smoker   • Smokeless tobacco: Never Used   Substance Use Topics   • Alcohol use: Yes     Comment: rarely   • Drug use: Not Currently     Types: Inhaled     Comment: marijuana, cocaine      Social History     Substance and Sexual Activity   Drug Use Not Currently   • Types: Inhaled     "Comment: marijuana, cocaine       FAMILY HISTORY  None noted when reviewed.     CURRENT MEDICATIONS  Home Medications     Reviewed by Raegan Starks R.N. (Registered Nurse) on 07/19/20 at 1839  Med List Status: <None>   Medication Last Dose Status   ARIPiprazole ER (ABILIFY MAINTENA) 300 MG Prefilled Syringe  Active                ALLERGIES  Allergies   Allergen Reactions   • Vicodin [Hydrocodone-Acetaminophen] Anaphylaxis     RXN=9 years ago   • Pcn [Penicillins] Nausea     RXN=8 years ago  Toleartes rocephin   • Bee Venom    • Blackberry [Rubus Fruticosus] Rash     \"rash\"   • Haldol [Haloperidol] Rash     Pt states, \"I have a rash and my lips were swollen.\"   • Raspberry        PHYSICAL EXAM  VITAL SIGNS: /64   Pulse 90   Temp 36.5 °C (97.7 °F) (Temporal)   Resp 14   Ht 1.676 m (5' 6\")   Wt (!) 129.9 kg (286 lb 6 oz)   SpO2 96%   BMI 46.22 kg/m²     Constitutional: Well developed, Well nourished, no acute distress, Non-toxic appearance.   HENT: Normocephalic, Atraumatic, Bilateral external ears normal   Thorax & Lungs: Non labored breathing   Skin: Warm, Dry  Extremities:, No edema   Musculoskeletal: No major deformities noted.   Neurologic: Awake, alert. Moves all extremities spontaneously.  Psychiatric: Normal affect, judgement normal.  He apparently made some unusual statements at triage but does not appear acutely psychotic no suicidal or homicidal ideation  LABS  Results for orders placed or performed during the hospital encounter of 07/19/20   HCG QUAL SERUM   Result Value Ref Range    Beta-Hcg Qualitative Serum Negative Negative   URINALYSIS CULTURE, IF INDICATED    Specimen: Urine   Result Value Ref Range    Color Yellow     Character Cloudy (A)     Specific Gravity 1.019 <1.035    Ph 6.0 5.0 - 8.0    Glucose Negative Negative mg/dL    Ketones Negative Negative mg/dL    Protein Negative Negative mg/dL    Bilirubin Negative Negative    Urobilinogen, Urine 0.2 Negative    Nitrite Negative " Negative    Leukocyte Esterase Moderate (A) Negative    Occult Blood Negative Negative    Micro Urine Req Microscopic    URINE DRUG SCREEN   Result Value Ref Range    Amphetamines Urine Negative Negative    Barbiturates Negative Negative    Benzodiazepines Negative Negative    Cocaine Metabolite Negative Negative    Methadone Negative Negative    Opiates Negative Negative    Oxycodone Negative Negative    Phencyclidine -Pcp Negative Negative    Propoxyphene Negative Negative    Cannabinoid Metab Negative Negative   URINE MICROSCOPIC (W/UA)   Result Value Ref Range    WBC 10-20 (A) /hpf    RBC 0-2 /hpf    Bacteria Few (A) None /hpf    Epithelial Cells Few /hpf    Hyaline Cast 3-5 (A) /lpf   POC BREATHALIZER   Result Value Ref Range    POC Breathalizer 0.00 0.00 - 0.01 Percent       All labs reviewed by me.    COURSE & MEDICAL DECISION MAKING  Pertinent Labs & Imaging studies reviewed. (See chart for details)    8:09 PM - Patient seen and examined at bedside. Ordered Urine Drug Screen, POC Breathalizer, U  to evaluate her symptoms.     9:25 PM - Patient was reevaluated at bedside. Discussed lab results with the patient and informed them that they were not pregnant. I discussed with her the plan for discharge. Patient verbalizes understanding and agreement to this plan of care.     Decision Making:  Patient was mainly concerned about missed menstrual period and possibility of pregnancy.  She apparently made some unusual statements in triage but does not appear psychotic and does not have any suicidal homicidal ideation she does not wish any further treatment she does not appear to need any psychiatric or medical intervention urgently at this time she will be discharged home  FINAL IMPRESSION  1. Anxiety    2. Amenorrhea          Jean-Paul GARCIA), am scribing for, and in the presence of, Jean-Paul Carr M.D..    Electronically signed by: Jean-Paul Olivares), 7/19/2020    Jean-Paul GARCIA M.D. personally  performed the services described in this documentation, as scribed by Jean-Paul Gao in my presence, and it is both accurate and complete.    The note accurately reflects work and decisions made by me.  Jean-Paul Crar M.D.  7/19/2020  9:48 PM

## 2020-08-08 ENCOUNTER — HOSPITAL ENCOUNTER (EMERGENCY)
Dept: HOSPITAL 8 - ED | Age: 35
LOS: 1 days | Discharge: HOME | End: 2020-08-09
Payer: MEDICARE

## 2020-08-08 VITALS — BODY MASS INDEX: 45.92 KG/M2 | WEIGHT: 292.55 LBS | HEIGHT: 67 IN

## 2020-08-08 DIAGNOSIS — Z72.9: ICD-10-CM

## 2020-08-08 DIAGNOSIS — J45.909: ICD-10-CM

## 2020-08-08 DIAGNOSIS — Z90.49: ICD-10-CM

## 2020-08-08 DIAGNOSIS — K59.00: ICD-10-CM

## 2020-08-08 DIAGNOSIS — R07.89: ICD-10-CM

## 2020-08-08 DIAGNOSIS — Z87.11: ICD-10-CM

## 2020-08-08 DIAGNOSIS — Z86.718: ICD-10-CM

## 2020-08-08 DIAGNOSIS — R10.32: Primary | ICD-10-CM

## 2020-08-08 DIAGNOSIS — R06.02: ICD-10-CM

## 2020-08-08 DIAGNOSIS — Z90.89: ICD-10-CM

## 2020-08-08 LAB
BASOPHILS # BLD AUTO: 0.07 X10^3/UL (ref 0–0.1)
BASOPHILS NFR BLD AUTO: 1 % (ref 0–1)
EOSINOPHIL # BLD AUTO: 0.21 X10^3/UL (ref 0–0.4)
EOSINOPHIL NFR BLD AUTO: 1 % (ref 1–7)
ERYTHROCYTE [DISTWIDTH] IN BLOOD BY AUTOMATED COUNT: 14.8 % (ref 9.6–15.2)
LYMPHOCYTES # BLD AUTO: 3.27 X10^3/UL (ref 1–3.4)
LYMPHOCYTES NFR BLD AUTO: 21 % (ref 22–44)
MCH RBC QN AUTO: 28.5 PG (ref 27–34.8)
MCHC RBC AUTO-ENTMCNC: 33 G/DL (ref 32.4–35.8)
MCV RBC AUTO: 86.2 FL (ref 80–100)
MD: NO
MONOCYTES # BLD AUTO: 0.92 X10^3/UL (ref 0.2–0.8)
MONOCYTES NFR BLD AUTO: 6 % (ref 2–9)
NEUTROPHILS # BLD AUTO: 10.77 X10^3/UL (ref 1.8–6.8)
NEUTROPHILS NFR BLD AUTO: 71 % (ref 42–75)
PLATELET # BLD AUTO: 372 X10^3/UL (ref 130–400)
PMV BLD AUTO: 7.3 FL (ref 7.4–10.4)
RBC # BLD AUTO: 4.25 X10^6/UL (ref 3.82–5.3)

## 2020-08-08 PROCEDURE — 87086 URINE CULTURE/COLONY COUNT: CPT

## 2020-08-08 PROCEDURE — 99284 EMERGENCY DEPT VISIT MOD MDM: CPT

## 2020-08-08 PROCEDURE — 85025 COMPLETE CBC W/AUTO DIFF WBC: CPT

## 2020-08-08 PROCEDURE — 74022 RADEX COMPL AQT ABD SERIES: CPT

## 2020-08-08 PROCEDURE — 80053 COMPREHEN METABOLIC PANEL: CPT

## 2020-08-08 PROCEDURE — 83690 ASSAY OF LIPASE: CPT

## 2020-08-08 PROCEDURE — 81025 URINE PREGNANCY TEST: CPT

## 2020-08-08 PROCEDURE — 36415 COLL VENOUS BLD VENIPUNCTURE: CPT

## 2020-08-08 PROCEDURE — 81001 URINALYSIS AUTO W/SCOPE: CPT

## 2020-08-09 VITALS — DIASTOLIC BLOOD PRESSURE: 69 MMHG | SYSTOLIC BLOOD PRESSURE: 120 MMHG

## 2020-08-09 LAB
ALBUMIN SERPL-MCNC: 3 G/DL (ref 3.4–5)
ALP SERPL-CCNC: 76 U/L (ref 45–117)
ALT SERPL-CCNC: 22 U/L (ref 12–78)
ANION GAP SERPL CALC-SCNC: 5 MMOL/L (ref 5–15)
BILIRUB SERPL-MCNC: 0.2 MG/DL (ref 0.2–1)
CALCIUM SERPL-MCNC: 8.5 MG/DL (ref 8.5–10.1)
CHLORIDE SERPL-SCNC: 110 MMOL/L (ref 98–107)
CREAT SERPL-MCNC: 0.84 MG/DL (ref 0.55–1.02)
HCG UR SG: 1.03 (ref 1–1.03)
MICROSCOPIC: (no result)
PROT SERPL-MCNC: 6.4 G/DL (ref 6.4–8.2)

## 2020-08-12 ENCOUNTER — HOSPITAL ENCOUNTER (EMERGENCY)
Dept: HOSPITAL 8 - ED | Age: 35
Discharge: HOME | End: 2020-08-12
Payer: MEDICARE

## 2020-08-12 VITALS — BODY MASS INDEX: 48.48 KG/M2 | HEIGHT: 65 IN | WEIGHT: 291.01 LBS

## 2020-08-12 VITALS — DIASTOLIC BLOOD PRESSURE: 74 MMHG | SYSTOLIC BLOOD PRESSURE: 122 MMHG

## 2020-08-12 DIAGNOSIS — J45.909: ICD-10-CM

## 2020-08-12 DIAGNOSIS — Z86.718: ICD-10-CM

## 2020-08-12 DIAGNOSIS — R60.0: Primary | ICD-10-CM

## 2020-08-12 DIAGNOSIS — Z87.11: ICD-10-CM

## 2020-08-12 DIAGNOSIS — Z90.89: ICD-10-CM

## 2020-08-12 DIAGNOSIS — M25.571: ICD-10-CM

## 2020-08-12 DIAGNOSIS — Z90.710: ICD-10-CM

## 2020-08-12 PROCEDURE — 99284 EMERGENCY DEPT VISIT MOD MDM: CPT

## 2020-08-12 NOTE — NUR
Patient states having swelling in both legs begining a few hours ago. Patient 
ambulated to the hospital. Patient has a very flat affect, states no history, 
and no other issues. Patient instucted to put on gown, but patient shows no 
interest. XRAY at bedside.

## 2020-08-13 PROCEDURE — 99285 EMERGENCY DEPT VISIT HI MDM: CPT

## 2020-08-13 ASSESSMENT — FIBROSIS 4 INDEX: FIB4 SCORE: 0.3

## 2020-08-14 ENCOUNTER — HOSPITAL ENCOUNTER (EMERGENCY)
Facility: MEDICAL CENTER | Age: 35
End: 2020-08-14
Attending: EMERGENCY MEDICINE | Admitting: EMERGENCY MEDICINE
Payer: MEDICARE

## 2020-08-14 VITALS
RESPIRATION RATE: 16 BRPM | HEART RATE: 74 BPM | SYSTOLIC BLOOD PRESSURE: 146 MMHG | WEIGHT: 290.13 LBS | DIASTOLIC BLOOD PRESSURE: 79 MMHG | HEIGHT: 67 IN | TEMPERATURE: 97.7 F | BODY MASS INDEX: 45.54 KG/M2 | OXYGEN SATURATION: 100 %

## 2020-08-14 DIAGNOSIS — F22 PARANOID DELUSION (HCC): ICD-10-CM

## 2020-08-14 DIAGNOSIS — R45.851 SUICIDAL IDEATION: ICD-10-CM

## 2020-08-14 PROCEDURE — 81025 URINE PREGNANCY TEST: CPT

## 2020-08-14 PROCEDURE — 80307 DRUG TEST PRSMV CHEM ANLYZR: CPT

## 2020-08-14 PROCEDURE — 90791 PSYCH DIAGNOSTIC EVALUATION: CPT

## 2020-08-14 PROCEDURE — 302970 POC BREATHALIZER: Performed by: EMERGENCY MEDICINE

## 2020-08-14 ASSESSMENT — LIFESTYLE VARIABLES
DO YOU DRINK ALCOHOL: NO
ON A TYPICAL DAY WHEN YOU DRINK ALCOHOL HOW MANY DRINKS DO YOU HAVE: 0
TOTAL SCORE: 0
TOTAL SCORE: 0
CONSUMPTION TOTAL: NEGATIVE
HAVE YOU EVER FELT YOU SHOULD CUT DOWN ON YOUR DRINKING: NO
HOW MANY TIMES IN THE PAST YEAR HAVE YOU HAD 5 OR MORE DRINKS IN A DAY: 0
AVERAGE NUMBER OF DAYS PER WEEK YOU HAVE A DRINK CONTAINING ALCOHOL: 0
TOTAL SCORE: 0
EVER FELT BAD OR GUILTY ABOUT YOUR DRINKING: NO
HAVE PEOPLE ANNOYED YOU BY CRITICIZING YOUR DRINKING: NO
EVER HAD A DRINK FIRST THING IN THE MORNING TO STEADY YOUR NERVES TO GET RID OF A HANGOVER: NO

## 2020-08-14 ASSESSMENT — ENCOUNTER SYMPTOMS: CHILLS: 1

## 2020-08-14 NOTE — ED NOTES
Med rec completed per pt at bedside  Allergies reviewed and updated - pt had many allergies listed but denies being allergic to most. Removed after discussion with PharmD  Left Vicodin per historical record as anaphylaxis is reaction listed, but pt denies this currently   No ABX in last 14 days     Pt denies taking any OTC or prescription medications currently  Asked pt about monthly Abilify injection listed in historical med rec, but pt reports she is no longer on and cannot state how long it has been since she received this medication   Removed from med rec for this reason

## 2020-08-14 NOTE — ED NOTES
Pt sleeping at this time. Unlabored breathing. Visible chest rise. Pt able to reposition self. Pt close to nurses station and able to be seen at charge desk.

## 2020-08-14 NOTE — DISCHARGE PLANNING
Alert Team    Legal hold completed by ERP; copies made and delivered to Social Work for follow-up. Patient remains on a legal hold while awaiting transfer to an inpatient psychiatric facility; Alert Team to continue to monitor.

## 2020-08-14 NOTE — ED NOTES
Pt sleeping at this time. Unlabored breathing. Visible chest rise and fall. Pt able to reposition self. Sitter in place.

## 2020-08-14 NOTE — ED PROVIDER NOTES
"ED Provider Note    Scribed for Elif Jc M.D. by Abimael Ashby. 8/14/2020, 12:07 AM.    Primary care provider: None noted  Means of arrival: Walk In, escorted by EMS  History obtained from: Patient  History limited by: None    CHIEF COMPLAINT  Chief Complaint   Patient presents with   • Psych Eval       HPI  Dunia Sahni is a 35 y.o. female with a history of bipolar affective disorder who presents to the Emergency Department for evaluation of \"a lack of means\" and \"suicidal neglect by the state\". Patient reports she is not being given the appropriate means to deal with her medical conditions. She reports she is a \"full blown [place] virgin and must be covered properly for decency\". Patient reports her doctor advised her to come into the ED tonight after complications from \"virtual heart surgery.\"  She reports a history of leukemia, and has been in remission for 6 months. She denies using any medications currently.     Review of patient history shows that she was last sent to a psychiatric facility for similar complaints in June.     I verified that the patient was wearing a mask and I was wearing appropriate PPE every time I entered the room. The patient's mask was on the patient at all times during my encounter.    REVIEW OF SYSTEMS  Review of Systems   Constitutional: Positive for chills.   Psychiatric/Behavioral: Positive for suicidal ideas.       PAST MEDICAL HISTORY   has a past medical history of Abscess (10/12/2017), Bipolar affective (HCC), Depression, Kidney infection, Leukemia (HCC) (12/2019), and Suicide attempt (HCC).    SURGICAL HISTORY   has a past surgical history that includes appendectomy; ercp in or (N/A, 7/5/2018); and skip by laparoscopy (N/A, 7/6/2018).    SOCIAL HISTORY  Social History     Tobacco Use   • Smoking status: Never Smoker   • Smokeless tobacco: Never Used   Substance Use Topics   • Alcohol use: Yes     Comment: rarely   • Drug use: Not Currently     Types: Inhaled    " " Comment: marijuana, cocaine      Social History     Substance and Sexual Activity   Drug Use Not Currently   • Types: Inhaled    Comment: marijuana, cocaine       FAMILY HISTORY  None pertinent.    CURRENT MEDICATIONS  Current Outpatient Medications   Medication Instructions   • Abilify Maintena 300 mg, Intramuscular, EVERY 30 DAYS       ALLERGIES  Allergies   Allergen Reactions   • Vicodin [Hydrocodone-Acetaminophen] Anaphylaxis     RXN=9 years ago   • Pcn [Penicillins] Nausea     RXN=8 years ago  Toleartes rocephin   • Bee Venom    • Blackberry [Rubus Fruticosus] Rash     \"rash\"   • Haldol [Haloperidol] Rash     Pt states, \"I have a rash and my lips were swollen.\"   • Orange Juice [Orange Oil]    • Raspberry        PHYSICAL EXAM  VITAL SIGNS: /82   Pulse 82   Temp 36.1 °C (97 °F) (Temporal)   Resp 16   Ht 1.702 m (5' 7\")   Wt (!) 131.6 kg (290 lb 2 oz)   LMP 07/29/2020   SpO2 97%   BMI 45.44 kg/m²   Vitals reviewed by myself.  Nursing note and vitals reviewed.  Physical Exam  Constitutional: Well-developed and well-nourished. No acute distress.   HENT: Head is normocephalic and atraumatic.  Eyes: extra-ocular movements intact  Cardiovascular: regular rate and  regular rhythm.   Pulmonary/Chest: Breath sounds normal. No wheezes or rales.    Musculoskeletal: Extremities exhibit normal range of motion without edema or tenderness.   Neurological: Awake and alert  Skin: Skin is warm and dry. No rash.       DIAGNOSTIC STUDIES /  LABS  Labs Reviewed   POC BREATHALIZER - Normal   URINE DRUG SCREEN   HCG QUALITATIVE UR       All labs reviewed by me.      REASSESSMENT  12:07 AM - Patient was seen and evaluated at bedside. Patient presents to the ED with complaints of \"suicidal neglect by the state\".     1:46 AM - Ordered a Legal-2000.    COURSE & MEDICAL DECISION MAKING  Nursing notes, VS, PMSFHx reviewed in chart.    Patient is a 35-year-old female who comes in for evaluation of suicidal ideation and " delusional thoughts.  Differential diagnosis includes psychosis, suicidal ideation, depression, acute stress disorder.      Patient's initial vitals are within normal limits.  At this point is unclear if she is truly a danger to herself, I am concerned she may be unable to care for herself.  I did not initiate a legal hold but instead consulted behavioral health.  After evaluating patient they feel that she does need a legal hold for further psychiatric care as she continues to promote suicidal thoughts without a plan and has delusional thoughts that make it difficult for her to care for herself.  Therefore she is placed on a legal hold and plan will be to transfer to psychiatric facility when there is available bed.      FINAL IMPRESSION  1. Suicidal ideation    2. Paranoid delusion (HCC)         Abimael GARCIA (Scribe), am scribing for, and in the presence of, Elif Jc M.D..    Electronically signed by: Abimael Ashby (Gailibangel), 8/14/2020    Elif GARCIA M.D. personally performed the services described in this documentation, as scribed by Abimael Ahsby in my presence, and it is both accurate and complete. E.    The note accurately reflects work and decisions made by me.  Elif Jc M.D.  8/14/2020  2:11 AM

## 2020-08-14 NOTE — PSYCHIATRY
PSYCHIATRY CONSULT TEAM NOTE: Consult received. Patient's legal hold will be assessed within 24 hours.     Please discontinue consult if patient is accepted to an inpatient psychiatric hospital.     Thank you.

## 2020-08-14 NOTE — DISCHARGE PLANNING
Medical Social Work    Referral: Legal hold Transfer to Mental Health Facility    Intervention: JADEN received call from Sheree at [facility] stating that they have accepted the patient for admission.     Pt's accepting physcian is Dr. Oliva STANLEY arranged for transportation to be set up through [facility]    The pt will be picked up at 1230.     JADEN notified the RN of the departure time as well as accepting facility.     JADEN created transfer packet and placed on chart.     Plan: Pt will transfer back to [facility] at 1230

## 2020-08-14 NOTE — ED NOTES
Pt placed on legal hold at 0128. Pt resting in bed at this time. Attempted to get a urine sample. Pt unable to urinate at this time.

## 2020-08-14 NOTE — DISCHARGE PLANNING
Medical Social Work    Referral: Legal Hold    Intervention: Legal Hold Paperwork given to SW by [employer] RN Raj    Legal Hold Initiated: Date: 8/14/2020 Time: 0128    Patient’s Insurance Listed on Face Sheet: Medicare and Medicaid FFS    Referrals sent to: [facility], [facility], [place], [facility], and [facility].     This referral contains the following information:  1) Face sheet _x___  2) Page 1 and Page 2 of Legal Hold __x__  3) Alert Team Assessment/Psych Assessment ___x_  4) Head to toe physical exam __x__  5) Urine Drug Screen __x__  6) Blood Alcohol __x__  7) Vital signs __x_  8) Pregnancy test when applicable _x__  9) Medications list __x__    Plan: Patient will transfer to mental health facility once acceptance is obtained

## 2020-08-14 NOTE — ED TRIAGE NOTES
"Dunia Carolina Sahni   35 y.o. female     Chief Complaint   Patient presents with   • Psych Eval      Pt amb to triage with steady gait for above complaint. Patient called paramedics from her house.      Patient repeatedly denies having suicidal thoughts or thoughts of hurting herself. However patient states \"in the position that I am in, it is neglect suicide.\" When questions to clarify patient states \"its because I have a lack of means.\" \"I just need help with my lack of means and neglect it is the states and alliances fault\"      Pt is alert and oriented, speaking in full sentences, follows commands and responds appropriately to questions. NAD. Resp are even and unlabored.   Pt placed in lobby. Pt educated on triage process. Pt encouraged to alert staff for any changes.    /82   Pulse 82   Temp 36.1 °C (97 °F) (Temporal)   Resp 16   Ht 1.702 m (5' 7\")   Wt (!) 131.6 kg (290 lb 2 oz)   LMP 07/29/2020   SpO2 97%   BMI 45.44 kg/m²     "

## 2020-08-14 NOTE — DISCHARGE PLANNING
MSW spoke to Ibis at [facility]. Pt is out of Medicare days.    MSW spoke to Felicia at [facility] They are reviewing pt's referral.
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
## 2020-08-14 NOTE — ED NOTES
Pt ambulated to bathroom. Unable to obtain urine sample at this time. Alert team at bedside at this time.
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
## 2020-08-14 NOTE — ED NOTES
Security searched pt belongings. Pt belongings placed in locker 16. Pt has a purse, Clothes, shoes and book bag taken to locker. Pt had tylenol in bag. Medication placed in pharmacy bag and placed in pharmacy bin.

## 2020-08-14 NOTE — ED NOTES
Patient's home medications have been reviewed by the pharmacy team.     Past Medical History:   Diagnosis Date   • Abscess 10/12/2017    right AC arm area   • Bipolar affective (HCC)    • Depression    • Kidney infection    • Leukemia (HCC) 12/2019   • Suicide attempt (HCC)        Patient's Medications   New Prescriptions    No medications on file   Previous Medications    No medications on file   Modified Medications    No medications on file   Discontinued Medications    ARIPIPRAZOLE ER (ABILIFY MAINTENA) 300 MG PREFILLED SYRINGE    300 mg by Intramuscular route Q30 DAYS. Indications: MIXED BIPOLAR AFFECTIVE DISORDER          A:  Patient is not taking home medications. Abilify removed as patient does not remember last time it was taken.     P:    No recommendations at this time.    Jose Hsieh, PharmD

## 2020-08-23 ENCOUNTER — HOSPITAL ENCOUNTER (EMERGENCY)
Dept: HOSPITAL 8 - ED | Age: 35
LOS: 1 days | Discharge: HOME | End: 2020-08-24
Payer: MEDICARE

## 2020-08-23 VITALS — WEIGHT: 287.92 LBS | BODY MASS INDEX: 45.19 KG/M2 | HEIGHT: 67 IN

## 2020-08-23 VITALS — DIASTOLIC BLOOD PRESSURE: 77 MMHG | SYSTOLIC BLOOD PRESSURE: 113 MMHG

## 2020-08-23 DIAGNOSIS — R07.89: Primary | ICD-10-CM

## 2020-08-23 DIAGNOSIS — N64.4: ICD-10-CM

## 2020-08-23 PROCEDURE — 99281 EMR DPT VST MAYX REQ PHY/QHP: CPT

## 2020-08-24 NOTE — NUR
I CHAPERONED WITH DR COHEN TO ASSESS PT. SHE REQUESTS DR COHEN NOT TOUCH HER. SHE 
STATES SHE HAS HAD DISCHARGE FROM HER RIGHT NIPPLE, NO DISCHARGE OBSERVED 
DURING ASSESSMENT. PT TO BE DISCHARGED.

## 2020-08-31 ENCOUNTER — HOSPITAL ENCOUNTER (EMERGENCY)
Dept: HOSPITAL 8 - ED | Age: 35
LOS: 1 days | Discharge: HOME | End: 2020-09-01
Payer: MEDICARE

## 2020-08-31 VITALS — HEIGHT: 67 IN | BODY MASS INDEX: 44.64 KG/M2 | WEIGHT: 284.4 LBS

## 2020-08-31 VITALS — DIASTOLIC BLOOD PRESSURE: 74 MMHG | SYSTOLIC BLOOD PRESSURE: 122 MMHG

## 2020-08-31 DIAGNOSIS — R45.851: Primary | ICD-10-CM

## 2020-08-31 DIAGNOSIS — Z86.718: ICD-10-CM

## 2020-08-31 DIAGNOSIS — F20.9: ICD-10-CM

## 2020-08-31 PROCEDURE — 99284 EMERGENCY DEPT VISIT MOD MDM: CPT

## 2020-08-31 NOTE — NUR
PT PRESENTS TO THE ER FOR SI. PT REFUSES TO ANSWER MD'S QUESTIONS, STATES SHE 
WILL NOT TAKE MEDICATIONS AND WHEN ASKED WHY SHE PRESENTED TO THE ER IF SHE 
WILL NOT ACCPET HELP STATES SHE "DOESN'T KNOW."



UPON D/C, PT STATES, "I'M SUING BECAUSE YOU GUYS WON'T TAKE ME IN." PT EDUCATED 
THAT SHE HAS BEEN ASSESSED BY THE MD AND IS UP FOR D/C. PT STATES "I'M 
REFUSING." PT EDUCATED THAT SHE CAN LEAVE OR SECUIRTY WILL BE CALLED. PT 
AGREEABLE TO LEAVE BY SELF.

## 2020-09-01 ENCOUNTER — HOSPITAL ENCOUNTER (EMERGENCY)
Facility: MEDICAL CENTER | Age: 35
End: 2020-09-01
Attending: EMERGENCY MEDICINE
Payer: MEDICARE

## 2020-09-01 VITALS
WEIGHT: 290.13 LBS | RESPIRATION RATE: 16 BRPM | HEIGHT: 67 IN | DIASTOLIC BLOOD PRESSURE: 89 MMHG | SYSTOLIC BLOOD PRESSURE: 170 MMHG | TEMPERATURE: 96.5 F | HEART RATE: 65 BPM | OXYGEN SATURATION: 98 % | BODY MASS INDEX: 45.54 KG/M2

## 2020-09-01 DIAGNOSIS — R45.851 SUICIDAL IDEATION: ICD-10-CM

## 2020-09-01 PROCEDURE — 90791 PSYCH DIAGNOSTIC EVALUATION: CPT

## 2020-09-01 PROCEDURE — 302970 POC BREATHALIZER: Performed by: EMERGENCY MEDICINE

## 2020-09-01 PROCEDURE — 80307 DRUG TEST PRSMV CHEM ANLYZR: CPT

## 2020-09-01 PROCEDURE — 81025 URINE PREGNANCY TEST: CPT

## 2020-09-01 PROCEDURE — 99284 EMERGENCY DEPT VISIT MOD MDM: CPT

## 2020-09-01 SDOH — HEALTH STABILITY: MENTAL HEALTH: HOW OFTEN DO YOU HAVE 6 OR MORE DRINKS ON ONE OCCASION?: LESS THAN MONTHLY

## 2020-09-01 SDOH — HEALTH STABILITY: MENTAL HEALTH: HOW MANY STANDARD DRINKS CONTAINING ALCOHOL DO YOU HAVE ON A TYPICAL DAY?: 1 OR 2

## 2020-09-01 SDOH — HEALTH STABILITY: MENTAL HEALTH: HOW OFTEN DO YOU HAVE A DRINK CONTAINING ALCOHOL?: MONTHLY OR LESS

## 2020-09-01 ASSESSMENT — FIBROSIS 4 INDEX: FIB4 SCORE: 0.3

## 2020-09-01 NOTE — ED TRIAGE NOTES
Dunia Sahni  35 y.o. female  Chief Complaint   Patient presents with   • Suicidal Ideation     Patient brought in by ambulance for suicidal ideation. Patient was previously discharged today from [facility] for suicidal ideation per EMS.

## 2020-09-01 NOTE — DISCHARGE PLANNING
Alert Team:    Patient to [facility]. Cab voucher provided #[id number]
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
## 2020-09-01 NOTE — ED PROVIDER NOTES
"ED Provider Note    CHIEF COMPLAINT  Chief Complaint   Patient presents with   • Suicidal Ideation     Patient brought in by ambulance for suicidal ideation. Patient was previously discharged today from [facility] for suicidal ideation per EMS.         HPI    Primary care provider: Pcp Pt States None   History obtained from: Patient  History limited by: None     Dunia Sahni is a 35 y.o. female who presents to the ED by EMS for suicidal ideation.  EMS reports that patient was discharged from [facility] today for suicidal ideation.  Patient states that she is thinking of either cutting her throat or her wrists.  She denies any actual acts of self-harm.  When asked why she wants to hurt herself she replied \"I don't know.\"  Patient is requesting to be admitted to a psychiatric facility.  Patient also unsure if she is pregnant and wants a pregnancy test.  She states that she has been hallucinating and \"seeing things like shadows.\"  She states that she stopped taking her medicines about 3 months ago because she was not sure if she is pregnant.  She denies any homicidal ideation.  She denies any known ill contacts or recent travels.  She denies fever/congestion/sore throat/cough/shortness of breath or difficulty breathing.  She reports that she has nausea at times.  She denies diarrhea or dysuria.  She has not noticed any rash or swelling.  She denies any pain except pain on her right chest where \"my bra is.\"    REVIEW OF SYSTEMS  Please see HPI for pertinent positives/negatives.  All other systems reviewed and are negative.     PAST MEDICAL HISTORY  Past Medical History:   Diagnosis Date   • Leukemia (HCC) 12/2019   • Abscess 10/12/2017    right AC arm area   • Bipolar affective (HCC)    • Depression    • Kidney infection    • Suicide attempt (HCC)         SURGICAL HISTORY  Past Surgical History:   Procedure Laterality Date   • GEREMIAS BY LAPAROSCOPY N/A 7/6/2018    Procedure: GEREMIAS BY LAPAROSCOPY;  Surgeon: " "Leroy Goodson M.D.;  Location: SURGERY [facility];  Service: General   • ERCP IN OR N/A 7/5/2018    Procedure: ERCP IN OR;  Surgeon: Nathan Maravilla M.D.;  Location: SURGERY [facility];  Service: Gastroenterology   • APPENDECTOMY          SOCIAL HISTORY  Social History     Tobacco Use   • Smoking status: Never Smoker   • Smokeless tobacco: Never Used   Substance and Sexual Activity   • Alcohol use: Yes     Frequency: Monthly or less     Drinks per session: 1 or 2     Binge frequency: Less than monthly     Comment: patient reports she drinks once or twice a year   • Drug use: Not Currently     Types: Inhaled     Comment: marijuana, cocaine   • Sexual activity: Not on file        FAMILY HISTORY  Family History   Family history unknown: Yes        CURRENT MEDICATIONS  Home Medications     Reviewed by Abril Camargo R.N. (Registered Nurse) on 09/01/20 at 0128  Med List Status: Complete   Medication Last Dose Status        Patient Corwin Taking any Medications                        ALLERGIES  Allergies   Allergen Reactions   • Vicodin [Hydrocodone-Acetaminophen] Anaphylaxis     RXN=9 years ago   • Orange Juice [Orange Oil] Swelling     \"Lips swell\"        PHYSICAL EXAM  VITAL SIGNS: BP (!) 170/89   Pulse 65   Temp 35.8 °C (96.5 °F) (Temporal)   Resp 16   Ht 1.702 m (5' 7\")   Wt (!) 131.6 kg (290 lb 2 oz)   SpO2 98%   BMI 45.44 kg/m²  @[id number]::@     Pulse ox interpretation: 98% I interpret this pulse ox as normal     Constitutional: Well developed, well nourished, alert in no apparent distress, nontoxic appearance    HENT: No external signs of trauma, normocephalic  Eyes: PERRL, EOMI, conjunctiva without erythema, no discharge, no icterus    Neck: Soft and supple, trachea midline, no stridor, no tenderness, no LAD, no JVD, good ROM    Cardiovascular: Regular rate and rhythm, no murmurs/rubs/gallops, strong distal pulses and good perfusion    Thorax & Lungs: No respiratory distress, " CTAB   Abdomen: Soft, nontender, nondistended, no guarding, no rebound, normal BS    Back: No CVAT    Extremities: No cyanosis, no edema, no gross deformity, good ROM, no tenderness, intact distal pulses with brisk cap refill    Skin: Warm, dry, no pallor/cyanosis, no rash noted    Lymphatic: No lymphadenopathy noted    Neuro: Alert and oriented to person, place, and time.  GCS 15.  CN II-XII grossly intact.  Normal speech.  Equal strength bilateral UE/LE.  Sensation intact to touch.  No cerebellar signs.  Psychiatric: Cooperative, flat affect, reports suicidal ideation, denies homicidal ideation, reports visual hallucinations      DIAGNOSTIC STUDIES / PROCEDURES        LABS  All labs reviewed by me.     Results for orders placed or performed during the hospital encounter of 09/01/20   BETA-HCG QUALITATIVE URINE   Result Value Ref Range    Beta-Hcg Urine Negative Negative   URINE DRUG SCREEN   Result Value Ref Range    Amphetamines Urine Negative Negative    Barbiturates Negative Negative    Benzodiazepines Negative Negative    Cocaine Metabolite Negative Negative    Methadone Negative Negative    Opiates Negative Negative    Oxycodone Negative Negative    Phencyclidine -Pcp Negative Negative    Propoxyphene Negative Negative    Cannabinoid Metab Negative Negative   POC BREATHALIZER   Result Value Ref Range    POC Breathalizer 0 0.00 - 0.01 Percent        RADIOLOGY  The radiologist's interpretation of all radiological studies have been reviewed by me.     No orders to display          COURSE & MEDICAL DECISION MAKING  Nursing notes, VS, PMSFHx reviewed in chart.     Review of past medical records shows the patient was last seen in this ED August 14, 2020 for suicidal ideation.      Differential diagnoses considered include but are not limited to: Suicidal ideation/attempt, anxiety, depression, psychosis/schizophrenia, personality disorder      History and physical exam as above.  This is an alert, calm and  cooperative patient in no acute distress and nontoxic in appearance with a benign exam who presents with suicidal ideation.  Patient is known to this ED with several recent visits for similar complaint.  No acute medical issues or signs of intoxication.  She does have elevated blood pressure and will need outpatient follow-up for further management.  No clinical evidence for hypertensive emergency.  Patient was evaluated by mental health and cleared for discharge to go to [facility].  Findings and plan discussed with the patient and she is agreeable.  Return to ED precautions were given.  She verbalized understanding and agreed with plan of care with no further questions or concerns.      The patient is referred to a primary physician for blood pressure management, diabetic screening, and for all other preventative health concerns.       FINAL IMPRESSION  1. Suicidal ideation Active          DISPOSITION  Patient will be discharged to [facility] in stable condition.       FOLLOW UP  Please go to [facility] as discussed with mental health          [address] [address]  [phone]  Schedule an appointment as soon as possible for a visit       [facility], Emergency Dept  [address] [facility] [id number] [phone]    If symptoms worsen         OUTPATIENT MEDICATIONS  There are no discharge medications for this patient.         Electronically signed by: Mega Duncan D.O., 9/1/2020 12:53 AM      Portions of this record were made with voice recognition software.  Despite my review, spelling/grammar/context errors may still remain.  Interpretation of this chart should be taken in this context.

## 2020-09-30 NOTE — CONSULTS
PSYCHIATRIC INTAKE EVALUATION     I personally performed the service, or was physically present during the key or critical portions of the service when performed by a resident; participated in the management of the patient; and, reviewed Dr Ortega's note.  I agree with all the findings except as noted under assessment/plan.     EKG: personally reviewed QTc 450  Cranial Imaging: personally reviewed MRI 2014: no significant finidngs     *ASSESSMENT/PLAN:    1.Schizoaffective disorder.   -perphenazine 2 mg bid  -may have received abilify maintana on 5/19 at [facility] (unconfirmed)           2. Medical:  - none acutely       Legal hold: extended.    *Will Follow  *Thank you for the consult

## 2020-10-12 NOTE — ED NOTES
Meal tray provided to pt.     Spoke with patient and gave negative covid results per PCR test.  Patient verbalized understanding.  Patient states that she is not feeling better, but not getting any worse.  Verbalized understanding of when to seek further medical care.

## 2020-10-22 ENCOUNTER — HOSPITAL ENCOUNTER (EMERGENCY)
Dept: HOSPITAL 8 - ED | Age: 35
Discharge: HOME | End: 2020-10-22
Payer: MEDICARE

## 2020-10-22 VITALS — DIASTOLIC BLOOD PRESSURE: 62 MMHG | SYSTOLIC BLOOD PRESSURE: 104 MMHG

## 2020-10-22 VITALS — HEIGHT: 67 IN | BODY MASS INDEX: 45.78 KG/M2 | WEIGHT: 291.67 LBS

## 2020-10-22 DIAGNOSIS — B97.89: ICD-10-CM

## 2020-10-22 DIAGNOSIS — J02.8: Primary | ICD-10-CM

## 2020-10-22 DIAGNOSIS — R11.0: ICD-10-CM

## 2020-10-22 DIAGNOSIS — Z86.718: ICD-10-CM

## 2020-10-22 DIAGNOSIS — Z90.49: ICD-10-CM

## 2020-10-22 LAB
ALBUMIN SERPL-MCNC: 3.3 G/DL (ref 3.4–5)
ANION GAP SERPL CALC-SCNC: 7 MMOL/L (ref 5–15)
BASOPHILS # BLD AUTO: 0.1 X10^3/UL (ref 0–0.1)
BASOPHILS NFR BLD AUTO: 1 % (ref 0–1)
CALCIUM SERPL-MCNC: 9.1 MG/DL (ref 8.5–10.1)
CHLORIDE SERPL-SCNC: 106 MMOL/L (ref 98–107)
CREAT SERPL-MCNC: 0.9 MG/DL (ref 0.55–1.02)
EOSINOPHIL # BLD AUTO: 0.2 X10^3/UL (ref 0–0.4)
EOSINOPHIL NFR BLD AUTO: 1 % (ref 1–7)
ERYTHROCYTE [DISTWIDTH] IN BLOOD BY AUTOMATED COUNT: 13.9 % (ref 9.6–15.2)
LYMPHOCYTES # BLD AUTO: 3.2 X10^3/UL (ref 1–3.4)
LYMPHOCYTES NFR BLD AUTO: 20 % (ref 22–44)
MCH RBC QN AUTO: 27.6 PG (ref 27–34.8)
MCHC RBC AUTO-ENTMCNC: 32.4 G/DL (ref 32.4–35.8)
MD: NO
MONOCYTES # BLD AUTO: 1 X10^3/UL (ref 0.2–0.8)
MONOCYTES NFR BLD AUTO: 6 % (ref 2–9)
NEUTROPHILS # BLD AUTO: 11.9 X10^3/UL (ref 1.8–6.8)
NEUTROPHILS NFR BLD AUTO: 73 % (ref 42–75)
PLATELET # BLD AUTO: 430 X10^3/UL (ref 130–400)
PMV BLD AUTO: 7.2 FL (ref 7.4–10.4)
RBC # BLD AUTO: 4.3 X10^6/UL (ref 3.82–5.3)

## 2020-10-22 PROCEDURE — 87880 STREP A ASSAY W/OPTIC: CPT

## 2020-10-22 PROCEDURE — 80048 BASIC METABOLIC PNL TOTAL CA: CPT

## 2020-10-22 PROCEDURE — 36415 COLL VENOUS BLD VENIPUNCTURE: CPT

## 2020-10-22 PROCEDURE — 84703 CHORIONIC GONADOTROPIN ASSAY: CPT

## 2020-10-22 PROCEDURE — 85025 COMPLETE CBC W/AUTO DIFF WBC: CPT

## 2020-10-22 PROCEDURE — 82040 ASSAY OF SERUM ALBUMIN: CPT

## 2020-10-22 PROCEDURE — 99283 EMERGENCY DEPT VISIT LOW MDM: CPT

## 2020-10-22 PROCEDURE — 87081 CULTURE SCREEN ONLY: CPT

## 2020-10-22 NOTE — NUR
Patient given discharge instructions and they have confirmed that they 
understand the instructions.  Patient ambulatory with steady gait. NAD, DENIES 
ADDITIONAL QUESTIONS OR NEEDS AT THIS TIME. NO PERSONAL BELONGINGS LEFT IN ROOM 
AFTER DC

## 2020-10-22 NOTE — NUR
EMIR ROBERTSON AT BS. PT NAD, RESTING ON 51 Give, APPEARS COMFORTABLE, CALL LIGHT IN 
REACH, DENIES ADDITIONAL NEEDS AT THIS TIME. WAITING FOR RAPID STREP RESULTS. 
WCTM.

## 2020-10-22 NOTE — NUR
PT AMBULATORY TO ROOM 35 W/ C/O BODY ACHES, VOMITING, CONGESTION, SORE THROAT X 
1 DAY. PT RESTING ON YANETH BECERRA.

## 2020-10-23 ENCOUNTER — APPOINTMENT (OUTPATIENT)
Dept: RADIOLOGY | Facility: MEDICAL CENTER | Age: 35
End: 2020-10-23
Attending: EMERGENCY MEDICINE
Payer: MEDICARE

## 2020-10-23 ENCOUNTER — HOSPITAL ENCOUNTER (EMERGENCY)
Facility: MEDICAL CENTER | Age: 35
End: 2020-10-23
Attending: EMERGENCY MEDICINE
Payer: MEDICARE

## 2020-10-23 VITALS
BODY MASS INDEX: 45.5 KG/M2 | OXYGEN SATURATION: 98 % | RESPIRATION RATE: 18 BRPM | HEIGHT: 67 IN | WEIGHT: 289.9 LBS | TEMPERATURE: 97.7 F | HEART RATE: 82 BPM | SYSTOLIC BLOOD PRESSURE: 99 MMHG | DIASTOLIC BLOOD PRESSURE: 55 MMHG

## 2020-10-23 DIAGNOSIS — J06.9 UPPER RESPIRATORY TRACT INFECTION, UNSPECIFIED TYPE: ICD-10-CM

## 2020-10-23 LAB
ALBUMIN SERPL BCP-MCNC: 3.9 G/DL (ref 3.2–4.9)
ALBUMIN/GLOB SERPL: 1.3 G/DL
ALP SERPL-CCNC: 98 U/L (ref 30–99)
ALT SERPL-CCNC: 19 U/L (ref 2–50)
ANION GAP SERPL CALC-SCNC: 8 MMOL/L (ref 7–16)
AST SERPL-CCNC: 16 U/L (ref 12–45)
BASOPHILS # BLD AUTO: 0.3 % (ref 0–1.8)
BASOPHILS # BLD: 0.04 K/UL (ref 0–0.12)
BILIRUB SERPL-MCNC: 0.4 MG/DL (ref 0.1–1.5)
BUN SERPL-MCNC: 8 MG/DL (ref 8–22)
CALCIUM SERPL-MCNC: 8.7 MG/DL (ref 8.5–10.5)
CHLORIDE SERPL-SCNC: 103 MMOL/L (ref 96–112)
CO2 SERPL-SCNC: 27 MMOL/L (ref 20–33)
COVID ORDER STATUS COVID19: NORMAL
CREAT SERPL-MCNC: 0.65 MG/DL (ref 0.5–1.4)
EKG IMPRESSION: NORMAL
EOSINOPHIL # BLD AUTO: 0.13 K/UL (ref 0–0.51)
EOSINOPHIL NFR BLD: 1 % (ref 0–6.9)
ERYTHROCYTE [DISTWIDTH] IN BLOOD BY AUTOMATED COUNT: 43.1 FL (ref 35.9–50)
GLOBULIN SER CALC-MCNC: 2.9 G/DL (ref 1.9–3.5)
GLUCOSE SERPL-MCNC: 106 MG/DL (ref 65–99)
HCG SERPL QL: NEGATIVE
HCT VFR BLD AUTO: 37.6 % (ref 37–47)
HGB BLD-MCNC: 12.1 G/DL (ref 12–16)
IMM GRANULOCYTES # BLD AUTO: 0.05 K/UL (ref 0–0.11)
IMM GRANULOCYTES NFR BLD AUTO: 0.4 % (ref 0–0.9)
LYMPHOCYTES # BLD AUTO: 1.8 K/UL (ref 1–4.8)
LYMPHOCYTES NFR BLD: 14.5 % (ref 22–41)
MCH RBC QN AUTO: 28.3 PG (ref 27–33)
MCHC RBC AUTO-ENTMCNC: 32.2 G/DL (ref 33.6–35)
MCV RBC AUTO: 87.9 FL (ref 81.4–97.8)
MONOCYTES # BLD AUTO: 0.7 K/UL (ref 0–0.85)
MONOCYTES NFR BLD AUTO: 5.6 % (ref 0–13.4)
NEUTROPHILS # BLD AUTO: 9.73 K/UL (ref 2–7.15)
NEUTROPHILS NFR BLD: 78.2 % (ref 44–72)
NRBC # BLD AUTO: 0 K/UL
NRBC BLD-RTO: 0 /100 WBC
NT-PROBNP SERPL IA-MCNC: 63 PG/ML (ref 0–125)
PLATELET # BLD AUTO: 342 K/UL (ref 164–446)
PMV BLD AUTO: 9.1 FL (ref 9–12.9)
POTASSIUM SERPL-SCNC: 4.2 MMOL/L (ref 3.6–5.5)
PROT SERPL-MCNC: 6.8 G/DL (ref 6–8.2)
RBC # BLD AUTO: 4.28 M/UL (ref 4.2–5.4)
SARS-COV-2 RNA RESP QL NAA+PROBE: NOTDETECTED
SODIUM SERPL-SCNC: 138 MMOL/L (ref 135–145)
SPECIMEN SOURCE: NORMAL
TROPONIN T SERPL-MCNC: <6 NG/L (ref 6–19)
WBC # BLD AUTO: 12.5 K/UL (ref 4.8–10.8)

## 2020-10-23 PROCEDURE — 94667 MNPJ CHEST WALL 1ST: CPT

## 2020-10-23 PROCEDURE — 92950 HEART/LUNG RESUSCITATION CPR: CPT

## 2020-10-23 PROCEDURE — 94150 VITAL CAPACITY TEST: CPT

## 2020-10-23 PROCEDURE — 94669 MECHANICAL CHEST WALL OSCILL: CPT

## 2020-10-23 PROCEDURE — 83880 ASSAY OF NATRIURETIC PEPTIDE: CPT

## 2020-10-23 PROCEDURE — 84703 CHORIONIC GONADOTROPIN ASSAY: CPT

## 2020-10-23 PROCEDURE — 99283 EMERGENCY DEPT VISIT LOW MDM: CPT

## 2020-10-23 PROCEDURE — 94668 MNPJ CHEST WALL SBSQ: CPT

## 2020-10-23 PROCEDURE — 71045 X-RAY EXAM CHEST 1 VIEW: CPT

## 2020-10-23 PROCEDURE — 80053 COMPREHEN METABOLIC PANEL: CPT

## 2020-10-23 PROCEDURE — 93005 ELECTROCARDIOGRAM TRACING: CPT

## 2020-10-23 PROCEDURE — C9803 HOPD COVID-19 SPEC COLLECT: HCPCS | Performed by: EMERGENCY MEDICINE

## 2020-10-23 PROCEDURE — 84484 ASSAY OF TROPONIN QUANT: CPT

## 2020-10-23 PROCEDURE — 85025 COMPLETE CBC W/AUTO DIFF WBC: CPT

## 2020-10-23 PROCEDURE — U0003 INFECTIOUS AGENT DETECTION BY NUCLEIC ACID (DNA OR RNA); SEVERE ACUTE RESPIRATORY SYNDROME CORONAVIRUS 2 (SARS-COV-2) (CORONAVIRUS DISEASE [COVID-19]), AMPLIFIED PROBE TECHNIQUE, MAKING USE OF HIGH THROUGHPUT TECHNOLOGIES AS DESCRIBED BY CMS-2020-01-R: HCPCS

## 2020-10-23 PROCEDURE — 93005 ELECTROCARDIOGRAM TRACING: CPT | Performed by: EMERGENCY MEDICINE

## 2020-10-23 RX ORDER — BENZONATATE 100 MG/1
100 CAPSULE ORAL 3 TIMES DAILY PRN
Qty: 20 CAP | Refills: 0 | Status: SHIPPED | OUTPATIENT
Start: 2020-10-23 | End: 2021-09-17

## 2020-10-23 ASSESSMENT — FIBROSIS 4 INDEX: FIB4 SCORE: 0.3

## 2020-10-23 NOTE — ED NOTES
Pt ambulated to and from the restroom with a steady gait. Pt will not keep on bp cuff and SPO2 monitor.

## 2020-10-23 NOTE — ED NOTES
Pt reassessed and has no unmet needs. Pt IV d/c. Pt given discharge instructions and verbalized understanding via teach back method. Pt ambulated the unit with a steady gait with discharge instructions in hand.

## 2020-10-23 NOTE — ED PROVIDER NOTES
ED Provider Note    Scribed for Richard Crocker M.D. by Stephen Ruano. 10/23/2020, 7:25 AM.    I verified that the patient was wearing a mask and I was wearing appropriate PPE every time I entered the room. The patient's mask was on the patient at all times during my encounter except for a brief view of the oropharynx.    Primary care provider: Pcp Pt States None  Means of arrival: walk in  History obtained from: patient  History limited by: none    CHIEF COMPLAINT  Chief Complaint   Patient presents with   • Nasal Congestion     For three days    • Flu Like Symptoms     For three days with a productive cough, low grade fever 99.8F at home       HPI  Dunia Sahni is a 35 y.o. female who presents to the Emergency Department for congestion and mild shortness of breath onset 3 days ago. Per the patient, she feels congestion in her chest and is now experiencing difficulty breathing. The patient reports additional symptoms of cough, mild chest pain, intermittent low grade fever at 99.8 °F, changes in taste and smell, and vomiting, and denies leg swelling or diarrhea.  No exacerbating or alleviating factors were noted. The patient also denies smoking, history of asthma, history of blood clots, birth control use, or recent travel. She had leukemia that was treated successfully with radiation.    REVIEW OF SYSTEMS  Pertinent positives include congestion, shortness of breath, cough, chest pain, intermittent fever, changes in taste and smell, and vomiting. Pertinent negatives include no leg swelling or diarrhea.  All other systems reviewed and negative.    PAST MEDICAL HISTORY   has a past medical history of Abscess (10/12/2017), Bipolar affective (HCC), Depression, Kidney infection, Leukemia (HCC) (12/2019), and Suicide attempt ([facility]).    SURGICAL HISTORY   has a past surgical history that includes appendectomy; ercp in or (N/A, 7/5/2018); and skpi by laparoscopy (N/A, 7/6/2018).    SOCIAL HISTORY  Social History  "    Tobacco Use   • Smoking status: Never Smoker   • Smokeless tobacco: Never Used   Substance Use Topics   • Alcohol use: Yes     Frequency: Monthly or less     Drinks per session: 1 or 2     Binge frequency: Less than monthly     Comment: patient reports she drinks once or twice a year   • Drug use: Not Currently     Types: Inhaled     Comment: marijuana, cocaine      Social History     Substance and Sexual Activity   Drug Use Not Currently   • Types: Inhaled    Comment: marijuana, cocaine       FAMILY HISTORY  Family History   Family history unknown: Yes       CURRENT MEDICATIONS  Home Medications     Reviewed by Arun Willis R.N. (Registered Nurse) on 10/23/20 at 0636  Med List Status: Complete   Medication Last Dose Status        Patient Corwin Taking any Medications                       ALLERGIES  Allergies   Allergen Reactions   • Vicodin [Hydrocodone-Acetaminophen] Anaphylaxis     RXN=9 years ago       PHYSICAL EXAM  VITAL SIGNS: /86   Pulse 90   Temp 36.2 °C (97.1 °F) (Temporal)   Resp 16   Ht 1.702 m (5' 7\")   Wt (!) 131.5 kg (289 lb 14.5 oz)   SpO2 96%   BMI 45.41 kg/m²     Constitutional: Well developed, Well nourished, Mild distress, Non-toxic appearance.   HENT: Normocephalic, Atraumatic, TMs normal, mucous membranes moist, no erythema, exudates, swelling, or masses, nares patent  Eyes: nonicteric  Neck: Supple, no meningismus  Lymphatic: No lymphadenopathy noted.   Cardiovascular: Regular rate and rhythm, no gallops rubs or murmurs  Lungs: Clear bilaterally   Abdomen: Bowel sounds normal, Soft, No tenderness  Skin: Warm, Dry, no rash  Back: No tenderness, No CVA tenderness.   Genitalia: Deferred  Rectal: Deferred  Extremities: No edema  Neurologic: Alert, appropriate, follows commands, moving all extremities, normal speech   Psychiatric: Affect normal    DIAGNOSTIC STUDIES / PROCEDURES    LABS  Results for orders placed or performed during the hospital encounter of 10/23/20   CBC " WITH DIFFERENTIAL   Result Value Ref Range    WBC 12.5 (H) 4.8 - 10.8 K/uL    RBC 4.28 4.20 - 5.40 M/uL    Hemoglobin 12.1 12.0 - 16.0 g/dL    Hematocrit 37.6 37.0 - 47.0 %    MCV 87.9 81.4 - 97.8 fL    MCH 28.3 27.0 - 33.0 pg    MCHC 32.2 (L) 33.6 - 35.0 g/dL    RDW 43.1 35.9 - 50.0 fL    Platelet Count 342 164 - 446 K/uL    MPV 9.1 9.0 - 12.9 fL    Neutrophils-Polys 78.20 (H) 44.00 - 72.00 %    Lymphocytes 14.50 (L) 22.00 - 41.00 %    Monocytes 5.60 0.00 - 13.40 %    Eosinophils 1.00 0.00 - 6.90 %    Basophils 0.30 0.00 - 1.80 %    Immature Granulocytes 0.40 0.00 - 0.90 %    Nucleated RBC 0.00 /100 WBC    Neutrophils (Absolute) 9.73 (H) 2.00 - 7.15 K/uL    Lymphs (Absolute) 1.80 1.00 - 4.80 K/uL    Monos (Absolute) 0.70 0.00 - 0.85 K/uL    Eos (Absolute) 0.13 0.00 - 0.51 K/uL    Baso (Absolute) 0.04 0.00 - 0.12 K/uL    Immature Granulocytes (abs) 0.05 0.00 - 0.11 K/uL    NRBC (Absolute) 0.00 K/uL   COMP METABOLIC PANEL   Result Value Ref Range    Sodium 138 135 - 145 mmol/L    Potassium 4.2 3.6 - 5.5 mmol/L    Chloride 103 96 - 112 mmol/L    Co2 27 20 - 33 mmol/L    Anion Gap 8.0 7.0 - 16.0    Glucose 106 (H) 65 - 99 mg/dL    Bun 8 8 - 22 mg/dL    Creatinine 0.65 0.50 - 1.40 mg/dL    Calcium 8.7 8.5 - 10.5 mg/dL    AST(SGOT) 16 12 - 45 U/L    ALT(SGPT) 19 2 - 50 U/L    Alkaline Phosphatase 98 30 - 99 U/L    Total Bilirubin 0.4 0.1 - 1.5 mg/dL    Albumin 3.9 3.2 - 4.9 g/dL    Total Protein 6.8 6.0 - 8.2 g/dL    Globulin 2.9 1.9 - 3.5 g/dL    A-G Ratio 1.3 g/dL   proBrain Natriuretic Peptide, NT   Result Value Ref Range    NT-proBNP 63 0 - 125 pg/mL   TROPONIN   Result Value Ref Range    Troponin T <6 6 - 19 ng/L   HCG QUAL SERUM   Result Value Ref Range    Beta-Hcg Qualitative Serum Negative Negative   COVID/SARS CoV-2 PCR    Specimen: Nasopharyngeal; Respirate   Result Value Ref Range    COVID Order Status Received    ESTIMATED GFR   Result Value Ref Range    GFR If African American >60 >60 mL/min/1.73 m 2    GFR  If Non African American >60 >60 mL/min/1.73 m 2   EKG   Result Value Ref Range    Report       [facility] Emergency Dept.    Test Date:  2020-10-23  Pt Name:    EVIE ENGEL           Department: ER  MRN:        [id number]                      Room:  Gender:     Female                       Technician: [id number]  :        1985                   Requested By:ER TRIAGE PROTOCOL  Order #:    [id number]                    Reading MD:    Measurements  Intervals                                Axis  Rate:       73                           P:          34  LA:         157                          QRS:        19  QRSD:       107                          T:          2  QT:         396  QTc:        437    Interpretive Statements  Sinus rhythm  RSR' in V1 or V2, right VCD or RVH  Compared to ECG 2020 02:18:51  No significant changes       All labs reviewed by me.    EKG  Obtained at 6:42  Normal Sinus rhythm   Rate 73  Axis normal   Intervals normal   No ST segment elevation or depression.      RADIOLOGY  DX-CHEST-PORTABLE (1 VIEW)   Final Result      No acute cardiopulmonary abnormality identified.        The radiologist's interpretation of all radiological studies have been reviewed by me.    COURSE & MEDICAL DECISION MAKING  Nursing notes, VS, PMSFHx reviewed in chart.     7:25 AM Patient seen and examined at bedside. Ordered for DX-chest, CBC w/diff, CMP, BNP, Troponin, HCG qual serum, an EKG, and COVID/Sars CoV-2 PCR to evaluate.     8:05 AM I ordered an Estimated GFR to evaluate.     9:28 AM I reviewed the patient's labs and scans, as noted above.     10:21 AM Recheck: Patient re-evaluated at beside. Patient reports feeling fine. Discussed patient's condition and treatment plan, including my plans for discharge. Patient's lab and radiology results discussed. The patient understood and is in agreement.     Decision Making:  This is a 35 y.o. year old female who presents with what appears to  be a viral respiratory infection.  The patient has no evidence of pneumonia radiographically and oxygen saturation is normal.  Lungs are clear.  Covid was sent but is currently pending-the patient will self quarantine until this has returned.  Patient appears to have a viral bronchitis/URI.  Otherwise laboratory work-up demonstrates mild leukocytosis, no significant electrolyte derangement and no troponin elevation.  I do not have a strong suspicion for PE.  I do not have a strong suspicion for CHF.    The patient will return for new or worsening symptoms and is stable at the time of discharge.    The patient is referred to a primary physician for blood pressure management, diabetic screening, and for all other preventative health concerns.    DISPOSITION:  Patient will be discharged home in stable condition.    FOLLOW UP:  [address] [address]  [phone]  Schedule an appointment as soon as possible for a visit today      OUTPATIENT MEDICATIONS:  New Prescriptions    BENZONATATE (TESSALON) 100 MG CAP    Take 1 Cap by mouth 3 times a day as needed for Cough.         FINAL IMPRESSION  1. Upper respiratory tract infection, unspecified type          I, Stephen Olivares), [place] scribing for, and in the presence of, Richard Crocker M.D..    Electronically signed by: Stephen Ruano ([place]), 10/23/2020    IRichard M.D. personally performed the services described in this documentation, as scribed by Stephen Ruano in my presence, and it is both accurate and complete.    C    The note accurately reflects work and decisions made by me.  Richard Crocker M.D.  10/23/2020  10:26 AM

## 2020-10-23 NOTE — DISCHARGE INSTRUCTIONS
You have been swabbed for Covid but that result is not back yet.  Please self quarantine until that has returned.  At this point there is no evidence of pneumonia-your symptoms likely represent a viral infection.  Follow-up in the next couple of days for recheck with a primary doctor or the emergency room.  Return sooner for trouble breathing or other concerns in the interim.

## 2020-10-23 NOTE — ED TRIAGE NOTES
Chief Complaint   Patient presents with   • Nasal Congestion     For three days    • Flu Like Symptoms     For three days with a productive cough, low grade fever 99.8F at home     Pt ambulatory to triage for above complaint. At the end of the exam, the patient states she is starting to have mild chest tightness, so an EKG order was placed and performed by ER Tech.   Pt is AO x 4, follows commands, and responds appropriately to questions. Patient's breathing is unlabored and pain is currently 6/10 on the 0-10 pain scale.  Pt placed in EKG room and then Senior Margie. Patient educated on triage process and encouraged to alert staff for any changes.

## 2020-11-06 NOTE — ED NOTES
Med Rec completed per patient   Allergies reviewed  No ORAL antibiotics in last 14 days    Patient states that she does not currently take any daily medications    None

## 2020-11-07 ENCOUNTER — HOSPITAL ENCOUNTER (EMERGENCY)
Dept: HOSPITAL 8 - ED | Age: 35
Discharge: HOME | End: 2020-11-07
Payer: MEDICARE

## 2020-11-07 VITALS — DIASTOLIC BLOOD PRESSURE: 50 MMHG | SYSTOLIC BLOOD PRESSURE: 111 MMHG

## 2020-11-07 VITALS — WEIGHT: 287.48 LBS | BODY MASS INDEX: 46.2 KG/M2 | HEIGHT: 66 IN

## 2020-11-07 DIAGNOSIS — J45.909: ICD-10-CM

## 2020-11-07 DIAGNOSIS — J00: Primary | ICD-10-CM

## 2020-11-07 DIAGNOSIS — Z87.11: ICD-10-CM

## 2020-11-07 DIAGNOSIS — R50.9: ICD-10-CM

## 2020-11-07 DIAGNOSIS — Z86.718: ICD-10-CM

## 2020-11-07 DIAGNOSIS — F20.9: ICD-10-CM

## 2020-11-07 LAB
ALBUMIN SERPL-MCNC: 3.2 G/DL (ref 3.4–5)
ANION GAP SERPL CALC-SCNC: 6 MMOL/L (ref 5–15)
BASOPHILS # BLD AUTO: 0 X10^3/UL (ref 0–0.1)
BASOPHILS NFR BLD AUTO: 0 % (ref 0–1)
CALCIUM SERPL-MCNC: 8.5 MG/DL (ref 8.5–10.1)
CHLORIDE SERPL-SCNC: 109 MMOL/L (ref 98–107)
CREAT SERPL-MCNC: 0.6 MG/DL (ref 0.55–1.02)
EOSINOPHIL # BLD AUTO: 0.1 X10^3/UL (ref 0–0.4)
EOSINOPHIL NFR BLD AUTO: 1 % (ref 1–7)
ERYTHROCYTE [DISTWIDTH] IN BLOOD BY AUTOMATED COUNT: 13.8 % (ref 9.6–15.2)
LYMPHOCYTES # BLD AUTO: 2 X10^3/UL (ref 1–3.4)
LYMPHOCYTES NFR BLD AUTO: 19 % (ref 22–44)
MCH RBC QN AUTO: 28.2 PG (ref 27–34.8)
MCHC RBC AUTO-ENTMCNC: 32.9 G/DL (ref 32.4–35.8)
MD: NO
MICROSCOPIC: (no result)
MONOCYTES # BLD AUTO: 0.7 X10^3/UL (ref 0.2–0.8)
MONOCYTES NFR BLD AUTO: 7 % (ref 2–9)
NEUTROPHILS # BLD AUTO: 7.9 X10^3/UL (ref 1.8–6.8)
NEUTROPHILS NFR BLD AUTO: 73 % (ref 42–75)
PLATELET # BLD AUTO: 325 X10^3/UL (ref 130–400)
PMV BLD AUTO: 7.5 FL (ref 7.4–10.4)
RBC # BLD AUTO: 4 X10^6/UL (ref 3.82–5.3)

## 2020-11-07 PROCEDURE — 99284 EMERGENCY DEPT VISIT MOD MDM: CPT

## 2020-11-07 PROCEDURE — 80048 BASIC METABOLIC PNL TOTAL CA: CPT

## 2020-11-07 PROCEDURE — 71045 X-RAY EXAM CHEST 1 VIEW: CPT

## 2020-11-07 PROCEDURE — 87086 URINE CULTURE/COLONY COUNT: CPT

## 2020-11-07 PROCEDURE — 81001 URINALYSIS AUTO W/SCOPE: CPT

## 2020-11-07 PROCEDURE — 36415 COLL VENOUS BLD VENIPUNCTURE: CPT

## 2020-11-07 PROCEDURE — 85025 COMPLETE CBC W/AUTO DIFF WBC: CPT

## 2020-11-07 PROCEDURE — 82040 ASSAY OF SERUM ALBUMIN: CPT

## 2020-11-29 ENCOUNTER — HOSPITAL ENCOUNTER (EMERGENCY)
Dept: HOSPITAL 8 - ED | Age: 35
Discharge: HOME | End: 2020-11-29
Payer: MEDICARE

## 2020-11-29 VITALS — SYSTOLIC BLOOD PRESSURE: 131 MMHG | DIASTOLIC BLOOD PRESSURE: 85 MMHG

## 2020-11-29 VITALS — BODY MASS INDEX: 43.77 KG/M2 | HEIGHT: 67 IN | WEIGHT: 278.88 LBS

## 2020-11-29 DIAGNOSIS — J45.909: ICD-10-CM

## 2020-11-29 DIAGNOSIS — N93.8: Primary | ICD-10-CM

## 2020-11-29 DIAGNOSIS — Z86.718: ICD-10-CM

## 2020-11-29 DIAGNOSIS — Z90.49: ICD-10-CM

## 2020-11-29 LAB
ALBUMIN SERPL-MCNC: 3.4 G/DL (ref 3.4–5)
ANION GAP SERPL CALC-SCNC: 9 MMOL/L (ref 5–15)
BASOPHILS # BLD AUTO: 0.1 X10^3/UL (ref 0–0.1)
BASOPHILS NFR BLD AUTO: 1 % (ref 0–1)
CALCIUM SERPL-MCNC: 8.3 MG/DL (ref 8.5–10.1)
CHLORIDE SERPL-SCNC: 110 MMOL/L (ref 98–107)
CREAT SERPL-MCNC: 0.83 MG/DL (ref 0.55–1.02)
EOSINOPHIL # BLD AUTO: 0.1 X10^3/UL (ref 0–0.4)
EOSINOPHIL NFR BLD AUTO: 1 % (ref 1–7)
ERYTHROCYTE [DISTWIDTH] IN BLOOD BY AUTOMATED COUNT: 13.8 % (ref 9.6–15.2)
LYMPHOCYTES # BLD AUTO: 2.1 X10^3/UL (ref 1–3.4)
LYMPHOCYTES NFR BLD AUTO: 20 % (ref 22–44)
MCH RBC QN AUTO: 28.6 PG (ref 27–34.8)
MCHC RBC AUTO-ENTMCNC: 33.7 G/DL (ref 32.4–35.8)
MD: NO
MONOCYTES # BLD AUTO: 0.6 X10^3/UL (ref 0.2–0.8)
MONOCYTES NFR BLD AUTO: 6 % (ref 2–9)
NEUTROPHILS # BLD AUTO: 7.9 X10^3/UL (ref 1.8–6.8)
NEUTROPHILS NFR BLD AUTO: 74 % (ref 42–75)
PLATELET # BLD AUTO: 411 X10^3/UL (ref 130–400)
PMV BLD AUTO: 7.2 FL (ref 7.4–10.4)
RBC # BLD AUTO: 4.06 X10^6/UL (ref 3.82–5.3)

## 2020-11-29 PROCEDURE — 85025 COMPLETE CBC W/AUTO DIFF WBC: CPT

## 2020-11-29 PROCEDURE — 99283 EMERGENCY DEPT VISIT LOW MDM: CPT

## 2020-11-29 PROCEDURE — 84703 CHORIONIC GONADOTROPIN ASSAY: CPT

## 2020-11-29 PROCEDURE — 80048 BASIC METABOLIC PNL TOTAL CA: CPT

## 2020-11-29 PROCEDURE — 36415 COLL VENOUS BLD VENIPUNCTURE: CPT

## 2020-11-29 PROCEDURE — 86901 BLOOD TYPING SEROLOGIC RH(D): CPT

## 2020-11-29 PROCEDURE — 82040 ASSAY OF SERUM ALBUMIN: CPT

## 2021-01-26 NOTE — ED NOTES
Patient is lindsey wilkinson if she has been accept at another facility at this time, explained that at this time there is no pending transfer   bp normal in office today  Continue healthy eating and exercise habits
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
## 2021-08-22 ENCOUNTER — HOSPITAL ENCOUNTER (EMERGENCY)
Dept: HOSPITAL 8 - ED | Age: 36
Discharge: HOME | End: 2021-08-22
Payer: MEDICARE

## 2021-08-22 VITALS — DIASTOLIC BLOOD PRESSURE: 74 MMHG | SYSTOLIC BLOOD PRESSURE: 124 MMHG

## 2021-08-22 VITALS — HEIGHT: 67 IN | WEIGHT: 269.85 LBS | BODY MASS INDEX: 42.35 KG/M2

## 2021-08-22 DIAGNOSIS — L85.3: Primary | ICD-10-CM

## 2021-08-22 DIAGNOSIS — Z86.718: ICD-10-CM

## 2021-08-22 DIAGNOSIS — J45.909: ICD-10-CM

## 2021-08-22 PROCEDURE — 99282 EMERGENCY DEPT VISIT SF MDM: CPT

## 2021-08-25 ENCOUNTER — HOSPITAL ENCOUNTER (EMERGENCY)
Dept: HOSPITAL 8 - ED | Age: 36
Discharge: HOME | End: 2021-08-25
Payer: MEDICARE

## 2021-08-25 VITALS — WEIGHT: 266.98 LBS | BODY MASS INDEX: 41.9 KG/M2 | HEIGHT: 67 IN

## 2021-08-25 VITALS — DIASTOLIC BLOOD PRESSURE: 57 MMHG | SYSTOLIC BLOOD PRESSURE: 128 MMHG
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
## 2021-08-25 DIAGNOSIS — Z86.718: ICD-10-CM
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
## 2021-08-25 DIAGNOSIS — Z87.11: ICD-10-CM

## 2021-08-25 DIAGNOSIS — J45.909: ICD-10-CM

## 2021-08-25 DIAGNOSIS — Z90.89: ICD-10-CM

## 2021-08-25 DIAGNOSIS — G89.11: Primary | ICD-10-CM

## 2021-08-25 DIAGNOSIS — M79.672: ICD-10-CM
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
## 2021-08-25 DIAGNOSIS — Z90.49: ICD-10-CM
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
## 2021-08-25 PROCEDURE — 99283 EMERGENCY DEPT VISIT LOW MDM: CPT

## 2021-08-30 ENCOUNTER — HOSPITAL ENCOUNTER (EMERGENCY)
Dept: HOSPITAL 8 - ED | Age: 36
Discharge: HOME | End: 2021-08-30
Payer: MEDICARE

## 2021-08-30 VITALS — DIASTOLIC BLOOD PRESSURE: 66 MMHG | SYSTOLIC BLOOD PRESSURE: 105 MMHG

## 2021-08-30 VITALS — WEIGHT: 265.88 LBS | BODY MASS INDEX: 41.73 KG/M2 | HEIGHT: 67 IN

## 2021-08-30 DIAGNOSIS — Z00.00: Primary | ICD-10-CM

## 2021-08-30 LAB — HCG UR SG: 1.03 (ref 1–1.03)

## 2021-08-30 PROCEDURE — 99283 EMERGENCY DEPT VISIT LOW MDM: CPT

## 2021-08-30 PROCEDURE — 81025 URINE PREGNANCY TEST: CPT

## 2021-09-08 ENCOUNTER — HOSPITAL ENCOUNTER (EMERGENCY)
Dept: HOSPITAL 8 - ED | Age: 36
Discharge: LEFT BEFORE DISCHARGE | End: 2021-09-08
Payer: MEDICARE

## 2021-09-08 VITALS — SYSTOLIC BLOOD PRESSURE: 135 MMHG | DIASTOLIC BLOOD PRESSURE: 72 MMHG

## 2021-09-08 VITALS — BODY MASS INDEX: 42.66 KG/M2 | WEIGHT: 271.83 LBS | HEIGHT: 67 IN

## 2021-09-08 DIAGNOSIS — M54.6: Primary | ICD-10-CM

## 2021-09-08 DIAGNOSIS — Z53.21: ICD-10-CM

## 2021-09-16 ENCOUNTER — HOSPITAL ENCOUNTER (EMERGENCY)
Dept: HOSPITAL 8 - ED | Age: 36
Discharge: HOME | End: 2021-09-16
Payer: MEDICARE

## 2021-09-16 ENCOUNTER — APPOINTMENT (OUTPATIENT)
Dept: RADIOLOGY | Facility: MEDICAL CENTER | Age: 36
End: 2021-09-16
Attending: EMERGENCY MEDICINE
Payer: MEDICARE

## 2021-09-16 ENCOUNTER — APPOINTMENT (OUTPATIENT)
Dept: RADIOLOGY | Facility: MEDICAL CENTER | Age: 36
End: 2021-09-16
Payer: MEDICARE

## 2021-09-16 ENCOUNTER — HOSPITAL ENCOUNTER (EMERGENCY)
Facility: MEDICAL CENTER | Age: 36
End: 2021-09-17
Attending: EMERGENCY MEDICINE
Payer: MEDICARE

## 2021-09-16 VITALS — SYSTOLIC BLOOD PRESSURE: 105 MMHG | DIASTOLIC BLOOD PRESSURE: 68 MMHG

## 2021-09-16 VITALS — BODY MASS INDEX: 43.3 KG/M2 | HEIGHT: 66 IN | WEIGHT: 269.4 LBS

## 2021-09-16 DIAGNOSIS — R10.9 FLANK PAIN: ICD-10-CM

## 2021-09-16 DIAGNOSIS — R53.81: Primary | ICD-10-CM

## 2021-09-16 DIAGNOSIS — M79.10 MYALGIA: ICD-10-CM

## 2021-09-16 DIAGNOSIS — J45.909: ICD-10-CM

## 2021-09-16 DIAGNOSIS — N39.0: ICD-10-CM

## 2021-09-16 DIAGNOSIS — R30.0 DYSURIA: ICD-10-CM

## 2021-09-16 DIAGNOSIS — R07.89 CHEST DISCOMFORT: ICD-10-CM

## 2021-09-16 DIAGNOSIS — R68.83 CHILLS (WITHOUT FEVER): ICD-10-CM

## 2021-09-16 DIAGNOSIS — N30.01 ACUTE CYSTITIS WITH HEMATURIA: ICD-10-CM

## 2021-09-16 LAB
ALBUMIN SERPL BCP-MCNC: 3.7 G/DL (ref 3.2–4.9)
ALBUMIN SERPL-MCNC: 3.5 G/DL (ref 3.4–5)
ALBUMIN/GLOB SERPL: 1.3 G/DL
ALP SERPL-CCNC: 86 U/L (ref 30–99)
ALP SERPL-CCNC: 91 U/L (ref 45–117)
ALT SERPL-CCNC: 11 U/L (ref 2–50)
ALT SERPL-CCNC: 23 U/L (ref 12–78)
ANION GAP SERPL CALC-SCNC: 4 MMOL/L (ref 5–15)
ANION GAP SERPL CALC-SCNC: 8 MMOL/L (ref 7–16)
APPEARANCE UR: ABNORMAL
AST SERPL-CCNC: 15 U/L (ref 12–45)
BACTERIA #/AREA URNS HPF: ABNORMAL /HPF
BASOPHILS # BLD AUTO: 0.1 X10^3/UL (ref 0–0.1)
BASOPHILS # BLD AUTO: 0.3 % (ref 0–1.8)
BASOPHILS # BLD: 0.04 K/UL (ref 0–0.12)
BASOPHILS NFR BLD AUTO: 1 % (ref 0–1)
BILIRUB SERPL-MCNC: 0.2 MG/DL (ref 0.1–1.5)
BILIRUB SERPL-MCNC: 0.4 MG/DL (ref 0.2–1)
BILIRUB UR QL STRIP.AUTO: NEGATIVE
BUN SERPL-MCNC: 12 MG/DL (ref 8–22)
CALCIUM SERPL-MCNC: 8.5 MG/DL (ref 8.5–10.1)
CALCIUM SERPL-MCNC: 9 MG/DL (ref 8.5–10.5)
CHLORIDE SERPL-SCNC: 107 MMOL/L (ref 98–107)
CHLORIDE SERPL-SCNC: 109 MMOL/L (ref 96–112)
CO2 SERPL-SCNC: 23 MMOL/L (ref 20–33)
COLOR UR: YELLOW
CREAT SERPL-MCNC: 0.77 MG/DL (ref 0.55–1.02)
CREAT SERPL-MCNC: 0.79 MG/DL (ref 0.5–1.4)
EKG IMPRESSION: NORMAL
EOSINOPHIL # BLD AUTO: 0.19 K/UL (ref 0–0.51)
EOSINOPHIL # BLD AUTO: 0.2 X10^3/UL (ref 0–0.4)
EOSINOPHIL NFR BLD AUTO: 1 % (ref 1–7)
EOSINOPHIL NFR BLD: 1.6 % (ref 0–6.9)
EPI CELLS #/AREA URNS HPF: ABNORMAL /HPF
ERYTHROCYTE [DISTWIDTH] IN BLOOD BY AUTOMATED COUNT: 17.5 % (ref 9.6–15.2)
ERYTHROCYTE [DISTWIDTH] IN BLOOD BY AUTOMATED COUNT: 43.9 FL (ref 35.9–50)
GLOBULIN SER CALC-MCNC: 2.8 G/DL (ref 1.9–3.5)
GLUCOSE SERPL-MCNC: 99 MG/DL (ref 65–99)
GLUCOSE UR STRIP.AUTO-MCNC: NEGATIVE MG/DL
HCG UR QL: NEGATIVE
HCT VFR BLD AUTO: 30.6 % (ref 37–47)
HGB BLD-MCNC: 9.2 G/DL (ref 12–16)
HYALINE CASTS #/AREA URNS LPF: ABNORMAL /LPF
IMM GRANULOCYTES # BLD AUTO: 0.05 K/UL (ref 0–0.11)
IMM GRANULOCYTES NFR BLD AUTO: 0.4 % (ref 0–0.9)
KETONES UR STRIP.AUTO-MCNC: NEGATIVE MG/DL
LEUKOCYTE ESTERASE UR QL STRIP.AUTO: ABNORMAL
LYMPHOCYTES # BLD AUTO: 3.01 K/UL (ref 1–4.8)
LYMPHOCYTES # BLD AUTO: 3.3 X10^3/UL (ref 1–3.4)
LYMPHOCYTES NFR BLD AUTO: 22 % (ref 22–44)
LYMPHOCYTES NFR BLD: 25.4 % (ref 22–41)
MCH RBC QN AUTO: 22.5 PG (ref 27–34.8)
MCH RBC QN AUTO: 22.6 PG (ref 27–33)
MCHC RBC AUTO-ENTMCNC: 30.1 G/DL (ref 33.6–35)
MCHC RBC AUTO-ENTMCNC: 31.4 G/DL (ref 32.4–35.8)
MCV RBC AUTO: 75.2 FL (ref 81.4–97.8)
MICRO URNS: ABNORMAL
MICROSCOPIC: (no result)
MONOCYTES # BLD AUTO: 0.75 K/UL (ref 0–0.85)
MONOCYTES # BLD AUTO: 0.9 X10^3/UL (ref 0.2–0.8)
MONOCYTES NFR BLD AUTO: 6 % (ref 2–9)
MONOCYTES NFR BLD AUTO: 6.3 % (ref 0–13.4)
NEUTROPHILS # BLD AUTO: 10.4 X10^3/UL (ref 1.8–6.8)
NEUTROPHILS # BLD AUTO: 7.83 K/UL (ref 2–7.15)
NEUTROPHILS NFR BLD AUTO: 70 % (ref 42–75)
NEUTROPHILS NFR BLD: 66 % (ref 44–72)
NITRITE UR QL STRIP.AUTO: NEGATIVE
NRBC # BLD AUTO: 0 K/UL
NRBC BLD-RTO: 0 /100 WBC
PH UR STRIP.AUTO: 7 [PH] (ref 5–8)
PLATELET # BLD AUTO: 470 K/UL (ref 164–446)
PLATELET # BLD AUTO: 517 X10^3/UL (ref 130–400)
PMV BLD AUTO: 7.4 FL (ref 7.4–10.4)
PMV BLD AUTO: 9 FL (ref 9–12.9)
POTASSIUM SERPL-SCNC: 4.3 MMOL/L (ref 3.6–5.5)
PROT SERPL-MCNC: 6.5 G/DL (ref 6–8.2)
PROT SERPL-MCNC: 7.7 G/DL (ref 6.4–8.2)
PROT UR QL STRIP: NEGATIVE MG/DL
RBC # BLD AUTO: 4.07 M/UL (ref 4.2–5.4)
RBC # BLD AUTO: 4.48 X10^6/UL (ref 3.82–5.3)
RBC # URNS HPF: ABNORMAL /HPF
RBC UR QL AUTO: NEGATIVE
SODIUM SERPL-SCNC: 140 MMOL/L (ref 135–145)
SP GR UR STRIP.AUTO: 1.02
UROBILINOGEN UR STRIP.AUTO-MCNC: 0.2 MG/DL
WBC # BLD AUTO: 11.9 K/UL (ref 4.8–10.8)
WBC #/AREA URNS HPF: ABNORMAL /HPF

## 2021-09-16 PROCEDURE — 96372 THER/PROPH/DIAG INJ SC/IM: CPT

## 2021-09-16 PROCEDURE — 84484 ASSAY OF TROPONIN QUANT: CPT

## 2021-09-16 PROCEDURE — 99283 EMERGENCY DEPT VISIT LOW MDM: CPT

## 2021-09-16 PROCEDURE — 81025 URINE PREGNANCY TEST: CPT

## 2021-09-16 PROCEDURE — 99284 EMERGENCY DEPT VISIT MOD MDM: CPT

## 2021-09-16 PROCEDURE — 85025 COMPLETE CBC W/AUTO DIFF WBC: CPT

## 2021-09-16 PROCEDURE — 700102 HCHG RX REV CODE 250 W/ 637 OVERRIDE(OP): Performed by: EMERGENCY MEDICINE

## 2021-09-16 PROCEDURE — 81001 URINALYSIS AUTO W/SCOPE: CPT

## 2021-09-16 PROCEDURE — 80053 COMPREHEN METABOLIC PANEL: CPT

## 2021-09-16 PROCEDURE — 87086 URINE CULTURE/COLONY COUNT: CPT

## 2021-09-16 PROCEDURE — 93005 ELECTROCARDIOGRAM TRACING: CPT | Performed by: EMERGENCY MEDICINE

## 2021-09-16 PROCEDURE — 36415 COLL VENOUS BLD VENIPUNCTURE: CPT

## 2021-09-16 PROCEDURE — 93005 ELECTROCARDIOGRAM TRACING: CPT

## 2021-09-16 PROCEDURE — 71045 X-RAY EXAM CHEST 1 VIEW: CPT

## 2021-09-16 PROCEDURE — A9270 NON-COVERED ITEM OR SERVICE: HCPCS | Performed by: EMERGENCY MEDICINE

## 2021-09-16 PROCEDURE — 74176 CT ABD & PELVIS W/O CONTRAST: CPT | Mod: ME

## 2021-09-16 RX ORDER — CEFDINIR 300 MG/1
300 CAPSULE ORAL ONCE
Status: COMPLETED | OUTPATIENT
Start: 2021-09-16 | End: 2021-09-16

## 2021-09-16 RX ADMIN — CEFDINIR 300 MG: 300 CAPSULE ORAL at 22:51

## 2021-09-16 NOTE — NUR
Patient reports body aches "for a few days", reports a fever "about an hour 
before I called the ambulance". During assessment patient does not make good 
eye contact, is slow to answer questions. Patient reports she had leukemia 3 
years ago and thinks it is coming back.

## 2021-09-17 VITALS
TEMPERATURE: 97.9 F | HEART RATE: 89 BPM | HEIGHT: 67 IN | RESPIRATION RATE: 16 BRPM | OXYGEN SATURATION: 95 % | BODY MASS INDEX: 45.41 KG/M2 | DIASTOLIC BLOOD PRESSURE: 56 MMHG | SYSTOLIC BLOOD PRESSURE: 112 MMHG

## 2021-09-17 LAB — TROPONIN T SERPL-MCNC: <6 NG/L (ref 6–19)

## 2021-09-17 RX ORDER — CEFDINIR 300 MG/1
300 CAPSULE ORAL 2 TIMES DAILY
Qty: 20 CAPSULE | Refills: 0 | Status: SHIPPED | OUTPATIENT
Start: 2021-09-17 | End: 2021-09-27

## 2021-09-17 RX ORDER — ONDANSETRON 4 MG/1
4 TABLET, ORALLY DISINTEGRATING ORAL EVERY 6 HOURS PRN
Qty: 10 TABLET | Refills: 0 | Status: SHIPPED | OUTPATIENT
Start: 2021-09-17 | End: 2022-11-19

## 2021-09-17 NOTE — ED TRIAGE NOTES
"Dunia Carolina Sahni  36 y.o. female  Chief Complaint   Patient presents with   • Flank Pain   • Chills   • N/V     Reports right flank pain, painful urination, incomplete emptying of the bladder, chills fatigue and chest tightness x 4-5 days.    Seen at [facility] for the same problem yesterday, was given \"a shot\" and that reportedly made her symptoms worse.      Vitals:    09/16/21 2013   BP: 124/72   Pulse: 92   Resp: 16   Temp: 36.4 °C (97.6 °F)   SpO2: 93%        Patient ambulatory into triage for the complaint above.    Patient is in stable condition at time of triage, has been educated on the triage process and placed back into the ED lobby at this time - pt has verbalized understanding of this process.     RN encouraged patient to alert staff at front for any changes that may occur while they are waiting to be evaluated by an ERP.  "

## 2021-09-17 NOTE — ED PROVIDER NOTES
ED Provider Note    CHIEF COMPLAINT  Chief Complaint   Patient presents with   • Flank Pain   • Chills   • N/V     HPI  Patient is a 36-year-old female with a history of bipolar affective disorder, prior UTIs who presents emergency room for several complaints.  She is primarily concerned about her nonspecific lower back discomfort with suprapubic discomfort and painful urination over the course of several days.  She denies hematuria, denies any history of kidney stones, but is concerned about possibility of a urinary tract infection.  Patient states that she was seen previously but is unaware of the diagnosis and believes something may have missed a urinary tract infection.  Denies any current sexual activity, denies a history of sexually transmitted infections, is endorsing a scant amount of vaginal discharge without bleeding is unsure of her last menstrual cycle.  Additionally she has had some nonspecific body aches and chills a course of 10 days, some nonspecific chest discomfort that she states is not associated with any exertion and is not currently present.  These episodes have lasted 40 minutes at a time, been present for the better part of several weeks.  She denies any coughing, denies true measured fevers, has a history of prior appendectomy.  Denies history of ovarian cysts or ovarian problems.  She is refusing pelvic exams or vaginal swabs    PPE Note: I personally donned full PPE for all patient encounters during this visit, including being clean-shaven with an N95 respirator mask, gloves, and goggles.    REVIEW OF SYSTEMS  See HPI for further details. All other systems are negative.     PAST MEDICAL HISTORY   has a past medical history of Abscess (10/12/2017), Bipolar affective (HCC), Depression, Kidney infection, Leukemia ([facility]) (12/2019), and Suicide attempt ([facility]).    SOCIAL HISTORY  Social History     Tobacco Use   • Smoking status: Never Smoker   • Smokeless tobacco: Never Used   Vaping Use   •  "Vaping Use: Never used   Substance and Sexual Activity   • Alcohol use: Yes     Comment: patient reports she drinks once or twice a year   • Drug use: Not Currently     Types: Inhaled     Comment: marijuana, cocaine   • Sexual activity: Not on file     SURGICAL HISTORY   has a past surgical history that includes appendectomy; ercp in or (N/A, 7/5/2018); and skip by laparoscopy (N/A, 7/6/2018).    CURRENT MEDICATIONS  Home Medications    **Home medications have not yet been reviewed for this encounter**       ALLERGIES  Allergies   Allergen Reactions   • Vicodin [Hydrocodone-Acetaminophen] Anaphylaxis     RXN=9 years ago     PHYSICAL EXAM  VITAL SIGNS: /56   Pulse 89   Temp 36.6 °C (97.9 °F) (Oral)   Resp 16   Ht 1.702 m (5' 7\")   SpO2 95%   BMI 45.41 kg/m²   Pulse ox interpretation: I interpret this pulse ox as normal.  Genl: F sitting in chair comfortably, speaking clearly, appears in no acute distress  Head: NC/AT  ENT: Mucous membranes moist, posterior pharynx clear, uvula midline, nares patent bilaterally  Eyes: Normal sclera, pupils equal round reactive to light  Neck: Supple, FROM, no LAD appreciated  Pulmonary: Lungs are clear to auscultation bilaterally  Chest: No TTP  CV:  RRR, no murmur appreciated, pulses 2+ in both upper and lower extremities,  Abdomen: soft, scant suprapubic tenderness, negative Jc sign, no McBurney's point tenderness.  ND; no rebound/guarding, no masses palpated, no HSM  : no CVA tenderness.  Pt declines vaginal exam or swabs  Musculoskeletal: Pain free ROM of the neck. Moving upper and lower extremities and spontaneous in coordinated fashion  Neuro: A&Ox4 (person, place, time, situation), speech fluent, gait steady, no focal deficits appreciated, No cerebellar signs. Sensation is grossly intact in the distal upper and lower extremities.  5/5 strength in  and dorsiflexion/plantar flexion of the ankles  Psych: Patient has an appropriate affect and behavior  Skin: " No rash or lesions.  No pallor or jaundice.  No cyanosis.  Warm and dry.     DIAGNOSTIC STUDIES / PROCEDURES    EKG  Results for orders placed or performed during the hospital encounter of 21   EKG   Result Value Ref Range    Report       [facility] Emergency Dept.    Test Date:  2021  Pt Name:    EVIE ENGEL           Department: ER  MRN:        [id number]                      Room:  Gender:     Female                       Technician: HRR  :        1985                   Requested By:ER TRIAGE PROTOCOL  Order #:    [id number]                    Reading MD: Robert Good MD    Measurements  Intervals                                Axis  Rate:       75                           P:          19  NV:         156                          QRS:        11  QRSD:       98                           T:          3  QT:         372  QTc:        416    Interpretive Statements  SINUS RHYTHM  RSR' IN V1 OR V2, RIGHT VCD OR RVH  Compared to ECG 10/23/2020 06:42:31  No significant changes  Electronically Signed On 2021 21:53:16 PDT by Robert Good MD       LABS  Labs Reviewed   URINALYSIS,CULTURE IF INDICATED - Abnormal; Notable for the following components:       Result Value    Character Turbid (*)     Leukocyte Esterase Moderate (*)     All other components within normal limits    Narrative:     Indication for culture:->Patient WITHOUT an indwelling Schulz  catheter in place with new onset of Dysuria, Frequency,  Urgency, and/or Suprapubic pain   CBC WITH DIFFERENTIAL - Abnormal; Notable for the following components:    WBC 11.9 (*)     RBC 4.07 (*)     Hemoglobin 9.2 (*)     Hematocrit 30.6 (*)     MCV 75.2 (*)     MCH 22.6 (*)     MCHC 30.1 (*)     Platelet Count 470 (*)     Neutrophils (Absolute) 7.83 (*)     All other components within normal limits   URINE MICROSCOPIC (W/UA) - Abnormal; Notable for the following components:    WBC 5-10 (*)     RBC 5-10 (*)     Bacteria Few (*)      Epithelial Cells Moderate (*)     All other components within normal limits    Narrative:     Indication for culture:->Patient WITHOUT an indwelling Schulz  catheter in place with new onset of Dysuria, Frequency,  Urgency, and/or Suprapubic pain   HCG QUALITATIVE UR    Narrative:     Indication for culture:->Patient WITHOUT an indwelling Schulz  catheter in place with new onset of Dysuria, Frequency,  Urgency, and/or Suprapubic pain   COMP METABOLIC PANEL   TROPONIN   ESTIMATED GFR     RADIOLOGY  CT-RENAL COLIC EVALUATION(A/P W/O)   Final Result         1.  Diverticulosis   2.  Fat-containing umbilical hernia      DX-CHEST-PORTABLE (1 VIEW)   Final Result         1.  No acute cardiopulmonary disease.        COURSE & MEDICAL DECISION MAKING  Pertinent Labs & Imaging studies reviewed. (See chart for details)    DDX:  Ectopic pregnancy/pregnancy  Cholecystitis  UTI/cystitis  Diverticulitis  Neprolithiasis  Gastroenteritis  Pregnancy  Ovarian cyst unlikely    MDM  Number of Diagnoses or Management Options    Initial evaluation at 2139:  Patient presents emergency room for symptoms as described above.  The patient presented with ongoing symptomology and believes that she has a urinary tract infection.  She apparently was seen at another facility and his records are not immediately available to me.  She is afebrile, she has no localizing flank discomfort or true signs of CVA tenderness and I believe the likelihood of a disseminated intra-abdominal infection is very low likelihood.  She does have underlying psychiatric illness and does demonstrate some behavioral changes that make me extensiveness of the discussion very difficult.  She is not a danger to herself or having psychosis or suicidal ideations at this time.  Lab work showed a very scant leukocytosis, stable but chronic anemia, no gross electrolyte abnormalities and her urinalysis shows leukocyte esterase with bacteria and white cells in a certain turbidity.  There  is some element of contamination due to the amount of epithelial cells that are present and her hCG is negative.  Her nonspecific chest discomfort did prompt troponin and EKG which are grossly unremarkable for signs of acute ischemia or infarction.  Due to the presence of RBCs and her difficult history I do believe getting a renal colic study is important for excluding any concurrent stone in the presence of infection.  Renal colic study is unremarkable for any nephrolithiasis and overall believe the patient can be treated for UTI and flank pain with cefdinir for 7 to 10 days duration.  Patient was counseled regarding these findings, the importance of adhering to medication regimen and completing the antibiotic therapy is discharged home in stable condition.    FINAL IMPRESSION  Visit Diagnoses     ICD-10-CM   1. Dysuria  R30.0   2. Chills (without fever)  R68.83   3. Myalgia  M79.10   4. Chest discomfort  R07.89   5. Acute cystitis with hematuria  N30.01   6. Flank pain  R10.9     Electronically signed by: Florentino Jones M.D., 9/16/2021 9:39 PM

## 2021-09-17 NOTE — ED NOTES
Discharge teaching with paperwork regarding UTI and new prescriptions provided to pt. Pt verbalized understanding of teaching and all questions answered. Pt is A&Ox4 with stable vital signs, stable physical assessment, and PIV removed upon discharge. Pt ambulatory out of ED with steady gait with all personal belongings.

## 2021-09-17 NOTE — ED NOTES
"Patient refused to perform self vaginal pathogens DNA swab. Patient stated \"It is against my Gnosticism\". Notified ERP. ERP discontinued order.   "

## 2021-09-19 ENCOUNTER — HOSPITAL ENCOUNTER (EMERGENCY)
Facility: MEDICAL CENTER | Age: 36
End: 2021-09-19
Attending: EMERGENCY MEDICINE
Payer: MEDICARE

## 2021-09-19 VITALS
HEART RATE: 72 BPM | OXYGEN SATURATION: 98 % | DIASTOLIC BLOOD PRESSURE: 68 MMHG | BODY MASS INDEX: 42.66 KG/M2 | TEMPERATURE: 97.2 F | WEIGHT: 271.83 LBS | HEIGHT: 67 IN | RESPIRATION RATE: 18 BRPM | SYSTOLIC BLOOD PRESSURE: 102 MMHG

## 2021-09-19 DIAGNOSIS — R10.9 FLANK PAIN: ICD-10-CM

## 2021-09-19 DIAGNOSIS — R30.0 DYSURIA: ICD-10-CM

## 2021-09-19 LAB
APPEARANCE UR: ABNORMAL
BACTERIA #/AREA URNS HPF: NEGATIVE /HPF
BILIRUB UR QL STRIP.AUTO: NEGATIVE
COLOR UR: ABNORMAL
EPI CELLS #/AREA URNS HPF: ABNORMAL /HPF
GLUCOSE BLD-MCNC: 124 MG/DL (ref 65–99)
GLUCOSE UR STRIP.AUTO-MCNC: NEGATIVE MG/DL
KETONES UR STRIP.AUTO-MCNC: NEGATIVE MG/DL
LEUKOCYTE ESTERASE UR QL STRIP.AUTO: NEGATIVE
MICRO URNS: ABNORMAL
MUCOUS THREADS #/AREA URNS HPF: ABNORMAL /HPF
NITRITE UR QL STRIP.AUTO: POSITIVE
PH UR STRIP.AUTO: 5.5 [PH] (ref 5–8)
PROT UR QL STRIP: 100 MG/DL
RBC # URNS HPF: >150 /HPF
RBC UR QL AUTO: ABNORMAL
SP GR UR STRIP.AUTO: >=1.03
UROBILINOGEN UR STRIP.AUTO-MCNC: 1 MG/DL
WBC #/AREA URNS HPF: ABNORMAL /HPF

## 2021-09-19 PROCEDURE — 82962 GLUCOSE BLOOD TEST: CPT

## 2021-09-19 PROCEDURE — 99283 EMERGENCY DEPT VISIT LOW MDM: CPT

## 2021-09-19 PROCEDURE — 81001 URINALYSIS AUTO W/SCOPE: CPT

## 2021-09-19 ASSESSMENT — FIBROSIS 4 INDEX: FIB4 SCORE: 0.33

## 2021-09-20 NOTE — ED PROVIDER NOTES
"ED Provider Note    CHIEF COMPLAINT  Chief Complaint   Patient presents with   • Medication Reaction     Patient was recently given a \"shot\" for UTI at [facility] and has since felt ill and achy with N/V. Patient is experiencing BL (3/10) flank pain that seems marcia getting worse.        LINDEN Sahni is a 36 y.o. female who presents with ongoing dysuria and bilateral flank pain.  This is been going on for the past 4 days or so, 3 days ago was evaluated here in the emergency department with a CT scan which excluded any sort of obstructive uropathy as well as blood work which did not reveal any significant leukocytosis, she had a urinalysis which suggested infection.  She was discharged with a prescription for cefdinir for pyelonephritis.  She states that she took double dose of the cefdinir and it made her achy because it was not a liquid antibiotic.  She then went to [place] where she was evaluated again, treated with an intramuscular injection of an antibiotic which she states is not working either so she returns here.  The patient has a psychiatric history.  She denies any other acute complaints.  She is requesting blood work and a liquid antibiotic.    REVIEW OF SYSTEMS  Negative for fever, rash, chest pain, dyspnea, abdominal pain. All other systems are negative.     PAST MEDICAL HISTORY   has a past medical history of Abscess (10/12/2017), Bipolar affective ([facility]), Depression, Kidney infection, Leukemia ([facility]) (12/2019), and Suicide attempt ([facility]).    SOCIAL HISTORY  Social History     Tobacco Use   • Smoking status: Never Smoker   • Smokeless tobacco: Never Used   Vaping Use   • Vaping Use: Never used   Substance and Sexual Activity   • Alcohol use: Yes     Comment: patient reports she drinks once or twice a year   • Drug use: Not Currently     Types: Inhaled     Comment: hx: marijuana, cocaine   • Sexual activity: Not on file       SURGICAL HISTORY   has a past surgical history that includes " "appendectomy; ercp in or (N/A, 7/5/2018); and skip by laparoscopy (N/A, 7/6/2018).    CURRENT MEDICATIONS  I personally reviewed the medication list in the charting documentation.     ALLERGIES  Allergies   Allergen Reactions   • Vicodin [Hydrocodone-Acetaminophen] Anaphylaxis     RXN=9 years ago       PHYSICAL EXAM  VITAL SIGNS: BP (!) 94/62   Pulse 86   Temp 36.4 °C (97.6 °F) (Temporal)   Resp 18   Ht 1.702 m (5' 7\")   Wt 123 kg (271 lb 13.2 oz)   LMP 09/19/2021   SpO2 98%   BMI 42.57 kg/m²   Constitutional: Standing at the bedside, well appearing patient in no acute distress.  Awake and alert, not toxic nor ill in appearance.  HENT: Normocephalic, no obvious evidence of acute trauma.   Neck: Comfortable movement without any obvious restriction in the range of motion.  Eyes: Conjunctiva normal, Non-icteric.   Chest: Normal nonlabored respirations.  Skin: The exposed portions of skin reveal no obvious rash or other abnormalities.  Musculoskeletal: No obvious restriction in the range of motion in all major joints.   Neurologic: Alert, No obvious focal deficits noted.   Psychiatric: Affect normal for clinical presentation    DIAGNOSTIC STUDIES / PROCEDURES    LABS/EKGs  Results for orders placed or performed during the hospital encounter of 09/19/21   URINALYSIS    Specimen: Urine   Result Value Ref Range    Color Red (A)     Character Hazy (A)     Specific Gravity >=1.030 <1.035    Ph 5.5 5.0 - 8.0    Glucose Negative Negative mg/dL    Ketones Negative Negative mg/dL    Protein 100 (A) Negative mg/dL    Bilirubin Negative Negative    Urobilinogen, Urine 1.0 Negative    Nitrite Positive (A) Negative    Leukocyte Esterase Negative Negative    Occult Blood Moderate (A) Negative    Micro Urine Req Microscopic    URINE MICROSCOPIC (W/UA)   Result Value Ref Range    WBC Rare /hpf    RBC >150 (A) /hpf    Bacteria Negative None /hpf    Epithelial Cells Few /hpf    Mucous Threads Rare /hpf   POCT glucose device " results   Result Value Ref Range    Glucose - Accu-Ck 124 (H) 65 - 99 mg/dL        COURSE & MEDICAL DECISION MAKING  Pertinent Labs & Imaging studies reviewed. (See chart for details)    Encounter Summary: This is a very pleasant 36 y.o. female who unfortunately required evaluation in the emergency department today with ongoing flank pain and burning with urination, has been evaluated here as well as [place] in the past few days, had a CT scan here which was unremarkable, no indication of obstructive uropathy or kidney stones, she had a urinalysis concerning for possible infection but did not tolerate the antibiotics well so she went to [place] where she was treated with intramuscular antibiotics, she returns with ongoing symptoms.  She has a strong psychiatric history.  She provided a urine sample here which reveals a lot of blood, in fact the urine was just red, I suspect this is a falsely positive nitrite because of the discoloration of the urine, on microscopy there were no bacteria whatsoever, and no significant WBCs, only RBCs.  At this point this does not represent infection.  This is not from a kidney stone and she does have a negative CT scan.  There is no infection, no indication for liquid antibiotics as she is repeatedly requesting.  She will be discharged to follow-up with a primary care provider.  Strict return instructions provided      DISPOSITION: Discharge Home      FINAL IMPRESSION  1. Flank pain    2. Dysuria        This dictation was created using voice recognition software. The accuracy of the dictation is limited to the abilities of the software. I expect there may be some errors of grammar and possibly content. The nursing notes were reviewed and certain aspects of this information were incorporated into this note.    Electronically signed by: Devonte Ojeda M.D., 9/19/2021 9:56 PM

## 2021-09-20 NOTE — ED NOTES
Attempted to walk patient to the restroom, patient unable to provide urine  
Called SW about bus pass for patient  
Patient has odd affect in the room, seems to be slightly confused, not making eye contact.  states that she's been having flank pain and chills. Seen at [facility] and states that she was given a shot.  
Patient is being discharged from ED to home. Discharge instructions were discussed by RN with patient and/or Family. No questions at this time. Medications were discussed with patient. VS within normal limits or have been addressed with patient and MD.     
Oriented - self; Oriented - place; Oriented - time

## 2021-09-25 PROCEDURE — 99283 EMERGENCY DEPT VISIT LOW MDM: CPT

## 2021-09-25 ASSESSMENT — FIBROSIS 4 INDEX: FIB4 SCORE: 0.33

## 2021-09-26 ENCOUNTER — HOSPITAL ENCOUNTER (EMERGENCY)
Facility: MEDICAL CENTER | Age: 36
End: 2021-09-26
Attending: EMERGENCY MEDICINE
Payer: MEDICARE

## 2021-09-26 VITALS
RESPIRATION RATE: 20 BRPM | HEIGHT: 67 IN | HEART RATE: 67 BPM | DIASTOLIC BLOOD PRESSURE: 59 MMHG | BODY MASS INDEX: 41.83 KG/M2 | SYSTOLIC BLOOD PRESSURE: 113 MMHG | TEMPERATURE: 97.5 F | OXYGEN SATURATION: 98 % | WEIGHT: 266.54 LBS

## 2021-09-26 DIAGNOSIS — N93.8 DUB (DYSFUNCTIONAL UTERINE BLEEDING): ICD-10-CM

## 2021-09-26 LAB — HCG UR QL: NEGATIVE

## 2021-09-26 PROCEDURE — 81025 URINE PREGNANCY TEST: CPT

## 2021-09-26 NOTE — ED TRIAGE NOTES
"Chief Complaint   Patient presents with   • Other     Pt to ED via EMS pt states she is here for pregnancy test stating \"I had my period 7 days ago but it was light and I heard two beats\" pt states she can feel the baby's pulse and see it moving in her stomach.  pt denies abd pain or abnormal discharge. Denies SI/HI VSS NAD   "

## 2021-09-26 NOTE — ED NOTES
Patient instructed the need for urine sample. Patient stated need for water so she could provide sample. Patient provided 2 cups of ice water.

## 2021-09-26 NOTE — ED NOTES
Patient revitaled at 0112. Pt states symptoms are 2/10. Explained and apologized about the wait and explained the triage process. Informed the patient to let us know if symptoms worsened.

## 2021-09-26 NOTE — ED NOTES
"Called lab and followed up with urine pregnancy test that's still pending results;  stated \"oh it's sitting here.\" I told her this specimen was sent an hour ago and to please go ahead and run this as soon as possible.   "

## 2021-09-26 NOTE — ED TRIAGE NOTES
Chief Complaint   Patient presents with   • Suicidal Ideation     Pt also stating that she is pregnant with 3 fetuses.  Pt also speaking to someone in the room that is not here.  Pt to green 32.  
No

## 2021-09-26 NOTE — ED PROVIDER NOTES
ED Provider Note    CHIEF COMPLAINT  Chief Complaint   Patient presents with   • Other        HPI    Primary care provider: Pcp Pt States None   History obtained from: Patient  History limited by: None     Dunia Sahni is a 36 y.o. female who presents to the ED by EMS requesting pregnancy test.  She believes that she is pregnant because she had a very light period 7 days ago and can feel a baby's heartbeat in her stomach.  She denies any pain.  She has had some nausea at times no vomiting today.  She reports that she has had poor appetite which she believes is an indication of pregnancy.  She denies fever/shortness of breath or difficulty breathing/diarrhea/constipation/dysuria/rash.    REVIEW OF SYSTEMS  Please see HPI for pertinent positives/negatives.  All other systems reviewed and are negative.     PAST MEDICAL HISTORY  Past Medical History:   Diagnosis Date   • Leukemia (HCC) 12/2019   • Abscess 10/12/2017    right AC arm area   • Bipolar affective (HCC)    • Depression    • Kidney infection    • Suicide attempt ([facility])         SURGICAL HISTORY  Past Surgical History:   Procedure Laterality Date   • GEREMIAS BY LAPAROSCOPY N/A 7/6/2018    Procedure: GEREMIAS BY LAPAROSCOPY;  Surgeon: Leroy Goodson M.D.;  Location: SURGERY [facility];  Service: General   • ERCP IN OR N/A 7/5/2018    Procedure: ERCP IN OR;  Surgeon: Nathan Maravilla M.D.;  Location: SURGERY [facility];  Service: Gastroenterology   • APPENDECTOMY          SOCIAL HISTORY  Social History     Tobacco Use   • Smoking status: Never Smoker   • Smokeless tobacco: Never Used   Vaping Use   • Vaping Use: Never used   Substance and Sexual Activity   • Alcohol use: Yes     Comment: patient reports she drinks once or twice a year   • Drug use: Not Currently     Types: Inhaled     Comment: hx: marijuana, cocaine   • Sexual activity: Not on file        FAMILY HISTORY  Family History   Family history unknown: Yes        CURRENT  "MEDICATIONS  Home Medications    **Home medications have not yet been reviewed for this encounter**          ALLERGIES  Allergies   Allergen Reactions   • Vicodin [Hydrocodone-Acetaminophen] Anaphylaxis     RXN=9 years ago        PHYSICAL EXAM  VITAL SIGNS: /59   Pulse 67   Temp 36.4 °C (97.5 °F) (Temporal)   Resp 20   Ht 1.702 m (5' 7\")   Wt 121 kg (266 lb 8.6 oz)   LMP 09/19/2021   SpO2 98%   BMI 41.75 kg/m²  @[id number]::@     Pulse ox interpretation: 98% I interpret this pulse ox as normal     Constitutional: Well developed, well nourished, alert in no apparent distress, nontoxic appearance    HENT: No external signs of trauma, normocephalic, mask on due to COVID-19 pandemic  Eyes: PERRL, conjunctiva without erythema, no discharge, no icterus    Neck: Soft and supple, trachea midline, no stridor, no tenderness, no LAD, no JVD, good ROM    Cardiovascular: Regular rate and rhythm, no murmurs/rubs/gallops, strong distal pulses and good perfusion    Thorax & Lungs: No respiratory distress, CTAB   Abdomen: Soft, nontender, nondistended, no guarding, no rebound, normal BS    Back: No CVAT    Extremities: No cyanosis, no edema, no gross deformity, good ROM, no tenderness, intact distal pulses with brisk cap refill    Skin: Warm, dry, no pallor/cyanosis, no rash noted    Lymphatic: No lymphadenopathy noted    Neuro: A/O times 3, no focal deficits noted    Psychiatric: Cooperative, flat affect, no signs of active hallucinations, patient denies suicidal or homicidal ideations      DIAGNOSTIC STUDIES / PROCEDURES        LABS  All labs reviewed by me.     Results for orders placed or performed during the hospital encounter of 09/26/21   BETA-HCG QUALITATIVE URINE   Result Value Ref Range    Beta-Hcg Urine Negative Negative        RADIOLOGY  The radiologist's interpretation of all radiological studies have been reviewed by me.     No orders to display          COURSE & MEDICAL DECISION MAKING  Nursing notes, " VS, PMSFHx reviewed in chart.     Review of past medical records shows the patient has had multiple ED visits with her last visit on September 19, 2021 complaining of dysuria and bilateral flank pain.  Patient with CT abdomen/pelvis on September 16, 2021 with the following findings:    1.  Diverticulosis  2.  Fat-containing umbilical hernia      Differential diagnoses considered include but are not limited to: Pregnancy, DUB, anxiety, depression, psychosis/schizophrenia, personality disorder      History and physical exam as above.  Pregnancy test returned negative.  I discussed the findings with the patient.  She is noted to be in no acute distress and nontoxic in appearance.  Her exam is benign.  No evidence for acute surgical abdomen.  I have low clinical suspicion for acute serious pathology at this time given the history/exam/findings.  She was advised on outpatient follow-up with gynecology and given return to ED precautions.  Patient verbalized understanding and agreed with plan of care with no further questions or concerns.      The patient is referred to a primary physician for blood pressure management, diabetic screening, and for all other preventative health concerns.       FINAL IMPRESSION  1. DUB (dysfunctional uterine bleeding) Acute          DISPOSITION  Patient will be discharged home in stable condition.       FOLLOW UP  Please follow-up with your gynecologist    Call in 1 day      [facility], Emergency Dept  [facility] [facility] [address] [phone]    If symptoms worsen         OUTPATIENT MEDICATIONS  Discharge Medication List as of 9/26/2021  7:11 AM             Electronically signed by: Mega Duncan D.O., 9/26/2021 7:08 AM      Portions of this record were made with voice recognition software.  Despite my review, spelling/grammar/context errors may still remain.  Interpretation of this chart should be taken in this context.

## 2021-10-23 LAB
ALBUMIN SERPL BCP-MCNC: 4.4 G/DL (ref 3.2–4.9)
ALBUMIN/GLOB SERPL: 1.5 G/DL
ALP SERPL-CCNC: 90 U/L (ref 30–99)
ALT SERPL-CCNC: 15 U/L (ref 2–50)
ANION GAP SERPL CALC-SCNC: 9 MMOL/L (ref 7–16)
AST SERPL-CCNC: 11 U/L (ref 12–45)
BASOPHILS # BLD AUTO: 0.3 % (ref 0–1.8)
BASOPHILS # BLD: 0.04 K/UL (ref 0–0.12)
BILIRUB SERPL-MCNC: 0.2 MG/DL (ref 0.1–1.5)
BUN SERPL-MCNC: 8 MG/DL (ref 8–22)
CALCIUM SERPL-MCNC: 9.2 MG/DL (ref 8.5–10.5)
CHLORIDE SERPL-SCNC: 107 MMOL/L (ref 96–112)
CO2 SERPL-SCNC: 25 MMOL/L (ref 20–33)
CREAT SERPL-MCNC: 0.77 MG/DL (ref 0.5–1.4)
EOSINOPHIL # BLD AUTO: 0.08 K/UL (ref 0–0.51)
EOSINOPHIL NFR BLD: 0.6 % (ref 0–6.9)
ERYTHROCYTE [DISTWIDTH] IN BLOOD BY AUTOMATED COUNT: 42.7 FL (ref 35.9–50)
GLOBULIN SER CALC-MCNC: 3 G/DL (ref 1.9–3.5)
GLUCOSE SERPL-MCNC: 110 MG/DL (ref 65–99)
HCG SERPL QL: NEGATIVE
HCT VFR BLD AUTO: 32.7 % (ref 37–47)
HGB BLD-MCNC: 9.8 G/DL (ref 12–16)
IMM GRANULOCYTES # BLD AUTO: 0.06 K/UL (ref 0–0.11)
IMM GRANULOCYTES NFR BLD AUTO: 0.5 % (ref 0–0.9)
LIPASE SERPL-CCNC: 14 U/L (ref 11–82)
LYMPHOCYTES # BLD AUTO: 3.14 K/UL (ref 1–4.8)
LYMPHOCYTES NFR BLD: 24 % (ref 22–41)
MCH RBC QN AUTO: 22.4 PG (ref 27–33)
MCHC RBC AUTO-ENTMCNC: 30 G/DL (ref 33.6–35)
MCV RBC AUTO: 74.8 FL (ref 81.4–97.8)
MONOCYTES # BLD AUTO: 0.86 K/UL (ref 0–0.85)
MONOCYTES NFR BLD AUTO: 6.6 % (ref 0–13.4)
NEUTROPHILS # BLD AUTO: 8.93 K/UL (ref 2–7.15)
NEUTROPHILS NFR BLD: 68 % (ref 44–72)
NRBC # BLD AUTO: 0 K/UL
NRBC BLD-RTO: 0 /100 WBC
PLATELET # BLD AUTO: 514 K/UL (ref 164–446)
PMV BLD AUTO: 8.9 FL (ref 9–12.9)
POTASSIUM SERPL-SCNC: 3.7 MMOL/L (ref 3.6–5.5)
PROT SERPL-MCNC: 7.4 G/DL (ref 6–8.2)
RBC # BLD AUTO: 4.37 M/UL (ref 4.2–5.4)
SODIUM SERPL-SCNC: 141 MMOL/L (ref 135–145)
WBC # BLD AUTO: 13.1 K/UL (ref 4.8–10.8)

## 2021-10-23 PROCEDURE — 83690 ASSAY OF LIPASE: CPT

## 2021-10-23 PROCEDURE — 84703 CHORIONIC GONADOTROPIN ASSAY: CPT

## 2021-10-23 PROCEDURE — 85025 COMPLETE CBC W/AUTO DIFF WBC: CPT

## 2021-10-23 PROCEDURE — 80053 COMPREHEN METABOLIC PANEL: CPT

## 2021-10-23 PROCEDURE — 99284 EMERGENCY DEPT VISIT MOD MDM: CPT

## 2021-10-23 ASSESSMENT — FIBROSIS 4 INDEX: FIB4 SCORE: 0.33

## 2021-10-24 ENCOUNTER — HOSPITAL ENCOUNTER (EMERGENCY)
Facility: MEDICAL CENTER | Age: 36
End: 2021-10-24
Attending: EMERGENCY MEDICINE | Admitting: EMERGENCY MEDICINE
Payer: MEDICARE

## 2021-10-24 VITALS
RESPIRATION RATE: 17 BRPM | DIASTOLIC BLOOD PRESSURE: 56 MMHG | HEIGHT: 67 IN | BODY MASS INDEX: 41.07 KG/M2 | HEART RATE: 64 BPM | OXYGEN SATURATION: 95 % | WEIGHT: 261.69 LBS | TEMPERATURE: 96.6 F | SYSTOLIC BLOOD PRESSURE: 113 MMHG

## 2021-10-24 DIAGNOSIS — Z32.02 NEGATIVE PREGNANCY TEST: Primary | ICD-10-CM

## 2021-10-24 DIAGNOSIS — D72.829 LEUKOCYTOSIS, UNSPECIFIED TYPE: ICD-10-CM

## 2021-10-24 LAB
APPEARANCE UR: ABNORMAL
BACTERIA #/AREA URNS HPF: NEGATIVE /HPF
BILIRUB UR QL STRIP.AUTO: NEGATIVE
COLOR UR: YELLOW
EPI CELLS #/AREA URNS HPF: NORMAL /HPF
GLUCOSE UR STRIP.AUTO-MCNC: NEGATIVE MG/DL
KETONES UR STRIP.AUTO-MCNC: ABNORMAL MG/DL
LEUKOCYTE ESTERASE UR QL STRIP.AUTO: ABNORMAL
MICRO URNS: ABNORMAL
NITRITE UR QL STRIP.AUTO: NEGATIVE
PH UR STRIP.AUTO: 7 [PH] (ref 5–8)
PROT UR QL STRIP: NEGATIVE MG/DL
RBC # URNS HPF: NORMAL /HPF
RBC UR QL AUTO: NEGATIVE
SP GR UR STRIP.AUTO: 1.03
UROBILINOGEN UR STRIP.AUTO-MCNC: 0.2 MG/DL
WBC #/AREA URNS HPF: NORMAL /HPF

## 2021-10-24 PROCEDURE — 81001 URINALYSIS AUTO W/SCOPE: CPT

## 2021-10-24 NOTE — ED TRIAGE NOTES
"Chief Complaint   Patient presents with   • Abdominal Pain     Pt states, \"I may be pregnant and I started cramping.\" Pt states she would like a blood pregnancy test. States her last period was 3 weeks ago. BIB EMS. Pt seems to be hearing voices and seeing things in the triage room. Requires repeated stimulation to answer questions.      /87   Pulse 86   Temp 35.9 °C (96.6 °F) (Temporal)   Resp 16   Ht 1.702 m (5' 7\")   Wt 119 kg (261 lb 11 oz)   LMP 10/02/2021 (Approximate)   SpO2 99%   BMI 40.99 kg/m²     Pt is ambulatory in and out of triage with a steady gait. Appropriate PPE worn throughout entire encounter. Pt placed back in the lobby and educated about triage process.    "

## 2021-10-24 NOTE — ED NOTES
Discharge education provided. Discharge paperwork signed by pt. All questions answered. All belongings with pt. Pt ambulated to lobby unassisted with steady gait.

## 2021-10-24 NOTE — ED PROVIDER NOTES
"ED Provider Note     10/24/2021  1:14 AM    Means of Arrival: Walk In  History obtained by: patient  Limitations: None  PCP: none  CODE STATUS: Full    CHIEF COMPLAINT  Chief Complaint   Patient presents with   • Abdominal Pain     Pt states, \"I may be pregnant and I started cramping.\" Pt states she would like a blood pregnancy test. States her last period was 3 weeks ago. BIB EMS. Pt seems to be hearing voices and seeing things in the triage room. Requires repeated stimulation to answer questions.        HPI  Dunia Carolina Sahni is a 36 y.o. female with history of bipolar affective, past suicide attempt who presents to our emergency department with request for pregnancy test.  She believes she may be pregnant because her last period was 3 weeks ago and she feels as though there is a change in her cycle.  She does not elaborate further on this.  She denies any vaginal bleeding today.  She denies any abdominal pain at this time.  She was here approximately 1 month ago with a similar request.  Pregnancy test was negative at that time.  She denies any fevers.  She denies any hallucinations.      REVIEW OF SYSTEMS  Review of Systems   All other systems reviewed and are negative.    See HPI for further details.    PAST MEDICAL HISTORY   has a past medical history of Abscess (10/12/2017), Bipolar affective (HCC), Depression, Kidney infection, Leukemia (HCC) (12/2019), and Suicide attempt ([facility]).    FAMILY HISTORY  Family History   Family history unknown: Yes       SOCIAL HISTORY  Social History     Tobacco Use   • Smoking status: Never Smoker   • Smokeless tobacco: Never Used   Vaping Use   • Vaping Use: Never used   Substance and Sexual Activity   • Alcohol use: Yes     Comment: patient reports she drinks once or twice a year   • Drug use: Not Currently     Types: Inhaled     Comment: hx: marijuana, cocaine   • Sexual activity: Not on file       SURGICAL HISTORY   has a past surgical history that includes appendectomy; " "ercp in or (N/A, 7/5/2018); and skip by laparoscopy (N/A, 7/6/2018).    CURRENT MEDICATIONS  Home Medications     Reviewed by Maren Solitario R.N. (Registered Nurse) on 10/23/21 at 1905  Med List Status: Not Addressed   Medication Last Dose Status   ondansetron (ZOFRAN ODT) 4 MG TABLET DISPERSIBLE  Active                ALLERGIES  Allergies   Allergen Reactions   • Vicodin [Hydrocodone-Acetaminophen] Anaphylaxis     RXN=9 years ago       PHYSICAL EXAM  VITAL SIGNS: /56   Pulse 64   Temp 35.9 °C (96.6 °F) (Temporal)   Resp 17   Ht 1.702 m (5' 7\")   Wt 119 kg (261 lb 11 oz)   LMP 10/02/2021 (Approximate)   SpO2 95%   BMI 40.99 kg/m²     Pulse ox interpretation: I interpret this pulse ox as normal.  Constitutional: 36-year-old woman with elevated BMI sitting at edge of bed.  Appears distracted at times.  HENT: No signs of trauma, Bilateral external ears normal, Nose normal.   Eyes: Pupils are equal, Conjunctiva normal, Non-icteric.   Neck: Normal range of motion without limitations  Cardiovascular: Regular rate  Thorax & Lungs: Unlabored breathing  Abdomen: No distention or tenderness  Skin: Warm, Dry, No erythema, No rash.   Musculoskeletal: Good range of motion in all major joints. No tenderness to palpation or major deformities noted.   Neurologic: Alert , Normal motor function, Normal sensory function, No focal deficits noted.   Psychiatric: Flat affect, when I am out of the room she appears to be talking to someone. When I inquired about it, she denies.   Physical Exam      DIAGNOSTIC STUDIES / PROCEDURES      LABS  Pertinent Labs & Imaging studies reviewed. (See chart for details)    RADIOLOGY  Pertinent Labs & Imaging studies reviewed. (See chart for details)    COURSE & MEDICAL DECISION MAKING  Pertinent Labs & Imaging studies reviewed. (See chart for details)    1:14 AM This is an emergent evaluation of a  36 y.o. female who presents with inquiry whether she is pregnant or not.  Labs were " done at triage.  She does have a slight white count.  But is not showing any obvious source of infection.  Vitals are normal.  She does have a bizarre affect and at times I have questioned if she is responding to internal stimuli.  However she is oriented, she is keeping consistent with her reason for being here, she is not showing any ambition and harming herself or others.  I cannot justify pursuing an emergent psychiatric evaluation although I think is clear there is some underlying psychiatric problem.  She does not want any further evaluation or work-up at this time.  Plan for discharge.    1:25 AM  Inquired again if she would be willing for us to check urinalysis for urinary tract infection.  She now agrees.    Urinalysis within acceptable limits.  Not obviously infection.  She was discharged in stable condition.     The patient will return for worsening symptoms and is stable at the time of discharge. The patient verbalizes understanding. Guidance was provided on appropriate use of medications including driving under the influence, overdose, and side effects.         FINAL IMPRESSION    ICD-10-CM   1. Negative pregnancy test Active Z32.02   2. Leukocytosis, unspecified type  D72.829            This dictation was created using voice recognition software. The accuracy of the dictation is limited to the abilities of the software. I expect there may be some errors of grammar and possibly content. The nursing notes were reviewed and certain aspects of this information were incorporated into this note.    Electronically signed by: Chato Smith II, M.D., 10/24/2021 1:14 AM

## 2021-10-24 NOTE — ED NOTES
Patient ambulatory from [facility] to [facility] 27 independently with steady gait.  Patient changed into gown and placed on monitor.    Chart up for ERP.

## 2021-10-29 PROCEDURE — 99284 EMERGENCY DEPT VISIT MOD MDM: CPT

## 2021-10-29 PROCEDURE — 81003 URINALYSIS AUTO W/O SCOPE: CPT | Mod: XU

## 2021-10-29 PROCEDURE — 80307 DRUG TEST PRSMV CHEM ANLYZR: CPT

## 2021-10-29 PROCEDURE — 81025 URINE PREGNANCY TEST: CPT

## 2021-10-29 ASSESSMENT — FIBROSIS 4 INDEX: FIB4 SCORE: 0.19

## 2021-10-30 ENCOUNTER — HOSPITAL ENCOUNTER (EMERGENCY)
Facility: MEDICAL CENTER | Age: 36
End: 2021-10-30
Attending: EMERGENCY MEDICINE
Payer: MEDICARE

## 2021-10-30 VITALS
WEIGHT: 258.38 LBS | HEART RATE: 68 BPM | HEIGHT: 66 IN | RESPIRATION RATE: 18 BRPM | BODY MASS INDEX: 41.52 KG/M2 | TEMPERATURE: 97.9 F | SYSTOLIC BLOOD PRESSURE: 98 MMHG | OXYGEN SATURATION: 97 % | DIASTOLIC BLOOD PRESSURE: 58 MMHG

## 2021-10-30 DIAGNOSIS — R10.9 FLANK PAIN: ICD-10-CM

## 2021-10-30 DIAGNOSIS — F45.8 DELUSION OF PREGNANCY: Primary | ICD-10-CM

## 2021-10-30 LAB
AMPHET UR QL SCN: NEGATIVE
APPEARANCE UR: CLEAR
BARBITURATES UR QL SCN: NEGATIVE
BENZODIAZ UR QL SCN: NEGATIVE
BILIRUB UR QL STRIP.AUTO: NEGATIVE
BZE UR QL SCN: NEGATIVE
CANNABINOIDS UR QL SCN: NEGATIVE
COLOR UR: YELLOW
GLUCOSE UR STRIP.AUTO-MCNC: NEGATIVE MG/DL
HCG UR QL: NEGATIVE
KETONES UR STRIP.AUTO-MCNC: NEGATIVE MG/DL
LEUKOCYTE ESTERASE UR QL STRIP.AUTO: NEGATIVE
METHADONE UR QL SCN: NEGATIVE
MICRO URNS: NORMAL
NITRITE UR QL STRIP.AUTO: NEGATIVE
OPIATES UR QL SCN: NEGATIVE
OXYCODONE UR QL SCN: NEGATIVE
PCP UR QL SCN: NEGATIVE
PH UR STRIP.AUTO: 6 [PH] (ref 5–8)
POC BREATHALIZER: 0 PERCENT (ref 0–0.01)
PROPOXYPH UR QL SCN: NEGATIVE
PROT UR QL STRIP: NEGATIVE MG/DL
RBC UR QL AUTO: NEGATIVE
SP GR UR STRIP.AUTO: >=1.03
UROBILINOGEN UR STRIP.AUTO-MCNC: 0.2 MG/DL

## 2021-10-30 PROCEDURE — 302970 POC BREATHALIZER: Performed by: EMERGENCY MEDICINE

## 2021-10-30 ASSESSMENT — PAIN DESCRIPTION - PAIN TYPE: TYPE: CHRONIC PAIN

## 2021-10-30 NOTE — ED NOTES
Pt ambulated to bathroom, now stating that she would like to leave.  Notified on waiting for member from Alert team to come and see her, pt states she does not want to wait.  Updated Dr. Smith.

## 2021-10-30 NOTE — ED TRIAGE NOTES
"Chief Complaint   Patient presents with   • Psych Eval   • Other     BIBA for confirmation of pregnancy    Pt states she had two home positive tests and wants to confirm pregnancy status. Pt took out pregnancy tests and was trying to show them to the live stream monitor in triage for proof. States \"for Jainism reasons she needs a blood test for her blood sugar\". FSBG 266 with [facility], \"for Jainism reasons she won't respond to that\", she refuses to confirm or deny history of diabetes.     Pt was seen on 10/23 with negative Hcg, states LMP 3 weeks ago.  "

## 2021-10-30 NOTE — ED NOTES
Upon rounding on patient it was found pt had left the hospital, Dr. Smith aware, pt not on a legal hold

## 2021-10-30 NOTE — ED NOTES
PT vitals rechecked; no obvious signs of distress at this time. PT updated on current wait times and thanked for continued patience.

## 2021-10-30 NOTE — ED PROVIDER NOTES
"ED Provider Note   10/30/2021  3:31 AM    Means of Arrival: EMS  History obtained by: patient  Limitations: None possible underlying psychiatric disorder    CHIEF COMPLAINT  Chief Complaint   Patient presents with   • Psych Eval   • Other       HPI  Dunia Sahni is a 36 y.o. female with history of bipolar affective disorder who presents with concerns that she is pregnant.  She pulled out a home pregnancy test and said \"I am pregnant.\"  (It is a negative pregnancy test.)  I evaluated her earlier this week when she presented with the same concern.  She also presented prior to that with request for pregnancy testing.  Pregnancy testing was previously negative.  She also mentioned to nursing that she needed to have her blood sugar checked.  Her blood sugar has been within acceptable limits.  She does not provide very extensive details.  When further questioned she becomes irritated and tells me that she only wants a pregnancy test, a liquid antibiotic, and may be blood test.  She says she has bilateral lower back pain and it is her kidneys hurting.    REVIEW OF SYSTEMS  Review of Systems   All other systems reviewed and are negative.    See HPI for further details.     PAST MEDICAL HISTORY   has a past medical history of Abscess (10/12/2017), Bipolar affective (HCC), Depression, Kidney infection, Leukemia (HCC) (12/2019), and Suicide attempt (HCC).    FAMILY HISTORY  Family History   Family history unknown: Yes       SOCIAL HISTORY  Social History     Tobacco Use   • Smoking status: Never Smoker   • Smokeless tobacco: Never Used   Vaping Use   • Vaping Use: Never used   Substance and Sexual Activity   • Alcohol use: Yes     Comment: patient reports she drinks once or twice a year   • Drug use: Not Currently     Types: Inhaled     Comment: hx: marijuana, cocaine   • Sexual activity: Not on file       SURGICAL HISTORY   has a past surgical history that includes appendectomy; ercp in or (N/A, 7/5/2018); and skip " "by laparoscopy (N/A, 7/6/2018).    CURRENT MEDICATIONS  Home Medications    **Home medications have not yet been reviewed for this encounter**         ALLERGIES  Allergies   Allergen Reactions   • Vicodin [Hydrocodone-Acetaminophen] Anaphylaxis     RXN=9 years ago       PHYSICAL EXAM  VITAL SIGNS: BP (!) 98/58   Pulse 68   Temp 36.6 °C (97.9 °F) (Temporal)   Resp 18   Ht 1.676 m (5' 6\")   Wt 117 kg (258 lb 6.1 oz)   LMP 10/02/2021 (Approximate)   SpO2 97%   BMI 41.70 kg/m²    Pulse ox interpretation: I interpret this pulse ox as normal.  Constitutional: Alert in no apparent distress.  36-year-old woman with increased BMI.  HENT: Normocephalic, Atraumatic, Bilateral external ears normal. Nose normal.   Eyes: Pupils are equal and reactive. Conjunctiva normal, non-icteric.   Heart: Regular rate and rythm, no murmurs.    Lungs: No respiratory distress, regular respirations. Clear to auscultation bilaterally.  Abdomen: Normal appearance, nondistended, nontender.  Skin: Warm, Dry, No erythema, No rash.   Neurologic: Alert, Grossly non-focal. No slurred speech. Moving extremities normally.   MSK: No signs of rash, full range of motion of extremities without limitations.  No midline spine tenderness.  Psychiatric: Labile mood.  Not suicidal.  Demonstrates delusional thoughts, fixation on pregnancy.  Physical Exam      COURSE & MEDICAL DECISION MAKING  Pertinent Labs & Imaging studies reviewed. (See chart for details)    3:31 AM This is an emergent evaluation of a 36 y.o., female who presents with request for pregnancy testing and liquid antibiotics.  Her vital signs are within acceptable limits.  She has no tachycardia.  No fever.  Plan to check urinalysis and pregnancy test.  I also would like life skills me with her since we now have multiple ER visits with paranoid delusions.  She is not interested in talking to me about past psychiatric problems.  If possible I would like our behavioral health nurse, Mari to " spends more time with her.  At this time I do not see any reason to place her on a hold as she is oriented, demonstrates ability to find transportation and nutrition if needed.  She is not suicidal.  She is not demonstrating a psychosis that is overly debilitating to her.    5:38 AM  Urine with no signs of infection.  She is not pregnant.  Urine drug screen negative.  Awaiting evaluation by behavioral health.  I have concerns that her frequent ER visits may be due to a decompensated psychiatric disorder. ADMITTED TO ED OBSERVATION AT 5:38 AM 10/30/21 FOR CONTINUED MONITORING, AWAITING BEHAVIORAL HEALTH EVALUATION. She is not on a legal hold at this time. She is oriented to place, time, and situation. She has no suicidal intentions.     7:13 AM  I was notified by nursing that Dunia was becoming irritated with weight.  She said she was going to leave.  I walked over to her room and found a hospital gown laying on the floor, there was nobody in the room.  She was not on a legal hold.  We will not be notifying authorities to check on her, she did not demonstrate an immediate threat to herself or others.      FINAL IMPRESSION  1. Delusion of pregnancy    2. Flank pain           This dictation was created using voice recognition software. The accuracy of the dictation is limited to the abilities of the software. I expect there may be some errors of grammar and possibly content. The nursing notes were reviewed and certain aspects of this information were incorporated into this note.    Electronically signed by: Chato Smith II, M.D., 10/30/2021 3:31 AM

## 2021-11-14 ENCOUNTER — HOSPITAL ENCOUNTER (EMERGENCY)
Facility: MEDICAL CENTER | Age: 36
End: 2021-11-14
Payer: MEDICARE

## 2021-11-14 VITALS
OXYGEN SATURATION: 97 % | HEART RATE: 90 BPM | BODY MASS INDEX: 40.47 KG/M2 | HEIGHT: 67 IN | RESPIRATION RATE: 18 BRPM | DIASTOLIC BLOOD PRESSURE: 89 MMHG | TEMPERATURE: 97.3 F | SYSTOLIC BLOOD PRESSURE: 151 MMHG

## 2021-11-14 PROCEDURE — 302449 STATCHG TRIAGE ONLY (STATISTIC)

## 2021-11-15 NOTE — ED NOTES
Attempt to call Pt from lobby for triage, Pt not in lobby or restroom. Per ED tech Pt went outside.

## 2022-01-20 NOTE — ED NOTES
Pt resting with eyes closed. Equal and unlabored breathing noted. Sitter remains at bedside.    M-Plasty Complex Repair Preamble Text (Leave Blank If You Do Not Want): Extensive wide undermining was performed.

## 2022-05-24 ENCOUNTER — HOSPITAL ENCOUNTER (EMERGENCY)
Facility: MEDICAL CENTER | Age: 37
End: 2022-05-24
Payer: MEDICARE

## 2022-05-24 VITALS
WEIGHT: 242.95 LBS | RESPIRATION RATE: 16 BRPM | BODY MASS INDEX: 38.13 KG/M2 | HEART RATE: 81 BPM | TEMPERATURE: 97.2 F | SYSTOLIC BLOOD PRESSURE: 128 MMHG | OXYGEN SATURATION: 99 % | DIASTOLIC BLOOD PRESSURE: 76 MMHG | HEIGHT: 67 IN

## 2022-05-24 PROCEDURE — 302449 STATCHG TRIAGE ONLY (STATISTIC)

## 2022-05-24 ASSESSMENT — PAIN DESCRIPTION - DESCRIPTORS: DESCRIPTORS: ACHING

## 2022-05-24 ASSESSMENT — FIBROSIS 4 INDEX: FIB4 SCORE: 0.19

## 2022-05-24 NOTE — ED TRIAGE NOTES
"Chief Complaint   Patient presents with   • Arm Pain     Right arm pain, pain is a 3/4 on the pain scale. Pt denies sustaining any injury to the arm. Pt states that she believes she is sick, and due to the covid would not like to, \"take any chances.\"        Pt BIB EMS [place] bus station to triage. Pt was difficult to triage, initially stating she was having generalized body aches, but then stated she was, \"hurting down to the bone,\" on right arm only, and believes that this may be a symptom of covid or other illness... Pt did appear to be responding to internal stimuli, answering \"yes,\" and \"nope,\" when the room was silent.     Pt educated on triage process and encouraged to alert staff of any changes.     /72   Pulse 61   Temp 36.4 °C (97.6 °F) (Temporal)   Resp 16   Ht 1.702 m (5' 7\")   Wt 110 kg (242 lb 15.2 oz)   LMP 05/15/2022 (Approximate)   SpO2 95%   BMI 38.05 kg/m²       "

## 2022-05-24 NOTE — ED NOTES
Called pt for re-vitals, no answer. Other triage tech stated she saw the pt leave. To be dismissed.

## 2022-10-22 ENCOUNTER — APPOINTMENT (OUTPATIENT)
Dept: RADIOLOGY | Facility: MEDICAL CENTER | Age: 37
End: 2022-10-22
Attending: STUDENT IN AN ORGANIZED HEALTH CARE EDUCATION/TRAINING PROGRAM
Payer: MEDICARE

## 2022-10-22 ENCOUNTER — HOSPITAL ENCOUNTER (EMERGENCY)
Facility: MEDICAL CENTER | Age: 37
End: 2022-10-22
Attending: STUDENT IN AN ORGANIZED HEALTH CARE EDUCATION/TRAINING PROGRAM
Payer: MEDICARE

## 2022-10-22 VITALS
SYSTOLIC BLOOD PRESSURE: 110 MMHG | BODY MASS INDEX: 37.47 KG/M2 | DIASTOLIC BLOOD PRESSURE: 72 MMHG | OXYGEN SATURATION: 98 % | RESPIRATION RATE: 16 BRPM | TEMPERATURE: 98.2 F | HEIGHT: 67 IN | HEART RATE: 65 BPM | WEIGHT: 238.76 LBS

## 2022-10-22 DIAGNOSIS — J40 BRONCHITIS: ICD-10-CM

## 2022-10-22 DIAGNOSIS — R05.1 ACUTE COUGH: ICD-10-CM

## 2022-10-22 PROCEDURE — 99283 EMERGENCY DEPT VISIT LOW MDM: CPT | Mod: 25

## 2022-10-22 PROCEDURE — 71045 X-RAY EXAM CHEST 1 VIEW: CPT

## 2022-10-22 RX ORDER — ALBUTEROL SULFATE 90 UG/1
2 AEROSOL, METERED RESPIRATORY (INHALATION) EVERY 6 HOURS PRN
Qty: 8.5 G | Refills: 0 | Status: SHIPPED | OUTPATIENT
Start: 2022-10-22 | End: 2022-11-19

## 2022-10-22 RX ORDER — BENZONATATE 100 MG/1
100 CAPSULE ORAL 3 TIMES DAILY PRN
Qty: 30 CAPSULE | Refills: 0 | Status: SHIPPED | OUTPATIENT
Start: 2022-10-22 | End: 2022-11-19

## 2022-10-22 ASSESSMENT — FIBROSIS 4 INDEX: FIB4 SCORE: 0.19

## 2022-10-22 NOTE — ED TRIAGE NOTES
"Vitals:    10/22/22 1427   BP: (!) 96/75   Pulse: 62   Resp: 17   Temp: 36.3 °C (97.4 °F)   SpO2: 100%     Chief Complaint   Patient presents with   • Other     Pt states she \"wants to get checked out before she gets sick,\" pt does eventually endorse a headache. Hx is difficult to obtain as pt appears to be responding to internal stimuli. Pt reports no past med hx but chart reviews shows hx of bipolar affective. When pt asked if she takes medications daily pt states \"I don't do anything, no drugs, no alcohol, no nothing.\" Pt denies SI or HI.     Pt ambulatory to and from triage and is cooperative.   "

## 2022-10-22 NOTE — ED NOTES
"Asked pt to place mask on her face she said \"yeah sure\" as she flipped staff off, informed pt we do not tolerate rude behavior.  "

## 2022-10-22 NOTE — ED PROVIDER NOTES
"CHIEF COMPLAINT  Chief Complaint   Patient presents with    Other       HPI  Dunia Sahni is a 37 y.o. female who presents evaluation of a dry nonproductive cough for the past several days.  Patient is concerned she may have contracted pneumonia as she states this feels similar to the previous episodes of pneumonia.  No fevers no significant shortness of breath or chest pain.    REVIEW OF SYSTEMS  See HPI for further details. All other systems are negative.     PAST MEDICAL HISTORY   has a past medical history of Abscess (10/12/2017), Bipolar affective (HCC), Depression, Kidney infection, Leukemia (HCC) (12/2019), and Suicide attempt ([facility]).    SOCIAL HISTORY  Social History     Tobacco Use    Smoking status: Never    Smokeless tobacco: Never   Vaping Use    Vaping Use: Never used   Substance and Sexual Activity    Alcohol use: Yes     Comment: patient reports she drinks once or twice a year    Drug use: Not Currently     Types: Inhaled     Comment: hx: marijuana, cocaine    Sexual activity: Not on file       SURGICAL HISTORY   has a past surgical history that includes appendectomy; ercp in or (N/A, 7/5/2018); and skip by laparoscopy (N/A, 7/6/2018).    CURRENT MEDICATIONS  Home Medications       Reviewed by Xiomara Martines R.N. (Registered Nurse) on 10/22/22 at 1435  Med List Status: Not Addressed     Medication Last Dose Status   ondansetron (ZOFRAN ODT) 4 MG TABLET DISPERSIBLE  Active                    ALLERGIES  Allergies   Allergen Reactions    Vicodin [Hydrocodone-Acetaminophen] Anaphylaxis     RXN=9 years ago       FAMILY HISTORY  No pertinent family history    PHYSICAL EXAM   BP (!) 96/75   Pulse 62   Temp 36.3 °C (97.4 °F) (Temporal)   Resp 17   Ht 1.702 m (5' 7\")   Wt 108 kg (238 lb 12.1 oz)   SpO2 100%   BMI 37.39 kg/m²  @[id number]::@   Pulse ox interpretation: I interpret this pulse ox as normal.  VITALS - vital signs documented prior to this note have been reviewed and " noted,  GENERAL - awake, alert, oriented, GCS 15, no apparent distress, non-toxic  appearing  HEENT - normocephalic, atraumatic, pupils equal, sclera anicteric, mucus  membranes moist  NECK - supple, no meningismus, full active range of motion, trachea midline  CARDIOVASCULAR - regular rate/rhythm, no murmurs/gallops/rubs  PULMONARY - no respiratory distress, speaking in full sentences, clear to  auscultation bilaterally, no wheezing/ronchi/rales, no accessory muscle use  GASTROINTESTINAL - soft, non-tender, non-distended, no rebound, guarding,  or peritonitis  GENITOURINARY - Deferred  NEUROLOGIC - Awake alert, normal mental status, speech fluid, cognition  normal, moves all extremities  MUSCULOSKELETAL - no obvious asymmetry or deformities present  EXTREMITIES - warm, well-perfused, no cyanosis or significant edema  DERMATOLOGIC - warm, dry, no rashes, no jaundice  PSYCHIATRIC -flat affect, denies SI HI or auditory visual hallucinations,          LABS      Labs Reviewed - No data to display      Pertinent Labs & Imaging studies reviewed. (See chart for details)    RADIOLOGY  DX-CHEST-PORTABLE (1 VIEW)   Final Result      No acute cardiopulmonary abnormality.                   ED COURSE/PROCEDURES          Medications - No data to display            MEDICAL DECISION MAKING    Patient presented for evaluation of a cough.  Differential included viral versus bacterial versus allergic causes.  Chest x-ray was obtained was negative, low concern for underlying occult pneumonia.  Afebrile no significant shortness of breath, clear lungs, lower concern for occult pneumonia patient does state that she has had recent sick contact exposures may represent a viral bronchitis.  Recommended over-the-counter symptomatic treatment.  At times during the interview she did have a flat affect and did seem paranoid though she is not responding to internal stimuli denied  any auditory visual hallucinations SI or HI, does have a prior  psychiatric history, though has access to food and shelter, and does not appear to be in a decompensated psychiatric illness at this point, thus do not believe she meets criteria for legal 2000.  All pertinent return precautions were discussed with the patient, and they expressed understanding.  Patient was discharged in a stable condition        FINAL IMPRESSION  1.  Bronchitis  2.  History of schizoaffective disorder           Electronically signed by: Grabiel Shaw D.O., 10/22/2022 3:47 PM      Dictation Disclaimer  Please note this report has been produced using speech recognition software and  may contain errors related to that system, including errors seen in grammar,  punctuation and spelling, as well as words and phrases that may be inappropriate.  If there are any questions or concerns, please feel free to contact the dictating  physician for clarification.

## 2022-11-08 ENCOUNTER — HOSPITAL ENCOUNTER (EMERGENCY)
Facility: MEDICAL CENTER | Age: 37
End: 2022-11-08
Attending: EMERGENCY MEDICINE
Payer: MEDICARE

## 2022-11-08 VITALS
RESPIRATION RATE: 16 BRPM | OXYGEN SATURATION: 96 % | SYSTOLIC BLOOD PRESSURE: 145 MMHG | DIASTOLIC BLOOD PRESSURE: 78 MMHG | WEIGHT: 238 LBS | HEIGHT: 67 IN | BODY MASS INDEX: 37.35 KG/M2 | HEART RATE: 71 BPM | TEMPERATURE: 97.1 F

## 2022-11-08 DIAGNOSIS — Z59.00 HOMELESSNESS: ICD-10-CM

## 2022-11-08 PROCEDURE — 99283 EMERGENCY DEPT VISIT LOW MDM: CPT

## 2022-11-08 SDOH — ECONOMIC STABILITY - HOUSING INSECURITY: HOMELESSNESS UNSPECIFIED: Z59.00

## 2022-11-08 ASSESSMENT — FIBROSIS 4 INDEX: FIB4 SCORE: 0.19

## 2022-11-09 NOTE — DISCHARGE PLANNING
IP Consult to  for DC Planning    Pt states she was staying at [facility]( This SW is not aware of  a place called [facility])  and could not return for 2 days. Pt asking if she can stay here. SW referred Pt to [facility] and she stated that's where she was. SW called [facility] and spoke to Sierra. They are not aware they had an incident at [facility] and Pt is NOT on their do not admit list for the shelter. Pt can go to the warming station.     JADEN has updated ERP and RN that Pt can be discharged with an option to stay at the [facility] if Pt is willing.

## 2022-11-09 NOTE — ED NOTES
"PT awake, sitting upright in bed, speaking without signs of distress. States she felt as if her right shoulder were dislocated approx 3 hours ago and \"I slammed it into a wall to put it back into place\" and now would like to be checked out. No outward signs of trauma noted. No open wounds or active bleeding noted.   "

## 2022-11-09 NOTE — ED NOTES
Pt AOx4 and ready for education. All discharge instructions given to pt. Pt provided taxi voucher to get to [facility]. Pt verbalized understanding of all discharge instructions. All lines removed prior to discharge. All questions answered. Pt to lobby via ambulation.

## 2022-11-09 NOTE — ED PROVIDER NOTES
"ED Provider Note    ED Provider Note    Primary care provider: Pcp Pt States None  Means of arrival: Walk-in  History obtained from: Patient    CHIEF COMPLAINT  Chief Complaint   Patient presents with    Arm Pain     \"My arm was out of socket. I need shelter for two days.\" Pt rambling in triage and hard to understand. Pt appears to be speaking to someone else who isn't in the room.     Seen at 7:33 PM.   HPI  Dunia Sahni is a 37 y.o. female with history of bipolar affective disorder presents to the emergency department for a place to stay.  She states that she was in a shelter or \"piece \"group home?  And slammed her shoulder into the wall repeatedly about 3 to 4 hours ago.  She states that this was not a violent move but she was slamming her right shoulder into the wall to relocate it as it spontaneously dislocated.  She states as result of this she was kicked out of the home and now comes to the ER.  She states that she will need to stay here for 2 days as the home has a 48-hour window before she is allowed to sign back in and return.    She states it is too cold outside she cannot sleep on the street.  She also repeatedly tells me that she has a heart condition.    REVIEW OF SYSTEMS  See HPI,   Remainder of ROS negative, unreliable due to underlying psychiatric history..     PAST MEDICAL HISTORY   has a past medical history of Abscess (10/12/2017), Bipolar affective (HCC), Depression, Kidney infection, Leukemia (HCC) (12/2019), and Suicide attempt (HCC).    SURGICAL HISTORY   has a past surgical history that includes appendectomy; ercp in or (N/A, 7/5/2018); and skip by laparoscopy (N/A, 7/6/2018).    SOCIAL HISTORY  Social History     Tobacco Use    Smoking status: Never    Smokeless tobacco: Never   Vaping Use    Vaping Use: Never used   Substance Use Topics    Alcohol use: Yes     Comment: patient reports she drinks once or twice a year    Drug use: Not Currently     Types: Inhaled     Comment: hx: " "marijuana, cocaine      Social History     Substance and Sexual Activity   Drug Use Not Currently    Types: Inhaled    Comment: hx: marijuana, cocaine       FAMILY HISTORY  Family History   Family history unknown: Yes       CURRENT MEDICATIONS  Reviewed.  See Encounter Summary.     ALLERGIES  Allergies   Allergen Reactions    Vicodin [Hydrocodone-Acetaminophen] Anaphylaxis     RXN=9 years ago       PHYSICAL EXAM  VITAL SIGNS: BP (!) 145/78   Pulse 71   Temp 36.2 °C (97.1 °F) (Temporal)   Resp 16   Ht 1.702 m (5' 7\")   Wt 108 kg (238 lb)   SpO2 96%   BMI 37.28 kg/m²   Constitutional: Awake, alert in no apparent distress.  HENT: Normocephalic, atraumatic.  Bilateral external ears normal. Nose normal.   Eyes: Conjunctiva normal, non-icteric, EOMI.    Thorax & Lungs: Easy unlabored respirations, Clear to ascultation bilaterally.  Cardiovascular: Regular rate, Regular rhythm, No murmurs, rubs or gallops. Bilateral pulses symmetrical.   Abdomen:  Soft, nontender, nondistended, normal active bowel sounds.   :    Skin: Visualized skin is  Dry, No erythema, No rash.   Musculoskeletal:   No cyanosis, clubbing or edema. No leg asymmetry.  Right shoulder is normal in appearance, there is no overlying ecchymosis.  The patient has good range of motion throughout the right upper extremity, no tenderness over the proximal humerus, clavicle or scapular region.  Neurologic: Alert, Grossly non-focal.   Psychiatric: Normal affect, Normal mood  Lymphatic:  No cervical LAD        RADIOLOGY  No orders to display         COURSE & MEDICAL DECISION MAKING  Pertinent Labs & Imaging studies reviewed. (See chart for details)    Differential diagnoses include but are not limited to: Acute homelessness.    7:33 PM - Medical record reviewed, history of bipolar affective disorder, numerous visits to the emergency departments in the area for various maladies, most complaints are unsubstantiated.      7:39 PM: I do not see any emergent " condition on this patient.  She appears to be acutely homeless due to possibly getting kicked out of a group living situation.  The history on her is quite unreliable.  This is similar to prior presentations.  The issue today is that currently is 20 °F outside and actively snowing.  Unfortunately we have seen a fairly large influx of acutely homeless people due to the poor conditions outside.  It is not safe for discharge onto the streets this evening, therefore we may have to keep her in ED observation until we can find an appropriate discharge.    Decision Making:  This is a pleasant 37 y.o. year old female who presents with no specific chief complaint, she does tell me that she was slamming her shoulder into the wall at her care facility.  She is a difficult historian, she cannot give me an exact name of what care facility she was staying at.  Social work talk to her as well and they got conflicting history.  In any case this appears to be consistent with her baseline.  I do not see any signs of trauma on her and she is ranging the shoulder fine.  I see no indication to x-ray the shoulder.  She is mostly interested in staying here in the ER.  She states that she would like to stay here for 2 days.  We do not have the availability to the house people here in the emergency department without a significant medical reason.  She is alert and oriented, she is fully ambulatory.  She is at her baseline given her on underlying bipolar affective disorder.  She does not meet any requirements for legal hold, we have transported her to the community triage center where they can house her overnight.      Discharge Medications:  Discharge Medication List as of 11/8/2022  8:31 PM          The patient was discharged home (see d/c instructions) was told to return immediately for any signs or symptoms listed, or any worsening at all.  The patient verbally agreed to the discharge precautions and follow-up plan which is documented in  EPIC.        FINAL IMPRESSION  1. Homelessness

## 2022-11-09 NOTE — ED TRIAGE NOTES
"Chief Complaint   Patient presents with    Arm Pain     \"My arm was out of socket. I need shelter for two days.\" Pt rambling in triage and hard to understand. Pt appears to be speaking to someone else who isn't in the room.     Pt easily agitated in room but cooperative.     /81   Pulse 70   Temp 35.8 °C (96.5 °F) (Temporal)   Resp 16   Ht 1.702 m (5' 7\")   Wt 108 kg (238 lb)   SpO2 94%   BMI 37.28 kg/m²     "

## 2022-11-11 ENCOUNTER — HOSPITAL ENCOUNTER (EMERGENCY)
Facility: MEDICAL CENTER | Age: 37
End: 2022-11-11
Attending: EMERGENCY MEDICINE
Payer: MEDICARE

## 2022-11-11 VITALS
TEMPERATURE: 98.5 F | SYSTOLIC BLOOD PRESSURE: 132 MMHG | DIASTOLIC BLOOD PRESSURE: 88 MMHG | BODY MASS INDEX: 37.47 KG/M2 | HEIGHT: 67 IN | RESPIRATION RATE: 18 BRPM | OXYGEN SATURATION: 98 % | WEIGHT: 238.76 LBS | HEART RATE: 68 BPM

## 2022-11-11 DIAGNOSIS — T14.8XXA ABRASION: ICD-10-CM

## 2022-11-11 PROCEDURE — 99283 EMERGENCY DEPT VISIT LOW MDM: CPT

## 2022-11-11 ASSESSMENT — FIBROSIS 4 INDEX: FIB4 SCORE: 0.19

## 2022-11-11 NOTE — ED PROVIDER NOTES
ED Provider Note    CHIEF COMPLAINT  Chief Complaint   Patient presents with    Wound Check     BIB [facility] for wound check of left lower calf. She has 3 inch long wound with red edges. Patient is ambulatory with a steady gait    Psych Eval     Patient talking to self in non-sensical conversations. She denies SI/HI       HPI  Dunia Sahni is a 37 y.o. female who presents abrasions to her distal posterior lower legs bilaterally.  They happen to be just at the upper cuff of the boots that she is wearing.  She has been wearing them for about a week now.  No fevers.  She states that she can get different shoes instead of the boots that she is currently wearing.    She states that she is homeless currently and stays at the shelter.  She does occasionally use cocaine though denies any other substance abuse.  Denies any thoughts of hurting herself or others.    REVIEW OF SYSTEMS  See HPI for further details. All other systems are negative.     PAST MEDICAL HISTORY   has a past medical history of Abscess (10/12/2017), Bipolar affective (HCC), Depression, Kidney infection, Leukemia (HCC) (12/2019), and Suicide attempt ([facility]).    SOCIAL HISTORY  Social History     Tobacco Use    Smoking status: Never    Smokeless tobacco: Never   Vaping Use    Vaping Use: Never used   Substance and Sexual Activity    Alcohol use: Yes     Comment: patient reports she drinks once or twice a year    Drug use: Not Currently     Types: Inhaled     Comment: hx: marijuana, cocaine    Sexual activity: Not on file       SURGICAL HISTORY   has a past surgical history that includes appendectomy; ercp in or (N/A, 7/5/2018); and skip by laparoscopy (N/A, 7/6/2018).    CURRENT MEDICATIONS  Home Medications       Reviewed by Jenny Ferguson R.N. (Registered Nurse) on 11/11/22 at 1354  Med List Status: Partial     Medication Last Dose Status   albuterol 108 (90 Base) MCG/ACT Aero Soln inhalation aerosol  Active   benzonatate (TESSALON) 100 MG Cap   "Active   ondansetron (ZOFRAN ODT) 4 MG TABLET DISPERSIBLE  Active                    ALLERGIES  Allergies   Allergen Reactions    Vicodin [Hydrocodone-Acetaminophen] Anaphylaxis     RXN=9 years ago       PHYSICAL EXAM  VITAL SIGNS: /87   Pulse 72   Temp 36.8 °C (98.2 °F) (Temporal)   Resp 16   Ht 1.702 m (5' 7\")   Wt 108 kg (238 lb 12.1 oz)   SpO2 99%   BMI 37.39 kg/m²   Pulse ox interpretation: I interpret this pulse ox as normal.  Constitutional: Alert in no apparent distress.  HENT: No signs of trauma, Bilateral external ears normal, Nose normal.   Eyes: Conjunctiva normal, Non-icteric.   Cardiovascular: Regular rate and rhythm.   Thorax & Lungs: Normal breath sounds, No respiratory distress,  Skin: Warm, Dry, No erythema, No rash.  Linear abrasions to the posterior distal calf region to bilateral lower legs.  Left is more affected than right.  No surrounding erythema or discharge.  No bleeding.  Extremities: Intact distal pulses, No edema, No cyanosis  Musculoskeletal: Good range of motion in all major joints. No major deformities noted.   Neurologic: Alert, Normal motor function and gait, Normal sensory function, No focal deficits noted.         DIAGNOSTIC STUDIES / PROCEDURES    COURSE & MEDICAL DECISION MAKING    Medications - No data to display    Pertinent Labs & Imaging studies reviewed. (See chart for details)  37 y.o. female presenting with abrasions to bilateral lower legs just at the cuff of her boots that she is currently wearing.  Likely related to frequent abrasions.  She is currently wearing ankle socks that do not cover the skin there.  Recommending changing her footwear and I suspect that the wounds will heal fine.  No evidence of sepsis.  No obvious signs of deep space infection.  No active bleeding or discharge.  The patient was discharged from this emergency department in stable condition.    The patient was instructed to follow-up with primary care physician for further " "management.  To return immediately for any worsening symptoms or development of any other concerning signs or symptoms. The patient verbalizes understanding in their own words.    /87   Pulse 72   Temp 36.8 °C (98.2 °F) (Temporal)   Resp 16   Ht 1.702 m (5' 7\")   Wt 108 kg (238 lb 12.1 oz)   SpO2 99%   BMI 37.39 kg/m²     The patient was referred to primary care where they will receive further BP management.      [facility], Emergency Dept  [address] [place] [id number] [phone]    As needed, If symptoms worsen    Primary care doctor    Schedule an appointment as soon as possible for a visit       FINAL IMPRESSION  1. Abrasion            Electronically signed by: Jean-Paul Brantley M.D., 11/11/2022 2:25 PM    "

## 2022-11-11 NOTE — ED TRIAGE NOTES
"Chief Complaint   Patient presents with   • Wound Check     BIB [facility] for wound check of left lower calf. She has 3 inch long wound with red edges. Patient is ambulatory with a steady gait   • Psych Eval     Patient talking to self in non-sensical conversations. She denies SI/HI         Patient to triage ambulatory with a steady gait, AAOx4, Appropriate precautions in place.     Explained wait time and triage process. Placed back in lobby. Told to notify ED tech or RN of any changes, verbalized understanding.    /87   Pulse 72   Temp 36.8 °C (98.2 °F) (Temporal)   Resp 16   Ht 1.702 m (5' 7\")   Wt 108 kg (238 lb 12.1 oz)   SpO2 99%   BMI 37.39 kg/m²     "

## 2022-11-11 NOTE — ED NOTES
Pt spoke to social work, given new shoes, calm and cooperative, no other questions concerning discharge, ambulatory with steady gait.

## 2022-11-13 ENCOUNTER — APPOINTMENT (OUTPATIENT)
Dept: RADIOLOGY | Facility: MEDICAL CENTER | Age: 37
End: 2022-11-13
Attending: EMERGENCY MEDICINE
Payer: MEDICARE

## 2022-11-13 ENCOUNTER — HOSPITAL ENCOUNTER (EMERGENCY)
Facility: MEDICAL CENTER | Age: 37
End: 2022-11-13
Attending: EMERGENCY MEDICINE
Payer: MEDICARE

## 2022-11-13 VITALS
SYSTOLIC BLOOD PRESSURE: 152 MMHG | HEART RATE: 73 BPM | TEMPERATURE: 98.6 F | BODY MASS INDEX: 37.37 KG/M2 | DIASTOLIC BLOOD PRESSURE: 101 MMHG | RESPIRATION RATE: 19 BRPM | HEIGHT: 67 IN | WEIGHT: 238.1 LBS | OXYGEN SATURATION: 99 %

## 2022-11-13 DIAGNOSIS — Z59.00 HOMELESS: ICD-10-CM

## 2022-11-13 DIAGNOSIS — Z13.9 ENCOUNTER FOR MEDICAL SCREENING EXAMINATION: ICD-10-CM

## 2022-11-13 LAB
ANION GAP SERPL CALC-SCNC: 11 MMOL/L (ref 7–16)
BASOPHILS # BLD AUTO: 0.4 % (ref 0–1.8)
BASOPHILS # BLD: 0.06 K/UL (ref 0–0.12)
BUN SERPL-MCNC: 13 MG/DL (ref 8–22)
CALCIUM SERPL-MCNC: 8.8 MG/DL (ref 8.5–10.5)
CHLORIDE SERPL-SCNC: 103 MMOL/L (ref 96–112)
CO2 SERPL-SCNC: 21 MMOL/L (ref 20–33)
CREAT SERPL-MCNC: 0.63 MG/DL (ref 0.5–1.4)
EOSINOPHIL # BLD AUTO: 0.1 K/UL (ref 0–0.51)
EOSINOPHIL NFR BLD: 0.7 % (ref 0–6.9)
ERYTHROCYTE [DISTWIDTH] IN BLOOD BY AUTOMATED COUNT: 44.1 FL (ref 35.9–50)
GFR SERPLBLD CREATININE-BSD FMLA CKD-EPI: 118 ML/MIN/1.73 M 2
GLUCOSE SERPL-MCNC: 107 MG/DL (ref 65–99)
HCT VFR BLD AUTO: 31.9 % (ref 37–47)
HGB BLD-MCNC: 9.9 G/DL (ref 12–16)
IMM GRANULOCYTES # BLD AUTO: 0.06 K/UL (ref 0–0.11)
IMM GRANULOCYTES NFR BLD AUTO: 0.4 % (ref 0–0.9)
LYMPHOCYTES # BLD AUTO: 2.93 K/UL (ref 1–4.8)
LYMPHOCYTES NFR BLD: 21.9 % (ref 22–41)
MCH RBC QN AUTO: 24.5 PG (ref 27–33)
MCHC RBC AUTO-ENTMCNC: 31 G/DL (ref 33.6–35)
MCV RBC AUTO: 79 FL (ref 81.4–97.8)
MONOCYTES # BLD AUTO: 0.91 K/UL (ref 0–0.85)
MONOCYTES NFR BLD AUTO: 6.8 % (ref 0–13.4)
NEUTROPHILS # BLD AUTO: 9.34 K/UL (ref 2–7.15)
NEUTROPHILS NFR BLD: 69.8 % (ref 44–72)
NRBC # BLD AUTO: 0 K/UL
NRBC BLD-RTO: 0 /100 WBC
PLATELET # BLD AUTO: 412 K/UL (ref 164–446)
PMV BLD AUTO: 9.5 FL (ref 9–12.9)
POTASSIUM SERPL-SCNC: 3.7 MMOL/L (ref 3.6–5.5)
RBC # BLD AUTO: 4.04 M/UL (ref 4.2–5.4)
SODIUM SERPL-SCNC: 135 MMOL/L (ref 135–145)
TROPONIN T SERPL-MCNC: <6 NG/L (ref 6–19)
WBC # BLD AUTO: 13.4 K/UL (ref 4.8–10.8)

## 2022-11-13 PROCEDURE — 99284 EMERGENCY DEPT VISIT MOD MDM: CPT

## 2022-11-13 PROCEDURE — 80048 BASIC METABOLIC PNL TOTAL CA: CPT

## 2022-11-13 PROCEDURE — 93005 ELECTROCARDIOGRAM TRACING: CPT | Performed by: EMERGENCY MEDICINE

## 2022-11-13 PROCEDURE — 71045 X-RAY EXAM CHEST 1 VIEW: CPT

## 2022-11-13 PROCEDURE — 84484 ASSAY OF TROPONIN QUANT: CPT

## 2022-11-13 PROCEDURE — 85025 COMPLETE CBC W/AUTO DIFF WBC: CPT

## 2022-11-13 PROCEDURE — 36415 COLL VENOUS BLD VENIPUNCTURE: CPT

## 2022-11-13 SDOH — ECONOMIC STABILITY - HOUSING INSECURITY: HOMELESSNESS UNSPECIFIED: Z59.00

## 2022-11-13 ASSESSMENT — FIBROSIS 4 INDEX: FIB4 SCORE: 0.19

## 2022-11-13 NOTE — ED PROVIDER NOTES
"ED Provider Note    CHIEF COMPLAINT  Chief Complaint   Patient presents with    Psych Eval     PT is homeless and in need of help with meds and resources.  PT BIB [facility] and talking to self.         HPI  Dunia Sahni is a 37 y.o. female who presents to the emergency department with primary complaint of being homeless and exposed to cold weather/snow this evening.  Past medical history as document below.  Denies taking any prescription medications at this point.  States that she feels generally unwell and that she needs blood work for evaluation.    Denies suicidal homicidal ideations    REVIEW OF SYSTEMS  See HPI for further details. All other systems are negative.     PAST MEDICAL HISTORY   has a past medical history of Abscess (10/12/2017), Bipolar affective (HCC), Depression, Kidney infection, Leukemia (HCC) (12/2019), and Suicide attempt (HCC).    SOCIAL HISTORY  Social History     Tobacco Use    Smoking status: Never    Smokeless tobacco: Never   Vaping Use    Vaping Use: Never used   Substance and Sexual Activity    Alcohol use: Yes     Comment: patient reports she drinks once or twice a year    Drug use: Not Currently     Types: Inhaled     Comment: hx: marijuana, cocaine    Sexual activity: Not on file       SURGICAL HISTORY   has a past surgical history that includes appendectomy; ercp in or (N/A, 7/5/2018); and skip by laparoscopy (N/A, 7/6/2018).    CURRENT MEDICATIONS  Home Medications    **Home medications have not yet been reviewed for this encounter**         ALLERGIES  Allergies   Allergen Reactions    Vicodin [Hydrocodone-Acetaminophen] Anaphylaxis     RXN=9 years ago       PHYSICAL EXAM  VITAL SIGNS: BP (!) 152/101   Pulse 73   Temp 37 °C (98.6 °F) (Temporal)   Resp 19   Ht 1.702 m (5' 7.01\")   Wt 108 kg (238 lb 1.6 oz)   LMP  (LMP Unknown)   SpO2 99%   BMI 37.28 kg/m²  @[id number]::@  Pulse ox interpretation: I interpret this pulse ox as normal.  Constitutional: Alert in no " apparent distress.  Motor tic  HENT: Normocephalic, Atraumatic, Bilateral external ears normal. Nose normal.   Eyes: Pupils are equal and reactive.   Heart: Regular rate and rythm, no murmurs.    Lungs: Clear to auscultation bilaterally.  Abdomen: Soft nontender  Skin: Warm, Dry, No erythema, No rash.   Neurologic: Alert, Grossly non-focal.   Psychiatric: Psychiatric affect    Results for orders placed or performed during the hospital encounter of 11/13/22   CBC WITH DIFFERENTIAL   Result Value Ref Range    WBC 13.4 (H) 4.8 - 10.8 K/uL    RBC 4.04 (L) 4.20 - 5.40 M/uL    Hemoglobin 9.9 (L) 12.0 - 16.0 g/dL    Hematocrit 31.9 (L) 37.0 - 47.0 %    MCV 79.0 (L) 81.4 - 97.8 fL    MCH 24.5 (L) 27.0 - 33.0 pg    MCHC 31.0 (L) 33.6 - 35.0 g/dL    RDW 44.1 35.9 - 50.0 fL    Platelet Count 412 164 - 446 K/uL    MPV 9.5 9.0 - 12.9 fL    Neutrophils-Polys 69.80 44.00 - 72.00 %    Lymphocytes 21.90 (L) 22.00 - 41.00 %    Monocytes 6.80 0.00 - 13.40 %    Eosinophils 0.70 0.00 - 6.90 %    Basophils 0.40 0.00 - 1.80 %    Immature Granulocytes 0.40 0.00 - 0.90 %    Nucleated RBC 0.00 /100 WBC    Neutrophils (Absolute) 9.34 (H) 2.00 - 7.15 K/uL    Lymphs (Absolute) 2.93 1.00 - 4.80 K/uL    Monos (Absolute) 0.91 (H) 0.00 - 0.85 K/uL    Eos (Absolute) 0.10 0.00 - 0.51 K/uL    Baso (Absolute) 0.06 0.00 - 0.12 K/uL    Immature Granulocytes (abs) 0.06 0.00 - 0.11 K/uL    NRBC (Absolute) 0.00 K/uL   BASIC METABOLIC PANEL   Result Value Ref Range    Sodium 135 135 - 145 mmol/L    Potassium 3.7 3.6 - 5.5 mmol/L    Chloride 103 96 - 112 mmol/L    Co2 21 20 - 33 mmol/L    Glucose 107 (H) 65 - 99 mg/dL    Bun 13 8 - 22 mg/dL    Creatinine 0.63 0.50 - 1.40 mg/dL    Calcium 8.8 8.5 - 10.5 mg/dL    Anion Gap 11.0 7.0 - 16.0   TROPONIN   Result Value Ref Range    Troponin T <6 6 - 19 ng/L   ESTIMATED GFR   Result Value Ref Range    GFR (CKD-EPI) 118 >60 mL/min/1.73 m 2           COURSE & MEDICAL DECISION MAKING  Pertinent Labs & Imaging studies  reviewed. (See chart for details)  37-year-old female presented emerged part with the above presentation.  Patient without acute medical complaint.  But does state that she wants blood work drawn.  She did have a brief commentary about some possible chest discomfort which sounds more like reflux.  Work-up is benign.  Heart score is low.  Will discharge back to the care shelter.      The patient will return for worsening symptoms and is stable at the time of discharge. The patient verbalizes understanding and will comply.    FINAL IMPRESSION  1. Homeless    2. Encounter for medical screening examination               Electronically signed by: Theron Carr M.D., 11/13/2022 3:08 AM

## 2022-11-19 ENCOUNTER — HOSPITAL ENCOUNTER (EMERGENCY)
Facility: MEDICAL CENTER | Age: 37
End: 2022-11-19
Attending: EMERGENCY MEDICINE
Payer: MEDICARE

## 2022-11-19 VITALS
DIASTOLIC BLOOD PRESSURE: 88 MMHG | HEIGHT: 67 IN | WEIGHT: 242.51 LBS | TEMPERATURE: 98.2 F | HEART RATE: 71 BPM | RESPIRATION RATE: 16 BRPM | BODY MASS INDEX: 38.06 KG/M2 | SYSTOLIC BLOOD PRESSURE: 134 MMHG | OXYGEN SATURATION: 96 %

## 2022-11-19 DIAGNOSIS — N39.0 ACUTE UTI: ICD-10-CM

## 2022-11-19 DIAGNOSIS — I10 HYPERTENSION, UNSPECIFIED TYPE: ICD-10-CM

## 2022-11-19 LAB
ALBUMIN SERPL BCP-MCNC: 3.7 G/DL (ref 3.2–4.9)
ALBUMIN/GLOB SERPL: 1.3 G/DL
ALP SERPL-CCNC: 80 U/L (ref 30–99)
ALT SERPL-CCNC: 23 U/L (ref 2–50)
ANION GAP SERPL CALC-SCNC: 9 MMOL/L (ref 7–16)
APPEARANCE UR: ABNORMAL
AST SERPL-CCNC: 17 U/L (ref 12–45)
BACTERIA #/AREA URNS HPF: NEGATIVE /HPF
BASOPHILS # BLD AUTO: 0.7 % (ref 0–1.8)
BASOPHILS # BLD: 0.06 K/UL (ref 0–0.12)
BILIRUB SERPL-MCNC: <0.2 MG/DL (ref 0.1–1.5)
BILIRUB UR QL STRIP.AUTO: NEGATIVE
BUN SERPL-MCNC: 9 MG/DL (ref 8–22)
CALCIUM SERPL-MCNC: 9 MG/DL (ref 8.5–10.5)
CHLORIDE SERPL-SCNC: 106 MMOL/L (ref 96–112)
CO2 SERPL-SCNC: 25 MMOL/L (ref 20–33)
COLOR UR: YELLOW
CREAT SERPL-MCNC: 0.79 MG/DL (ref 0.5–1.4)
EOSINOPHIL # BLD AUTO: 0.12 K/UL (ref 0–0.51)
EOSINOPHIL NFR BLD: 1.3 % (ref 0–6.9)
EPI CELLS #/AREA URNS HPF: ABNORMAL /HPF
ERYTHROCYTE [DISTWIDTH] IN BLOOD BY AUTOMATED COUNT: 43.2 FL (ref 35.9–50)
GFR SERPLBLD CREATININE-BSD FMLA CKD-EPI: 100 ML/MIN/1.73 M 2
GLOBULIN SER CALC-MCNC: 2.8 G/DL (ref 1.9–3.5)
GLUCOSE SERPL-MCNC: 129 MG/DL (ref 65–99)
GLUCOSE UR STRIP.AUTO-MCNC: NEGATIVE MG/DL
HCT VFR BLD AUTO: 31.9 % (ref 37–47)
HGB BLD-MCNC: 10 G/DL (ref 12–16)
IMM GRANULOCYTES # BLD AUTO: 0.04 K/UL (ref 0–0.11)
IMM GRANULOCYTES NFR BLD AUTO: 0.4 % (ref 0–0.9)
KETONES UR STRIP.AUTO-MCNC: ABNORMAL MG/DL
LEUKOCYTE ESTERASE UR QL STRIP.AUTO: ABNORMAL
LIPASE SERPL-CCNC: 18 U/L (ref 11–82)
LYMPHOCYTES # BLD AUTO: 2.36 K/UL (ref 1–4.8)
LYMPHOCYTES NFR BLD: 26.4 % (ref 22–41)
MCH RBC QN AUTO: 24.9 PG (ref 27–33)
MCHC RBC AUTO-ENTMCNC: 31.3 G/DL (ref 33.6–35)
MCV RBC AUTO: 79.4 FL (ref 81.4–97.8)
MICRO URNS: ABNORMAL
MONOCYTES # BLD AUTO: 0.64 K/UL (ref 0–0.85)
MONOCYTES NFR BLD AUTO: 7.2 % (ref 0–13.4)
NEUTROPHILS # BLD AUTO: 5.71 K/UL (ref 2–7.15)
NEUTROPHILS NFR BLD: 64 % (ref 44–72)
NITRITE UR QL STRIP.AUTO: NEGATIVE
NRBC # BLD AUTO: 0 K/UL
NRBC BLD-RTO: 0 /100 WBC
PH UR STRIP.AUTO: 7 [PH] (ref 5–8)
PLATELET # BLD AUTO: 426 K/UL (ref 164–446)
PMV BLD AUTO: 8.9 FL (ref 9–12.9)
POTASSIUM SERPL-SCNC: 4 MMOL/L (ref 3.6–5.5)
PROT SERPL-MCNC: 6.5 G/DL (ref 6–8.2)
PROT UR QL STRIP: NEGATIVE MG/DL
RBC # BLD AUTO: 4.02 M/UL (ref 4.2–5.4)
RBC # URNS HPF: ABNORMAL /HPF
RBC UR QL AUTO: NEGATIVE
SODIUM SERPL-SCNC: 140 MMOL/L (ref 135–145)
SP GR UR STRIP.AUTO: 1.02
UROBILINOGEN UR STRIP.AUTO-MCNC: 0.2 MG/DL
WBC # BLD AUTO: 8.9 K/UL (ref 4.8–10.8)
WBC #/AREA URNS HPF: ABNORMAL /HPF

## 2022-11-19 PROCEDURE — 83690 ASSAY OF LIPASE: CPT

## 2022-11-19 PROCEDURE — 80053 COMPREHEN METABOLIC PANEL: CPT

## 2022-11-19 PROCEDURE — 85025 COMPLETE CBC W/AUTO DIFF WBC: CPT

## 2022-11-19 PROCEDURE — 99283 EMERGENCY DEPT VISIT LOW MDM: CPT

## 2022-11-19 PROCEDURE — A9270 NON-COVERED ITEM OR SERVICE: HCPCS | Performed by: EMERGENCY MEDICINE

## 2022-11-19 PROCEDURE — 36415 COLL VENOUS BLD VENIPUNCTURE: CPT

## 2022-11-19 PROCEDURE — 81001 URINALYSIS AUTO W/SCOPE: CPT

## 2022-11-19 PROCEDURE — 700102 HCHG RX REV CODE 250 W/ 637 OVERRIDE(OP): Performed by: EMERGENCY MEDICINE

## 2022-11-19 RX ORDER — CEFDINIR 300 MG/1
300 CAPSULE ORAL ONCE
Status: COMPLETED | OUTPATIENT
Start: 2022-11-19 | End: 2022-11-19

## 2022-11-19 RX ORDER — CEFDINIR 300 MG/1
300 CAPSULE ORAL 2 TIMES DAILY
Qty: 10 CAPSULE | Refills: 0 | Status: SHIPPED | OUTPATIENT
Start: 2022-11-19 | End: 2022-11-24

## 2022-11-19 RX ORDER — SODIUM CHLORIDE 9 MG/ML
1000 INJECTION, SOLUTION INTRAVENOUS ONCE
Status: DISCONTINUED | OUTPATIENT
Start: 2022-11-19 | End: 2022-11-19 | Stop reason: HOSPADM

## 2022-11-19 RX ADMIN — CEFDINIR 300 MG: 300 CAPSULE ORAL at 18:15

## 2022-11-19 ASSESSMENT — FIBROSIS 4 INDEX: FIB4 SCORE: 0.24

## 2022-11-19 NOTE — ED TRIAGE NOTES
Pt presents to ED with complaints of back pain that started today. She sates she wants to be checked for a kidney infection. Denies painful urination or urinary frequency. She is responding to internal stimuli during triage and has difficulty completing triage. She denies SI/HI at this time.     Pt educated on ED process and asked to wait in lobby. Patient educated on importance of alerting staff to new or worsening symptoms or concerns.

## 2022-11-20 NOTE — ED PROVIDER NOTES
ED Provider Note    CHIEF COMPLAINT  Chief Complaint   Patient presents with    Back Pain    Hand Pain    Abrasion       HPI  Dunia Sahni is a 37 y.o. female who presents with a chief complaint of back pain and right hand pain that started yesterday. She has not tried any medication for her symptoms. She denies any trauma. She is worried for a kidney infection but denies any dysuria, hematuria, fevers, nausea, vomiting, abdominal pain, diarrhea, constipation. She is not concerned for pregnancy or STI. She denies any IV drug use, saddle anesthesia, lower extremity weakness/numbness, urinary/fecal incontinence, or recent back injections. She denies any SI or HI.    REVIEW OF SYSTEMS  See HPI for further details. Back pain. Right hand pain. All other systems are negative.     PAST MEDICAL HISTORY   has a past medical history of Abscess (10/12/2017), Bipolar affective (HCC), Depression, Kidney infection, Leukemia ([facility]) (12/2019), and Suicide attempt ([facility]).    SOCIAL HISTORY  Social History     Tobacco Use    Smoking status: Never    Smokeless tobacco: Never   Vaping Use    Vaping Use: Never used   Substance and Sexual Activity    Alcohol use: Yes     Comment: patient reports she drinks once or twice a year    Drug use: Not Currently     Types: Inhaled     Comment: hx: marijuana, cocaine    Sexual activity: Not on file       SURGICAL HISTORY   has a past surgical history that includes appendectomy; ercp in or (N/A, 7/5/2018); and skip by laparoscopy (N/A, 7/6/2018).    CURRENT MEDICATIONS  Home Medications       Reviewed by Tomasa Sanchez R.N. (Registered Nurse) on 11/19/22 at 1544  Med List Status: Complete     Medication Last Dose Status        Patient Corwin Taking any Medications                           ALLERGIES  Allergies   Allergen Reactions    Vicodin [Hydrocodone-Acetaminophen] Anaphylaxis     RXN=9 years ago    Denies 11/19/2022       PHYSICAL EXAM  VITAL SIGNS: BP (!) 145/90   Pulse (!) 107   " Temp 36.7 °C (98 °F) (Temporal)   Resp 14   Ht 1.702 m (5' 7\")   Wt 110 kg (242 lb 8.1 oz)   LMP 10/29/2022 (Approximate)   SpO2 99%   BMI 37.98 kg/m²   Pulse ox interpretation: I interpret this pulse ox as normal.  Constitutional: Alert in no apparent distress.  HENT: No signs of trauma, Bilateral external ears normal, Nose normal. Moist mucous membranes.  Eyes: Pupils are equal and reactive, Conjunctiva normal, Non-icteric.   Neck: Normal range of motion, Supple, No stridor.   Lymphatic: No lymphadenopathy noted.   Cardiovascular: Regular rate and rhythm, no murmurs. Pulses symmetrical.  Thorax & Lungs: Normal breath sounds, No respiratory distress, No wheezing, No chest tenderness.   Abdomen: Bowel sounds normal, Soft, No tenderness, No masses, No pulsatile masses. No peritoneal signs.  Skin: Warm, Dry, No erythema, No rash.   Back: No bony tenderness, no CVA tenderness.   Extremities: Intact distal pulses, No edema, No tenderness, No cyanosis.  Musculoskeletal: Good range of motion in all major joints. No obvious right hand injury, erythema, edema. No right hand tenderness. Full range of motion of all fingers on right hand and wrist. No tenderness to palpation or major deformities noted.   Neurologic: Alert, No focal deficits noted.   Psychiatric: Responding to internal stimuli but able to carry on an appropriate conversation. No SI/HI.     DIAGNOSTIC STUDIES / PROCEDURES    LABS  Results for orders placed or performed during the hospital encounter of 11/19/22   URINALYSIS    Specimen: Urine   Result Value Ref Range    Color Yellow     Character Cloudy (A)     Specific Gravity 1.025 <1.035    Ph 7.0 5.0 - 8.0    Glucose Negative Negative mg/dL    Ketones Trace (A) Negative mg/dL    Protein Negative Negative mg/dL    Bilirubin Negative Negative    Urobilinogen, Urine 0.2 Negative    Nitrite Negative Negative    Leukocyte Esterase Small (A) Negative    Occult Blood Negative Negative    Micro Urine Req " Microscopic    CBC WITH DIFFERENTIAL   Result Value Ref Range    WBC 8.9 4.8 - 10.8 K/uL    RBC 4.02 (L) 4.20 - 5.40 M/uL    Hemoglobin 10.0 (L) 12.0 - 16.0 g/dL    Hematocrit 31.9 (L) 37.0 - 47.0 %    MCV 79.4 (L) 81.4 - 97.8 fL    MCH 24.9 (L) 27.0 - 33.0 pg    MCHC 31.3 (L) 33.6 - 35.0 g/dL    RDW 43.2 35.9 - 50.0 fL    Platelet Count 426 164 - 446 K/uL    MPV 8.9 (L) 9.0 - 12.9 fL    Neutrophils-Polys 64.00 44.00 - 72.00 %    Lymphocytes 26.40 22.00 - 41.00 %    Monocytes 7.20 0.00 - 13.40 %    Eosinophils 1.30 0.00 - 6.90 %    Basophils 0.70 0.00 - 1.80 %    Immature Granulocytes 0.40 0.00 - 0.90 %    Nucleated RBC 0.00 /100 WBC    Neutrophils (Absolute) 5.71 2.00 - 7.15 K/uL    Lymphs (Absolute) 2.36 1.00 - 4.80 K/uL    Monos (Absolute) 0.64 0.00 - 0.85 K/uL    Eos (Absolute) 0.12 0.00 - 0.51 K/uL    Baso (Absolute) 0.06 0.00 - 0.12 K/uL    Immature Granulocytes (abs) 0.04 0.00 - 0.11 K/uL    NRBC (Absolute) 0.00 K/uL   Comp Metabolic Panel   Result Value Ref Range    Sodium 140 135 - 145 mmol/L    Potassium 4.0 3.6 - 5.5 mmol/L    Chloride 106 96 - 112 mmol/L    Co2 25 20 - 33 mmol/L    Anion Gap 9.0 7.0 - 16.0    Glucose 129 (H) 65 - 99 mg/dL    Bun 9 8 - 22 mg/dL    Creatinine 0.79 0.50 - 1.40 mg/dL    Calcium 9.0 8.5 - 10.5 mg/dL    AST(SGOT) 17 12 - 45 U/L    ALT(SGPT) 23 2 - 50 U/L    Alkaline Phosphatase 80 30 - 99 U/L    Total Bilirubin <0.2 0.1 - 1.5 mg/dL    Albumin 3.7 3.2 - 4.9 g/dL    Total Protein 6.5 6.0 - 8.2 g/dL    Globulin 2.8 1.9 - 3.5 g/dL    A-G Ratio 1.3 g/dL   LIPASE   Result Value Ref Range    Lipase 18 11 - 82 U/L   URINE MICROSCOPIC (W/UA)   Result Value Ref Range    WBC 5-10 (A) /hpf    RBC 0-2 /hpf    Bacteria Negative None /hpf    Epithelial Cells Few /hpf   ESTIMATED GFR   Result Value Ref Range    GFR (CKD-EPI) 100 >60 mL/min/1.73 m 2     RADIOLOGY  No orders to display     COURSE & MEDICAL DECISION MAKING  Pertinent Labs & Imaging studies reviewed. (See chart for details)  This  "is a 37 year old female here requesting evaluation for \"kidney infection\" due to atraumatic back pain. Also reports some right hand pain. She arrives to triage tachycardic however at the time of my evaluation her HR has improved to normal without intervention. She is AF with otherwise normal VS. Appears well hydrated and non-toxic. Appears to be responding to internal stimuli - does have a history of BPD - but denies SI/HI and does appear to be able to take care of herself and complete ADLs without difficulty. She is alert and interactive, able to carry on an appropriate conversation - does not meet legal hold criteria presently. She does not have any risk factors or symptoms consistent with epidural abscess or cauda equina. She has full strength and sensation in her bilateral lower extremities. She has no CVAT or bony spine tenderness to suggest osteomyelitis or discitis.     CBC is without leukocytosis although she has a very mild anemia that does not need to be emergently addressed. Metabolic panel is normal with the exception of glucose to 129. UA with WBCs and leukocyte esterase. Patient rather insistent on treatment for kidney infection which I suppose is reasonable given the results of the UA so will trial cefdinir. She does not require hospitalization and is safe for discharge with close outpatient management. Discharged in good and stable condition with strict return precautions.    The patient will return for worsening symptoms and is stable at the time of discharge. The patient verbalizes understanding and will comply.      FINAL IMPRESSION  1. Acute UTI  cefdinir (OMNICEF) 300 MG Cap      2. Hypertension, unspecified type          Electronically signed by: Ziggy Seay M.D., 11/19/2022 4:11 PM    "

## 2022-11-20 NOTE — DISCHARGE INSTRUCTIONS
You were seen in the ER for back pain.  Your urine does suggest a possible infection and I have called in a prescription for antibiotics.  Please take these as directed.  The remainder of your labs did not suggest any acute abnormality that requires further work-up, consultation, or admission to the hospital.  Your blood sugar was slightly elevated today as well as your blood pressure and this should be followed up as an outpatient.  Please establish with a primary care physician.  Return with new or worsening symptoms.  I have and!

## 2022-11-20 NOTE — ED NOTES
Pt medicated per MAR, given discharge instructions, antibiotic education provided, pt asking for cab, social work contacted.

## 2023-01-31 ENCOUNTER — APPOINTMENT (OUTPATIENT)
Dept: RADIOLOGY | Facility: MEDICAL CENTER | Age: 38
End: 2023-01-31
Attending: EMERGENCY MEDICINE
Payer: MEDICARE

## 2023-01-31 ENCOUNTER — HOSPITAL ENCOUNTER (EMERGENCY)
Facility: MEDICAL CENTER | Age: 38
End: 2023-01-31
Attending: EMERGENCY MEDICINE
Payer: MEDICARE

## 2023-01-31 VITALS
WEIGHT: 242.51 LBS | SYSTOLIC BLOOD PRESSURE: 117 MMHG | OXYGEN SATURATION: 98 % | DIASTOLIC BLOOD PRESSURE: 69 MMHG | TEMPERATURE: 97.9 F | HEIGHT: 67 IN | HEART RATE: 79 BPM | RESPIRATION RATE: 18 BRPM | BODY MASS INDEX: 38.06 KG/M2

## 2023-01-31 DIAGNOSIS — R07.9 CHEST PAIN, UNSPECIFIED TYPE: ICD-10-CM

## 2023-01-31 LAB
EKG IMPRESSION: NORMAL
HCG SERPL QL: NEGATIVE
POC BREATHALIZER: 0 PERCENT (ref 0–0.01)
TROPONIN T SERPL-MCNC: 8 NG/L (ref 6–19)

## 2023-01-31 PROCEDURE — 71045 X-RAY EXAM CHEST 1 VIEW: CPT

## 2023-01-31 PROCEDURE — 99284 EMERGENCY DEPT VISIT MOD MDM: CPT

## 2023-01-31 PROCEDURE — 84703 CHORIONIC GONADOTROPIN ASSAY: CPT

## 2023-01-31 PROCEDURE — 302970 POC BREATHALIZER: Performed by: EMERGENCY MEDICINE

## 2023-01-31 PROCEDURE — 84484 ASSAY OF TROPONIN QUANT: CPT

## 2023-01-31 PROCEDURE — 36415 COLL VENOUS BLD VENIPUNCTURE: CPT

## 2023-01-31 PROCEDURE — 93005 ELECTROCARDIOGRAM TRACING: CPT | Performed by: EMERGENCY MEDICINE

## 2023-01-31 ASSESSMENT — FIBROSIS 4 INDEX: FIB4 SCORE: 0.29

## 2023-01-31 NOTE — ED TRIAGE NOTES
"Chief Complaint   Patient presents with    Psych Eval     PT is homeless and in need of help with meds and resources.  PT BIB [facility] and talking to self.       Blood Pressure (Abnormal) 140/115   Pulse 68   Temperature 36.9 °C (98.4 °F) (Temporal)   Respiration 18   Height 1.702 m (5' 7.01\")   Weight 108 kg (238 lb 1.6 oz)   Last Menstrual Period  (LMP Unknown)   Oxygen Saturation 98% Comment: Room air  Body Mass Index 37.28 kg/m²     " Admission Reconciliation is Completed  Discharge Reconciliation is Not Complete Admission Reconciliation is Completed  Discharge Reconciliation is Completed

## 2023-01-31 NOTE — ED NOTES
Med Rec completed per patient   Allergies reviewed  No ORAL antibiotics in last 30 days    Patient states that she is not currently taking any medications

## 2023-01-31 NOTE — ED TRIAGE NOTES
"Chief Complaint   Patient presents with    Psych Eval     Pt presents to ED via EMS \"to have my heart checked up on because of my heart surgery x9 months ago that I had done in [place], I want my heart checked out because it's cold outside.\" Pt denies any other issue at this time. Pt appears impulsive and responding to internal stimuli. Pt responding to auditory hallucinations.     Pt admits to EMS ETOH use, but denies when asked by RN.  "

## 2023-01-31 NOTE — ED PROVIDER NOTES
"ED Provider Note    CHIEF COMPLAINT  Chief Complaint   Patient presents with    Psych Eval     Pt presents to ED via EMS \"to have my heart checked up on because of my heart surgery x9 months ago that I had done in [place], I want my heart checked out because it's cold outside.\" Pt denies any other issue at this time. Pt appears impulsive and responding to internal stimuli. Pt responding to auditory hallucinations.       EXTERNAL RECORDS REVIEWED  External ED Note visitation at [facility] on 11/29/2022 for chest pain evaluation was completed and was negative for cardiac etiology    HPI/ROS    Dunia Carolina Sahni is a 37 y.o. female who presents with complaint of chest pain.  She states that she had surgical intervention her heart few years ago in [place] and states since that time she is intermittent chest pain.  The pain is dull in nature, is been present for over 24 hours, there is no radiation to her back, to her groin, to her abdomen, there is no alleviating exacerbating factors, denies fever, shakes, chills, sweats, nausea, vomiting.    Cardiac Risk Factors:  No Age > 55  No Aspirin use within 7 days  No prior history of coronary artery disease  No diabetes  No hyperlipidemia/chloesterol   No hypertension  No obesity  No family history of coronary artery disease at a young age <54 yo  No tobacco use   No drugs (methamphetamine or cocaine)  No history of aortic aneurysm   No history of aortic dissection   No history of deep vein thrombosis or pulmonary embolism   No hormone replacement  No oral birth control     PAST MEDICAL HISTORY   has a past medical history of Abscess (10/12/2017), Bipolar affective (HCC), Depression, Kidney infection, Leukemia (HCC) (12/2019), and Suicide attempt (HCC).    SURGICAL HISTORY   has a past surgical history that includes appendectomy; ercp in or (N/A, 7/5/2018); and skip by laparoscopy (N/A, 7/6/2018).    FAMILY HISTORY  Family History   Family history " "unknown: Yes       SOCIAL HISTORY  Social History     Tobacco Use    Smoking status: Never    Smokeless tobacco: Never   Vaping Use    Vaping Use: Never used   Substance and Sexual Activity    Alcohol use: Yes     Comment: patient reports she drinks once or twice a year    Drug use: Not Currently     Types: Inhaled     Comment: hx: marijuana, cocaine    Sexual activity: Not on file       CURRENT MEDICATIONS  Home Medications       Reviewed by Elisha Marquis (Pharmacy Tech) on 01/31/23 at 1048  Med List Status: Complete     Medication Last Dose Status        Patient Corwin Taking any Medications                           ALLERGIES  Allergies   Allergen Reactions    Vicodin [Hydrocodone-Acetaminophen] Anaphylaxis     RXN=9 years ago    Denies 11/19/2022       PHYSICAL EXAM  VITAL SIGNS: /69   Pulse 79   Temp 36.6 °C (97.9 °F) (Temporal)   Resp 18   Ht 1.702 m (5' 7.01\")   Wt 110 kg (242 lb 8.1 oz)   SpO2 98%   BMI 37.97 kg/m²      Nursing notes and vitals reviewed.  Constitutional: Well developed, Well nourished, No acute distress, Non-toxic appearance.   Eyes: PERRLA, EOMI, Conjunctiva normal, No discharge.   Cardiovascular: Normal heart rate, Normal rhythm, No murmurs, No rubs, No gallops.   Thorax & Lungs: No respiratory distress, No rales, No rhonchi, No wheezing, No chest tenderness.   Abdomen: Bowel sounds normal, Soft, No tenderness, No guarding, No rebound, No masses, No pulsatile masses.   Skin: Warm, Dry, No erythema, No rash.   Extremities: No deformity, no pedal edema, good range of motion range of motion upper lower extremes bilaterally  Neurologic: Alert & oriented x 3, no focal abnormalities noted, acting appropriately on examination  Psychiatric: Affect normal for clinical presentation.      DIAGNOSTIC STUDIES / PROCEDURES    LABS  Results for orders placed or performed during the hospital encounter of 01/31/23   Troponin STAT   Result Value Ref Range    Troponin T 8 6 - 19 ng/L "   BETA-HCG QUALITATIVE SERUM   Result Value Ref Range    Beta-Hcg Qualitative Serum Negative Negative   POC BREATHALIZER   Result Value Ref Range    POC Breathalizer 0.00 0.00 - 0.01 Percent         RADIOLOGY  I have independently interpreted the diagnostic imaging associated with this visit and am waiting the final reading from the radiologist.   My preliminary interpretation is a follows: No evidence of intra thoracic abnormality such as infiltrate, pneumothorax  Radiologist interpretation:   DX-CHEST-PORTABLE (1 VIEW)   Final Result      No acute cardiopulmonary abnormality.            COURSE & MEDICAL DECISION MAKING    ED Observation Status? Yes; I am placing the patient in to an observation status due to a diagnostic uncertainty as well as therapeutic intensity. Patient placed in observation status at 9:15 AM, 1/31/2023.     Observation plan is as follows: Chest pain evaluation, psychiatric evaluation    Upon Reevaluation, the patient's condition has: Improved; and will be discharged.  Patient was evaluated for chest pain and showed negative troponin, negative EKG, heart score of 1, she was asymptomatic on discharge.    Patient discharged from ED Observation status at 1145 a.m. (Time)1/31/2023(Date).     INITIAL ASSESSMENT, COURSE AND PLAN  Care Narrative: This is a 37-year-old female with a heart score 1 presents with atypical chest discomfort.  X-rays negative for infection, pneumothorax or significant normality.  She has a negative troponin, negative EKG and I do not believe she has acute coronary syndrome.  In addition, she is not hypoxic, tachypneic, still tachycardic I do not believe she has a pulmonary embolism.  She has no clinical or historical evidence of aortic dissection, aortic aneurysm.  The patient was speaking to herself in the room occasionally per the nursing staff when I talk to her she was speaking to be in full sentences and no evidence of acute psychosis.  Patient denies suicidal  homicidal ideation.  I am unsure the patient's etiology of her chest discomfort at this point time she stated she is 1 make sure she was okay.  I do believe she is okay for discharge.  The patient has strict return precautions.        ADDITIONAL PROBLEM LIST  History of bipolar: Patient here has slight hallucinations but is not suicidal, homicidal or acutely psychotic.  She be following with primary care physician for further evaluation management.  DISPOSITION AND DISCUSSIONS      Decision tools and prescription drugs considered including, but not limited to: HEART Score of 1 with no clinical evidence of acute coronary syndrome, myocardial infarction. .    FINAL DIAGNOSIS  1. Chest pain, unspecified type        DISPOSITION:  Patient will be discharged home in stable condition.    FOLLOW UP:  [facility], Emergency Dept  [address] [id number] [phone]    If symptoms worsen    Sahd Weeks M.D.  [address] [place] [id number] [phone]    Schedule an appointment as soon as possible for a visit   If symptoms worsen      OUTPATIENT MEDICATIONS:  There are no discharge medications for this patient.        Electronically signed by: Ricky Santos D.O., 1/31/2023 9:55 AM

## 2023-01-31 NOTE — ED NOTES
Pt provided with discharge instructions. Pt had no further questions. Pt provided with bus pass. Pt ambulated to lobby.

## 2023-01-31 NOTE — ED NOTES
Pt did not provide urine sample of sufficient amount. Pt informed that another sample will be required.

## 2023-01-31 NOTE — ED NOTES
Informed Pt that a urine sample is still required and to inform RN when she is able to provide one. Pt given water and sandwich.

## 2023-04-08 ENCOUNTER — HOSPITAL ENCOUNTER (EMERGENCY)
Facility: MEDICAL CENTER | Age: 38
End: 2023-04-08
Attending: EMERGENCY MEDICINE
Payer: MEDICARE

## 2023-04-08 VITALS
DIASTOLIC BLOOD PRESSURE: 75 MMHG | BODY MASS INDEX: 33.84 KG/M2 | TEMPERATURE: 98.5 F | HEART RATE: 80 BPM | OXYGEN SATURATION: 97 % | RESPIRATION RATE: 20 BRPM | SYSTOLIC BLOOD PRESSURE: 128 MMHG | HEIGHT: 67 IN | WEIGHT: 215.61 LBS

## 2023-04-08 DIAGNOSIS — R00.2 PALPITATIONS: ICD-10-CM

## 2023-04-08 DIAGNOSIS — Z86.59 HISTORY OF SCHIZOPHRENIA: ICD-10-CM

## 2023-04-08 LAB
AMPHET UR QL SCN: NEGATIVE
ANION GAP SERPL CALC-SCNC: 11 MMOL/L (ref 7–16)
APPEARANCE UR: CLEAR
BACTERIA #/AREA URNS HPF: ABNORMAL /HPF
BARBITURATES UR QL SCN: NEGATIVE
BASOPHILS # BLD AUTO: 0.3 % (ref 0–1.8)
BASOPHILS # BLD: 0.03 K/UL (ref 0–0.12)
BENZODIAZ UR QL SCN: NEGATIVE
BILIRUB UR QL STRIP.AUTO: NEGATIVE
BUN SERPL-MCNC: 18 MG/DL (ref 8–22)
BZE UR QL SCN: NEGATIVE
CALCIUM SERPL-MCNC: 9 MG/DL (ref 8.5–10.5)
CANNABINOIDS UR QL SCN: NEGATIVE
CHLORIDE SERPL-SCNC: 109 MMOL/L (ref 96–112)
CO2 SERPL-SCNC: 20 MMOL/L (ref 20–33)
COLOR UR: YELLOW
CREAT SERPL-MCNC: 0.7 MG/DL (ref 0.5–1.4)
EKG IMPRESSION: NORMAL
EOSINOPHIL # BLD AUTO: 0.07 K/UL (ref 0–0.51)
EOSINOPHIL NFR BLD: 0.6 % (ref 0–6.9)
EPI CELLS #/AREA URNS HPF: ABNORMAL /HPF
ERYTHROCYTE [DISTWIDTH] IN BLOOD BY AUTOMATED COUNT: 47.7 FL (ref 35.9–50)
GFR SERPLBLD CREATININE-BSD FMLA CKD-EPI: 115 ML/MIN/1.73 M 2
GLUCOSE SERPL-MCNC: 102 MG/DL (ref 65–99)
GLUCOSE UR STRIP.AUTO-MCNC: NEGATIVE MG/DL
HCT VFR BLD AUTO: 34.6 % (ref 37–47)
HGB BLD-MCNC: 10.9 G/DL (ref 12–16)
HYALINE CASTS #/AREA URNS LPF: ABNORMAL /LPF
IMM GRANULOCYTES # BLD AUTO: 0.06 K/UL (ref 0–0.11)
IMM GRANULOCYTES NFR BLD AUTO: 0.5 % (ref 0–0.9)
KETONES UR STRIP.AUTO-MCNC: NEGATIVE MG/DL
LEUKOCYTE ESTERASE UR QL STRIP.AUTO: ABNORMAL
LYMPHOCYTES # BLD AUTO: 2.19 K/UL (ref 1–4.8)
LYMPHOCYTES NFR BLD: 18.9 % (ref 22–41)
MCH RBC QN AUTO: 26.1 PG (ref 27–33)
MCHC RBC AUTO-ENTMCNC: 31.5 G/DL (ref 33.6–35)
MCV RBC AUTO: 82.8 FL (ref 81.4–97.8)
METHADONE UR QL SCN: NEGATIVE
MICRO URNS: ABNORMAL
MONOCYTES # BLD AUTO: 0.77 K/UL (ref 0–0.85)
MONOCYTES NFR BLD AUTO: 6.7 % (ref 0–13.4)
NEUTROPHILS # BLD AUTO: 8.45 K/UL (ref 2–7.15)
NEUTROPHILS NFR BLD: 73 % (ref 44–72)
NITRITE UR QL STRIP.AUTO: NEGATIVE
NRBC # BLD AUTO: 0 K/UL
NRBC BLD-RTO: 0 /100 WBC
OPIATES UR QL SCN: NEGATIVE
OXYCODONE UR QL SCN: NEGATIVE
PCP UR QL SCN: NEGATIVE
PH UR STRIP.AUTO: 5 [PH] (ref 5–8)
PLATELET # BLD AUTO: 392 K/UL (ref 164–446)
PMV BLD AUTO: 9.4 FL (ref 9–12.9)
POC BREATHALIZER: 0 PERCENT (ref 0–0.01)
POTASSIUM SERPL-SCNC: 3.8 MMOL/L (ref 3.6–5.5)
PROPOXYPH UR QL SCN: NEGATIVE
PROT UR QL STRIP: NEGATIVE MG/DL
RBC # BLD AUTO: 4.18 M/UL (ref 4.2–5.4)
RBC # URNS HPF: ABNORMAL /HPF
RBC UR QL AUTO: NEGATIVE
SODIUM SERPL-SCNC: 140 MMOL/L (ref 135–145)
SP GR UR STRIP.AUTO: 1.03
UROBILINOGEN UR STRIP.AUTO-MCNC: 0.2 MG/DL
WBC # BLD AUTO: 11.6 K/UL (ref 4.8–10.8)
WBC #/AREA URNS HPF: ABNORMAL /HPF

## 2023-04-08 PROCEDURE — 81001 URINALYSIS AUTO W/SCOPE: CPT | Mod: XU

## 2023-04-08 PROCEDURE — 302970 POC BREATHALIZER: Performed by: EMERGENCY MEDICINE

## 2023-04-08 PROCEDURE — 80307 DRUG TEST PRSMV CHEM ANLYZR: CPT

## 2023-04-08 PROCEDURE — 90791 PSYCH DIAGNOSTIC EVALUATION: CPT

## 2023-04-08 PROCEDURE — 80048 BASIC METABOLIC PNL TOTAL CA: CPT

## 2023-04-08 PROCEDURE — 36415 COLL VENOUS BLD VENIPUNCTURE: CPT

## 2023-04-08 PROCEDURE — 93005 ELECTROCARDIOGRAM TRACING: CPT | Performed by: EMERGENCY MEDICINE

## 2023-04-08 PROCEDURE — 99284 EMERGENCY DEPT VISIT MOD MDM: CPT

## 2023-04-08 PROCEDURE — 93005 ELECTROCARDIOGRAM TRACING: CPT

## 2023-04-08 PROCEDURE — 85025 COMPLETE CBC W/AUTO DIFF WBC: CPT

## 2023-04-08 ASSESSMENT — FIBROSIS 4 INDEX: FIB4 SCORE: 0.29
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
## 2023-04-08 NOTE — ED TRIAGE NOTES
"Chief Complaint   Patient presents with    Palpitations     X5-6 hrs \"it's because of a chemical change in my brain and I'm having personal problems.\"     Psych Eval     Pt appears to be responding to internal stimuli, denies auditory and visual hallucinations.      Pt ambulatory to triage for above complaint. Denies any mental health hx, denies use of drugs/ ETOH tonight. .    /74   Pulse 86   Temp 36.9 °C (98.5 °F) (Temporal)   Resp 18   Ht 1.702 m (5' 7\")   Wt 97.8 kg (215 lb 9.8 oz)   LMP 03/25/2023 (Approximate)   SpO2 98%   BMI 33.77 kg/m²     "

## 2023-04-08 NOTE — ED PROVIDER NOTES
"ED Provider Note    CHIEF COMPLAINT  Chief Complaint   Patient presents with    Palpitations     X5-6 hrs \"it's because of a chemical change in my brain and I'm having personal problems.\"     Psych Eval     Pt appears to be responding to internal stimuli, denies auditory and visual hallucinations.        EXTERNAL RECORDS REVIEWED  Patient has numerous, prior ED encounters.    Evaluation by behavioral health from August 2020 was noted.  At the time patient was experiencing suicidal ideations.  She was placed on a legal hold.    Psychiatry evaluation from June 2020 was also reviewed.    HPI/ROS  LIMITATION TO HISTORY   None  OUTSIDE HISTORIAN(S):  None    Dunia Carolina Sahni is a 37 y.o. female who presents with a chief complaint of feeling lightheaded for 1 week, particularly in the mornings.  She states it improves if she drinks water and when she eats.  She denies fever cough or cold symptoms.  No nausea vomiting or diarrhea.  She is also concerned that she may have a urinary tract infection.  She states she \"hold onto her urine too long\".  She denies history of diabetes or asthma.  No prior cardiac history.  She has had her gallbladder removed and an appendectomy.  She denies allergies or taking any medications.  She does not smoke, drink or use drugs.    PAST MEDICAL HISTORY   has a past medical history of Abscess (10/12/2017), Bipolar affective (HCC), Depression, Kidney infection, Leukemia (HCC) (12/2019), and Suicide attempt ([facility]).    SURGICAL HISTORY   has a past surgical history that includes appendectomy; ercp in or (N/A, 7/5/2018); and skip by laparoscopy (N/A, 7/6/2018).    FAMILY HISTORY  Family History   Family history unknown: Yes       SOCIAL HISTORY  Social History     Tobacco Use    Smoking status: Never    Smokeless tobacco: Never   Vaping Use    Vaping Use: Never used   Substance and Sexual Activity    Alcohol use: Yes     Comment: patient reports she drinks once or twice a year    Drug use: " "Yes     Types: Inhaled     Comment: hx: marijuana, cocaine    Sexual activity: Not on file       CURRENT MEDICATIONS  Home Medications    **Home medications have not yet been reviewed for this encounter**         ALLERGIES  Allergies   Allergen Reactions    Vicodin [Hydrocodone-Acetaminophen] Anaphylaxis     RXN=9 years ago    Denies 11/19/2022       PHYSICAL EXAM  VITAL SIGNS: /75   Pulse 80   Temp 36.9 °C (98.5 °F) (Temporal)   Resp 20   Ht 1.702 m (5' 7\")   Wt 97.8 kg (215 lb 9.8 oz)   LMP 03/25/2023 (Approximate)   SpO2 97%   BMI 33.77 kg/m²    Vitals reviewed.  Constitutional: Patient is oriented to person, place, and time. Appears well-developed and well-nourished. No distress.    Head: Normocephalic and atraumatic.   Mouth/Throat: Oropharynx is clear and moist  Eyes: Conjunctivae are normal. Pupils are equal, round  Neck: Normal range of motion. Neck supple.  Cardiovascular: Normal rate, regular rhythm and normal heart sounds.   Pulmonary/Chest: Effort normal and breath sounds normal. No respiratory distress  Abdominal: Soft. Bowel sounds are normal. There is no tenderness  Musculoskeletal: No edema and no tenderness.  Neurological: No focal deficits.   Skin: Skin is warm and dry. No erythema. No pallor.   Psychiatric: She denies suicidal or homicidal ideations.      DIAGNOSTIC STUDIES / PROCEDURES  EKG  I have independently interpreted this EKG    LABS  Results for orders placed or performed during the hospital encounter of 04/08/23   URINALYSIS    Specimen: Urine, Clean Catch   Result Value Ref Range    Color Yellow     Character Clear     Specific Gravity 1.031 <1.035    Ph 5.0 5.0 - 8.0    Glucose Negative Negative mg/dL    Ketones Negative Negative mg/dL    Protein Negative Negative mg/dL    Bilirubin Negative Negative    Urobilinogen, Urine 0.2 Negative    Nitrite Negative Negative    Leukocyte Esterase Small (A) Negative    Occult Blood Negative Negative    Micro Urine Req Microscopic  "   URINE DRUG SCREEN   Result Value Ref Range    Amphetamines Urine Negative Negative    Barbiturates Negative Negative    Benzodiazepines Negative Negative    Cocaine Metabolite Negative Negative    Methadone Negative Negative    Opiates Negative Negative    Oxycodone Negative Negative    Phencyclidine -Pcp Negative Negative    Propoxyphene Negative Negative    Cannabinoid Metab Negative Negative   Basic Metabolic Panel   Result Value Ref Range    Sodium 140 135 - 145 mmol/L    Potassium 3.8 3.6 - 5.5 mmol/L    Chloride 109 96 - 112 mmol/L    Co2 20 20 - 33 mmol/L    Glucose 102 (H) 65 - 99 mg/dL    Bun 18 8 - 22 mg/dL    Creatinine 0.70 0.50 - 1.40 mg/dL    Calcium 9.0 8.5 - 10.5 mg/dL    Anion Gap 11.0 7.0 - 16.0   CBC WITH DIFFERENTIAL   Result Value Ref Range    WBC 11.6 (H) 4.8 - 10.8 K/uL    RBC 4.18 (L) 4.20 - 5.40 M/uL    Hemoglobin 10.9 (L) 12.0 - 16.0 g/dL    Hematocrit 34.6 (L) 37.0 - 47.0 %    MCV 82.8 81.4 - 97.8 fL    MCH 26.1 (L) 27.0 - 33.0 pg    MCHC 31.5 (L) 33.6 - 35.0 g/dL    RDW 47.7 35.9 - 50.0 fL    Platelet Count 392 164 - 446 K/uL    MPV 9.4 9.0 - 12.9 fL    Neutrophils-Polys 73.00 (H) 44.00 - 72.00 %    Lymphocytes 18.90 (L) 22.00 - 41.00 %    Monocytes 6.70 0.00 - 13.40 %    Eosinophils 0.60 0.00 - 6.90 %    Basophils 0.30 0.00 - 1.80 %    Immature Granulocytes 0.50 0.00 - 0.90 %    Nucleated RBC 0.00 /100 WBC    Neutrophils (Absolute) 8.45 (H) 2.00 - 7.15 K/uL    Lymphs (Absolute) 2.19 1.00 - 4.80 K/uL    Monos (Absolute) 0.77 0.00 - 0.85 K/uL    Eos (Absolute) 0.07 0.00 - 0.51 K/uL    Baso (Absolute) 0.03 0.00 - 0.12 K/uL    Immature Granulocytes (abs) 0.06 0.00 - 0.11 K/uL    NRBC (Absolute) 0.00 K/uL   URINE MICROSCOPIC (W/UA)   Result Value Ref Range    WBC 2-5 /hpf    RBC 5-10 (A) /hpf    Bacteria Few (A) None /hpf    Epithelial Cells Moderate (A) /hpf    Hyaline Cast 0-2 /lpf   ESTIMATED GFR   Result Value Ref Range    GFR (CKD-EPI) 115 >60 mL/min/1.73 m 2   POC BREATHALIZER    Result Value Ref Range    POC Breathalizer 0.00 0.00 - 0.01 Percent   EKG   Result Value Ref Range    Report       [facility]    Test Date:  2023  Pt Name:    EVIE ENGEL           Department: ER  MRN:        [id number]                      Room:  Gender:     Female                       Technician: [id number]  :        1985                   Requested By:ER TRIAGE PROTOCOL  Order #:    [id number]                    Reading MD: FEROZ RAM DO    Measurements  Intervals                                Axis  Rate:       80                           P:          27  IL:         156                          QRS:        14  QRSD:       102                          T:          13  QT:         385  QTc:        445    Interpretive Statements  Sinus rhythm  Low voltage, precordial leads  Compared to ECG 2023 08:58:16  Low QRS voltage now present  Electronically Signed On 2023 10:06:29 PDT by FEROZ RAM DO           COURSE & MEDICAL DECISION MAKING    ED Observation Status? No; Patient does not meet criteria for ED Observation.     INITIAL ASSESSMENT, COURSE AND PLAN  Care Narrative: This is a pleasant 37-year-old female.  She does not appear to be in any distress.  She is reporting about a week of morning lightheadedness that she reports improves with eating and drinking.  She is concerned about an electrolyte disturbance.  She is concerned about an arrhythmia.  She is not having any symptoms suggestive of ACS.  She is on the monitor and she has a normal rhythm and reassuring vital signs.  She is requesting that her electrolytes to be evaluated.    1007AM patient is reevaluated at the bedside.  She states that her landlord is waiting for her and she needs to go.  We discussed lab results which are reassuring.  EKG was reassuring and for this, she was grateful.  At this point, after discussion with the alert team and further evaluation of the patient, she does  not meet criteria for legal hold.  She is not demonstrating inability to care for self.  She is not suicidal or homicidal.  She wants to be discharged from the emergency department and I think this is a reasonable disposition.    DISPOSITION AND DISCUSSIONS  I have discussed management of the patient with the following physicians and CAROLINA's: None    Discussion of management with other [facility] or appropriate source(s): Behavioral Health        Escalation of care considered, and ultimately not performed: However, labs and EKG are reassuringdiagnostic imaging and acute inpatient care management, however at this time, the patient is most appropriate for outpatient management    Barriers to care at this time, including but not limited to: None.     FINAL DIAGNOSIS  1. Palpitations    2. History of schizophrenia           Electronically signed by: Cari Ray D.O., 4/8/2023 6:30 AM

## 2023-04-15 ENCOUNTER — HOSPITAL ENCOUNTER (EMERGENCY)
Facility: MEDICAL CENTER | Age: 38
End: 2023-04-16
Attending: EMERGENCY MEDICINE
Payer: MEDICARE

## 2023-04-15 ENCOUNTER — APPOINTMENT (OUTPATIENT)
Dept: RADIOLOGY | Facility: MEDICAL CENTER | Age: 38
End: 2023-04-15
Attending: EMERGENCY MEDICINE
Payer: MEDICARE

## 2023-04-15 DIAGNOSIS — F22 PARANOID BEHAVIOR (HCC): ICD-10-CM

## 2023-04-15 DIAGNOSIS — R42 LIGHTHEADEDNESS: ICD-10-CM

## 2023-04-15 PROCEDURE — 80048 BASIC METABOLIC PNL TOTAL CA: CPT

## 2023-04-15 PROCEDURE — 85025 COMPLETE CBC W/AUTO DIFF WBC: CPT

## 2023-04-15 PROCEDURE — 71045 X-RAY EXAM CHEST 1 VIEW: CPT

## 2023-04-15 PROCEDURE — 84703 CHORIONIC GONADOTROPIN ASSAY: CPT

## 2023-04-15 PROCEDURE — 36415 COLL VENOUS BLD VENIPUNCTURE: CPT

## 2023-04-15 PROCEDURE — 93005 ELECTROCARDIOGRAM TRACING: CPT | Performed by: EMERGENCY MEDICINE

## 2023-04-15 PROCEDURE — 99283 EMERGENCY DEPT VISIT LOW MDM: CPT

## 2023-04-15 PROCEDURE — 99283 EMERGENCY DEPT VISIT LOW MDM: CPT | Mod: 25

## 2023-04-15 ASSESSMENT — FIBROSIS 4 INDEX: FIB4 SCORE: 0.32

## 2023-04-16 VITALS
OXYGEN SATURATION: 98 % | WEIGHT: 220.9 LBS | HEART RATE: 99 BPM | SYSTOLIC BLOOD PRESSURE: 135 MMHG | RESPIRATION RATE: 16 BRPM | HEIGHT: 67 IN | DIASTOLIC BLOOD PRESSURE: 89 MMHG | TEMPERATURE: 98 F | BODY MASS INDEX: 34.67 KG/M2

## 2023-04-16 LAB
ANION GAP SERPL CALC-SCNC: 11 MMOL/L (ref 7–16)
BASOPHILS # BLD AUTO: 0.3 % (ref 0–1.8)
BASOPHILS # BLD: 0.03 K/UL (ref 0–0.12)
BUN SERPL-MCNC: 10 MG/DL (ref 8–22)
CALCIUM SERPL-MCNC: 8.9 MG/DL (ref 8.5–10.5)
CHLORIDE SERPL-SCNC: 106 MMOL/L (ref 96–112)
CO2 SERPL-SCNC: 23 MMOL/L (ref 20–33)
CREAT SERPL-MCNC: 0.69 MG/DL (ref 0.5–1.4)
EKG IMPRESSION: NORMAL
EOSINOPHIL # BLD AUTO: 0.13 K/UL (ref 0–0.51)
EOSINOPHIL NFR BLD: 1.1 % (ref 0–6.9)
ERYTHROCYTE [DISTWIDTH] IN BLOOD BY AUTOMATED COUNT: 47.8 FL (ref 35.9–50)
GFR SERPLBLD CREATININE-BSD FMLA CKD-EPI: 115 ML/MIN/1.73 M 2
GLUCOSE SERPL-MCNC: 109 MG/DL (ref 65–99)
HCG SERPL QL: NEGATIVE
HCT VFR BLD AUTO: 34.2 % (ref 37–47)
HGB BLD-MCNC: 10.7 G/DL (ref 12–16)
IMM GRANULOCYTES # BLD AUTO: 0.07 K/UL (ref 0–0.11)
IMM GRANULOCYTES NFR BLD AUTO: 0.6 % (ref 0–0.9)
LYMPHOCYTES # BLD AUTO: 3 K/UL (ref 1–4.8)
LYMPHOCYTES NFR BLD: 25.3 % (ref 22–41)
MCH RBC QN AUTO: 26.2 PG (ref 27–33)
MCHC RBC AUTO-ENTMCNC: 31.3 G/DL (ref 33.6–35)
MCV RBC AUTO: 83.6 FL (ref 81.4–97.8)
MONOCYTES # BLD AUTO: 0.78 K/UL (ref 0–0.85)
MONOCYTES NFR BLD AUTO: 6.6 % (ref 0–13.4)
NEUTROPHILS # BLD AUTO: 7.83 K/UL (ref 2–7.15)
NEUTROPHILS NFR BLD: 66.1 % (ref 44–72)
NRBC # BLD AUTO: 0 K/UL
NRBC BLD-RTO: 0 /100 WBC
PLATELET # BLD AUTO: 459 K/UL (ref 164–446)
PMV BLD AUTO: 9.2 FL (ref 9–12.9)
POTASSIUM SERPL-SCNC: 4 MMOL/L (ref 3.6–5.5)
RBC # BLD AUTO: 4.09 M/UL (ref 4.2–5.4)
SODIUM SERPL-SCNC: 140 MMOL/L (ref 135–145)
WBC # BLD AUTO: 11.8 K/UL (ref 4.8–10.8)

## 2023-04-16 NOTE — ED NOTES
Unable to obtain EKG at this time. Pt having hallucinations and pulls off leads and can not sit still. Pt has transient compliance.

## 2023-04-16 NOTE — DISCHARGE PLANNING
" This 37 y female pt suffers from schizophrenia and presented in the er with some lightheadedness and some delusions(which appear to be baseline). Currently she has rested in the er and \"feels better\". She was cabbed to the local shelter and given treatment options at the [facility] clinic and [facility]. She denies any si or hi and appears capable of caring for herself.  "

## 2023-04-16 NOTE — ED NOTES
Patient discharged per orders.  PT verbalized understanding of discharge instructions. Pt leaving in stable condition with all belongings. Pt provided bus pass and taxi voucher.

## 2023-04-16 NOTE — ED NOTES
Pt ambulatory to room from lobby. Pt placed into hospital gown and urine sample collected and sent. Pts blood pressure 128/80 in room. Pt having auditory hallucinations.

## 2023-04-16 NOTE — ED PROVIDER NOTES
"ER Provider Note    Scribed for Dr. José Miguel Larios M.D. by Sam Drummond. 4/15/2023  11:07 PM    Primary Care Provider: Pcp Pt States None    CHIEF COMPLAINT  Chief Complaint   Patient presents with    Lightheadedness     Pt reports her BP was really low last time she was here. Reports lightheadedness.    Psych Eval     Pt exhibiting flight of ideas in triage     EXTERNAL RECORDS REVIEWED  Other Patient has been seen a few times this year for psych eval, and palpitations.    HPI/ROS  LIMITATION TO HISTORY   Select: : None    OUTSIDE HISTORIAN(S):  None    Dunia Sahni is a 37 y.o. female who presents to the ED for evaluation of heart palpitations which have been ongoing for the past three weeks. She describes her heart beat as fluttery and fast off and on for the last three weeks, which is worse in the morning. She also states she may be pregnant for \"the last few days\", noting her last menstrual period was a week ago. She denies any current sexual activity. She denies any formal diagnosis of heart problems. She states she has pain in her midline chest where her throat meets her torso. She denies any recent falls, alcohol use or smoking history. She states she uses cocaine, her last use was \" a long time ago\". She states that at baseline she has a low blood pressure. She denies any surgical history. She denies fever or shortness of breath. She states she is living at [facility]. She complains of having a dry cough. She denies taking medications     PAST MEDICAL HISTORY  Past Medical History:   Diagnosis Date    Abscess 10/12/2017    right AC arm area    Bipolar affective (HCC)     Depression     Kidney infection     Leukemia (HCC) 12/2019    Suicide attempt (HCC)      SURGICAL HISTORY  Past Surgical History:   Procedure Laterality Date    GEREMIAS BY LAPAROSCOPY N/A 7/6/2018    Procedure: GEREMIAS BY LAPAROSCOPY;  Surgeon: Leroy Goodson M.D.;  Location: SURGERY [facility];  Service: General    ERCP IN " "OR N/A 7/5/2018    Procedure: ERCP IN OR;  Surgeon: Nathan Maravilla M.D.;  Location: SURGERY [facility];  Service: Gastroenterology    APPENDECTOMY       FAMILY HISTORY  Family History   Family history unknown: Yes     SOCIAL HISTORY   reports that she has never smoked. She has never used smokeless tobacco. She reports current alcohol use. She reports current drug use. Drug: Inhaled.    CURRENT MEDICATIONS  Previous Medications    No medications on file     ALLERGIES  Vicodin [hydrocodone-acetaminophen]    PHYSICAL EXAM  BP (!) 134/91   Pulse (!) 109   Temp 36.3 °C (97.4 °F) (Temporal)   Resp 12   Ht 1.702 m (5' 7\")   Wt 100 kg (220 lb 14.4 oz)   LMP 03/25/2023 (Approximate)   SpO2 96%   BMI 34.60 kg/m²   Constitutional: Alert in no apparent distress. Strange Affect, not communicating well  HENT: No signs of trauma, Bilateral external ears normal, Nose normal.   Eyes: Pupils are equal and reactive, Conjunctiva normal, Non-icteric.   Neck: Normal range of motion, No tenderness, Supple, No stridor.   Lymphatic: No lymphadenopathy noted.   Cardiovascular: Mildly tachycardic rate and  normal rhythm, no murmurs.   Thorax & Lungs: Normal breath sounds, No respiratory distress, No wheezing, No chest tenderness.   Abdomen: Bowel sounds normal, Soft, No tenderness, No masses, No pulsatile masses. No peritoneal signs.  Skin: Warm, Dry, No erythema, No rash.   Back: No bony tenderness, No CVA tenderness.   Extremities: Intact distal pulses, No edema, No tenderness, No cyanosis.  Musculoskeletal: Good range of motion in all major joints. No tenderness to palpation or major deformities noted.   Neurologic: Alert , Normal motor function, Normal sensory function, No focal deficits noted.   Psychiatric: Affect normal, Judgment normal, Mood normal.     DIAGNOSTIC STUDIES & PROCEDURES    Labs:   Labs Reviewed   CBC WITH DIFFERENTIAL - Abnormal; Notable for the following components:       Result Value    WBC 11.8 (*)  "    RBC 4.09 (*)     Hemoglobin 10.7 (*)     Hematocrit 34.2 (*)     MCH 26.2 (*)     MCHC 31.3 (*)     Platelet Count 459 (*)     Neutrophils (Absolute) 7.83 (*)     All other components within normal limits   BASIC METABOLIC PANEL - Abnormal; Notable for the following components:    Glucose 109 (*)     All other components within normal limits   HCG QUAL SERUM   ESTIMATED GFR     All labs reviewed by me.    EKG Interpretation:  Interpreted by me  No ST-T wave changes and no ectopy with no Brugada syndrome or any hypertrophic cardiomyopathy and no preexcitation and normal intervals      Radiology:   The attending Emergency Physician has independently interpreted the diagnostic imaging associated with this visit and is awaiting the final reading from the radiologist, which will be displayed below.    Preliminary interpretation is a follows: No pneumonia seen on my read.  Radiologist interpretation:     DX-CHEST-PORTABLE (1 VIEW)   Final Result         1. No acute cardiopulmonary abnormalities are identified.           COURSE & MEDICAL DECISION MAKING    ED Observation Status?  Patient does [facility] for observation status.  The patient is interested in observation and 11 PM on 415.  The patient continues to be in observation status.  The patient is now improving.  And was discharged home on 416 at 5:44 AM during the stay the patient had a psychiatric evaluation as well as was assessed for reasons for lightheadedness.    INITIAL ASSESSMENT AND PLAN  Care Narrative: Patient was in the emergency department      11:07 PM - Patient seen and evaluated at bedside. Discussed plan of care, including EKG, labs and imaging. Patient agrees to plan of care. Ordered EKG, HCG Qual, BMP, CBC w/ Diff, eGFR, and DX-Chest to evaluate.    1:30 AM - Patient was reevaluated at bedside. Discussed lab and radiology results with the patient and informed her of my plan to discharge her with outpatient follow up for continued symptoms.      ADDITIONAL PROBLEM LIST AND DISPOSITION  No additional problems noted during this encounter.               DISPOSITION AND DISCUSSIONS  I have discussed management of the patient with the following physicians and CAROLINA's: None    Discussion of management with other [facility] or appropriate source(s): None     Escalation of care considered, and ultimately not performed: IV fluids and acute inpatient care management, however at this time, the patient is most appropriate for outpatient management.    Barriers to care at this time, including but not limited to:  None noted at this time .     Decision tools and prescription drugs considered including, but not limited to: Antibiotics not felt to be necessary. .    Patient will be discharged.    Patient was ultimately lightheaded.  Chest x-ray was negative.  The patient has no significant anemia however there is mild and around baseline.  The patient is not pregnant.  There is no electrolyte derangement.  We will discharge the patient home with strict return precautions and follow-up.    FOLLOW UP:  [facility]  [address] [facility] [address]  [phone]      OUTPATIENT MEDICATIONS:  New Prescriptions    No medications on file     FINAL IMPRESSION   1. Lightheadedness    2. Paranoid behavior (HCC)      Sam GARCIA ([place]), am scribing for, and in the presence of, José Miguel Larios M.D..    Electronically signed by: Sam Drummond ([place]), 4/15/2023    José Miguel GARCIA M.D. personally performed the services described in this documentation, as scribed by Sam Drummond in my presence, and it is both accurate and complete.    The note accurately reflects work and decisions made by me.  José Miguel Larios M.D.  4/16/2023  5:45 AM

## 2023-05-21 ENCOUNTER — HOSPITAL ENCOUNTER (EMERGENCY)
Facility: MEDICAL CENTER | Age: 38
End: 2023-05-22
Attending: EMERGENCY MEDICINE
Payer: MEDICARE

## 2023-05-21 ENCOUNTER — APPOINTMENT (OUTPATIENT)
Dept: RADIOLOGY | Facility: MEDICAL CENTER | Age: 38
End: 2023-05-21
Attending: EMERGENCY MEDICINE
Payer: MEDICARE

## 2023-05-21 DIAGNOSIS — L55.9 SUNBURN: ICD-10-CM

## 2023-05-21 DIAGNOSIS — R07.89 ATYPICAL CHEST PAIN: ICD-10-CM

## 2023-05-21 PROCEDURE — 84484 ASSAY OF TROPONIN QUANT: CPT

## 2023-05-21 PROCEDURE — 80053 COMPREHEN METABOLIC PANEL: CPT

## 2023-05-21 PROCEDURE — 36415 COLL VENOUS BLD VENIPUNCTURE: CPT

## 2023-05-21 PROCEDURE — 85025 COMPLETE CBC W/AUTO DIFF WBC: CPT

## 2023-05-21 PROCEDURE — 99283 EMERGENCY DEPT VISIT LOW MDM: CPT

## 2023-05-21 PROCEDURE — 93005 ELECTROCARDIOGRAM TRACING: CPT | Performed by: EMERGENCY MEDICINE

## 2023-05-21 PROCEDURE — 71045 X-RAY EXAM CHEST 1 VIEW: CPT

## 2023-05-21 ASSESSMENT — FIBROSIS 4 INDEX: FIB4 SCORE: 0.28

## 2023-05-22 VITALS
SYSTOLIC BLOOD PRESSURE: 134 MMHG | WEIGHT: 200 LBS | RESPIRATION RATE: 17 BRPM | HEART RATE: 72 BPM | OXYGEN SATURATION: 97 % | HEIGHT: 67 IN | DIASTOLIC BLOOD PRESSURE: 74 MMHG | BODY MASS INDEX: 31.39 KG/M2 | TEMPERATURE: 98.2 F

## 2023-05-22 LAB
ALBUMIN SERPL BCP-MCNC: 4.3 G/DL (ref 3.2–4.9)
ALBUMIN/GLOB SERPL: 1.8 G/DL
ALP SERPL-CCNC: 77 U/L (ref 30–99)
ALT SERPL-CCNC: 16 U/L (ref 2–50)
ANION GAP SERPL CALC-SCNC: 12 MMOL/L (ref 7–16)
AST SERPL-CCNC: 12 U/L (ref 12–45)
BASOPHILS # BLD AUTO: 0.3 % (ref 0–1.8)
BASOPHILS # BLD: 0.03 K/UL (ref 0–0.12)
BILIRUB SERPL-MCNC: 0.2 MG/DL (ref 0.1–1.5)
BUN SERPL-MCNC: 13 MG/DL (ref 8–22)
CALCIUM ALBUM COR SERPL-MCNC: 8.8 MG/DL (ref 8.5–10.5)
CALCIUM SERPL-MCNC: 9 MG/DL (ref 8.5–10.5)
CHLORIDE SERPL-SCNC: 107 MMOL/L (ref 96–112)
CO2 SERPL-SCNC: 22 MMOL/L (ref 20–33)
CREAT SERPL-MCNC: 0.8 MG/DL (ref 0.5–1.4)
EKG IMPRESSION: NORMAL
EOSINOPHIL # BLD AUTO: 0.16 K/UL (ref 0–0.51)
EOSINOPHIL NFR BLD: 1.8 % (ref 0–6.9)
ERYTHROCYTE [DISTWIDTH] IN BLOOD BY AUTOMATED COUNT: 46.3 FL (ref 35.9–50)
GFR SERPLBLD CREATININE-BSD FMLA CKD-EPI: 98 ML/MIN/1.73 M 2
GLOBULIN SER CALC-MCNC: 2.4 G/DL (ref 1.9–3.5)
GLUCOSE SERPL-MCNC: 95 MG/DL (ref 65–99)
HCT VFR BLD AUTO: 37.5 % (ref 37–47)
HGB BLD-MCNC: 11.5 G/DL (ref 12–16)
IMM GRANULOCYTES # BLD AUTO: 0.04 K/UL (ref 0–0.11)
IMM GRANULOCYTES NFR BLD AUTO: 0.5 % (ref 0–0.9)
LYMPHOCYTES # BLD AUTO: 2.57 K/UL (ref 1–4.8)
LYMPHOCYTES NFR BLD: 29.6 % (ref 22–41)
MCH RBC QN AUTO: 25.4 PG (ref 27–33)
MCHC RBC AUTO-ENTMCNC: 30.7 G/DL (ref 33.6–35)
MCV RBC AUTO: 82.8 FL (ref 81.4–97.8)
MONOCYTES # BLD AUTO: 1.01 K/UL (ref 0–0.85)
MONOCYTES NFR BLD AUTO: 11.6 % (ref 0–13.4)
NEUTROPHILS # BLD AUTO: 4.88 K/UL (ref 2–7.15)
NEUTROPHILS NFR BLD: 56.2 % (ref 44–72)
NRBC # BLD AUTO: 0 K/UL
NRBC BLD-RTO: 0 /100 WBC
PLATELET # BLD AUTO: 399 K/UL (ref 164–446)
PMV BLD AUTO: 9.2 FL (ref 9–12.9)
POTASSIUM SERPL-SCNC: 3.9 MMOL/L (ref 3.6–5.5)
PROT SERPL-MCNC: 6.7 G/DL (ref 6–8.2)
RBC # BLD AUTO: 4.53 M/UL (ref 4.2–5.4)
SODIUM SERPL-SCNC: 141 MMOL/L (ref 135–145)
TROPONIN T SERPL-MCNC: <6 NG/L (ref 6–19)
WBC # BLD AUTO: 8.7 K/UL (ref 4.8–10.8)

## 2023-05-22 NOTE — ED PROVIDER NOTES
"ED Provider Note    CHIEF COMPLAINT  Chief Complaint   Patient presents with    Chest Pain     Patient states \"my chest hurts a little\" when asked to elaborate and describe the pain she just repeats that it hurts 'a little', patient refused to allow any interventions or take any medications.        EXTERNAL RECORDS REVIEWED  Inpatient Notes patient last seen in the [facility] April 15 for lightheadedness and psychiatric evaluation    HPI/ROS  LIMITATION TO HISTORY   Select: Behavior  OUTSIDE HISTORIAN(S):  None    Dunia Sahni is a 38 y.o. female who presents to the emergency department with complaint of sunburn and heat exposure.  She explains that she lives at our place and was sitting on a park bench minding her own business for prolonged period time.  Now feeling that she has sunburn and dehydrated.  Also noted brief period of chest pain although this is not currently pregnant.  Denies any other current complaints.  Hoping to get some water.    PAST MEDICAL HISTORY   has a past medical history of Abscess (10/12/2017), Bipolar affective (HCC), Depression, Kidney infection, Leukemia (HCC) (12/2019), and Suicide attempt ([facility]).    SURGICAL HISTORY   has a past surgical history that includes appendectomy; ercp in or (N/A, 7/5/2018); and skip by laparoscopy (N/A, 7/6/2018).    FAMILY HISTORY  Family History   Family history unknown: Yes       SOCIAL HISTORY  Social History     Tobacco Use    Smoking status: Never    Smokeless tobacco: Never   Vaping Use    Vaping Use: Never used   Substance and Sexual Activity    Alcohol use: Yes     Comment: patient reports she drinks once or twice a year    Drug use: Yes     Types: Inhaled     Comment: hx: marijuana, cocaine    Sexual activity: Not on file       CURRENT MEDICATIONS  Home Medications    **Home medications have not yet been reviewed for this encounter**         ALLERGIES  Allergies   Allergen Reactions    Vicodin [Hydrocodone-Acetaminophen] Anaphylaxis " "    RXN=9 years ago    Denies 11/19/2022       PHYSICAL EXAM  VITAL SIGNS: /74   Pulse 72   Temp 36.8 °C (98.2 °F) (Temporal)   Resp 17   Ht 1.702 m (5' 7\")   Wt 90.7 kg (200 lb)   SpO2 97%   BMI 31.32 kg/m²      Pulse ox interpretation: I interpret this pulse ox as normal.  Constitutional: Alert in no apparent distress.  HENT: No signs of trauma, Bilateral external ears normal, Nose normal.   Eyes: Pupils are equal and reactive  Neck: Normal range of motion, No tenderness, Supple  Cardiovascular: Regular rate and rhythm, no murmurs.   Thorax & Lungs: Normal breath sounds, No respiratory distress, No wheezing, No chest tenderness.   Abdomen: Bowel sounds normal, Soft, No tenderness  Skin: Warm, Dry sunburn skin.   Extremities: Intact distal pulses  Musculoskeletal: Good range of motion in all major joints. No tenderness to palpation or major deformities noted.   Neurologic: Alert , Normal motor function, Normal sensory function, No focal deficits noted.   Psychiatric: Psychiatric affect.  Poor eye contact.  Denying suicidal homicidal ideations        DIAGNOSTIC STUDIES / PROCEDURES      LABS  Results for orders placed or performed during the hospital encounter of 05/21/23   CBC w/ Differential   Result Value Ref Range    WBC 8.7 4.8 - 10.8 K/uL    RBC 4.53 4.20 - 5.40 M/uL    Hemoglobin 11.5 (L) 12.0 - 16.0 g/dL    Hematocrit 37.5 37.0 - 47.0 %    MCV 82.8 81.4 - 97.8 fL    MCH 25.4 (L) 27.0 - 33.0 pg    MCHC 30.7 (L) 33.6 - 35.0 g/dL    RDW 46.3 35.9 - 50.0 fL    Platelet Count 399 164 - 446 K/uL    MPV 9.2 9.0 - 12.9 fL    Neutrophils-Polys 56.20 44.00 - 72.00 %    Lymphocytes 29.60 22.00 - 41.00 %    Monocytes 11.60 0.00 - 13.40 %    Eosinophils 1.80 0.00 - 6.90 %    Basophils 0.30 0.00 - 1.80 %    Immature Granulocytes 0.50 0.00 - 0.90 %    Nucleated RBC 0.00 /100 WBC    Neutrophils (Absolute) 4.88 2.00 - 7.15 K/uL    Lymphs (Absolute) 2.57 1.00 - 4.80 K/uL    Monos (Absolute) 1.01 (H) 0.00 - 0.85 " K/uL    Eos (Absolute) 0.16 0.00 - 0.51 K/uL    Baso (Absolute) 0.03 0.00 - 0.12 K/uL    Immature Granulocytes (abs) 0.04 0.00 - 0.11 K/uL    NRBC (Absolute) 0.00 K/uL   Complete Metabolic Panel (CMP)   Result Value Ref Range    Sodium 141 135 - 145 mmol/L    Potassium 3.9 3.6 - 5.5 mmol/L    Chloride 107 96 - 112 mmol/L    Co2 22 20 - 33 mmol/L    Anion Gap 12.0 7.0 - 16.0    Glucose 95 65 - 99 mg/dL    Bun 13 8 - 22 mg/dL    Creatinine 0.80 0.50 - 1.40 mg/dL    Calcium 9.0 8.5 - 10.5 mg/dL    AST(SGOT) 12 12 - 45 U/L    ALT(SGPT) 16 2 - 50 U/L    Alkaline Phosphatase 77 30 - 99 U/L    Total Bilirubin 0.2 0.1 - 1.5 mg/dL    Albumin 4.3 3.2 - 4.9 g/dL    Total Protein 6.7 6.0 - 8.2 g/dL    Globulin 2.4 1.9 - 3.5 g/dL    A-G Ratio 1.8 g/dL   Troponin - STAT Once   Result Value Ref Range    Troponin T <6 6 - 19 ng/L   CORRECTED CALCIUM   Result Value Ref Range    Correct Calcium 8.8 8.5 - 10.5 mg/dL   ESTIMATED GFR   Result Value Ref Range    GFR (CKD-EPI) 98 >60 mL/min/1.73 m 2         RADIOLOGY  I have independently interpreted the diagnostic imaging associated with this visit and am waiting the final reading from the radiologist.   My preliminary interpretation is as follows:   Radiologist interpretation:   DX-CHEST-PORTABLE (1 VIEW)   Final Result         1.  No acute cardiopulmonary disease.            COURSE & MEDICAL DECISION MAKING    ED Observation Status? No; Patient does not meet criteria for ED Observation.     INITIAL ASSESSMENT, COURSE AND PLAN  Care Narrative: Patient presenting the emergency department for heat exposure, sun burn and complaint of brief period of chest pain.  Will work-up from ACS standpoint although I do believe this is less likely.    DISPOSITION AND DISCUSSIONS  I have discussed management of the patient with the following physicians and CAROLINA's: None    Discussion of management with other Q or appropriate source(s): Pharmacy for medication verification      Escalation of care  considered, and ultimately not performed:acute inpatient care management, however at this time, the patient is most appropriate for outpatient management    Barriers to care at this time, including but not limited to: Patient does not have established PCP.     Decision tools and prescription drugs considered including, but not limited to: HEART Score low .    38-year-old female presenting to the emerged department with the above presentation.  Chart review reveals multiple ER presentations.  Today work-up is benign.  Heart score is low.  Troponin and EKG are benign.  Chest x-ray does not show any acute abnormalities.  At this point I will discharge the patient to home with outpatient follow-up with PCP as well as return precautions here to the ER if needed.        FINAL DIAGNOSIS  1. Atypical chest pain    2. Sunburn           Electronically signed by: Theron Carr M.D., 5/21/2023 11:37 PM

## 2023-05-22 NOTE — ED TRIAGE NOTES
"Chief Complaint   Patient presents with    Chest Pain     Patient states \"my chest hurts a little\" when asked to elaborate and describe the pain she just repeats that it hurts 'a little', patient refused to allow any interventions or take any medications.      Patient biba for above from [place] [facility], patient is a/ox4 and responds to questions appropriately, but is mumbling unintelligibly to herself under her breath. Patient trails off into unintelligible speech after answering each question. Denies alcohol or drugs. Patient initially refused any interventions by EMS including vitals, but allowed this RN to take vitals.     /70   Pulse 66   Temp 36.8 °C (98.2 °F) (Temporal)   Resp 16   Ht 1.702 m (5' 7\")   Wt 90.7 kg (200 lb)   SpO2 98%   BMI 31.32 kg/m²    "

## 2023-05-27 ENCOUNTER — HOSPITAL ENCOUNTER (EMERGENCY)
Facility: MEDICAL CENTER | Age: 38
End: 2023-05-27
Attending: EMERGENCY MEDICINE
Payer: MEDICARE

## 2023-05-27 VITALS
BODY MASS INDEX: 33.22 KG/M2 | WEIGHT: 211.64 LBS | SYSTOLIC BLOOD PRESSURE: 120 MMHG | OXYGEN SATURATION: 98 % | HEART RATE: 70 BPM | TEMPERATURE: 97 F | DIASTOLIC BLOOD PRESSURE: 72 MMHG | HEIGHT: 67 IN | RESPIRATION RATE: 14 BRPM

## 2023-05-27 DIAGNOSIS — Z13.9 ENCOUNTER FOR MEDICAL SCREENING EXAMINATION: ICD-10-CM

## 2023-05-27 LAB
AMPHET UR QL SCN: NEGATIVE
BARBITURATES UR QL SCN: NEGATIVE
BENZODIAZ UR QL SCN: NEGATIVE
BZE UR QL SCN: NEGATIVE
CANNABINOIDS UR QL SCN: NEGATIVE
EKG IMPRESSION: NORMAL
FENTANYL UR QL: NEGATIVE
METHADONE UR QL SCN: NEGATIVE
OPIATES UR QL SCN: NEGATIVE
OXYCODONE UR QL SCN: NEGATIVE
PCP UR QL SCN: NEGATIVE
PROPOXYPH UR QL SCN: NEGATIVE

## 2023-05-27 PROCEDURE — 80307 DRUG TEST PRSMV CHEM ANLYZR: CPT

## 2023-05-27 PROCEDURE — 99283 EMERGENCY DEPT VISIT LOW MDM: CPT

## 2023-05-27 PROCEDURE — 93005 ELECTROCARDIOGRAM TRACING: CPT | Performed by: EMERGENCY MEDICINE

## 2023-05-27 ASSESSMENT — FIBROSIS 4 INDEX: FIB4 SCORE: 0.27

## 2023-05-27 NOTE — ED NOTES
Pt up in the bathroom, urine cup provided to pt. Noted talking to herself while inside the restroom

## 2023-05-27 NOTE — ED PROVIDER NOTES
ED Provider Note    Primary care provider: Pcp Pt States None    CHIEF COMPLAINT  Chief Complaint   Patient presents with    Psych Eval     Patient speaking in word salad in triage. Hx schizoaffective, and bipolar. Unable to answer questions appropriately in triage      Limitation to History:  Select: Behavior    HPI  Dunia Sahni is a 38 y.o. female who presents to the Emergency Department to get her heart checked out.  She is a very poor historian, she is evasive about answering any type of questions.  She tells me she walked here.  She does not wish to expand on anything.  She denies any recreational drug use.  She does not want to be seen by psychiatry.      External Record Review: Prior history of depression, bipolar affective, multiple prior visits usually for chest pain.  Seen at both this facility and [facility].    REVIEW OF SYSTEMS  See HPI.     PAST MEDICAL HISTORY   has a past medical history of Abscess (10/12/2017), Bipolar affective (HCC), Depression, Kidney infection, Leukemia (HCC) (12/2019), and Suicide attempt (HCC).    SURGICAL HISTORY   has a past surgical history that includes appendectomy; ercp in or (N/A, 7/5/2018); and skip by laparoscopy (N/A, 7/6/2018).    SOCIAL HISTORY  Social History     Tobacco Use    Smoking status: Never    Smokeless tobacco: Never   Vaping Use    Vaping Use: Never used   Substance Use Topics    Alcohol use: Yes     Comment: patient reports she drinks once or twice a year    Drug use: Yes     Types: Inhaled     Comment: hx: marijuana, cocaine      Social History     Substance and Sexual Activity   Drug Use Yes    Types: Inhaled    Comment: hx: marijuana, cocaine       FAMILY HISTORY  Family History   Family history unknown: Yes       CURRENT MEDICATIONS  Reviewed.  See Encounter Summary.     ALLERGIES  Allergies   Allergen Reactions    Vicodin [Hydrocodone-Acetaminophen] Anaphylaxis     RXN=9 years ago    Denies 11/19/2022       PHYSICAL  "EXAM  VITAL SIGNS: /73   Pulse 73   Temp 36 °C (96.8 °F) (Temporal)   Resp 16   Ht 1.702 m (5' 7\")   Wt 96 kg (211 lb 10.3 oz)   SpO2 98%   BMI 33.15 kg/m²   Constitutional: Awake, alert in no apparent distress.  HENT: Normocephalic, Bilateral external ears normal. Nose normal.   Eyes: Conjunctiva normal, non-icteric, EOMI.    Thorax & Lungs: Easy unlabored respirations, Clear to ascultation bilaterally.  Cardiovascular: Regular rate, Regular rhythm, No murmurs, rubs or gallops. Bilateral pulses symmetrical.   Abdomen:  Soft, nontender, nondistended, normal active bowel sounds.   :    Skin: Visualized skin is  Dry, No erythema, No rash.   Musculoskeletal:   No cyanosis, clubbing or edema. No leg asymmetry.   Neurologic: Alert, Grossly non-focal.  Normal speech, stance, gait  Psychiatric: Calm but borderline hostile, intermittent eye contact, appropriately dressed.  Lymphatic:      EKG   12 lead Interpreted by me  Rhythm:  Normal sinus rhythm   Rate: 70  Axis: normal  Ectopy: none  Conduction: normal  ST Segments: no acute change  T Waves: no acute change  Clinical Impression: Normal EKG without acute changes     COURSE & MEDICAL DECISION MAKING  Pertinent Labs & Imaging studies reviewed. (See chart for details)    COURSE & MEDICAL DECISION MAKING  Pertinent Labs & Imaging studies reviewed. (See chart for details)    Differential diagnoses include but are not limited to: Schizoaffective disorder    9:43 AM - Nursing notes reviewed, patient seen and examined at bedside.    Escalation of care considered, and ultimately not performed: blood analysis, diagnostic imaging, and acute inpatient care management, however at this time, the patient is most appropriate for outpatient management.    Barriers to care at this time, including but not limited to: Patient does not have established PCP, Patient is homeless, and underlying mental health disorder .     Decision tools and prescription drugs considered " including, but not limited to: Heart score low    Decision Making:  This is a pleasant 38 y.o. year old female who presents with a request to get her heart checked out.  The patient is a very poor historian, clearly with some significant underlying psychiatric disorder.  Consider schizoaffective disorder or possibly methamphetamine abuse.  The patient is not having any pain.  She was requesting her heart to be checked out as she has a multiple prior occasions, EKG without acute ischemic changes.  I do not see any indication for any further work-up.  I feel that this most likely is a manifestation of the patient's underlying psychiatric illness.    With regards to legal hold, she is not gravely disabled, she is appropriately dressed, does not appear malnourished.  She is capable of getting herself here to the emergency department independently.  She does not appear to be responding to internal stimuli, she denies any suicidal or homicidal ideation.  Therefore at this time she does not merit a nonvoluntary hold and will be discharged here from the emergency department.   The patient was discharged home (see d/c instructions) was told to return immediately for any signs or symptoms listed, or any worsening at all.  The patient verbally agreed to the discharge precautions and follow-up plan which is documented in EPIC.    Discharge Medications:  New Prescriptions    No medications on file       FINAL IMPRESSION  1. Encounter for medical screening examination

## 2023-05-27 NOTE — ED NOTES
Discharge instructions given to pt including follow up with pcp or returning if no improvement of symptoms or to return if worse. Questions answered by RN. Denies any new complaints. Discharged w/stable vitals and able to ambulates to the lobby w/steady gait.

## 2023-05-27 NOTE — ED TRIAGE NOTES
Dunia Sahni  38 y.o. female  Chief Complaint   Patient presents with    Psych Eval     Patient speaking in word salad in triage. Hx schizoaffective, and bipolar. Unable to answer questions appropriately in triage      Pt ambulatory to triage with steady gait for above complaint.     Pt is speaking in full sentences, follows commands but responds inappropriately to questions. Resp are even and unlabored.    Charge RN aware of patient. Pt placed in green 36.     This RN masked and in appropriate PPE during encounter.     Vitals:    05/27/23 0908   BP: 122/73   Pulse: 73   Resp: 16   Temp: 36 °C (96.8 °F)   SpO2: 98%

## 2023-06-01 ENCOUNTER — HOSPITAL ENCOUNTER (EMERGENCY)
Facility: MEDICAL CENTER | Age: 38
End: 2023-06-02
Attending: EMERGENCY MEDICINE
Payer: MEDICARE

## 2023-06-01 DIAGNOSIS — F25.9 SCHIZOAFFECTIVE DISORDER, UNSPECIFIED TYPE (HCC): ICD-10-CM

## 2023-06-01 PROCEDURE — 99285 EMERGENCY DEPT VISIT HI MDM: CPT

## 2023-06-01 ASSESSMENT — FIBROSIS 4 INDEX: FIB4 SCORE: 0.27

## 2023-06-02 VITALS
TEMPERATURE: 98.2 F | HEIGHT: 67 IN | OXYGEN SATURATION: 96 % | RESPIRATION RATE: 17 BRPM | SYSTOLIC BLOOD PRESSURE: 105 MMHG | DIASTOLIC BLOOD PRESSURE: 53 MMHG | HEART RATE: 65 BPM | WEIGHT: 211 LBS | BODY MASS INDEX: 33.12 KG/M2

## 2023-06-02 LAB
AMPHET UR QL SCN: NEGATIVE
APPEARANCE UR: ABNORMAL
BACTERIA #/AREA URNS HPF: ABNORMAL /HPF
BARBITURATES UR QL SCN: NEGATIVE
BENZODIAZ UR QL SCN: NEGATIVE
BILIRUB UR QL STRIP.AUTO: NEGATIVE
BZE UR QL SCN: NEGATIVE
CANNABINOIDS UR QL SCN: NEGATIVE
COLOR UR: ABNORMAL
EPI CELLS #/AREA URNS HPF: ABNORMAL /HPF
FENTANYL UR QL: NEGATIVE
FLUAV RNA SPEC QL NAA+PROBE: NEGATIVE
FLUBV RNA SPEC QL NAA+PROBE: NEGATIVE
GLUCOSE UR STRIP.AUTO-MCNC: NEGATIVE MG/DL
HCG UR QL: NEGATIVE
KETONES UR STRIP.AUTO-MCNC: NEGATIVE MG/DL
LEUKOCYTE ESTERASE UR QL STRIP.AUTO: ABNORMAL
METHADONE UR QL SCN: NEGATIVE
MICRO URNS: ABNORMAL
NITRITE UR QL STRIP.AUTO: NEGATIVE
OPIATES UR QL SCN: NEGATIVE
OXYCODONE UR QL SCN: NEGATIVE
PCP UR QL SCN: NEGATIVE
PH UR STRIP.AUTO: 6 [PH] (ref 5–8)
POC BREATHALIZER: 0 PERCENT (ref 0–0.01)
PROPOXYPH UR QL SCN: NEGATIVE
PROT UR QL STRIP: 100 MG/DL
RBC # URNS HPF: ABNORMAL /HPF
RBC UR QL AUTO: ABNORMAL
RENAL EPI CELLS #/AREA URNS HPF: NEGATIVE /HPF
RSV RNA SPEC QL NAA+PROBE: NEGATIVE
SARS-COV-2 RNA RESP QL NAA+PROBE: NOTDETECTED
SP GR UR STRIP.AUTO: 1.03
SPECIMEN SOURCE: NORMAL
UROBILINOGEN UR STRIP.AUTO-MCNC: 1 MG/DL
WBC #/AREA URNS HPF: ABNORMAL /HPF

## 2023-06-02 PROCEDURE — C9803 HOPD COVID-19 SPEC COLLECT: HCPCS | Performed by: EMERGENCY MEDICINE

## 2023-06-02 PROCEDURE — 302970 POC BREATHALIZER: Performed by: EMERGENCY MEDICINE

## 2023-06-02 PROCEDURE — 0241U HCHG SARS-COV-2 COVID-19 NFCT DS RESP RNA 4 TRGT MIC: CPT

## 2023-06-02 PROCEDURE — 81001 URINALYSIS AUTO W/SCOPE: CPT

## 2023-06-02 PROCEDURE — 700111 HCHG RX REV CODE 636 W/ 250 OVERRIDE (IP): Mod: UD

## 2023-06-02 PROCEDURE — 90791 PSYCH DIAGNOSTIC EVALUATION: CPT

## 2023-06-02 PROCEDURE — 81025 URINE PREGNANCY TEST: CPT

## 2023-06-02 PROCEDURE — 80307 DRUG TEST PRSMV CHEM ANLYZR: CPT

## 2023-06-02 RX ORDER — LORAZEPAM 2 MG/ML
INJECTION INTRAMUSCULAR
Status: DISCONTINUED
Start: 2023-06-02 | End: 2023-06-02 | Stop reason: HOSPADM

## 2023-06-02 RX ORDER — DIPHENHYDRAMINE HYDROCHLORIDE 50 MG/ML
INJECTION INTRAMUSCULAR; INTRAVENOUS
Status: DISCONTINUED
Start: 2023-06-02 | End: 2023-06-02 | Stop reason: HOSPADM

## 2023-06-02 RX ORDER — HALOPERIDOL 5 MG/ML
5 INJECTION INTRAMUSCULAR ONCE
Status: DISCONTINUED | OUTPATIENT
Start: 2023-06-02 | End: 2023-06-02 | Stop reason: HOSPADM

## 2023-06-02 RX ORDER — PERPHENAZINE 2 MG/1
2 TABLET ORAL 2 TIMES DAILY
Status: DISCONTINUED | OUTPATIENT
Start: 2023-06-02 | End: 2023-06-02 | Stop reason: HOSPADM

## 2023-06-02 RX ORDER — DIPHENHYDRAMINE HYDROCHLORIDE 50 MG/ML
25 INJECTION INTRAMUSCULAR; INTRAVENOUS ONCE
Status: DISCONTINUED | OUTPATIENT
Start: 2023-06-02 | End: 2023-06-02 | Stop reason: HOSPADM

## 2023-06-02 RX ORDER — LORAZEPAM 2 MG/ML
2 INJECTION INTRAMUSCULAR ONCE
Status: DISCONTINUED | OUTPATIENT
Start: 2023-06-02 | End: 2023-06-02 | Stop reason: HOSPADM

## 2023-06-02 RX ORDER — DIPHENHYDRAMINE HYDROCHLORIDE 50 MG/ML
25 INJECTION INTRAMUSCULAR; INTRAVENOUS ONCE
Status: DISCONTINUED | OUTPATIENT
Start: 2023-06-02 | End: 2023-06-02

## 2023-06-02 RX ORDER — HALOPERIDOL 5 MG/ML
INJECTION INTRAMUSCULAR
Status: DISCONTINUED
Start: 2023-06-02 | End: 2023-06-02 | Stop reason: HOSPADM

## 2023-06-02 NOTE — DISCHARGE PLANNING
Alert Team/Behavioral Health   Note:      Mental Health Transfer    Referral: transfer to Mental Health Facility    Intervention:  (Alice) @ [facility] called to accept patient.    Patient's accepting provider is Renée TOVAR.    [facility].    The patient will be picked up @ 2000.    Notified Attending Provider (Katy TOVAR), Bedside RN (Amadou ABRAHAM), and Alert Team RN (Lyla DIETRICH) via Voalte message of the departure time as well as accepting facility.    [facility] PCS form scanned in .    Transfer packet to be created and placed in chart with filled out COBRA form.    Plan: transfer to [facility] via [facility] ambulance for acute inpatient psychiatric care.

## 2023-06-02 NOTE — DISCHARGE PLANNING
Alert Team:    Legal hold initiated at 0222. Telesitter ordered for level of observation due to safety concerns and elopement risk.

## 2023-06-02 NOTE — ED NOTES
Pt resting in 1:1 direct observation of telesitter, L2K in chart, pt safety precautions in place, NAD, no needs or requests at this time.

## 2023-06-02 NOTE — ED NOTES
Patient to be placed on L2K, spoke with alert team. Instructed to wait until Alert team is able to speak with patient prior to removing personal belongings from patient.

## 2023-06-02 NOTE — ED NOTES
Assumed pt care, bedside report received from CHRISTIE Fraga. Pt resting supine with eyes closed and even, unlabored respirations on RA. Telephone report given to Deedee wilson.

## 2023-06-02 NOTE — ED NOTES
"Pt woken for VS. Pt AOx4, states that she \"came in to see if I'm carrying.\" Pt encouraged to void, states that she is unable to at this time but provided water. Denies SI, HI. Denies pain, needs met. Tele sitter remains in progress.  "

## 2023-06-02 NOTE — ED TRIAGE NOTES
"Chief Complaint   Patient presents with    Psych Eval     Patient not speaking in full sentences and talking to self. Patient repeating, \"hello, this is a courtesy and privacy\". Not able to state any other complaints or pain.       Patient ambulated to triage for above complaint. Patient not answering questions appropriately at this time. Patient educated on triage process and encouraged to notify staff if condition worsens. Patient returned to the lobby in stable condition.   "

## 2023-06-02 NOTE — ED NOTES
Tele-Sitter at bedside for 1:1 observation.    Rounded on Pt. Pt resting comfortably with eyes closed. Respirations regular.  Will continue to monitor.

## 2023-06-02 NOTE — ED NOTES
Report to Crissy with tele sitting, will be down shortly to set up tele sitting in patient room. Patient resting in gurney, continues to speak with self distorted perception of reality, conversation unclear and hard to follow. Patient shows no signs of worsening agitation at this time, safety precautions in place.

## 2023-06-02 NOTE — DISCHARGE PLANNING
Alert Team/Behavioral Health   Note:      [facility] ALERT TEAM DISCHARGE PLANNING NOTE    Date:  6/2/23  Patient Name:  Dunia Sahni - 38 y.o. - Discharge Planning  MRN:  [id number]   YOB: 1985  ADMISSION DATE:  6/1/2023     Writer forwarded referral packet for inpatient psychiatric care to the following community providers:  [facility] , [facility]     Items included in the referral packet:   _x____Face Sheet   _x____Pages 1 and 2 of completed legal hold   _____Alert Team/Psych Assessment   _x____H&P   _____UDS   _x____Blood Alcohol   _x____Vital signs   _____Pregnancy Test (if applicable)   _x____Medications List   _____Covid Screen

## 2023-06-02 NOTE — ED NOTES
Received bedside report; assumed care of Pt.    No oxygen.  Tele-Sitter at bedside for 1:1 observation.  Low fall risk precautions.    Introduced self to Pt; informed her that I am part of her care team.  No acute distress at this time.  Will continue to monitor.

## 2023-06-02 NOTE — ED NOTES
Verbal order received for straight cath however pt agreed to attempt to void again. Escorted pt to rr, pt voided a scant amount of cloudy, pink tinged, malodorous urine. Denied dysuria, ERP updated. [place] and crackers provided. Tele sitter in process.

## 2023-06-02 NOTE — ED NOTES
"Patient cooperative with breathalyzer, provided with non-slip socks. Patient responding only with \"yes\" or \"no\" regardless of what questions are being asked, patient also inspecting hands/fingernails saying repeatedly \"No they look fine to me, they are clean\" \"What are you talking about, they are clean\". Safety precautions in place,  "

## 2023-06-02 NOTE — ED NOTES
Patient's home medications have been reviewed by the pharmacy team.     Past Medical History:   Diagnosis Date    Abscess 10/12/2017    right AC arm area    Bipolar affective (HCC)     Depression     Kidney infection     Leukemia (HCC) 12/2019    Suicide attempt (HCC)        Patient's Medications    No medications on file          A:  No current medications per patient's pharmacy.  Medications do not appear to be contributing to current complaints.     P:    Follow psychiatry recommendations for medications and monitor patient while she is in the ER.    Jacques De La Rosa, MarkellD, BCPS

## 2023-06-02 NOTE — ED NOTES
Pt woken for collection of urine sample. Collection process explained to patient, patient verbalized understanding. Amb to rr with steady gait however came back to rm with empty cup. Pt responding to internal stimuli, unable to explain why she was unable to collect specimen. Water and juice provided, encouraged PO intake and educated again on need for urine sample. ERP notified of delay, tele sitter remains in progress.

## 2023-06-02 NOTE — ED PROVIDER NOTES
"ED Provider Note    CHIEF COMPLAINT  Chief Complaint   Patient presents with    Psych Eval     Patient not speaking in full sentences and talking to self. Patient repeating, \"hello, this is a courtesy and privacy\". Not able to state any other complaints or pain.        EXTERNAL RECORDS REVIEWED  Prior history of depression, bipolar affective, multiple prior visits usually for chest pain.  Seen at both this facility and [facility].    ER visit on 5/27/2023 when the patient was seen for a gastric evaluation and concerns regarding possible heart discomfort.  To the history of intermittent methamphetamine abuse.  During that evaluation she did not have active SI or HI, she did not meet criteria for hold and was discharged.    HPI/ROS  LIMITATION TO HISTORY   Select: Altered mental status / Confusion  OUTSIDE HISTORIAN(S):  None    Dunia Sahni is a 38 y.o. female who presents to the emergency room for uncertain concerns.  The patient was noted to be talking to the wall and intermittently talking to herself.  When I introduced myself and she was very easily redirectable she was primarily concerned about her privacy and the possibility of having a pregnancy.  She is denying any recent sexual activity, she is unsure of her last menstrual period.  She has no abdominal pain, no chest pain.  Intermittently throughout the exam she is noted to be mumbling incoherently.  She denied any alcohol or drug use.  Review of the chart showed that she did not have any recent positivity on her drug screening and appears to have schizoaffective disorder.    PAST MEDICAL HISTORY   has a past medical history of Abscess (10/12/2017), Bipolar affective (HCC), Depression, Kidney infection, Leukemia (HCC) (12/2019), and Suicide attempt (HCC).    SURGICAL HISTORY   has a past surgical history that includes appendectomy; ercp in or (N/A, 7/5/2018); and skip by laparoscopy (N/A, 7/6/2018).    FAMILY HISTORY  Family History " "  Family history unknown: Yes       SOCIAL HISTORY  Social History     Tobacco Use    Smoking status: Never    Smokeless tobacco: Never   Vaping Use    Vaping Use: Never used   Substance and Sexual Activity    Alcohol use: Yes     Comment: patient reports she drinks once or twice a year    Drug use: Yes     Types: Inhaled     Comment: hx: marijuana, cocaine    Sexual activity: Not on file     CURRENT MEDICATIONS  Home Medications       Reviewed by Wendy Reeves R.N. (Registered Nurse) on 06/01/23 at 2334  Med List Status: Partial     Medication Last Dose Status        Patient Corwin Taking any Medications                         ALLERGIES  Allergies   Allergen Reactions    Vicodin [Hydrocodone-Acetaminophen] Anaphylaxis     RXN=9 years ago    Denies 11/19/2022       PHYSICAL EXAM  VITAL SIGNS: Pulse 78   Temp 36.2 °C (97.1 °F) (Temporal)   Resp 16   Ht 1.702 m (5' 7\")   Wt 95.7 kg (211 lb)   SpO2 100%   BMI 33.05 kg/m²    Genl: F sitting in chair comfortably, speaking pressured  Pulmonary: Lungs are clear to auscultation bilaterally  CV:  RRR, no murmur appreciated, pulses 2+ in both upper and lower extremities,  Abdomen: soft, NT/ND; no rebound/guarding  Musculoskeletal: Pain free ROM of the neck. Moving upper and lower extremities and spontaneous in coordinated fashion  Neuro: A&Ox2 (person, place), speech pressured, gait steady, no focal deficits appreciated  Psych: Orientation: person, place and time/date   Appearance: age appropriate  Behavior: suspicious   Speech: pressured   Mood: blunted   Affect: mood congruent   Thought Process: disorganized   Thought Content: no suicidal thoughts   Delusions: active auditory hallucinations   Perceptions/Sensorium:   Peers to be responding to internal stimuli.  Cognition: poor concentration   Language: spontaneous   Insight and judgement: poor based on recent behaviors     DIAGNOSTIC STUDIES / PROCEDURES    LABS  Labs Reviewed   POC BREATHALIZER - Normal "   URINE DRUG SCREEN   HCG QUALITATIVE UR     COURSE & MEDICAL DECISION MAKING    ED Observation Status? Yes; I am placing the patient in to an observation status due to a diagnostic uncertainty as well as therapeutic intensity. Patient placed in observation status at 2:19 AM, 6/2/2023.     Observation plan is as follows: Patient is disorganized, has some element of underlying psychosis and is not safe for discharge.  Legal hold initiated as patient's alcohol level is not elevated, urine drug screen is currently pending.  Vital signs are stable and chart is reviewed with ED behavioral health team.    INITIAL ASSESSMENT, COURSE AND PLAN  Care Narrative: Patient presents to the emergency room for symptoms as described above.  She is fairly disorganized, she is occasionally redirectable and when further questioned about drug use or alcohol she says no.  She oftentimes is noted by nursing staff to be inspecting her hands and fingernails and talking to them as if they were people in the room.  On reassessment I have spoken to the patient about my concerns regarding her safety and her acute psychosis and she appears to have limited insight into his current condition.  Does not immediate information regarding prior medication and this is available to us for further management of her psychosis.  Breathalyzer is not elevated, urine drug screen is pending, hCG is also pending.  Vital signs remained stable at this time she will be evaluated by ED behavioral health team, anticipate that she will remain on hold pending reassessment in the morning.    DISPOSITION AND DISCUSSIONS  I have discussed management of the patient with the following physicians and CAROLINA's:  non    Discussion of management with other QHP or appropriate source(s): Behavioral Health assessment of psychosis      FINAL DIAGNOSIS  1. Schizoaffective disorder, unspecified type (HCC)      Electronically signed by: Florentino Jones M.D., 6/2/2023 12:15 AM

## 2023-06-02 NOTE — ED NOTES
Bedside report received from Aimee RN, Pt safety precautions in place, L2K in chart, pt resting on gurney in 1:1 direct observation of tele sitter. NAD, No needs or requests at this time.

## 2023-06-02 NOTE — DISCHARGE PLANNING
Alert Team/Behavioral Health   Note:      Sent UDS and negative pregnancy test results to [facility].
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
## 2023-06-02 NOTE — ED NOTES
Bedside report to Flakito SORIANO, removed self from care of patient. Discussed orders, poc, patient status, safety precautions and room air oxygen status

## 2023-06-02 NOTE — ED NOTES
Security, Alert team and RN to patient room to inform patient of l2k status and remove personal belongings, initially patient uncooperative and agitated, spoke with ERP for meds, haldol, benadryl and ativan ordered. Once informed of possible medication administration, patient began cooperating. All personal belongings removed patient changed into gown.

## 2023-06-02 NOTE — ED NOTES
Bedside report from Radha RN, assumed care of patient. Patient in room, safety precautions in place, agree with triage note. Waiting for ERP evaluation

## 2023-06-02 NOTE — DISCHARGE PLANNING
Alert Team/Behavioral Health   Note:      - [facility]): Intake Counselor (José Miguel) called. Patient declined due to being out of Medicare lifetime psych days. Notified Alert Team RN (Lyla DIETRICH).    -  [facility]:  (Alice) called. Referral has been received and pending review.    - [facility] : Talked to Ashlyn. Referral has been received and pending review.

## 2023-06-02 NOTE — CONSULTS
"[facility] PSYCHIATRIC CONSULT:Intake  Reason for admission: Prior history of depression, bipolar affective, multiple prior visits usually for chest pain.  Seen at both this facility and [facility].presents to the emergency room for uncertain concerns.  The patient was noted to be talking to the wall and intermittently talking to herself  Consulting Physician/APN/PA: Sam Pereira M.D.  Reason for Consult:psychosis, can't care for self  Consultant: Diamond Bailey MD    Legal Status  on hold       CC: \"I came here to see if Im pregnant. The government mandates it\"  HPI: 37 yo female who has a hx of schizoaffective disorder who was last seen 2020. At that time she was on perhphenazine, and ability maintena. She says she has never been on psych meds, doesn't have a psych clinic, doesn't want/need meds and wants to go home. She denies depression, SI/HI/Psychosis. Has a place to stay with roommates.     Chart(s) Review:  Please review: summary note 2020. she gets very delusional. Compliance is an issue.    Medical ROS:  Review of systems  per tx tm: on review she may have a UTI but she won't participate in review of symptoms    Psychiatric Exam (MSE):  Vitals:Blood pressure 111/62, pulse 65, temperature 36.5 °C (97.7 °F), temperature source Temporal, resp. rate 18, height 1.702 m (5' 7\"), weight 95.7 kg (211 lb), SpO2 98 %, not currently breastfeeding.    Constitutional: lying in bed covered up, appears to be wearing makup, good eye contact, not cooperative  General Appearance: as noted above  Musculoskeletal:gait not observed.   Alert/Orientation: she  does not answer but knew how to get here so oriented enough for her needs  Attn/Concentration: distracted  Fund of Knowledge: not tested  Memory recent/remote: grossly intact  Speech:jpressured  Language:  fluent  Thought Content:+  psychosis , denies  SI/HI      Thought Process: loose  Insight/Judgement:impaired  Mood: not " identifed  Affect: mildly irritable    Past Medical Hx:     Past Medical History:   Diagnosis Date    Abscess 10/12/2017    right AC arm area    Bipolar affective (HCC)     Depression     Kidney infection     Leukemia (HCC) 12/2019    Suicide attempt (HCC)        Past Psychiatric Hx:  SI/SAs: no answer  Hospitalizations: +  Dx:schizoaffective disorder       Family Psych Hx:  Family History   Family history unknown: Yes       Social Hx:  Housing: as noted     Drugs/Alcohol: denies    Labs:  Lab Results   Component Value Date/Time    AMPHUR Negative 06/02/2023 1139    BARBSURINE Negative 06/02/2023 1139    BENZODIAZU Negative 06/02/2023 1139    COCAINEMET Negative 06/02/2023 1139    METHADONE Negative 06/02/2023 1139    ECSTASY Negative 07/07/2015 1105    OPIATES Negative 06/02/2023 1139    OXYCODN Negative 06/02/2023 1139    PCPURINE Negative 06/02/2023 1139    PROPOXY Negative 06/02/2023 1139    CANNABINOID Negative 06/02/2023 1139     No results for input(s): WBC, RBC, HEMOGLOBIN, HEMATOCRIT, MCV, MCH, RDW, PLATELETCT, MPV, NEUTSPOLYS, LYMPHOCYTES, MONOCYTES, EOSINOPHILS, BASOPHILS, RBCMORPHOLO in the last 72 hours.  No results for input(s): SODIUM, POTASSIUM, CHLORIDE, CO2, GLUCOSE, BUN, CPKTOTAL in the last 72 hours.    Cranial Imaging: personally reviewed  Cranial MRI: 2014: no significant findings      EKG: QTc:  542       Meds Current:  Scheduled Medications   Medication Dose Frequency    haloperidol lactate  5 mg Once    LORazepam  2 mg Once    diphenhydrAMINE  25 mg Once     Allergies: Vicodin [hydrocodone-acetaminophen]      Assessement    1. Schizoaffective disorder     Medical:    -R/O UTI      Recommendations:  Legal Status:  extended  Observation status:   -telemonitor    Visitors:  no  Personal belongings: no    Discussed/voalted: MARIELLA Burns MD    Medication and Other Recommendations: final orders as per Tx Tm  Perphenazine 2 mg bid  2    depending on her behavior even if psychotic, may be able to be  released from hold, or not     -Legal hold conditions discussed in detail.    Will continue to follow with you.    Thank you for the consult.   Signing off, please reconsult as needed.       Discharge recommendations: TBD     If released from [facility]: Discharge Instructions:  -Reviewed safety plan: 911, ER, PCM, [facility], Suicide crisis line  -Please assist with outpatient Psychiatric/substance use follow up appointments at discharge once medically cleared.

## 2023-06-02 NOTE — DISCHARGE SUMMARY
"  ED Observation Discharge Summary    Patient:Dunia Sahni  Patient : 1985  Patient MRN: [id number]  Patient PCP: Pcp Pt States None    Admit Date: 2023  Discharge Date and Time: 23 11:37 AM  Discharge Diagnosis:   1. Schizoaffective disorder, unspecified type (HCC)      Discharge Attending: Sam Pereira M.D.  Discharge Service: ED Observation    ED Course  Dunia is a 38 y.o. female who was evaluated at [facility] for bizarre behavior.  Patient was observed in the emergency and her psychosis was treated with Benadryl and Haldol as well as Ativan.  With this patient remained with ongoing delusions but was nonviolent and easily redirectable.  Patient was accepted by [facility] and will be transferred to their care    Discharge Exam:  /62   Pulse 65   Temp 36.5 °C (97.7 °F) (Temporal)   Resp 18   Ht 1.702 m (5' 7\")   Wt 95.7 kg (211 lb)   SpO2 98%   BMI 33.05 kg/m² .    Constitutional: Awake and alert. Nontoxic  HENT:  Grossly normal  Eyes: Grossly normal  Neck: Normal range of motion  Cardiovascular: Normal heart rate   Thorax & Lungs: No respiratory distress  Abdomen: Nontender  Skin:  No pathologic rash.   Extremities: Well perfused  Psychiatric: Affect normal    Labs  Results for orders placed or performed during the hospital encounter of 23   POC BREATHALIZER   Result Value Ref Range    POC Breathalizer 0.00 0.00 - 0.01 Percent       Radiology  No orders to display       Medications:   New Prescriptions    No medications on file       My final assessment includes psychosis  Upon Reevaluation, the patient's condition has: not improved; and will be escalated to hospitalization.    Patient discharged from ED Observation status at 11:39 AM (Time) 23 (Date).     Total time spent on this ED Observation discharge encounter is < 30 Minutes    Electronically signed by: Sam Pereira M.D., 2023 11:37 AM       "

## 2023-06-02 NOTE — DISCHARGE PLANNING
Alert Team/Behavioral Health   Note:      - [facility]:  (Alice) called to request and make sure we get UDS & pregnancy test. They are aware that patient is resting which may be the reason urine samples have not been collected for these labs, if they have already been ordered.    Notified Attending Provider (Randall TOVAR), Bedside RN (Aimee TELLO), Alert Team RN (Lyla DIETRICH), and Alert Team DPA (Poonam PERES) via Voalte message.

## 2023-06-02 NOTE — ED NOTES
COVID swab collected and sent to lab. Pt resting in 1:1 direct observation of telesitter, L2K in chart, pt safety precautions in place, NAD, no needs or requests at this time.

## 2023-06-03 NOTE — ED NOTES
"Patient report to [facility], pt Alert , respirations even an unlabored.   /53   Pulse 65   Temp 36.8 °C (98.2 °F) (Temporal)   Resp 17   Ht 1.702 m (5' 7\")   Wt 95.7 kg (211 lb)   SpO2 96%   BMI 33.05 kg/m²   Belonging bags x 2 and save keeping bag x 1 handed over to [facility] staff in presence of security.  "

## 2023-06-03 NOTE — ED NOTES
Assumed care of patient, patient bedside report received from CHRISTIE Tobar . Pt AAO X 4 , respirations even and unlabored, on room air . Introduced self as pt RN, POC discussed, call light in reach, Safety risk interventions in place. Pt on L2K, tele sitter in direct view of patient. Plan for transfer to [facility], ETA 8 pm.

## 2023-06-03 NOTE — ED NOTES
Nurse to nurse report called to [facility]. Bedside report given to Radha SORIANO.

## 2023-06-03 NOTE — ED NOTES
"Pt refused medication admin. Pt states \"I'm not taking that until I go to court.\" ERP notified  "

## 2023-06-11 ENCOUNTER — HOSPITAL ENCOUNTER (EMERGENCY)
Facility: MEDICAL CENTER | Age: 38
End: 2023-06-11
Attending: EMERGENCY MEDICINE
Payer: MEDICARE

## 2023-06-11 ENCOUNTER — HOSPITAL ENCOUNTER (EMERGENCY)
Facility: MEDICAL CENTER | Age: 38
End: 2023-06-12
Attending: EMERGENCY MEDICINE
Payer: MEDICARE

## 2023-06-11 VITALS
OXYGEN SATURATION: 98 % | WEIGHT: 216.05 LBS | DIASTOLIC BLOOD PRESSURE: 76 MMHG | SYSTOLIC BLOOD PRESSURE: 121 MMHG | HEART RATE: 72 BPM | TEMPERATURE: 97 F | RESPIRATION RATE: 16 BRPM | HEIGHT: 67 IN | BODY MASS INDEX: 33.91 KG/M2

## 2023-06-11 DIAGNOSIS — F25.9 SCHIZOAFFECTIVE DISORDER, UNSPECIFIED TYPE (HCC): ICD-10-CM

## 2023-06-11 DIAGNOSIS — F99 PSYCHIATRIC DISORDER: ICD-10-CM

## 2023-06-11 LAB
HCG UR QL: NEGATIVE
POC BREATHALIZER: 0 PERCENT (ref 0–0.01)
POC BREATHALIZER: 0 PERCENT (ref 0–0.01)

## 2023-06-11 PROCEDURE — 302970 POC BREATHALIZER: Performed by: EMERGENCY MEDICINE

## 2023-06-11 PROCEDURE — 81025 URINE PREGNANCY TEST: CPT

## 2023-06-11 PROCEDURE — 99284 EMERGENCY DEPT VISIT MOD MDM: CPT

## 2023-06-11 PROCEDURE — 90791 PSYCH DIAGNOSTIC EVALUATION: CPT

## 2023-06-11 PROCEDURE — 99285 EMERGENCY DEPT VISIT HI MDM: CPT

## 2023-06-11 RX ORDER — PERPHENAZINE 2 MG/1
2 TABLET ORAL 2 TIMES DAILY
Status: DISCONTINUED | OUTPATIENT
Start: 2023-06-11 | End: 2023-06-11 | Stop reason: HOSPADM

## 2023-06-11 ASSESSMENT — FIBROSIS 4 INDEX
FIB4 SCORE: 0.36
FIB4 SCORE: 0.36

## 2023-06-11 ASSESSMENT — LIFESTYLE VARIABLES: DO YOU DRINK ALCOHOL: NO

## 2023-06-11 NOTE — ED TRIAGE NOTES
"Chief Complaint   Patient presents with    Psych Eval     Pt ambulatory to triage noted having visual and auditory hallucination c/o having heart issues\" and thinks that she is pregnant. Pt rambling in triage and disorganized thoughts. Told this RN \"they don't have bed in shelter for me and I need you to transfer me somewhere else\". Unable to maintain eye contact. Has hx of schizoaffective. Admits currently not taking any medication.s     Denies si/hi.   "

## 2023-06-11 NOTE — ED NOTES
Pt ambulatory to G26, steady gait.  Agree with triage assessment.  Pt changed into gown.  Chart up for ERP.
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
## 2023-06-11 NOTE — ED NOTES
Patient ambulatory to BR, steady gait, urine sample provided, sent to lab.     Patient refused medications ordered, despite education.

## 2023-06-11 NOTE — ED TRIAGE NOTES
"Chief Complaint   Patient presents with    Psych Eval     Pt ambulatory to triage noted having visual and auditory hallucination c/o having heart issues\" and thinks that she is pregnant. Pt rambling in triage with disorganized thoughts. Told this RN \"they don't have bed in shelter for me and I need you to transfer me somewhere else\". Unable to maintain eye contact. Has hx of schizoaffective. Admits currently not taking any medication.     Denies si/hi.  "

## 2023-06-11 NOTE — ED PROVIDER NOTES
"ER Provider Note    Scribed for Robert Holt M.D. by Ruby Guillen. 6/11/2023   10:27 AM    Primary Care Provider: Pcp Pt States None    CHIEF COMPLAINT  Chief Complaint   Patient presents with    Psych Eval     Pt ambulatory to triage noted having visual and auditory hallucination c/o having heart issues\" and thinks that she is pregnant. Pt rambling in triage with disorganized thoughts. Told this RN \"they don't have bed in shelter for me and I need you to transfer me somewhere else\". Unable to maintain eye contact. Has hx of schizoaffective. Admits currently not taking any medication.     EXTERNAL RECORDS REVIEWED  Review of patient's past medical records show that she was seen here at the [facility] ED on June 1, 2023 for similar symptoms. She frequently comes to the hospital reporting chest pain and pregnancy and has had a workup done for this in the past.     HPI/ROS  LIMITATION TO HISTORY   None    OUTSIDE HISTORIAN(S):  None    Dunia Sahni is a 38 y.o. female with a history of schizoaffective disorder who presents to the ED for a psychiatric evaluation. She states that she has a problem with her heart where she has a chest pain and feels palpitations. She also reports that she may be pregnant. She says that she is unable to care for herself at this time and needs to be transferred somewhere where they can.    PAST MEDICAL HISTORY  Past Medical History:   Diagnosis Date    Abscess 10/12/2017    right AC arm area    Bipolar affective (HCC)     Depression     Kidney infection     Leukemia (HCC) 12/2019    Suicide attempt (HCC)        SURGICAL HISTORY  Past Surgical History:   Procedure Laterality Date    GEREMIAS BY LAPAROSCOPY N/A 7/6/2018    Procedure: GEREMIAS BY LAPAROSCOPY;  Surgeon: Leroy Goodson M.D.;  Location: SURGERY [facility];  Service: General    ERCP IN OR N/A 7/5/2018    Procedure: ERCP IN OR;  Surgeon: Nathan Maravilla M.D.;  Location: SURGERY [facility];  Service: " "Gastroenterology    APPENDECTOMY         FAMILY HISTORY  Family History   Family history unknown: Yes       SOCIAL HISTORY   reports that she has never smoked. She has never used smokeless tobacco. She reports current alcohol use. She reports current drug use. Drug: Inhaled.    CURRENT MEDICATIONS  No current outpatient medications.     ALLERGIES  Allergies   Allergen Reactions    Vicodin [Hydrocodone-Acetaminophen] Anaphylaxis     RXN=9 years ago    Denies 11/19/2022        PHYSICAL EXAM  /75   Pulse 76   Temp 36.1 °C (97 °F) (Oral)   Resp 16   Ht 1.702 m (5' 7\")   Wt 98 kg (216 lb 0.8 oz)   LMP  (LMP Unknown)   SpO2 98%   BMI 33.84 kg/m²      Nursing note and vitals reviewed.  Constitutional: Well-developed and well-nourished. No distress.  Hair is clean and done.  Bright pink lipstick noted.  Sitting in the gurney.  Belongings packed in a duffel bag at the side of the bed.  HENT: Head is normocephalic and atraumatic. Oropharynx is clear and moist without exudate or erythema.   Eyes: Pupils are equal, round, and reactive to light. Conjunctiva are normal.   Cardiovascular: Normal rate and regular rhythm. No murmur heard. Normal radial pulses.  Pulmonary/Chest: Breath sounds normal. No wheezes or rales.   Abdominal: Soft and non-tender. No distention    Musculoskeletal: Extremities exhibit normal range of motion without edema or tenderness.   Neurological: Awake, alert and oriented to person, place, and time. No focal deficits noted.  Skin: Skin is warm and dry. No rash.   Psychiatric: Somewhat disorganized thoughts, stating that she can't take care of herself.     DIAGNOSTIC STUDIES    Labs:   Results for orders placed or performed during the hospital encounter of 06/11/23   BETA-HCG QUALITATIVE URINE   Result Value Ref Range    Beta-Hcg Urine Negative Negative     INITIAL ASSESSMENT AND PLAN    10:27 AM - Patient was evaluated at bedside. Ordered for HCG Qual to evaluate. She will be treated with " perphenazine 2 mg. Patient verbalizes understanding and support with my plan of care.     ED Observation Status? Yes; I am placing the patient in to an observation status due to a diagnostic uncertainty as well as therapeutic intensity. Patient placed in observation status at 10:34 AM, 6/11/2023.     Observation plan is as follows: Labs and monitor for changes.     Upon Reevaluation, the patient's condition has: Improved; and will be discharged.    Patient discharged from ED Observation status at 12:16 PM (Time) 6/11/2023 (Date).      COURSE AND MEDICAL DECISION MAKING    11:41 PM - RN informed me that the patient refused perphenazine medication despite aducation.     12:15 PM - I discussed the patient's case with José Miguel from the Alert Team who states that the patient is refusing to take her medications. Patient is well kempt and we agree that the patient is stable for discharge.     Patient presents today with a psychiatric disorder.  She is not suicidal.  Not homicidal.  She is well-kempt.  She has a bag of belongings at pact and at her bedside.  I do not feel that she meets criteria for legal 2000.    DISPOSITION AND DISCUSSIONS    I have discussed management of the patient with the following physicians and CAROLINA's: None.    Discussion of management with other HP or appropriate source(s): José Miguel (Alert Team)     Escalation of care considered, and ultimately not performed: the patient was evaluated by the alert team, after discussion I have recommended the patient to be discharged.    Barriers to care at this time, including but not limited to: Patient is homeless and Patient lacks financial resources.     The patient will return for new or worsening symptoms and is stable at the time of discharge.    The patient is referred to a primary physician for blood pressure management, diabetic screening, and for all other preventative health concerns.    DISPOSITION:  Patient will be discharged home in stable  condition.    FOLLOW UP:  [facility], Emergency Dept  [address] [id number] [phone]    If symptoms worsen    FINAL DIANGOSIS  1. Psychiatric disorder      Ruby GARCIA ([place]), am scribing for, and in the presence of, Robert Holt M.D..    Electronically signed by: Ruby Guillen ([place]), 6/11/2023    Robert GARCIA M.D. personally performed the services described in this documentation, as scribed by Ruby Guillen in my presence, and it is both accurate and complete.      The note accurately reflects work and decisions made by me.  Robert Holt M.D.  6/11/2023  1:25 PM

## 2023-06-11 NOTE — ED NOTES
Dunia Sahni discharged via ambulation with self.  Discharge instructions given and reviewed, patient educated to follow up with ED prn, verbalized understanding.  All personal belongings in possession.  No questions at this time.

## 2023-06-11 NOTE — ED NOTES
Patient appears well kept with clean clothing, talking to self, redirectable; denies medical complaints.     Denies ETOH or drug use, denies SI/HI, reports not taking medication as previously prescribed.      ERP at bedside.

## 2023-06-12 VITALS
RESPIRATION RATE: 17 BRPM | DIASTOLIC BLOOD PRESSURE: 48 MMHG | SYSTOLIC BLOOD PRESSURE: 108 MMHG | HEIGHT: 68 IN | HEART RATE: 79 BPM | WEIGHT: 215 LBS | BODY MASS INDEX: 32.58 KG/M2 | OXYGEN SATURATION: 98 % | TEMPERATURE: 98.6 F

## 2023-06-12 PROCEDURE — 90791 PSYCH DIAGNOSTIC EVALUATION: CPT

## 2023-06-12 NOTE — DISCHARGE SUMMARY
"  ED Observation Discharge Summary    Patient:Dunia Sahni  Patient : 1985  Patient MRN: [id number]  Patient PCP: Pcp Pt States None    Admit Date: 2023  Discharge Date and Time: 23 3:07 AM  Discharge Diagnosis: Behavioral disturbance, schizoaffective disorder.  Discharge Attending: Florentino Jones M.D.  Discharge Service: ED Observation    ED Course  Dunia is a 38 y.o. female who was evaluated at [facility] and evaluated by my colleague.  She has known underlying psychiatric illness and upon arrival had what appeared to be acute psychosis that was treated with Benadryl, Haldol and Ativan.  Patient had some ongoing delusions and was eventually easily redirectable.  Patient originally had been planned for inpatient follow-up but has been seen and evaluated at [place].  Recently, was seen by ED behavioral health team and is currently not meeting criteria for legal hold, is seen generalized improvement following administration of antipsychotics and behavioral health team is working on reestablishing with outpatient care and is able to safety plan at this time.    Discharge Exam:  /78   Pulse 89   Temp 37.2 °C (98.9 °F) (Temporal)   Resp 17   Ht 1.727 m (5' 8\")   Wt 97.5 kg (215 lb)   LMP  (LMP Unknown)   SpO2 98%   BMI 32.69 kg/m² .    Constitutional: Awake and alert. Nontoxic  HENT:  Grossly normal  Eyes: Grossly normal  Neck: Normal range of motion  Cardiovascular: Normal heart rate   Thorax & Lungs: No respiratory distress  Abdomen: Nontender  Skin:  No pathologic rash.   Extremities: Well perfused  Psychiatric: Affect normal, no SI, no HI, no active delusions    Labs  Results for orders placed or performed during the hospital encounter of 23   POC Breathalizer   Result Value Ref Range    POC Breathalizer 0.0 0.00 - 0.01 Percent     Radiology  No orders to display     Medications:   New Prescriptions    No medications on file     My final " assessment includes outpatient follow-up, discussion regarding these resources, safety concerns.    Upon Reevaluation, the patient's condition has: Improved; and will be discharged.    Patient discharged from ED Observation status at 0330 (Time) 6/12/23 (Date).     Total time spent on this ED Observation discharge encounter is > 30 Minutes    Electronically signed by: Florentino Jones M.D., 6/12/2023 3:07 AM

## 2023-06-12 NOTE — ED TRIAGE NOTES
"Chief Complaint   Patient presents with    Psych Eval     BIB EMS patient [facility]; patient states she has a palpitations and that her leg is broken; patient is walking perfectly Aox4; GCS15; states her heart is not working well.Not HI/SI     /78   Pulse 89   Temp 37.2 °C (98.9 °F) (Temporal)   Resp 17   Ht 1.727 m (5' 8\")   Wt 97.5 kg (215 lb)   LMP  (LMP Unknown)   SpO2 98%   BMI 32.69 kg/m²     "

## 2023-06-12 NOTE — ED NOTES
Assumed bedside report and patient care from CHRISTIE Kulkarni. Patient is resting comfortably in bed with no signs of labored breathing or restlessness. Safety measures in place,.

## 2023-06-12 NOTE — ED NOTES
Patient remains comfortable on gurney, no identifiable needs at this time. Equal chest rise and fall bilaterally. Safety measures in place.

## 2023-06-12 NOTE — ED NOTES
The pt ambulated to room from ambulance bay. The pt talking to self and responding to internal stimuli. The pt denies SI and HI. The pt follows commands and is calm and cooperative. Vitals stable on the monitor.
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
## 2023-06-12 NOTE — ED NOTES
Pt stable for discharge. Pt educated and reviewed discharge instructions with RN. Pt verbalized understanding & all questions were answered. Pt ambulated independently with balanced and steady gait out of the ED with all belongings. Pt encouraged to come back if symptoms worsen. Pt provided all information needed as well as directions to the walk up clinc.

## 2023-06-12 NOTE — ED PROVIDER NOTES
ED Provider Note    CHIEF COMPLAINT  Chief Complaint   Patient presents with    Psych Eval     BIB EMS patient called from a laundromat; patient states she has a palpitations and that her leg is broken; patient is walking perfectly Aox4; GCS15; states her heart is not working well.Not HI/SI       EXTERNAL RECORDS REVIEWED  Inpatient Notes the patient was admitted to [facility] 6/2/2023 for psychosis he was discharged to the shelter 6/8/2023 with outpatient psychiatry appointments he has a history of schizoaffective disorder patient's most recent blood work was 6/3/2023 with a normal white count of 6.8 hemoglobin 11.7 and a normal differential.  Her comprehensive metabolic panel is normal the same day.  Patient had a negative pregnancy test when she was seen here earlier today    HPI/ROS  LIMITATION TO HISTORY   Select: Behavior and has a history of psychosis and schizoaffective disorder is off her medications  OUTSIDE HISTORIAN(S):  none    Dunia Sahni is a 38 y.o. female who presents brought in by EMS for psychiatric evaluation.  [facility]  She told him that her leg was broken and her heart is not working well.  She was walking around and alert and oriented when she was telling the  EMS this.  The patient was just seen here earlier today and evaluated by psychiatry and discharged back to the shelter.  The patient states that she is worried for her safety and wants to be evaluated for repeat evaluation to psychiatry.  She is not suicidal at this time.  She states she has not been taking her psychiatric medications.  She denies any drinking smoking or drug use.  She states that she does not feel safe caring for herself.    PAST MEDICAL HISTORY   has a past medical history of Abscess (10/12/2017), Bipolar affective (HCC), Depression, Kidney infection, Leukemia (HCC) (12/2019), and Suicide attempt (HCC).    SURGICAL HISTORY   has a past surgical history that  "includes appendectomy; ercp in or (N/A, 7/5/2018); and skip by laparoscopy (N/A, 7/6/2018).    FAMILY HISTORY  Family History   Family history unknown: Yes       SOCIAL HISTORY  Social History     Tobacco Use    Smoking status: Never    Smokeless tobacco: Never   Vaping Use    Vaping Use: Never used   Substance and Sexual Activity    Alcohol use: Yes     Comment: patient reports she drinks once or twice a year    Drug use: Yes     Types: Inhaled     Comment: hx: marijuana, cocaine    Sexual activity: Not on file       CURRENT MEDICATIONS  Home Medications       Reviewed by Dior Matos R.N. (Registered Nurse) on 06/11/23 at 2149  Med List Status: <None>     Medication Last Dose Status        Patient Corwin Taking any Medications                           ALLERGIES  Allergies   Allergen Reactions    Vicodin [Hydrocodone-Acetaminophen] Anaphylaxis     RXN=9 years ago    Denies 11/19/2022       PHYSICAL EXAM  VITAL SIGNS: /78   Pulse 89   Temp 37.2 °C (98.9 °F) (Temporal)   Resp 17   Ht 1.727 m (5' 8\")   Wt 97.5 kg (215 lb)   LMP  (LMP Unknown)   SpO2 98%   BMI 32.69 kg/m²      Constitutional: Well developed, Well nourished, No acute distress, Non-toxic appearance.   HEENT: Normocephalic, Atraumatic,  external ears normal, pharynx pink,  Mucous  Membranes moist, No rhinorrhea or mucosal edema  Eyes: PERRL, EOMI, Conjunctiva normal, No discharge.   Neck: Normal range of motion, No tenderness, Supple, No stridor.   Lymphatic: No lymphadenopathy    Cardiovascular: Regular Rate and Rhythm, No murmurs,  rubs, or gallops.   Thorax & Lungs: Lungs clear to auscultation bilaterally, No respiratory distress, No wheezes, rhales or rhonchi, No chest wall tenderness.   Abdomen: Bowel sounds normal, Soft, non tender, non distended,  No pulsatile masses., no rebound guarding or peritoneal signs.   Skin: Warm, Dry, No erythema, No rash,   Back:  No CVA tenderness,  No spinal tenderness, bony crepitance step offs or " instability.   Extremities: Equal, intact distal pulses, No cyanosis, clubbing or edema,  No tenderness.   Musculoskeletal: Good range of motion in all major joints. No tenderness to palpation or major deformities noted.   Neurologic: Alert & oriented x 3,  No focal deficits noted.   Psychiatric: Positive for some disorganized thinking and hallucinations      DIAGNOSTIC STUDIES / PROCEDURES  EKG  I have independently interpreted this EKG  none    LABS  Results for orders placed or performed during the hospital encounter of 06/11/23   POC Breathalizer   Result Value Ref Range    POC Breathalizer 0.0 0.00 - 0.01 Percent         RADIOLOGY  I have independently interpreted the diagnostic imaging associated with this visit and am waiting the final reading from the radiologist.   My preliminary interpretation is as follows: none  Radiologist interpretation: none    COURSE & MEDICAL DECISION MAKING    ED Observation Status? Yes; I am placing the patient in to an observation status due to a diagnostic uncertainty as well as therapeutic intensity. Patient placed in observation status at 10:04 PM, 6/11/2023.     Observation plan is as follows: [facility] evaluation      INITIAL ASSESSMENT, COURSE AND PLAN  Care Narrative: This is a 38-year-old female with a known history of schizoaffective disorder who was just discharged from [facility] 3 days ago.  She has not been taking her medications and states she is unable to care for herself.  She denies suicidal ideation at this time.  Currently she is hallucinating and speaking to people who are not in the room she is complaining of a broken leg which on physical exam does not appear broken and she has full range of motion.  Her heart is regular rate rhythm and her lungs are clear and she is normal stable vital signs.  At this time I placed her on a legal hold and she will be evaluated by psychiatry for possible replacements in a psychiatric facility versus  discharge.        ADDITIONAL PROBLEM LIST  Bipolar affective disorder  Schizoaffective disorder  Depression  Leukemia  Suicide attempt  DISPOSITION AND DISCUSSIONS    Patient was evaluated by life skills here and is not a danger to herself or others.  She was encouraged to take her psychiatric medications as directed.  We will let her rest here tonight and we will try to get her into [facility] tomorrow when they open.  She will be watched in ED observation tonight.  Her legal hold was lifted however so if she decides to leave at any time tonight she can.    I have discussed management of the patient with the following physicians and CAROLINA's:  none    Discussion of management with other QHP or appropriate source(s): Behavioral Health will evaluate the patient in the emergency department for inpatient versus outpatient psychiatric care    Escalation of care considered, and ultimately not performed:blood analysis however the patient has had blood work recently and was normal on 6/3/2023    Barriers to care at this time, including but not limited to: Patient does not have established PCP, Patient is homeless, Patient lacks transportation , and Patient lacks financial resources.     Decision tools and prescription drugs considered including, but not limited to: none.    FINAL DIAGNOSIS  1. Schizoaffective disorder, unspecified type (HCC)           Electronically signed by: Kya Burns M.D., 6/11/2023 10:01 PM

## 2023-06-12 NOTE — ED NOTES
The pt is alert and talking to self. The pt is calm, cooperative, and complaint at this time. Sitter in hallway ensuring the pt's safety. Breathalyzer complete, 0.0. called to initiate telesitter

## 2023-06-12 NOTE — ED NOTES
"Assist RN: Patient given discharge instructions, and was able to accurately repeat back to RN. Patient states \"I want to stay here, place me on a legal hold\". Patient informed that the recommendation from the psychiatric team is for patient to follow up as per her discharge instructions and that she would be allowed to stay in ED until they open at 9am, but patient was not satisfied with this, stating she wished to be admitted. When asked why patient stated initially that it was because she didn't want to be homeless, however when informed that the solution to her homelessness would be to follow up with a professional outpatient and follow their advice, patient changed her story and began to state \"I don't feel safe with myself, now place me on a legal hold\". Patient informed that the advice of the psych team would be followed and that patient should follow up as advised.   "

## 2023-06-29 ENCOUNTER — HOSPITAL ENCOUNTER (EMERGENCY)
Facility: MEDICAL CENTER | Age: 38
End: 2023-06-29
Attending: STUDENT IN AN ORGANIZED HEALTH CARE EDUCATION/TRAINING PROGRAM
Payer: MEDICARE

## 2023-06-29 VITALS
HEART RATE: 87 BPM | BODY MASS INDEX: 32.58 KG/M2 | WEIGHT: 214.95 LBS | RESPIRATION RATE: 16 BRPM | HEIGHT: 68 IN | SYSTOLIC BLOOD PRESSURE: 105 MMHG | TEMPERATURE: 98.2 F | OXYGEN SATURATION: 98 % | DIASTOLIC BLOOD PRESSURE: 61 MMHG

## 2023-06-29 DIAGNOSIS — F25.9 SCHIZOAFFECTIVE DISORDER, UNSPECIFIED TYPE (HCC): ICD-10-CM

## 2023-06-29 LAB — POC BREATHALIZER: 0 PERCENT (ref 0–0.01)

## 2023-06-29 PROCEDURE — 302970 POC BREATHALIZER: Performed by: STUDENT IN AN ORGANIZED HEALTH CARE EDUCATION/TRAINING PROGRAM

## 2023-06-29 PROCEDURE — 99283 EMERGENCY DEPT VISIT LOW MDM: CPT

## 2023-06-29 ASSESSMENT — FIBROSIS 4 INDEX: FIB4 SCORE: 0.36

## 2023-06-29 NOTE — ED NOTES
"Pt ambulated to [facility]N 37 from lobby with steady gait.  On monitor, call light in reach. Chart up for ERP.  Pt states \"my heart dropped... like density\"    "

## 2023-06-29 NOTE — ED PROVIDER NOTES
"CHIEF COMPLAINT  Chief Complaint   Patient presents with    Psych Eval     Psych eval/abnormal behavior. Pt is withdrawn. Cannot get a clear story or details. Pt has incoherent speech in triage, pt just states ''my heart has dropped out''       LIMITATION TO HISTORY   Select: Behavior     HPI    Dunia Sahni is a 38 y.o. female who presents to the Emergency Department evaluation of abnormal behavior.  Patient has a history of a schizoaffective bipolar disorder.  She she states that for the past several weeks she has been noncompliant with her medications.  Over the past few days does admit to having intermittent auditory hallucinations and feelings like her \"heart was dropping\" she denies any SI HI or other complaints no chest pain shortness of breath abdominal pain nausea vomiting or diarrhea    OUTSIDE HISTORIAN(S):  Select: None available    EXTERNAL RECORDS REVIEWED  Select: Other inpatient Notes at [place] 6/2/2023 was admitted for decompensated psychiatric illness.      PAST MEDICAL HISTORY  Past Medical History:   Diagnosis Date    Abscess 10/12/2017    right AC arm area    Bipolar affective (HCC)     Depression     Kidney infection     Leukemia (HCC) 12/2019    Schizoaffective disorder (HCC)     Substance abuse (HCC)     Suicide attempt (HCC)      .    SURGICAL HISTORY  Past Surgical History:   Procedure Laterality Date    GEREMIAS BY LAPAROSCOPY N/A 7/6/2018    Procedure: GEREMIAS BY LAPAROSCOPY;  Surgeon: Leroy Goodson M.D.;  Location: SURGERY [facility];  Service: General    ERCP IN OR N/A 7/5/2018    Procedure: ERCP IN OR;  Surgeon: Nathan Maravilla M.D.;  Location: SURGERY [facility];  Service: Gastroenterology    APPENDECTOMY           FAMILY HISTORY  Family History   Family history unknown: Yes          SOCIAL HISTORY  Social History     Socioeconomic History    Marital status:      Spouse name: Not on file    Number of children: Not on file    Years of education: Not on " "file    Highest education level: Not on file   Occupational History    Not on file   Tobacco Use    Smoking status: Never    Smokeless tobacco: Never   Vaping Use    Vaping Use: Never used   Substance and Sexual Activity    Alcohol use: Yes     Comment: patient reports she drinks once or twice a year    Drug use: Yes     Types: Inhaled     Comment: hx: marijuana, cocaine    Sexual activity: Not on file   Other Topics Concern    Not on file   Social History Narrative    ** Merged History Encounter **          Social Determinants of Health     Financial Resource Strain: Not on file   Food Insecurity: Not on file   Transportation Needs: Not on file   Physical Activity: Not on file   Stress: Not on file   Social Connections: Not on file   Intimate Partner Violence: Not on file   Housing Stability: Not on file         CURRENT MEDICATIONS  No current facility-administered medications on file prior to encounter.     No current outpatient medications on file prior to encounter.           ALLERGIES  Allergies   Allergen Reactions    Vicodin [Hydrocodone-Acetaminophen] Anaphylaxis     RXN=9 years ago    Denies 11/19/2022       PHYSICAL EXAM  VITAL SIGNS:/75   Pulse 90   Temp 36.1 °C (96.9 °F) (Temporal)   Resp 18   Ht 1.727 m (5' 8\")   Wt 97.5 kg (214 lb 15.2 oz)   LMP  (LMP Unknown)   SpO2 97%   BMI 32.68 kg/m²       Pulse ox interpretation: I interpret this pulse ox as normal.  VITALS - vital signs documented prior to this note have been reviewed and noted,  GENERAL - awake, alert, oriented, GCS 15, no apparent distress, non-toxic  appearing  HEENT - normocephalic, atraumatic, pupils equal, sclera anicteric, mucus  membranes moist  NECK - supple, no meningismus, full active range of motion, trachea midline  CARDIOVASCULAR - regular rate/rhythm, no murmurs/gallops/rubs  PULMONARY - no respiratory distress, speaking in full sentences, clear to  auscultation bilaterally, no wheezing/ronchi/rales, no accessory " muscle use  GASTROINTESTINAL - soft, non-tender, non-distended, no rebound, guarding,  or peritonitis  GENITOURINARY - Deferred  NEUROLOGIC - Awake alert, normal mental status, speech fluid, cognition  normal, moves all extremities  MUSCULOSKELETAL - no obvious asymmetry or deformities present  EXTREMITIES - warm, well-perfused, no cyanosis or significant edema  DERMATOLOGIC - warm, dry, no rashes, no jaundice  PSYCHIATRIC -admits to intermittent auditory hallucinations        Radiologist interpretation:   No orders to display        COURSE & MEDICAL DECISION MAKING    ED COURSE:    ED Observation Status? no        INITIAL ASSESSMENT, COURSE AND PLAN  Care Narrative: Patient presented for evaluation of intermittent auditory hallucinations.  Upon my assessment patient does have a slightly odd affect,  and does admit to intermittent auditory hallucinations has a known history of schizoaffective disorder.  Denies any suicidal or homicidal ideations.  Does not seem to be responding to internal stimuli.  She has a normal reassuring physical exam otherwise she was seen by the alert team and we both agree the patient does not meet criteria for legal 2000.  She has no other somatic complaints.  Precautions were discussed patient was discharged in stable condition           ADDITIONAL PROBLEM LIST  Schizoaffective, medication noncompliance, auditory hallucinations,      Discussion of management with other QHP or appropriate source(s): Behavioral Health        Escalation of care considered, and ultimately not performed:blood analysis    Barriers to care at this time, including but not limited to: Patient does not have established PCP.     Decision tools and prescription drugs considered including, but not limited to: Medication modification   .    FINAL DIAGNOSIS  1. Schizoaffective disorder, unspecified type (HCC)             Electronically signed by: Grabiel Flood DO ,2:46 AM 06/29/23

## 2023-06-29 NOTE — DISCHARGE PLANNING
"Alert Team:    Pt is a 37 y/o female presenting to the ED for a psychiatric evaluation. Exhibiting abnormal behavior in triage; withdrawn; unclear of reason for presenting to the ED. Pt has incoherent speech in triage and states, \"My heart has dropped.\" Pt evaluated by this writer and she denies SI/HI/hallucinations. Denies ETOH/substance use; breathalyzer 0.00. Well known to alert team. Hx of Schizoaffective DO (Bipolar Type), Borderline Personality DO traits, ama, and psychosis. Hx of methamphetamine abuse. Pt in room taking pictures of herself with her cell phone. Does not meet criteria for a legal hold and this time. Findings discussed with ERP who agrees pt is safe to discharge to self. Updated RN. Taxi voucher provided for transportation to the shelter.   "

## 2023-06-29 NOTE — ED TRIAGE NOTES
"Chief Complaint   Patient presents with    Psych Eval     Psych eval/abnormal behavior. Pt is withdrawn. Cannot get a clear story or details. Pt has incoherent speech in triage, pt just states ''my heart has dropped out''     /75   Pulse 90   Temp 36.1 °C (96.9 °F) (Temporal)   Resp 18   Ht 1.727 m (5' 8\")   Wt 97.5 kg (214 lb 15.2 oz)   LMP  (LMP Unknown)   SpO2 97%   BMI 32.68 kg/m²     Pt here for above cc  Pt denies ETOH/drug use  Pt also denies SI/HI  Incomprehensible story/cc, pt talking to herself in triage and not responding correctly to some questions  Pt multiple recent visits here for psych related  Pt has psych hx    Pt to [facility], educated on rooming process   "

## 2023-07-04 ENCOUNTER — HOSPITAL ENCOUNTER (EMERGENCY)
Facility: MEDICAL CENTER | Age: 38
End: 2023-07-05
Attending: EMERGENCY MEDICINE
Payer: MEDICARE

## 2023-07-04 ENCOUNTER — APPOINTMENT (OUTPATIENT)
Dept: RADIOLOGY | Facility: MEDICAL CENTER | Age: 38
End: 2023-07-04
Attending: EMERGENCY MEDICINE
Payer: MEDICARE

## 2023-07-04 ENCOUNTER — HOSPITAL ENCOUNTER (EMERGENCY)
Facility: MEDICAL CENTER | Age: 38
End: 2023-07-04
Attending: EMERGENCY MEDICINE
Payer: MEDICARE

## 2023-07-04 VITALS
TEMPERATURE: 98.7 F | BODY MASS INDEX: 32.43 KG/M2 | HEIGHT: 68 IN | SYSTOLIC BLOOD PRESSURE: 114 MMHG | WEIGHT: 214 LBS | HEART RATE: 68 BPM | RESPIRATION RATE: 16 BRPM | OXYGEN SATURATION: 97 % | DIASTOLIC BLOOD PRESSURE: 60 MMHG

## 2023-07-04 DIAGNOSIS — R46.2 BIZARRE BEHAVIOR: ICD-10-CM

## 2023-07-04 DIAGNOSIS — R11.10 VOMITING AND DIARRHEA: ICD-10-CM

## 2023-07-04 DIAGNOSIS — R10.13 EPIGASTRIC PAIN: ICD-10-CM

## 2023-07-04 DIAGNOSIS — B34.9 NONSPECIFIC SYNDROME SUGGESTIVE OF VIRAL ILLNESS: ICD-10-CM

## 2023-07-04 DIAGNOSIS — R19.7 VOMITING AND DIARRHEA: ICD-10-CM

## 2023-07-04 LAB
ALBUMIN SERPL BCP-MCNC: 4 G/DL (ref 3.2–4.9)
ALBUMIN/GLOB SERPL: 1.7 G/DL
ALP SERPL-CCNC: 86 U/L (ref 30–99)
ALT SERPL-CCNC: 12 U/L (ref 2–50)
ANION GAP SERPL CALC-SCNC: 10 MMOL/L (ref 7–16)
AST SERPL-CCNC: 11 U/L (ref 12–45)
BASOPHILS # BLD AUTO: 0.3 % (ref 0–1.8)
BASOPHILS # BLD: 0.04 K/UL (ref 0–0.12)
BILIRUB SERPL-MCNC: 0.2 MG/DL (ref 0.1–1.5)
BUN SERPL-MCNC: 17 MG/DL (ref 8–22)
CALCIUM ALBUM COR SERPL-MCNC: 8.9 MG/DL (ref 8.5–10.5)
CALCIUM SERPL-MCNC: 8.9 MG/DL (ref 8.5–10.5)
CHLORIDE SERPL-SCNC: 104 MMOL/L (ref 96–112)
CO2 SERPL-SCNC: 24 MMOL/L (ref 20–33)
CREAT SERPL-MCNC: 0.64 MG/DL (ref 0.5–1.4)
EOSINOPHIL # BLD AUTO: 0.06 K/UL (ref 0–0.51)
EOSINOPHIL NFR BLD: 0.5 % (ref 0–6.9)
ERYTHROCYTE [DISTWIDTH] IN BLOOD BY AUTOMATED COUNT: 43.4 FL (ref 35.9–50)
GFR SERPLBLD CREATININE-BSD FMLA CKD-EPI: 117 ML/MIN/1.73 M 2
GLOBULIN SER CALC-MCNC: 2.4 G/DL (ref 1.9–3.5)
GLUCOSE SERPL-MCNC: 103 MG/DL (ref 65–99)
HCG SERPL QL: NEGATIVE
HCT VFR BLD AUTO: 33.3 % (ref 37–47)
HGB BLD-MCNC: 10.7 G/DL (ref 12–16)
IMM GRANULOCYTES # BLD AUTO: 0.05 K/UL (ref 0–0.11)
IMM GRANULOCYTES NFR BLD AUTO: 0.4 % (ref 0–0.9)
LACTATE SERPL-SCNC: 0.4 MMOL/L (ref 0.5–2)
LIPASE SERPL-CCNC: 17 U/L (ref 11–82)
LYMPHOCYTES # BLD AUTO: 1.82 K/UL (ref 1–4.8)
LYMPHOCYTES NFR BLD: 14.3 % (ref 22–41)
MCH RBC QN AUTO: 26.2 PG (ref 27–33)
MCHC RBC AUTO-ENTMCNC: 32.1 G/DL (ref 32.2–35.5)
MCV RBC AUTO: 81.4 FL (ref 81.4–97.8)
MONOCYTES # BLD AUTO: 0.75 K/UL (ref 0–0.85)
MONOCYTES NFR BLD AUTO: 5.9 % (ref 0–13.4)
NEUTROPHILS # BLD AUTO: 10 K/UL (ref 1.82–7.42)
NEUTROPHILS NFR BLD: 78.6 % (ref 44–72)
NRBC # BLD AUTO: 0 K/UL
NRBC BLD-RTO: 0 /100 WBC (ref 0–0.2)
PLATELET # BLD AUTO: 361 K/UL (ref 164–446)
PMV BLD AUTO: 9 FL (ref 9–12.9)
POTASSIUM SERPL-SCNC: 4.3 MMOL/L (ref 3.6–5.5)
PROT SERPL-MCNC: 6.4 G/DL (ref 6–8.2)
RBC # BLD AUTO: 4.09 M/UL (ref 4.2–5.4)
SODIUM SERPL-SCNC: 138 MMOL/L (ref 135–145)
WBC # BLD AUTO: 12.7 K/UL (ref 4.8–10.8)

## 2023-07-04 PROCEDURE — 74176 CT ABD & PELVIS W/O CONTRAST: CPT

## 2023-07-04 PROCEDURE — 700111 HCHG RX REV CODE 636 W/ 250 OVERRIDE (IP): Mod: JZ

## 2023-07-04 PROCEDURE — 96374 THER/PROPH/DIAG INJ IV PUSH: CPT

## 2023-07-04 PROCEDURE — 85025 COMPLETE CBC W/AUTO DIFF WBC: CPT

## 2023-07-04 PROCEDURE — 99285 EMERGENCY DEPT VISIT HI MDM: CPT | Mod: 27

## 2023-07-04 PROCEDURE — 84703 CHORIONIC GONADOTROPIN ASSAY: CPT

## 2023-07-04 PROCEDURE — 83605 ASSAY OF LACTIC ACID: CPT

## 2023-07-04 PROCEDURE — 99284 EMERGENCY DEPT VISIT MOD MDM: CPT

## 2023-07-04 PROCEDURE — 80053 COMPREHEN METABOLIC PANEL: CPT

## 2023-07-04 PROCEDURE — 36415 COLL VENOUS BLD VENIPUNCTURE: CPT

## 2023-07-04 PROCEDURE — 83690 ASSAY OF LIPASE: CPT

## 2023-07-04 RX ORDER — ONDANSETRON 4 MG/1
4 TABLET, ORALLY DISINTEGRATING ORAL ONCE
Status: DISCONTINUED | OUTPATIENT
Start: 2023-07-04 | End: 2023-07-04

## 2023-07-04 RX ORDER — ONDANSETRON 2 MG/ML
4 INJECTION INTRAMUSCULAR; INTRAVENOUS ONCE
Status: COMPLETED | OUTPATIENT
Start: 2023-07-04 | End: 2023-07-04

## 2023-07-04 RX ORDER — ONDANSETRON 4 MG/1
4 TABLET, ORALLY DISINTEGRATING ORAL EVERY 6 HOURS PRN
Qty: 10 TABLET | Refills: 0 | Status: SHIPPED | OUTPATIENT
Start: 2023-07-04 | End: 2023-10-22

## 2023-07-04 RX ADMIN — ONDANSETRON 4 MG: 2 INJECTION INTRAMUSCULAR; INTRAVENOUS at 16:48

## 2023-07-04 ASSESSMENT — FIBROSIS 4 INDEX
FIB4 SCORE: 0.36
FIB4 SCORE: 0.32

## 2023-07-04 ASSESSMENT — PAIN DESCRIPTION - PAIN TYPE: TYPE: ACUTE PAIN

## 2023-07-04 NOTE — ED TRIAGE NOTES
"Dunia Sahni  38 y.o.  female  Chief Complaint   Patient presents with    N/V     Pt was checking in to the [facility] today, & staff called because she vomited all over the lobby & was complaining of severe stomach pain.     Abdominal Pain     RLQ pain. Pt reports hx of hernias, & states this feels \"like bad hernia pain\". Hx appendectomy & cholecystectomy.    Hallucinations     Pt is actively hallucinating & attending to internal stimuli, which is her baseline.      BIB [facility]. Pt denied nausea en route, so no zofran given. Labs ordered.   Patient educated on triage process, to alert staff if any changes in condition.  "

## 2023-07-04 NOTE — ED PROVIDER NOTES
"ED Provider Note    Primary care provider: Pcp Pt States None    CHIEF COMPLAINT  Chief Complaint   Patient presents with    N/V     Pt was checking in to the [facility] today, & staff called because she vomited all over the lobby & was complaining of severe stomach pain.     Abdominal Pain     RLQ pain. Pt reports hx of hernias, & states this feels \"like bad hernia pain\". Hx appendectomy & cholecystectomy.    Hallucinations     Pt is actively hallucinating & attending to internal stimuli, which is her baseline.          Limitation to History:  Select: Behavior    HPI  Dunia Sahni is a 38 y.o. female who presents to the Emergency Department with vomiting and diarrhea.  Apparently just ordered about 2 or 3 hours ago.  The patient is requesting ultrasound and lab work to evaluate.  She believes that she is having severe abdominal pain from possibly hernias.  She is status postcholecystectomy and appendectomy, she had hernias repaired previously but her surgeon told them that they can return at any time.    History does appear to be somewhat unreliable.      External Record Review: Patient has been to this emergency department and [employer] 6 times in the past 30 days.  History of bipolar and schizoaffective disorder, I had seen her previously.    REVIEW OF SYSTEMS  See HPI.     PAST MEDICAL HISTORY   has a past medical history of Abscess (10/12/2017), Bipolar affective (HCC), Depression, Kidney infection, Leukemia (HCC) (12/2019), Schizoaffective disorder (HCC), Substance abuse (HCC), and Suicide attempt (HCC).    SURGICAL HISTORY   has a past surgical history that includes appendectomy; ercp in or (N/A, 7/5/2018); and skip by laparoscopy (N/A, 7/6/2018).    SOCIAL HISTORY  Social History     Tobacco Use    Smoking status: Never    Smokeless tobacco: Never   Vaping Use    Vaping Use: Never used   Substance Use Topics    Alcohol use: Yes     Comment: patient reports she drinks once or twice a year    " "Drug use: Yes     Types: Inhaled     Comment: hx: marijuana, cocaine      Social History     Substance and Sexual Activity   Drug Use Yes    Types: Inhaled    Comment: hx: marijuana, cocaine       FAMILY HISTORY  Family History   Family history unknown: Yes       CURRENT MEDICATIONS  Reviewed.  See Encounter Summary.     ALLERGIES  Allergies   Allergen Reactions    Vicodin [Hydrocodone-Acetaminophen] Anaphylaxis     RXN=9 years ago    Denies 11/19/2022       PHYSICAL EXAM  VITAL SIGNS: /60   Pulse 68   Temp 37.1 °C (98.7 °F) (Temporal)   Resp 16   Ht 1.727 m (5' 8\")   Wt 97.1 kg (214 lb)   LMP 06/21/2023 (Approximate)   SpO2 97%   BMI 32.54 kg/m²   Constitutional: Awake, alert in no apparent distress.  Initially sleeping, resting comfortably.  Does not appear to be in any pain or distress.  HENT: Normocephalic, Bilateral external ears normal. Nose normal.   Eyes: Conjunctiva normal, non-icteric, EOMI.    Thorax & Lungs: Easy unlabored respirations, Clear to ascultation bilaterally.  Cardiovascular: Regular rate, Regular rhythm, No murmurs, rubs or gallops. Bilateral pulses symmetrical.   Abdomen:  Soft, notes abdominal tenderness at the suggestion of palpation.  Nondistended, normal active bowel sounds.   :    Skin: Visualized skin is  Dry, No erythema, No rash.   Musculoskeletal:   No cyanosis, clubbing or edema. No leg asymmetry.   Neurologic: Alert, Grossly non-focal.   Psychiatric: Normal affect, Normal mood  Lymphatic:            RADIOLOGY  I have independently interpreted the diagnostic imaging associated with this visit and am waiting the final reading from the radiologist.   My preliminary interpretation is as follows: No evidence of obstruction    Radiologist interpretation:   CT-RENAL COLIC EVALUATION(A/P W/O)   Final Result      1.  No nephro or ureterolithiasis.   2.  No evidence of acute inflammation in the abdomen or pelvis on this noncontrast CT.          COURSE & MEDICAL DECISION " MAKING  Pertinent Labs & Imaging studies reviewed. (See chart for details)    COURSE & MEDICAL DECISION MAKING  Pertinent Labs & Imaging studies reviewed. (See chart for details)    Differential diagnoses include but are not limited to: Most likely viral syndrome, other consideration would be SBO, pancreatitis, less likely hernia    4:04 PM - Nursing notes reviewed, patient seen and examined at bedside.    ED Observation Status? Yes; I am placing the patient in to an observation status due to a diagnostic uncertainty as well as therapeutic intensity. Patient placed in observation status at 4:15 PM    Observation plan is as follows: Antiemetics, CT, laboratory evaluation    Upon Reevaluation, the patient's condition has: Improved, the patient has not vomited since she has been here.    Patient discharged from ED Observation status at 7/4/2023 7 PM    Escalation of care considered, and ultimately not performed: acute inpatient care management, however at this time, the patient is most appropriate for outpatient management.    Barriers to care at this time, including but not limited to: Patient does not have established PCP and Patient is homeless.     Decision Making:  This is a pleasant 38 y.o. year old female who presents with vomiting and diarrhea.  I suspect this is all a viral illness.  The patient did not have any appreciable abdominal tenderness on examination though she was complaining of severe abdominal pain she was sleeping through most of the ED course.  She did not have any witnessed episodes of vomiting, though her close did smell of vomit.  Laboratory evaluation shows a slight leukocytosis at 12.7 with mild neutrophil predominance.  Lactate reassuring, patient is not pregnant, lipase normal.  Remainder of laboratory evaluation is normal.  CT does not show any acute process.  The patient on reassessment has not had any additional vomiting, she has been sleeping throughout the ED course and has not  required any pain medications.  I feel that she can be safely discharged at this time with a prescription for Zofran as needed.    ADDITIONAL PROBLEM LIST  Bipolar and schizoaffective disorder, stable, no AVH no SI     The patient was discharged home (see d/c instructions) was told to return immediately for any signs or symptoms listed, or any worsening at all.  The patient verbally agreed to the discharge precautions and follow-up plan which is documented in EPIC.    Discharge Medications:  Discharge Medication List as of 7/4/2023  7:18 PM        START taking these medications    Details   ondansetron (ZOFRAN ODT) 4 MG TABLET DISPERSIBLE Take 1 Tablet by mouth every 6 hours as needed for Nausea/Vomiting., Disp-10 Tablet, R-0, Normal             FINAL IMPRESSION  1. Vomiting and diarrhea    2. Nonspecific syndrome suggestive of viral illness

## 2023-07-05 VITALS
OXYGEN SATURATION: 94 % | BODY MASS INDEX: 35.16 KG/M2 | WEIGHT: 223.99 LBS | HEART RATE: 55 BPM | TEMPERATURE: 98.3 F | DIASTOLIC BLOOD PRESSURE: 56 MMHG | SYSTOLIC BLOOD PRESSURE: 114 MMHG | RESPIRATION RATE: 14 BRPM | HEIGHT: 67 IN

## 2023-07-05 PROCEDURE — 700111 HCHG RX REV CODE 636 W/ 250 OVERRIDE (IP): Performed by: EMERGENCY MEDICINE

## 2023-07-05 PROCEDURE — 90791 PSYCH DIAGNOSTIC EVALUATION: CPT

## 2023-07-05 PROCEDURE — 700102 HCHG RX REV CODE 250 W/ 637 OVERRIDE(OP): Performed by: EMERGENCY MEDICINE

## 2023-07-05 PROCEDURE — A9270 NON-COVERED ITEM OR SERVICE: HCPCS | Performed by: EMERGENCY MEDICINE

## 2023-07-05 RX ORDER — FAMOTIDINE 20 MG/1
20 TABLET, FILM COATED ORAL ONCE
Status: COMPLETED | OUTPATIENT
Start: 2023-07-05 | End: 2023-07-05

## 2023-07-05 RX ORDER — HALOPERIDOL 5 MG/ML
5 INJECTION INTRAMUSCULAR ONCE
Status: DISCONTINUED | OUTPATIENT
Start: 2023-07-05 | End: 2023-07-05 | Stop reason: HOSPADM

## 2023-07-05 RX ORDER — LORAZEPAM 1 MG/1
1 TABLET ORAL ONCE
Status: COMPLETED | OUTPATIENT
Start: 2023-07-05 | End: 2023-07-05

## 2023-07-05 RX ORDER — SUCRALFATE 1 G/1
1 TABLET ORAL
Qty: 56 TABLET | Refills: 0 | Status: SHIPPED | OUTPATIENT
Start: 2023-07-05 | End: 2023-07-19

## 2023-07-05 RX ORDER — ONDANSETRON 4 MG/1
4 TABLET, ORALLY DISINTEGRATING ORAL ONCE
Status: COMPLETED | OUTPATIENT
Start: 2023-07-05 | End: 2023-07-05

## 2023-07-05 RX ORDER — SUCRALFATE 1 G/1
1 TABLET ORAL
Status: SHIPPED | COMMUNITY
End: 2023-08-23

## 2023-07-05 RX ADMIN — LORAZEPAM 1 MG: 1 TABLET ORAL at 04:28

## 2023-07-05 RX ADMIN — FAMOTIDINE 20 MG: 20 TABLET, FILM COATED ORAL at 00:28

## 2023-07-05 RX ADMIN — ONDANSETRON 4 MG: 4 TABLET, ORALLY DISINTEGRATING ORAL at 00:28

## 2023-07-05 RX ADMIN — LIDOCAINE HYDROCHLORIDE 30 ML: 20 SOLUTION OROPHARYNGEAL at 04:28

## 2023-07-05 RX ADMIN — LIDOCAINE HYDROCHLORIDE 30 ML: 20 SOLUTION OROPHARYNGEAL at 00:28

## 2023-07-05 NOTE — ED NOTES
Bedside report from Mcintosh RN. Pt on gurney in lowest, locked position. Pt on room air, continuous monitoring. Call light within reach. No needs at this time.

## 2023-07-05 NOTE — ED NOTES
Written and verbal discharge instructions given to patient. Patient acknowledges and reports understanding of instructions.  Patient is agreeable to discharge at this time.  Ambulated out of department with purse in hand.
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
## 2023-07-05 NOTE — ED NOTES
Patient's home medications have been reviewed by the pharmacy team.     Past Medical History:   Diagnosis Date    Abscess 10/12/2017    right AC arm area    Bipolar affective (HCC)     Depression     Kidney infection     Leukemia (HCC) 12/2019    Schizoaffective disorder (HCC)     Substance abuse (HCC)     Suicide attempt (HCC)        Patient's Medications   New Prescriptions    SUCRALFATE (CARAFATE) 1 GM TAB    Take 1 Tablet by mouth 4 Times a Day,Before Meals and at Bedtime for 14 days.   Previous Medications    ONDANSETRON (ZOFRAN ODT) 4 MG TABLET DISPERSIBLE    Take 1 Tablet by mouth every 6 hours as needed for Nausea/Vomiting.    SUCRALFATE (CARAFATE) 1 GM TAB    Take 1 g by mouth 4 Times a Day,Before Meals and at Bedtime.   Modified Medications    No medications on file   Discontinued Medications    No medications on file     A:  Medications do not appear to be contributing to current complaints.     P:    Follow psychiatry recommendations for medications and monitor patient while she is in the ED.    Jacques De La Rosa, MarkellD, BCPS

## 2023-07-05 NOTE — ED NOTES
Given discharge instructions, verbalized understanding, left with all belongings, ambulates to ED lobby.  Given a bus pass and resources.

## 2023-07-05 NOTE — ED NOTES
Med rec completed per patients home pharmacy, [facility] states they have two medications ready for her, ready on the shelf, have not been picked up yet  Unable to assess allergies at this time

## 2023-07-05 NOTE — ED PROVIDER NOTES
"ED Provider Note    CHIEF COMPLAINT  Chief Complaint   Patient presents with    Psych Eval     Talking to people, not making much sense      EXTERNAL RECORDS REVIEWED  Patient has been to this emergency department and [place] 6 times in the past 30 days.  History of bipolar and schizoaffective disorder, seen by Dr. Copeland earlier today and discharged.  At that time she was having some abdominal discomfort and some hallucinations.    HPI/ROS  LIMITATION TO HISTORY   Select: Altered mental status / Confusion  OUTSIDE HISTORIAN(S):  none    Dunia Carolina Sahni is a 38 y.o. female with a past medical history of bipolar and schizoaffective disorder who presents the emergency room for concerns regarding active psychosis.  Per EMS report the patient has been talking erroneously, having rambling speech.  By the time I saw the patient in triage the patient was speaking in full sentences, is denying any SI, HI, has not been having any auditory or visual hallucinations endorses having some burning upper abdominal comfort.  She tried drinking some water and this was painful and she came back for further assessment.  She is asking \"to stay so she can get an ultrasound or something to help her figure this out.\"      Seen earlier today as she had some nauseousness, vomiting and had lab work-up that showed a leukocytosis, normal electrolytes and a negative pregnancy and lipase.  CT of the abdomen pelvis was reassuring.  Discharged with Zofran at that time.    PAST MEDICAL HISTORY   has a past medical history of Abscess (10/12/2017), Bipolar affective (HCC), Depression, Kidney infection, Leukemia (HCC) (12/2019), Schizoaffective disorder (HCC), Substance abuse (HCC), and Suicide attempt (HCC).    SURGICAL HISTORY   has a past surgical history that includes appendectomy; ercp in or (N/A, 7/5/2018); and skip by laparoscopy (N/A, 7/6/2018).    FAMILY HISTORY  Family History   Family history unknown: Yes       SOCIAL " "HISTORY  Social History     Tobacco Use    Smoking status: Never    Smokeless tobacco: Never   Vaping Use    Vaping Use: Never used   Substance and Sexual Activity    Alcohol use: Yes     Comment: patient reports she drinks once or twice a year    Drug use: Yes     Types: Inhaled     Comment: hx: marijuana, cocaine    Sexual activity: Not on file       CURRENT MEDICATIONS  Home Medications       Reviewed by Elisha Dalton (Pharmacy Tech) on 07/05/23 at 0451  Med List Status: Complete     Medication Last Dose Status   ondansetron (ZOFRAN ODT) 4 MG TABLET DISPERSIBLE New RX Active   sucralfate (CARAFATE) 1 GM Tab New RX Active                  ALLERGIES  Allergies   Allergen Reactions    Vicodin [Hydrocodone-Acetaminophen] Anaphylaxis     RXN=9 years ago    Denies 11/19/2022     PHYSICAL EXAM  VITAL SIGNS: /57   Pulse 68   Temp 36.9 °C (98.5 °F) (Temporal)   Resp 14   Ht 1.702 m (5' 7\")   Wt 102 kg (223 lb 15.8 oz)   LMP 06/21/2023 (Approximate)   SpO2 94%   BMI 35.08 kg/m²    Genl: F sitting in gurney comfortably, speaking clearly, appears in no acute distress   Head: NC/AT   ENT: Mucous membranes moist, posterior pharynx clear, uvula midline, nares patent bilaterally   Eyes: Normal sclera, pupils equal round reactive to light  Neck: Supple, FROM, no LAD appreciated   Pulmonary: Lungs are clear to auscultation bilaterally  CV:  RRR, no murmur appreciated  Abdomen: soft, NT/ND; no rebound/guarding, no masses palpated, no HSM  Musculoskeletal: Pain free ROM of the neck. Moving upper and lower extremities and spontaneous in coordinated fashion  Neuro: A&Ox4 (person, place, time, situation), speech fluent, gait steady, no focal deficits appreciatedSensation is grossly intact in the distal upper and lower extremities  Orientation: person, place and time/date   Appearance: age appropriate  Behavior: appropriate   Speech: normal, no pressured speech   Mood: blunted   Affect: mood congruent   Thought " Process: forward thinking   Thought Content: no suicidal thoughts   Delusions: none   Perceptions/Sensorium: does not appear to be responding to internal stimuli   Cognition: normal   Language: normal   Insight and judgement: intact   Skin: No rash or lesions.  No pallor or jaundice.  No cyanosis.  Warm and dry.     DIAGNOSTIC STUDIES / PROCEDURES  Prior labs and imaging are reviewed    COURSE & MEDICAL DECISION MAKING    ED Observation Status? Yes; I am placing the patient in to an observation status due to a diagnostic uncertainty as well as therapeutic intensity. Patient placed in observation status at 1:03 AM, 7/5/2023.     Observation plan is as follows: Patient is being observed here in the emergency department with her behavioral changes, history of underlying psychiatric illness she has been seen by ED behavioral health team and at this time the plan is for reassessment in the morning with anticipated outpatient discharge and safety planning.    Signed out to the oncoming physician at 0600    INITIAL ASSESSMENT, COURSE AND PLAN  Care Narrative: Seen and evaluated for symptoms as described above.  The patient is alert, oriented and initially had no significant behavioral changes.  She mentioned on multiple occasions that she was having some upper abdominal discomfort and I went through her labs and medical work-up from her previous assessment here in the emergency department.  There is no interval changes, no febrile illness, no abdominal findings of acute peritonitis.  The patient is then noted to have somewhat bizarre behavior and was seen by me and the ED behavioral health team.  She had been given a small amount of Ativan and seemed to have some stabilization.  She was able to take Pepcid and GI cocktail and felt her abdominal discomfort significantly improved.  Current plan is for reevaluation in the morning with likely safety planning to outpatient facility    Signed out to the oncoming provider at  0600    FINAL DIAGNOSIS  1. Epigastric pain    2. Bizarre behavior      Electronically signed by: Florentino Jones M.D., 7/4/2023 11:21 PM

## 2023-07-05 NOTE — ED NOTES
Pt laying left lateral on gurney. Even chest rise and fall visualized. Pt resting quietly. No acute distress.

## 2023-07-05 NOTE — DISCHARGE SUMMARY
"  ED Observation Discharge Summary    Patient:Dunia Sahni  Patient : 1985  Patient MRN: [id number]  Patient PCP: Pcp Pt States None    Admit Date: 2023  Discharge Date and Time: 23 9:57 AM  Discharge Diagnosis:   1. Epigastric pain  sucralfate (CARAFATE) 1 GM Tab      2. Bizarre behavior            Discharge Attending: Cari Ray D.O.  Discharge Service: ED Observation    ED Course  Dunia is a 38 y.o. female who was evaluated at [facility] presented to the emergency department initially with bizarre behavior and psych evaluation.  She is known to this department.  She was evaluated by the alert team.  She has a known history of schizoaffective disorder and borderline personality.  She denied suicidality.  She demonstrated goal orientation.  She did not meet criteria for legal hold.  She has been provided resources through [employer] and her .  She has also been given a bus pass.  No events under my care.  Patient was seen at the bedside and had no new complaints.  She was discharged in stable condition.    Discharge Exam:  /56   Pulse (!) 55   Temp 36.9 °C (98.5 °F) (Temporal)   Resp 14   Ht 1.702 m (5' 7\")   Wt 102 kg (223 lb 15.8 oz)   LMP 2023 (Approximate)   SpO2 94%   BMI 35.08 kg/m² .    Constitutional: Awake and alert. Nontoxic  HENT:  Grossly normal  Eyes: Grossly normal  Neck: Normal range of motion  Cardiovascular: Normal heart rate   Thorax & Lungs: No respiratory distress  Abdomen: Nontender  Skin:  No pathologic rash.   Extremities: Well perfused  Psychiatric: Affect normal    Labs  Results for orders placed or performed during the hospital encounter of 23   CBC WITH DIFFERENTIAL   Result Value Ref Range    WBC 12.7 (H) 4.8 - 10.8 K/uL    RBC 4.09 (L) 4.20 - 5.40 M/uL    Hemoglobin 10.7 (L) 12.0 - 16.0 g/dL    Hematocrit 33.3 (L) 37.0 - 47.0 %    MCV 81.4 81.4 - 97.8 fL    MCH 26.2 (L) 27.0 - 33.0 pg    MCHC 32.1 (L) 32.2 - 35.5 g/dL "    RDW 43.4 35.9 - 50.0 fL    Platelet Count 361 164 - 446 K/uL    MPV 9.0 9.0 - 12.9 fL    Neutrophils-Polys 78.60 (H) 44.00 - 72.00 %    Lymphocytes 14.30 (L) 22.00 - 41.00 %    Monocytes 5.90 0.00 - 13.40 %    Eosinophils 0.50 0.00 - 6.90 %    Basophils 0.30 0.00 - 1.80 %    Immature Granulocytes 0.40 0.00 - 0.90 %    Nucleated RBC 0.00 0.00 - 0.20 /100 WBC    Neutrophils (Absolute) 10.00 (H) 1.82 - 7.42 K/uL    Lymphs (Absolute) 1.82 1.00 - 4.80 K/uL    Monos (Absolute) 0.75 0.00 - 0.85 K/uL    Eos (Absolute) 0.06 0.00 - 0.51 K/uL    Baso (Absolute) 0.04 0.00 - 0.12 K/uL    Immature Granulocytes (abs) 0.05 0.00 - 0.11 K/uL    NRBC (Absolute) 0.00 K/uL   COMP METABOLIC PANEL   Result Value Ref Range    Sodium 138 135 - 145 mmol/L    Potassium 4.3 3.6 - 5.5 mmol/L    Chloride 104 96 - 112 mmol/L    Co2 24 20 - 33 mmol/L    Anion Gap 10.0 7.0 - 16.0    Glucose 103 (H) 65 - 99 mg/dL    Bun 17 8 - 22 mg/dL    Creatinine 0.64 0.50 - 1.40 mg/dL    Calcium 8.9 8.5 - 10.5 mg/dL    AST(SGOT) 11 (L) 12 - 45 U/L    ALT(SGPT) 12 2 - 50 U/L    Alkaline Phosphatase 86 30 - 99 U/L    Total Bilirubin 0.2 0.1 - 1.5 mg/dL    Albumin 4.0 3.2 - 4.9 g/dL    Total Protein 6.4 6.0 - 8.2 g/dL    Globulin 2.4 1.9 - 3.5 g/dL    A-G Ratio 1.7 g/dL   LIPASE   Result Value Ref Range    Lipase 17 11 - 82 U/L   HCG QUAL SERUM   Result Value Ref Range    Beta-Hcg Qualitative Serum Negative Negative   LACTIC ACID   Result Value Ref Range    Lactic Acid 0.4 (L) 0.5 - 2.0 mmol/L   CORRECTED CALCIUM   Result Value Ref Range    Correct Calcium 8.9 8.5 - 10.5 mg/dL   ESTIMATED GFR   Result Value Ref Range    GFR (CKD-EPI) 117 >60 mL/min/1.73 m 2       Radiology  No orders to display       Medications:   New Prescriptions    SUCRALFATE (CARAFATE) 1 GM TAB    Take 1 Tablet by mouth 4 Times a Day,Before Meals and at Bedtime for 14 days.       My final assessment includes   1. Epigastric pain  sucralfate (CARAFATE) 1 GM Tab      2. Bizarre behavior             Upon Reevaluation, the patient's condition has: Improved; and will be discharged.    Patient discharged from ED Observation status at 10:40 AM (Time) July 5, 2023 (Date).     Total time spent on this ED Observation discharge encounter is < 30 Minutes    Electronically signed by: Cari Ray D.O., 7/5/2023 9:57 AM

## 2023-07-05 NOTE — ED NOTES
Pt asking for a cab voucher and requesting to speak to social work. Leeann from  is on her way over.

## 2023-07-05 NOTE — ED TRIAGE NOTES
".  Chief Complaint   Patient presents with    Psych Eval     Talking to people, not making much sense        38 yr patient walked in to triage for above complaint. Per patient she was told by some doctor to come here. Patient is rambling words and not making much sense. Have hard time being able to complete triage. Patient seen here earlier today.     Pt placed in lobby. Pt educated on triage process. Pt encouraged to alert staff for any changes.     Patient and staff wearing appropriate PPE    /79   Pulse 80   Temp 36.1 °C (96.9 °F) (Temporal)   Resp 16   Ht 1.702 m (5' 7\")   Wt 102 kg (223 lb 15.8 oz)   LMP 06/21/2023 (Approximate)   SpO2 99%   BMI 35.08 kg/m²    "

## 2023-07-05 NOTE — ED NOTES
"Medicated per MAR. Sporadic bursts of inappropriate speech, including \"you have something draped over you and it's illegal so.\" Pt ambulates to bathroom and back with steady gait. Pt back in room. No other needs at this time.  "

## 2023-07-05 NOTE — ED NOTES
Pt appears to be sleeping, resting comfortably on gurney. Pt laying supine. Even chest rise and fall visualized. Call light within reach. No acute distress.

## 2023-07-05 NOTE — ED NOTES
Pt sitting up in bed following vital sign evaluation. Pt talking to self. No acute distress. Cooperative with staff.

## 2023-07-05 NOTE — ED NOTES
Checked on bed, with unlabored respirations. No safety risk noted  Awake on bed, still speaking to herself  Continued safety precaution  Susanne in low position, side rail up for pt safety.   Will continue to monitor for any complications.

## 2023-07-05 NOTE — ED NOTES
Pt ambulatory with steady gait noted assisted by triage RN nAgel to room [address] with same report as triage. Charted for ERP

## 2023-07-05 NOTE — ED NOTES
Alert team able to calm pt, encourage her to get back into bed and rest quietly. [place]on provided. Pt cooperative, laying left lateral on gurney.

## 2023-07-05 NOTE — ED NOTES
Pt complaining of burning epigastric pain similar to initial complaint that was resolved by GI cocktail. ERP aware.

## 2023-08-12 ENCOUNTER — APPOINTMENT (OUTPATIENT)
Dept: RADIOLOGY | Facility: MEDICAL CENTER | Age: 38
End: 2023-08-12
Attending: EMERGENCY MEDICINE
Payer: MEDICARE

## 2023-08-12 ENCOUNTER — HOSPITAL ENCOUNTER (EMERGENCY)
Facility: MEDICAL CENTER | Age: 38
End: 2023-08-13
Attending: EMERGENCY MEDICINE
Payer: MEDICARE

## 2023-08-12 VITALS
WEIGHT: 209.66 LBS | DIASTOLIC BLOOD PRESSURE: 56 MMHG | OXYGEN SATURATION: 96 % | RESPIRATION RATE: 18 BRPM | SYSTOLIC BLOOD PRESSURE: 98 MMHG | BODY MASS INDEX: 32.91 KG/M2 | HEART RATE: 65 BPM | HEIGHT: 67 IN | TEMPERATURE: 97 F

## 2023-08-12 DIAGNOSIS — R10.9 LEFT SIDED ABDOMINAL PAIN: ICD-10-CM

## 2023-08-12 LAB
ALBUMIN SERPL BCP-MCNC: 4.2 G/DL (ref 3.2–4.9)
ALBUMIN/GLOB SERPL: 1.8 G/DL
ALP SERPL-CCNC: 83 U/L (ref 30–99)
ALT SERPL-CCNC: 10 U/L (ref 2–50)
ANION GAP SERPL CALC-SCNC: 9 MMOL/L (ref 7–16)
APPEARANCE UR: ABNORMAL
AST SERPL-CCNC: 15 U/L (ref 12–45)
BACTERIA #/AREA URNS HPF: NEGATIVE /HPF
BASOPHILS # BLD AUTO: 0.5 % (ref 0–1.8)
BASOPHILS # BLD: 0.05 K/UL (ref 0–0.12)
BILIRUB SERPL-MCNC: 0.2 MG/DL (ref 0.1–1.5)
BILIRUB UR QL STRIP.AUTO: NEGATIVE
BUN SERPL-MCNC: 13 MG/DL (ref 8–22)
CALCIUM ALBUM COR SERPL-MCNC: 9 MG/DL (ref 8.5–10.5)
CALCIUM SERPL-MCNC: 9.2 MG/DL (ref 8.5–10.5)
CAOX CRY #/AREA URNS HPF: ABNORMAL /HPF
CHLORIDE SERPL-SCNC: 110 MMOL/L (ref 96–112)
CO2 SERPL-SCNC: 22 MMOL/L (ref 20–33)
COLOR UR: YELLOW
CREAT SERPL-MCNC: 0.99 MG/DL (ref 0.5–1.4)
EOSINOPHIL # BLD AUTO: 0.08 K/UL (ref 0–0.51)
EOSINOPHIL NFR BLD: 0.8 % (ref 0–6.9)
EPI CELLS #/AREA URNS HPF: ABNORMAL /HPF
ERYTHROCYTE [DISTWIDTH] IN BLOOD BY AUTOMATED COUNT: 44.9 FL (ref 35.9–50)
GFR SERPLBLD CREATININE-BSD FMLA CKD-EPI: 76 ML/MIN/1.73 M 2
GLOBULIN SER CALC-MCNC: 2.4 G/DL (ref 1.9–3.5)
GLUCOSE SERPL-MCNC: 107 MG/DL (ref 65–99)
GLUCOSE UR STRIP.AUTO-MCNC: NEGATIVE MG/DL
HCG SERPL QL: NEGATIVE
HCT VFR BLD AUTO: 37.5 % (ref 37–47)
HGB BLD-MCNC: 11.6 G/DL (ref 12–16)
HYALINE CASTS #/AREA URNS LPF: ABNORMAL /LPF
IMM GRANULOCYTES # BLD AUTO: 0.03 K/UL (ref 0–0.11)
IMM GRANULOCYTES NFR BLD AUTO: 0.3 % (ref 0–0.9)
KETONES UR STRIP.AUTO-MCNC: ABNORMAL MG/DL
LEUKOCYTE ESTERASE UR QL STRIP.AUTO: ABNORMAL
LIPASE SERPL-CCNC: 24 U/L (ref 11–82)
LYMPHOCYTES # BLD AUTO: 2.78 K/UL (ref 1–4.8)
LYMPHOCYTES NFR BLD: 26.2 % (ref 22–41)
MCH RBC QN AUTO: 25.4 PG (ref 27–33)
MCHC RBC AUTO-ENTMCNC: 30.9 G/DL (ref 32.2–35.5)
MCV RBC AUTO: 82.1 FL (ref 81.4–97.8)
MICRO URNS: ABNORMAL
MONOCYTES # BLD AUTO: 0.69 K/UL (ref 0–0.85)
MONOCYTES NFR BLD AUTO: 6.5 % (ref 0–13.4)
NEUTROPHILS # BLD AUTO: 6.97 K/UL (ref 1.82–7.42)
NEUTROPHILS NFR BLD: 65.7 % (ref 44–72)
NITRITE UR QL STRIP.AUTO: NEGATIVE
NRBC # BLD AUTO: 0 K/UL
NRBC BLD-RTO: 0 /100 WBC (ref 0–0.2)
PH UR STRIP.AUTO: 5.5 [PH] (ref 5–8)
PLATELET # BLD AUTO: 403 K/UL (ref 164–446)
PMV BLD AUTO: 9.3 FL (ref 9–12.9)
POTASSIUM SERPL-SCNC: 4.1 MMOL/L (ref 3.6–5.5)
PROT SERPL-MCNC: 6.6 G/DL (ref 6–8.2)
PROT UR QL STRIP: NEGATIVE MG/DL
RBC # BLD AUTO: 4.57 M/UL (ref 4.2–5.4)
RBC # URNS HPF: ABNORMAL /HPF
RBC UR QL AUTO: ABNORMAL
SODIUM SERPL-SCNC: 141 MMOL/L (ref 135–145)
SP GR UR STRIP.AUTO: 1.03
UROBILINOGEN UR STRIP.AUTO-MCNC: 0.2 MG/DL
WBC # BLD AUTO: 10.6 K/UL (ref 4.8–10.8)
WBC #/AREA URNS HPF: ABNORMAL /HPF

## 2023-08-12 PROCEDURE — 84703 CHORIONIC GONADOTROPIN ASSAY: CPT

## 2023-08-12 PROCEDURE — 36415 COLL VENOUS BLD VENIPUNCTURE: CPT

## 2023-08-12 PROCEDURE — 81001 URINALYSIS AUTO W/SCOPE: CPT

## 2023-08-12 PROCEDURE — 83690 ASSAY OF LIPASE: CPT

## 2023-08-12 PROCEDURE — 99284 EMERGENCY DEPT VISIT MOD MDM: CPT

## 2023-08-12 PROCEDURE — 80053 COMPREHEN METABOLIC PANEL: CPT

## 2023-08-12 PROCEDURE — 85025 COMPLETE CBC W/AUTO DIFF WBC: CPT

## 2023-08-12 ASSESSMENT — FIBROSIS 4 INDEX: FIB4 SCORE: 0.32

## 2023-08-13 ENCOUNTER — APPOINTMENT (OUTPATIENT)
Dept: RADIOLOGY | Facility: MEDICAL CENTER | Age: 38
End: 2023-08-13
Attending: EMERGENCY MEDICINE
Payer: MEDICARE

## 2023-08-13 NOTE — ED NOTES
.Patient remains comfortable on gurney, no identifiable needs at this time. Equal chest rise and fall bilaterally. Safety measures in place, call light within reach.

## 2023-08-13 NOTE — ED TRIAGE NOTES
"Chief Complaint   Patient presents with    Abdominal Pain     PT reports LLQ pain x 3 weeks. PT does report some urinary discomfort, denies foul odor. PT denies issues with bowel movements. PT does have hx of hernia.     PT brought in by EMS from [facility], PT brought to triage.     PT tangential in conversation, some what difficult historian. PT experiencing compulsive movements throughout triage, jerking head, stating she is \"just checking her nose\".     NAD. Resp are even and unlabored.      Pt placed in lobby. Pt educated on triage process. Pt encouraged to alert staff for any changes.     Patient and staff wearing appropriate PPE        " I left a message for the patient. Not shared in the message but information I plan to share is noted below.     The patient's carrier screening for CF, SMA, and Fragile X syndrome was negative. The residual risk to be a carrier is as noted below:     CF:  1 in 2,700 (Reproductive Risk: 1 in 290,000)  SMA: 1 in 770 (Reproductive Risk: 1 in 110,000)  Fragile X - no reproductive risk or residual risk calculated. Number of repeats was 30 and 30.

## 2023-08-13 NOTE — ED PROVIDER NOTES
"                                                        ED Provider Note    CHIEF COMPLAINT  Chief Complaint   Patient presents with    Abdominal Pain     PT reports LLQ pain x 3 weeks. PT does report some urinary discomfort, denies foul odor. PT denies issues with bowel movements. PT does have hx of hernia.        HPI    Primary care provider: Pcp Pt States None   History obtained from: Patient  History limited by: None     Dunia Sahni is a 38 y.o. female who presents to the ED by EMS complaining of abdominal pain and points to the left upper and also left lower quadrant.  Patient states that she has \"hernia\" that has been present for a long time and the current pain has been ongoing for 3 weeks.  She describes the pain as \"it just hurts.\"  She reports slight fever \"sometimes.\"  She reports occasional nausea without vomiting today.  She reports that she has noticed bright red blood in her stools over the past 7 hours.  She also noticed blood in the urine and \"sometimes it burns.\"  She denies possibility of pregnancy.  Patient has had appendectomy and cholecystectomy.  She has not noticed any rash.    REVIEW OF SYSTEMS  Please see HPI for pertinent positives/negatives.  All other systems reviewed and are negative.     PAST MEDICAL HISTORY  Past Medical History:   Diagnosis Date    Leukemia (HCC) 12/2019    Abscess 10/12/2017    right AC arm area    Bipolar affective (HCC)     Depression     Kidney infection     Schizoaffective disorder (HCC)     Substance abuse (HCC)     Suicide attempt (HCC)         SURGICAL HISTORY  Past Surgical History:   Procedure Laterality Date    GEREMIAS BY LAPAROSCOPY N/A 7/6/2018    Procedure: GEREMIAS BY LAPAROSCOPY;  Surgeon: Leroy Goodson M.D.;  Location: SURGERY [facility];  Service: General    ERCP IN OR N/A 7/5/2018    Procedure: ERCP IN OR;  Surgeon: Nathan Maravilla M.D.;  Location: SURGERY [facility];  Service: Gastroenterology    APPENDECTOMY          SOCIAL " "HISTORY  Social History     Tobacco Use    Smoking status: Never    Smokeless tobacco: Never   Vaping Use    Vaping Use: Never used   Substance and Sexual Activity    Alcohol use: Yes     Comment: patient reports she drinks once or twice a year    Drug use: Yes     Types: Inhaled     Comment: hx: marijuana, cocaine    Sexual activity: Not on file        FAMILY HISTORY  Family History   Family history unknown: Yes        CURRENT MEDICATIONS  Home Medications    **Home medications have not yet been reviewed for this encounter**          ALLERGIES  Allergies   Allergen Reactions    Vicodin [Hydrocodone-Acetaminophen] Anaphylaxis     RXN=9 years ago    Denies 11/19/2022        PHYSICAL EXAM  VITAL SIGNS: BP 98/56   Pulse 65   Temp 36.1 °C (97 °F) (Temporal)   Resp 18   Ht 1.702 m (5' 7\")   Wt 95.1 kg (209 lb 10.5 oz)   SpO2 96%   BMI 32.84 kg/m²  @[id number]::@     Pulse ox interpretation: 98% I interpret this pulse ox as normal     Constitutional: Well developed, well nourished, alert in no apparent distress, nontoxic appearance    HENT: No external signs of trauma, normocephalic  Eyes: PERRL, conjunctiva without erythema, no discharge, no icterus    Neck: Soft and supple, trachea midline, no stridor, no tenderness, no LAD, good ROM    Cardiovascular: Regular rate and rhythm, no murmurs/rubs/gallops, strong distal pulses and good perfusion    Thorax & Lungs: No respiratory distress, CTAB    Abdomen: Soft, mild tenderness along the left abdomen, nondistended, no guarding, no rebound, normal BS    Back: No CVAT     Extremities: No cyanosis, no edema, no gross deformity, good ROM, intact distal pulses with brisk cap refill    Skin: Warm, dry, no pallor/cyanosis, no rash noted      Neuro: A/O times 3, no focal deficits noted    Psychiatric: Cooperative, slightly anxious      DIAGNOSTIC STUDIES / PROCEDURES        LABS  All labs reviewed by me.     Results for orders placed or performed during the hospital encounter " of 08/12/23   CBC WITH DIFFERENTIAL   Result Value Ref Range    WBC 10.6 4.8 - 10.8 K/uL    RBC 4.57 4.20 - 5.40 M/uL    Hemoglobin 11.6 (L) 12.0 - 16.0 g/dL    Hematocrit 37.5 37.0 - 47.0 %    MCV 82.1 81.4 - 97.8 fL    MCH 25.4 (L) 27.0 - 33.0 pg    MCHC 30.9 (L) 32.2 - 35.5 g/dL    RDW 44.9 35.9 - 50.0 fL    Platelet Count 403 164 - 446 K/uL    MPV 9.3 9.0 - 12.9 fL    Neutrophils-Polys 65.70 44.00 - 72.00 %    Lymphocytes 26.20 22.00 - 41.00 %    Monocytes 6.50 0.00 - 13.40 %    Eosinophils 0.80 0.00 - 6.90 %    Basophils 0.50 0.00 - 1.80 %    Immature Granulocytes 0.30 0.00 - 0.90 %    Nucleated RBC 0.00 0.00 - 0.20 /100 WBC    Neutrophils (Absolute) 6.97 1.82 - 7.42 K/uL    Lymphs (Absolute) 2.78 1.00 - 4.80 K/uL    Monos (Absolute) 0.69 0.00 - 0.85 K/uL    Eos (Absolute) 0.08 0.00 - 0.51 K/uL    Baso (Absolute) 0.05 0.00 - 0.12 K/uL    Immature Granulocytes (abs) 0.03 0.00 - 0.11 K/uL    NRBC (Absolute) 0.00 K/uL   COMP METABOLIC PANEL   Result Value Ref Range    Sodium 141 135 - 145 mmol/L    Potassium 4.1 3.6 - 5.5 mmol/L    Chloride 110 96 - 112 mmol/L    Co2 22 20 - 33 mmol/L    Anion Gap 9.0 7.0 - 16.0    Glucose 107 (H) 65 - 99 mg/dL    Bun 13 8 - 22 mg/dL    Creatinine 0.99 0.50 - 1.40 mg/dL    Calcium 9.2 8.5 - 10.5 mg/dL    Correct Calcium 9.0 8.5 - 10.5 mg/dL    AST(SGOT) 15 12 - 45 U/L    ALT(SGPT) 10 2 - 50 U/L    Alkaline Phosphatase 83 30 - 99 U/L    Total Bilirubin 0.2 0.1 - 1.5 mg/dL    Albumin 4.2 3.2 - 4.9 g/dL    Total Protein 6.6 6.0 - 8.2 g/dL    Globulin 2.4 1.9 - 3.5 g/dL    A-G Ratio 1.8 g/dL   LIPASE   Result Value Ref Range    Lipase 24 11 - 82 U/L   HCG QUAL SERUM   Result Value Ref Range    Beta-Hcg Qualitative Serum Negative Negative   URINALYSIS,CULTURE IF INDICATED    Specimen: Urine, Clean Catch   Result Value Ref Range    Color Yellow     Character Cloudy (A)     Specific Gravity 1.029 <1.035    Ph 5.5 5.0 - 8.0    Glucose Negative Negative mg/dL    Ketones Trace (A) Negative  mg/dL    Protein Negative Negative mg/dL    Bilirubin Negative Negative    Urobilinogen, Urine 0.2 Negative    Nitrite Negative Negative    Leukocyte Esterase Trace (A) Negative    Occult Blood Large (A) Negative    Micro Urine Req Microscopic    ESTIMATED GFR   Result Value Ref Range    GFR (CKD-EPI) 76 >60 mL/min/1.73 m 2   URINE MICROSCOPIC (W/UA)   Result Value Ref Range    WBC 5-10 (A) /hpf    RBC 5-10 (A) /hpf    Bacteria Negative None /hpf    Epithelial Cells Few /hpf    Ca Oxalate Crystal Moderate /hpf    Hyaline Cast 0-2 /lpf        RADIOLOGY  I have independently interpreted the diagnostic imaging associated with this visit and am waiting the final reading from the radiologist.     No orders to display          COURSE & MEDICAL DECISION MAKING  Nursing notes, VS, PMSFHx reviewed in chart.     Review of past medical records shows the patient has had multiple ED visits.  Patient was seen in this ED twice on July 4, 2023 for psychiatric evaluation and also complaining of abdominal pain with nausea and vomiting.  Patient was also seen in this ED June 29, 2023 as well as twice on June 11, 2023 for psych eval.  Patient was admitted at [facility] June 2, 2023 for psychosis.  Last CT abdomen/pelvis on July 4, 2023 had the following findings:    1.  No nephro or ureterolithiasis.  2.  No evidence of acute inflammation in the abdomen or pelvis on this noncontrast CT.      Differential diagnoses considered include but are not limited to: Bowel perforation/obstruction, ileus, diverticulitis, pancreatitis, PUD, gastritis, KS/renal colic, UTI/pyelo, gastroenteritis, colitis, muscle strain, hernia, pregnancy/ectopic, PID, endometriosis, ovarian cyst/torsion      ED Observation Status? Yes; I am placing the patient in to an observation status due to a diagnostic uncertainty as well as therapeutic intensity. Patient placed in observation status at 10:00 PM, 8/12/2023.     Observation plan is as follows: We will obtain  laboratory and imaging studies and monitor patient in the ED.    Upon Reevaluation, the patient's condition has: Remained stable and patient left AMA    Patient discharged from ED Observation status at 0342 on August 13, 2023.      INITIAL ASSESSMENT AND PLAN  Care Narrative: This is a 38-year-old female patient with past medical history including leukemia, bipolar, schizoaffective disorder, past suicide attempts and substance abuse who presents the ED complaining of left-sided abdominal pain for 3 weeks.  We will obtain laboratory and imaging studies and closely monitor patient in the ED.      Discussion of management with other QHP or appropriate source(s): None     Escalation of care considered, and ultimately not performed: acute inpatient care management, however at this time, the patient is most appropriate for outpatient management.     Barriers to care at this time, including but not limited to: Patient does not have established PCP.     Decision tools and prescription drugs considered including, but not limited to: Pain Medications   .        History and physical exam as above.  Laboratory testing fairly unremarkable and appears fairly stable compared to her recent results.  CT abdomen/pelvis was ordered.  I was informed by ED nurse that patient went to the CT scan twice and twice returned to her room because she refused to cooperate.  I was then informed by ED nurse that patient demanded to leave and she left AMA before I was able to return to the room.  Patient was clinically stable during her ED stay and without evidence for acute abdomen.  She has been seen several times for bizarre behavior and also reported abdominal pain and her last CT abdomen/pelvis on July 4, 2023 did not show any evidence for acute findings.  It is possible that she has new abdominal findings but also possible that her complaint is due to her schizoaffective disorder.      The patient is referred to a primary physician for blood  pressure management, diabetic screening, and for all other preventative health concerns.       FINAL IMPRESSION  1. Left sided abdominal pain Acute          DISPOSITION  Patient left AMA      FOLLOW UP  No follow-up provider specified.       OUTPATIENT MEDICATIONS  Discharge Medication List as of 8/13/2023  3:47 AM             Electronically signed by: Mega Duncan D.O., 8/12/2023 9:37 PM      Portions of this record were made with voice recognition software.  Despite my review, errors may remain.  Please interpret this chart in the appropriate context.

## 2023-08-13 NOTE — ED NOTES
"Patient returned from CT scan and refusing to stay and complete workup. Unable to complete CT for a second time due to patient inability to remain still. Patient now demanding to leave stating \"It's my right. I won't have anywhere to live if I don't leave now.\" PIV removed by patient, catheter intact, bleeding controlled and dressing placed. Patient refused to sign AMA form and ambulated to the lobby independently with steady gait and all belongings with patient.    ERP updated on patient refusal of CT scan and demand to leave AMA.    Patient alert and oriented, denies SI/HI, and able to make her own decisions upon exit from ED.  "

## 2023-08-13 NOTE — ED NOTES
Patient returned from CT.  Bedside report from CHRISTIE Londono  Patient resting comfortably sitting up in bed, resp even and unlabored, NAD, and no needs at this time.

## 2023-08-13 NOTE — ED NOTES
Pt ambulated steady gait to green 35. Pt to [facility], in gown and on monitor. Chart up for ERP. Pt states LLQ abd pain for 3 weeks, states it's a stinging pain and then throbs. Pt is poor historian and continues to jerk head, rambles on in conversation.   Safety measure in place,

## 2023-08-23 ENCOUNTER — HOSPITAL ENCOUNTER (EMERGENCY)
Facility: MEDICAL CENTER | Age: 38
End: 2023-08-23
Attending: STUDENT IN AN ORGANIZED HEALTH CARE EDUCATION/TRAINING PROGRAM
Payer: MEDICARE

## 2023-08-23 VITALS
RESPIRATION RATE: 16 BRPM | WEIGHT: 207.89 LBS | DIASTOLIC BLOOD PRESSURE: 61 MMHG | OXYGEN SATURATION: 96 % | TEMPERATURE: 97.5 F | SYSTOLIC BLOOD PRESSURE: 105 MMHG | HEIGHT: 67 IN | HEART RATE: 61 BPM | BODY MASS INDEX: 32.63 KG/M2

## 2023-08-23 DIAGNOSIS — F25.9 SCHIZOAFFECTIVE DISORDER, UNSPECIFIED TYPE (HCC): ICD-10-CM

## 2023-08-23 DIAGNOSIS — R10.9 ABDOMINAL PAIN, UNSPECIFIED ABDOMINAL LOCATION: ICD-10-CM

## 2023-08-23 LAB
ALBUMIN SERPL BCP-MCNC: 4.3 G/DL (ref 3.2–4.9)
ALBUMIN/GLOB SERPL: 1.8 G/DL
ALP SERPL-CCNC: 85 U/L (ref 30–99)
ALT SERPL-CCNC: 15 U/L (ref 2–50)
ANION GAP SERPL CALC-SCNC: 9 MMOL/L (ref 7–16)
AST SERPL-CCNC: 15 U/L (ref 12–45)
B-HCG SERPL-ACNC: <1 MIU/ML (ref 0–5)
BASOPHILS # BLD AUTO: 0.4 % (ref 0–1.8)
BASOPHILS # BLD: 0.05 K/UL (ref 0–0.12)
BILIRUB SERPL-MCNC: 0.4 MG/DL (ref 0.1–1.5)
BUN SERPL-MCNC: 10 MG/DL (ref 8–22)
CALCIUM ALBUM COR SERPL-MCNC: 8.9 MG/DL (ref 8.5–10.5)
CALCIUM SERPL-MCNC: 9.1 MG/DL (ref 8.5–10.5)
CHLORIDE SERPL-SCNC: 105 MMOL/L (ref 96–112)
CO2 SERPL-SCNC: 25 MMOL/L (ref 20–33)
CREAT SERPL-MCNC: 0.86 MG/DL (ref 0.5–1.4)
EOSINOPHIL # BLD AUTO: 0.1 K/UL (ref 0–0.51)
EOSINOPHIL NFR BLD: 0.8 % (ref 0–6.9)
ERYTHROCYTE [DISTWIDTH] IN BLOOD BY AUTOMATED COUNT: 45.5 FL (ref 35.9–50)
FLUAV RNA SPEC QL NAA+PROBE: NEGATIVE
FLUBV RNA SPEC QL NAA+PROBE: NEGATIVE
GFR SERPLBLD CREATININE-BSD FMLA CKD-EPI: 90 ML/MIN/1.73 M 2
GLOBULIN SER CALC-MCNC: 2.4 G/DL (ref 1.9–3.5)
GLUCOSE SERPL-MCNC: 95 MG/DL (ref 65–99)
HCT VFR BLD AUTO: 37.1 % (ref 37–47)
HGB BLD-MCNC: 11.8 G/DL (ref 12–16)
IMM GRANULOCYTES # BLD AUTO: 0.04 K/UL (ref 0–0.11)
IMM GRANULOCYTES NFR BLD AUTO: 0.3 % (ref 0–0.9)
LIPASE SERPL-CCNC: 17 U/L (ref 11–82)
LYMPHOCYTES # BLD AUTO: 3.04 K/UL (ref 1–4.8)
LYMPHOCYTES NFR BLD: 25.1 % (ref 22–41)
MCH RBC QN AUTO: 26.3 PG (ref 27–33)
MCHC RBC AUTO-ENTMCNC: 31.8 G/DL (ref 32.2–35.5)
MCV RBC AUTO: 82.8 FL (ref 81.4–97.8)
MONOCYTES # BLD AUTO: 0.74 K/UL (ref 0–0.85)
MONOCYTES NFR BLD AUTO: 6.1 % (ref 0–13.4)
NEUTROPHILS # BLD AUTO: 8.13 K/UL (ref 1.82–7.42)
NEUTROPHILS NFR BLD: 67.3 % (ref 44–72)
NRBC # BLD AUTO: 0 K/UL
NRBC BLD-RTO: 0 /100 WBC (ref 0–0.2)
PLATELET # BLD AUTO: 387 K/UL (ref 164–446)
PMV BLD AUTO: 9.3 FL (ref 9–12.9)
POC BREATHALIZER: 0 PERCENT (ref 0–0.01)
POTASSIUM SERPL-SCNC: 3.8 MMOL/L (ref 3.6–5.5)
PROT SERPL-MCNC: 6.7 G/DL (ref 6–8.2)
RBC # BLD AUTO: 4.48 M/UL (ref 4.2–5.4)
RSV RNA SPEC QL NAA+PROBE: NEGATIVE
SARS-COV-2 RNA RESP QL NAA+PROBE: NOTDETECTED
SODIUM SERPL-SCNC: 139 MMOL/L (ref 135–145)
SPECIMEN SOURCE: NORMAL
WBC # BLD AUTO: 12.1 K/UL (ref 4.8–10.8)

## 2023-08-23 PROCEDURE — 90791 PSYCH DIAGNOSTIC EVALUATION: CPT

## 2023-08-23 PROCEDURE — 80053 COMPREHEN METABOLIC PANEL: CPT

## 2023-08-23 PROCEDURE — 84702 CHORIONIC GONADOTROPIN TEST: CPT

## 2023-08-23 PROCEDURE — 0241U HCHG SARS-COV-2 COVID-19 NFCT DS RESP RNA 4 TRGT MIC: CPT

## 2023-08-23 PROCEDURE — C9803 HOPD COVID-19 SPEC COLLECT: HCPCS | Performed by: EMERGENCY MEDICINE

## 2023-08-23 PROCEDURE — 99285 EMERGENCY DEPT VISIT HI MDM: CPT

## 2023-08-23 PROCEDURE — 700111 HCHG RX REV CODE 636 W/ 250 OVERRIDE (IP): Mod: JZ | Performed by: STUDENT IN AN ORGANIZED HEALTH CARE EDUCATION/TRAINING PROGRAM

## 2023-08-23 PROCEDURE — 302970 POC BREATHALIZER: Performed by: EMERGENCY MEDICINE

## 2023-08-23 PROCEDURE — 36415 COLL VENOUS BLD VENIPUNCTURE: CPT

## 2023-08-23 PROCEDURE — 96372 THER/PROPH/DIAG INJ SC/IM: CPT

## 2023-08-23 PROCEDURE — 83690 ASSAY OF LIPASE: CPT

## 2023-08-23 PROCEDURE — 85025 COMPLETE CBC W/AUTO DIFF WBC: CPT

## 2023-08-23 RX ORDER — PERPHENAZINE 2 MG/1
2 TABLET ORAL 2 TIMES DAILY
Status: DISCONTINUED | OUTPATIENT
Start: 2023-08-23 | End: 2023-08-23 | Stop reason: HOSPADM

## 2023-08-23 RX ORDER — OLANZAPINE 5 MG/1
5 TABLET ORAL ONCE
Status: DISCONTINUED | OUTPATIENT
Start: 2023-08-23 | End: 2023-08-23 | Stop reason: HOSPADM

## 2023-08-23 RX ORDER — KETOROLAC TROMETHAMINE 30 MG/ML
15 INJECTION, SOLUTION INTRAMUSCULAR; INTRAVENOUS ONCE
Status: COMPLETED | OUTPATIENT
Start: 2023-08-23 | End: 2023-08-23

## 2023-08-23 RX ADMIN — KETOROLAC TROMETHAMINE 15 MG: 30 INJECTION, SOLUTION INTRAMUSCULAR; INTRAVENOUS at 03:12

## 2023-08-23 ASSESSMENT — PAIN DESCRIPTION - PAIN TYPE: TYPE: ACUTE PAIN

## 2023-08-23 ASSESSMENT — FIBROSIS 4 INDEX: FIB4 SCORE: 0.42

## 2023-08-23 NOTE — ED NOTES
Called Alert Team Kathryn seek help for this Pt.   Pt claiming she will commit suicide with no plans.

## 2023-08-23 NOTE — ED NOTES
Alert Team Kathryn @ bedside. Will re evaluate later in the morning,  Keep pt to rest and sleep.

## 2023-08-23 NOTE — ED NOTES
"As pt was leaving triage room she states \"I need to be on a legal hold because I have no where to go\". States \"I can't take care of myself\".   "

## 2023-08-23 NOTE — DISCHARGE SUMMARY
"  ED Observation Discharge Summary    Patient:Dunia Sahni  Patient : 1985  Patient MRN: [id number]  Patient PCP: Pcp Pt States None  Admit Date: 2023  Discharge Date and Time: 23 2:11 PM  Discharge Diagnosis:   1. Abdominal pain, unspecified abdominal location        2. Schizoaffective disorder, unspecified type (HCC)        Discharge Attending: Ziggy Seay M.D.  Discharge Service: ED Observation    ED Course  Dunia is a 38 y.o. female who was evaluated at [facility] for abdominal pain.  She was medically cleared.  She ultimately reported suicidal ideation, was seen by behavioral health, and was accepted to [place].  She was transferred via ambulance.    Discharge Exam:  /61   Pulse 61   Temp 36.1 °C (96.9 °F) (Temporal)   Resp 16   Ht 1.702 m (5' 7\")   Wt 94.3 kg (207 lb 14.3 oz)   SpO2 96%   BMI 32.56 kg/m² .    Constitutional: Awake and alert. Nontoxic  HENT:  Grossly normal  Eyes: Grossly normal  Neck: Normal range of motion  Cardiovascular: Normal heart rate   Thorax & Lungs: No respiratory distress  Skin:  No pathologic rash.   Extremities: Well perfused  Psychiatric: Affect normal    Labs  Results for orders placed or performed during the hospital encounter of 23   CBC WITH DIFFERENTIAL   Result Value Ref Range    WBC 12.1 (H) 4.8 - 10.8 K/uL    RBC 4.48 4.20 - 5.40 M/uL    Hemoglobin 11.8 (L) 12.0 - 16.0 g/dL    Hematocrit 37.1 37.0 - 47.0 %    MCV 82.8 81.4 - 97.8 fL    MCH 26.3 (L) 27.0 - 33.0 pg    MCHC 31.8 (L) 32.2 - 35.5 g/dL    RDW 45.5 35.9 - 50.0 fL    Platelet Count 387 164 - 446 K/uL    MPV 9.3 9.0 - 12.9 fL    Neutrophils-Polys 67.30 44.00 - 72.00 %    Lymphocytes 25.10 22.00 - 41.00 %    Monocytes 6.10 0.00 - 13.40 %    Eosinophils 0.80 0.00 - 6.90 %    Basophils 0.40 0.00 - 1.80 %    Immature Granulocytes 0.30 0.00 - 0.90 %    Nucleated RBC 0.00 0.00 - 0.20 /100 WBC    Neutrophils (Absolute) 8.13 (H) 1.82 - 7.42 K/uL    " Lymphs (Absolute) 3.04 1.00 - 4.80 K/uL    Monos (Absolute) 0.74 0.00 - 0.85 K/uL    Eos (Absolute) 0.10 0.00 - 0.51 K/uL    Baso (Absolute) 0.05 0.00 - 0.12 K/uL    Immature Granulocytes (abs) 0.04 0.00 - 0.11 K/uL    NRBC (Absolute) 0.00 K/uL   COMP METABOLIC PANEL   Result Value Ref Range    Sodium 139 135 - 145 mmol/L    Potassium 3.8 3.6 - 5.5 mmol/L    Chloride 105 96 - 112 mmol/L    Co2 25 20 - 33 mmol/L    Anion Gap 9.0 7.0 - 16.0    Glucose 95 65 - 99 mg/dL    Bun 10 8 - 22 mg/dL    Creatinine 0.86 0.50 - 1.40 mg/dL    Calcium 9.1 8.5 - 10.5 mg/dL    Correct Calcium 8.9 8.5 - 10.5 mg/dL    AST(SGOT) 15 12 - 45 U/L    ALT(SGPT) 15 2 - 50 U/L    Alkaline Phosphatase 85 30 - 99 U/L    Total Bilirubin 0.4 0.1 - 1.5 mg/dL    Albumin 4.3 3.2 - 4.9 g/dL    Total Protein 6.7 6.0 - 8.2 g/dL    Globulin 2.4 1.9 - 3.5 g/dL    A-G Ratio 1.8 g/dL   LIPASE   Result Value Ref Range    Lipase 17 11 - 82 U/L   BETA-HCG QUANTITATIVE SERUM   Result Value Ref Range    Bhcg <1.0 0.0 - 5.0 mIU/mL   ESTIMATED GFR   Result Value Ref Range    GFR (CKD-EPI) 90 >60 mL/min/1.73 m 2   CoV-2, FLU A/B, and RSV by PCR (2-4 Hours CEPHEID) : Collect NP swab in VTM    Specimen: Respirate   Result Value Ref Range    Influenza virus A RNA Negative Negative    Influenza virus B, PCR Negative Negative    RSV, PCR Negative Negative    SARS-CoV-2 by PCR NotDetected     SARS-CoV-2 Source NP Swab    POC BREATHALIZER   Result Value Ref Range    POC Breathalizer 0.000 0.00 - 0.01 Percent       Radiology  No orders to display       Medications:   New Prescriptions    No medications on file       My final assessment includes lab review, notes review, discussion with nursing staff  Upon Reevaluation, the patient's condition has: not improved; and will be escalated to hospitalization.    Patient discharged from ED Observation status at 1418 (Time) 8/23/2023 (Date).     Total time spent on this ED Observation discharge encounter is < 30  Minutes    Electronically signed by: Ziggy Seay M.D., 8/23/2023 2:11 PM

## 2023-08-23 NOTE — DISCHARGE PLANNING
Alert Team Note:     Contacted [facility], spoke to Gisele. Pt referral has been received and is being reviewed. Facility has requested UA results. Writer will fax when available. Per Gisele, they have been unable to verify pts insurance as it is pulling up as having no lifetime psych days left. Gisele has handed this off to their business office for verification.     Contacted by [place], spoke to Alice. Pt referral has been received and is being reviewed.

## 2023-08-23 NOTE — ED PROVIDER NOTES
"ED Provider Note    CHIEF COMPLAINT  Chief Complaint   Patient presents with    Abdominal Pain     Pt states \"my appendix hurts\" and points to the left side of her abdomen. Denies n/v. Reports pain with urination.        EXTERNAL RECORDS REVIEWED  Outpatient Notes patient seen here in the ER on 12 August for abdominal pain.  She left AMA prior to completion of work-up.    HPI/ROS  LIMITATION TO HISTORY   Select: : None  OUTSIDE HISTORIAN(S):  None    Dunia Sahni is a 38 y.o. female who presents with abdominal pain.  Patient states the pain has been going on for 3 to 4 months.  She denies any acute changes today.  She denies any fevers or chills.  No nausea or vomiting.  She reports normal bowel movements without diarrhea constipation, black or bloody stool.  She denies any dysuria, hematuria or urgency (though, I appreciate triage note states she has had pain with urination).  She denies possibility of pregnancy.  She has had an appendectomy and a cholecystectomy.  Patient refers me to her medical record multiple times stating that she has a ' hernia' .  She describes the pain is mostly suprapubic.  She denies any vaginal discharge or abnormal vaginal bleeding.    Patient is repeatedly requesting that I place her on a legal hold stating \" I am unable to care for myself'.  Patient says that she has been sleeping at the shelter ' but I cannot keep staying there'.  She denies any thoughts of hurting herself or others.    PAST MEDICAL HISTORY   has a past medical history of Abscess (10/12/2017), Bipolar affective (HCC), Depression, Kidney infection, Leukemia (HCC) (12/2019), Schizoaffective disorder (HCC), Substance abuse (HCC), and Suicide attempt ([facility]).    SURGICAL HISTORY   has a past surgical history that includes appendectomy; ercp in or (N/A, 7/5/2018); and skip by laparoscopy (N/A, 7/6/2018).    FAMILY HISTORY  Family History   Family history unknown: Yes       SOCIAL HISTORY  Social History " "    Tobacco Use    Smoking status: Never    Smokeless tobacco: Never   Vaping Use    Vaping Use: Never used   Substance and Sexual Activity    Alcohol use: Yes     Comment: patient reports she drinks once or twice a year    Drug use: Yes     Types: Inhaled     Comment: hx: marijuana, cocaine    Sexual activity: Not on file       CURRENT MEDICATIONS  Home Medications       Reviewed by Miesha Teague R.N. (Registered Nurse) on 08/23/23 at 0041  Med List Status: Partial     Medication Last Dose Status   ondansetron (ZOFRAN ODT) 4 MG TABLET DISPERSIBLE  Active   sucralfate (CARAFATE) 1 GM Tab  Active                    ALLERGIES  Allergies   Allergen Reactions    Vicodin [Hydrocodone-Acetaminophen] Anaphylaxis     RXN=9 years ago    Denies 11/19/2022       PHYSICAL EXAM  VITAL SIGNS: /70   Pulse 76   Temp 36.1 °C (96.9 °F) (Temporal)   Resp 16   Ht 1.702 m (5' 7\")   Wt 94.3 kg (207 lb 14.3 oz)   SpO2 94%   BMI 32.56 kg/m²    Constitutional: Awake and alert . Non toxic  HENT: Normal inspection  Moist mucous membranes  Eyes: Normal inspection  Neck: Grossly normal range of motion.  Cardiovascular: Normal heart rate, Normal rhythm.  Symmetric peripheral pulses.   Thorax & Lungs: No respiratory distress, No wheezing, No rales, No rhonchi, No chest tenderness.   Abdomen: Soft, non-distended, nontender to palpation in all 4 quadrants, no mass  Skin: No obvious rash.  Extremities: Warm, well perfused. No clubbing, cyanosis, edema   Neurologic: Grossly normal   Psychiatric:  Denies SI/HI.       DIAGNOSTIC STUDIES / PROCEDURES      LABS  Results for orders placed or performed during the hospital encounter of 08/23/23   CBC WITH DIFFERENTIAL   Result Value Ref Range    WBC 12.1 (H) 4.8 - 10.8 K/uL    RBC 4.48 4.20 - 5.40 M/uL    Hemoglobin 11.8 (L) 12.0 - 16.0 g/dL    Hematocrit 37.1 37.0 - 47.0 %    MCV 82.8 81.4 - 97.8 fL    MCH 26.3 (L) 27.0 - 33.0 pg    MCHC 31.8 (L) 32.2 - 35.5 g/dL    RDW 45.5 35.9 - 50.0 fL    " Platelet Count 387 164 - 446 K/uL    MPV 9.3 9.0 - 12.9 fL    Neutrophils-Polys 67.30 44.00 - 72.00 %    Lymphocytes 25.10 22.00 - 41.00 %    Monocytes 6.10 0.00 - 13.40 %    Eosinophils 0.80 0.00 - 6.90 %    Basophils 0.40 0.00 - 1.80 %    Immature Granulocytes 0.30 0.00 - 0.90 %    Nucleated RBC 0.00 0.00 - 0.20 /100 WBC    Neutrophils (Absolute) 8.13 (H) 1.82 - 7.42 K/uL    Lymphs (Absolute) 3.04 1.00 - 4.80 K/uL    Monos (Absolute) 0.74 0.00 - 0.85 K/uL    Eos (Absolute) 0.10 0.00 - 0.51 K/uL    Baso (Absolute) 0.05 0.00 - 0.12 K/uL    Immature Granulocytes (abs) 0.04 0.00 - 0.11 K/uL    NRBC (Absolute) 0.00 K/uL   COMP METABOLIC PANEL   Result Value Ref Range    Sodium 139 135 - 145 mmol/L    Potassium 3.8 3.6 - 5.5 mmol/L    Chloride 105 96 - 112 mmol/L    Co2 25 20 - 33 mmol/L    Anion Gap 9.0 7.0 - 16.0    Glucose 95 65 - 99 mg/dL    Bun 10 8 - 22 mg/dL    Creatinine 0.86 0.50 - 1.40 mg/dL    Calcium 9.1 8.5 - 10.5 mg/dL    Correct Calcium 8.9 8.5 - 10.5 mg/dL    AST(SGOT) 15 12 - 45 U/L    ALT(SGPT) 15 2 - 50 U/L    Alkaline Phosphatase 85 30 - 99 U/L    Total Bilirubin 0.4 0.1 - 1.5 mg/dL    Albumin 4.3 3.2 - 4.9 g/dL    Total Protein 6.7 6.0 - 8.2 g/dL    Globulin 2.4 1.9 - 3.5 g/dL    A-G Ratio 1.8 g/dL   LIPASE   Result Value Ref Range    Lipase 17 11 - 82 U/L   BETA-HCG QUANTITATIVE SERUM   Result Value Ref Range    Bhcg <1.0 0.0 - 5.0 mIU/mL   ESTIMATED GFR   Result Value Ref Range    GFR (CKD-EPI) 90 >60 mL/min/1.73 m 2         COURSE & MEDICAL DECISION MAKING    ED Observation Status? Yes; I am placing the patient in to an observation status due to a diagnostic uncertainty as well as therapeutic intensity. Patient placed in observation status at 2:42 AM, 8/23/2023.     Observation plan is as follows: IV, Labs, Serial Exams, Behavioral Health evaluation      INITIAL ASSESSMENT, COURSE AND PLAN  Care Narrative: This is a 38-year-old female who presents today again for abdominal pain.  He has multiple  prior visits to the ER for similar complaints.  She also states his symptoms have been ongoing for months.  She arrives with normal vital signs and is systemically well-appearing.  She has a very benign abdominal exam without any focal tenderness or signs of peritonitis.  She does have a very mild leukocytosis though this appears to be relatively unchanged from prior.  Pregnancy test is negative and ectopic pregnancy ruled out.  She does not have pancreatitis.  She has no right upper quadrant tenderness, normal liver function testing and has had a prior history of cholecystitis and I doubt acute biliary pathology.  She has had an appendectomy and has no right lower quadrant tenderness on exam.  Patient has been unable to provide a urine but denies any symptoms of dysuria, hematuria or signs to suggest pyelonephritis or urinary tract infection.  She has a very benign abdominal exam, without focality and I doubt acute intraabdominal etiology including  small bowel obstruction, intraabdominal infection, diverticulitis,  perforation, ischemia or other acute surgical process and I do not think cross sectional imaging is indicated.     Patient was given Toradol for pain and has been sleeping comfortably in the ER.    On my initial assessment patient was requesting multiple times to be placed on a legal hold.  Nursing staff had noticed that she was talking as if she was on the phone with someone during conversation though no one else was in the room.  Patient clearly denies any suicidal or homicidal ideation.  With me, she has been quite linear and does not appear to be grossly decompensated from a mental health standpoint.  She has a known history of bipolar and schizoaffective disorder. She clearly articulates where she has been living and how she cares for herself.  I do not see an indication to place her on a legal hold.  I did discuss this patient with Kathryn with behavioral health.  She was also familiar with this  patient.  She felt that the patient was at her baseline but did suggest observing her until the morning so that she could sleep overnight and be reevaluated in the morning.  Patient endorsed to my colleague Dr. Santiago.  I anticipate she will likely be discharged home after behavioral health evaluation.         DISPOSITION AND DISCUSSIONS  I have discussed management of the patient with the following physicians and CAROL[facility]'s:  Dr. Santiago    Discussion of management with other [facility] or appropriate source(s): Behavioral Health Kathryn      Escalation of care considered, and ultimately not performed:diagnostic imaging    Barriers to care at this time, including but not limited to: Patient does not have established PCP.     Decision tools and prescription drugs considered including, but not limited to:  None .    FINAL DIAGNOSIS  1. Abdominal pain, unspecified abdominal location    2. Schizoaffective disorder, unspecified type (HCC)           Electronically signed by: Roshni Chambers M.D., 8/23/2023 2:39 AM

## 2023-08-23 NOTE — ED TRIAGE NOTES
"Chief Complaint   Patient presents with    Abdominal Pain     Pt states \"my appendix hurts\" and points to the left side of her abdomen. Denies n/v. Reports pain with urination.      Pt ambulates to triage with luggage.    Pt was seen here on 8/12 for the same symptoms. Pt has frequent visits to the ED. Pt will intermittently talk as if she is on the phone with someone during conversation. -SI/HI. Hx bipolar & schizoaffective.     Pt to jevon, educated on triage process, encouraged to notify staff of changes. Pt provided urine cup for sample.   "

## 2023-08-23 NOTE — ED NOTES
Patient's home medications have been reviewed by the pharmacy team.     Past Medical History:   Diagnosis Date    Abscess 10/12/2017    right AC arm area    Bipolar affective (HCC)     Depression     Kidney infection     Leukemia (HCC) 12/2019    Schizoaffective disorder (HCC)     Substance abuse (HCC)     Suicide attempt (HCC)        Patient's Medications   New Prescriptions    No medications on file   Previous Medications    ONDANSETRON (ZOFRAN ODT) 4 MG TABLET DISPERSIBLE    Take 1 Tablet by mouth every 6 hours as needed for Nausea/Vomiting.   Modified Medications    No medications on file   Discontinued Medications    SUCRALFATE (CARAFATE) 1 GM TAB    Take 1 g by mouth 4 Times a Day,Before Meals and at Bedtime.          A:  Medications do not appear to be contributing to current complaints.       P:    No recommendations at this time.         Clarita Oneil, PharmD

## 2023-08-23 NOTE — ED NOTES
All items removed from room for safety and to minimize risk of suicide. Verified that Legal Hold appropriate and signed in patient's chart. Confirmed patient's personal items removed from room, and if applicable labeled/ placed in secure area    1:1 Observation. Continuous visual monitoring by Telesitter. Discussion with sitter about patient care, safety and support. Will continue to monitor.
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
## 2023-08-23 NOTE — PROGRESS NOTES
"ED Observation Progress Note    Date of Service: 08/23/23    Interval History and Interventions  Patient was seen by behavioral health.  The patient reported being actively suicidal.  I spoke with her.  She reported of I discharged her she take the power cord to her phone and strangle herself.  She was placed on a legal hold.  She says that her abdominal pain went away.  She has no new complaints.  Arrangements will be made for psychiatric care.    Physical Exam  /75   Pulse 63   Temp 36.1 °C (96.9 °F) (Temporal)   Resp 17   Ht 1.702 m (5' 7\")   Wt 94.3 kg (207 lb 14.3 oz)   SpO2 95%   BMI 32.56 kg/m² .    Constitutional: Awake and alert. Nontoxic  HENT:  Grossly normal  Eyes: Grossly normal  Neck: Normal range of motion  Cardiovascular: Normal heart rate   Thorax & Lungs: No respiratory distress  Abdomen: Nontender  Skin:  No pathologic rash.   Extremities: Well perfused  Psychiatric: Affect normal    Labs  Results for orders placed or performed during the hospital encounter of 08/23/23   CBC WITH DIFFERENTIAL   Result Value Ref Range    WBC 12.1 (H) 4.8 - 10.8 K/uL    RBC 4.48 4.20 - 5.40 M/uL    Hemoglobin 11.8 (L) 12.0 - 16.0 g/dL    Hematocrit 37.1 37.0 - 47.0 %    MCV 82.8 81.4 - 97.8 fL    MCH 26.3 (L) 27.0 - 33.0 pg    MCHC 31.8 (L) 32.2 - 35.5 g/dL    RDW 45.5 35.9 - 50.0 fL    Platelet Count 387 164 - 446 K/uL    MPV 9.3 9.0 - 12.9 fL    Neutrophils-Polys 67.30 44.00 - 72.00 %    Lymphocytes 25.10 22.00 - 41.00 %    Monocytes 6.10 0.00 - 13.40 %    Eosinophils 0.80 0.00 - 6.90 %    Basophils 0.40 0.00 - 1.80 %    Immature Granulocytes 0.30 0.00 - 0.90 %    Nucleated RBC 0.00 0.00 - 0.20 /100 WBC    Neutrophils (Absolute) 8.13 (H) 1.82 - 7.42 K/uL    Lymphs (Absolute) 3.04 1.00 - 4.80 K/uL    Monos (Absolute) 0.74 0.00 - 0.85 K/uL    Eos (Absolute) 0.10 0.00 - 0.51 K/uL    Baso (Absolute) 0.05 0.00 - 0.12 K/uL    Immature Granulocytes (abs) 0.04 0.00 - 0.11 K/uL    NRBC (Absolute) 0.00 K/uL "   COMP METABOLIC PANEL   Result Value Ref Range    Sodium 139 135 - 145 mmol/L    Potassium 3.8 3.6 - 5.5 mmol/L    Chloride 105 96 - 112 mmol/L    Co2 25 20 - 33 mmol/L    Anion Gap 9.0 7.0 - 16.0    Glucose 95 65 - 99 mg/dL    Bun 10 8 - 22 mg/dL    Creatinine 0.86 0.50 - 1.40 mg/dL    Calcium 9.1 8.5 - 10.5 mg/dL    Correct Calcium 8.9 8.5 - 10.5 mg/dL    AST(SGOT) 15 12 - 45 U/L    ALT(SGPT) 15 2 - 50 U/L    Alkaline Phosphatase 85 30 - 99 U/L    Total Bilirubin 0.4 0.1 - 1.5 mg/dL    Albumin 4.3 3.2 - 4.9 g/dL    Total Protein 6.7 6.0 - 8.2 g/dL    Globulin 2.4 1.9 - 3.5 g/dL    A-G Ratio 1.8 g/dL   LIPASE   Result Value Ref Range    Lipase 17 11 - 82 U/L   BETA-HCG QUANTITATIVE SERUM   Result Value Ref Range    Bhcg <1.0 0.0 - 5.0 mIU/mL   ESTIMATED GFR   Result Value Ref Range    GFR (CKD-EPI) 90 >60 mL/min/1.73 m 2       Radiology  No orders to display       Problem List  1.  Suicidal ideation    Electronically signed by: John Santiago M.D., 8/23/2023 6:00 AM

## 2023-08-23 NOTE — DISCHARGE PLANNING
Alert Team Note:    Faxed negative Covid test and consult notes to [facility] as requested by Pierre

## 2023-08-23 NOTE — DISCHARGE PLANNING
Alert Team Note:    Contacted by [place], spoke to Alice. Pt has been accepted, accepting is Dr. Baptiste. Facility expects transport at 1430.  Informed LH DEBORA Levin.     Facility requires an infection control form, informed Alert Team CHRISTIE rajput.

## 2023-08-23 NOTE — ED NOTES
Pt ambulatory back to room [address]  with steady gait. Pt placed on the monitor.  Pt states same complaints in triage report.   Alert oriented not in any form of respiratory distress noted. Chart up for ERP to see.

## 2023-08-23 NOTE — ED NOTES
Checked on bed, with unlabored respirations. No safety risk noted  Sleeping  Tele-Sitter - 1:1 with continuous visual monitoring  Continued safety precaution  Gurney in low position, side rail up for pt safety.   No needs identified at the moment

## 2023-08-23 NOTE — DISCHARGE PLANNING
Alert Team:    Patient is a 39 y/o female, well known to ER staff, presenting with medical complaint. Pt is mildly tangential but organized and goal directed when requesting to be placed on legal hold so she may have a place to go and assistance with caring for herself.    Patient has a diagnose history of Schizoaffective DO (Bipolar Type), Borderline Personality DO traits, ama, and psychosis; historical difficulty with treatment and medications compliance.   Patient provided with medications to address abdominal discomfort and psychiatric condition (Zyprexa dn Toradol), labs obtained while patient allowed to rest.   Patient medically cleared; not meeting legal hold criteria. Patient will be provided with 24 hr bus pass and encouraged to f/u with [facility] walk-in clinic to assist with housing and medication management.

## 2023-08-23 NOTE — DISCHARGE PLANNING
Legal Hold Transfer     Referral: Legal hold transfer to Mental Health Facility     Intervention: Notified by  Pramod that pt has been accepted to [facility].     Pt's accepting physcian is Dr. Baptiste     Transport arranged through [facility] at [facility]     The pt will be picked up at 1430      Notified Bedside RN Vivian, Alert Team CHRISTIE Arita, and Dr. Seay of the departure time as well as accepting facility.      Transfer packet and COBRA created with original legal hold and placed on chart.      Plan: Pt will be transferring to [facility] today at 1430 via [facility].

## 2023-08-23 NOTE — CONSULTS
"[facility]  PSYCHIATRIC CONSULTATION - Intake  New Patient    DOS: 08/23/23     Reason for Admission: Patient is a 39 y/o female, well known to ER staff, presenting with medical complaint. Pt is mildly tangential but organized and goal directed when requesting to be placed on legal hold so she may have a place to go and assistance with caring for herself  Reason for Consult: legal hold eval, chronic SI  Requesting LIP: John Santiago M.D.  Psychiatric Consultant: WILIAN Reynaga    Legal Status: on legal hold    Chart Review: active problem list, medication list, allergies, family history, social history, health maintenance, notes from last encounter, lab results    CC:   Chief Complaint   Patient presents with    Abdominal Pain     Pt states \"my appendix hurts\" and points to the left side of her abdomen. Denies n/v. Reports pain with urination.        HPI:   Patient is a 38 year old female with reported Hx bipolar and schizoaffective disorder, currently vocalizing SI with multiple plans, recently placed on legal hold.    Per documentation, has presented completely alert and oriented. Upon psychiatric assessment, patient presents with disorientation, expressing no knowing the month, unable to retain for prolonged periods. Reports Hx of IP psychiatric hospitalization on multiple occasion, reports recently at [facility], remote  [facility], denies current medications. [facility]     Reports Sx of depression including: depressed mood, insomnia, decreased interests, energy, concentration, appetite. Admits suicidal thinking with planning.     Admits AH, reports recently increasing feelings of being unsafe at housing, reports feeling one person was after her, unable to identify individual, states she would kill that unidentified person. Education provided r/t additional resources including [facility].     Reports Sx of PTSD including: flashbacks, " "recurrent nightmares, hypervigilance, intrusive thoughts, anxiety.     Patient denies thoughts of self-harm, denies current VH. Patient denies symptoms indicative of ama/hypomania, OCD, or ADHD. Agreeable to restart previous medication, unable to vocalize medication name or when administered except to say last time she was hospitalized at [facility], chart reviewed. Per documentation, Hx of utilizing services, does not take recommended medications including: Paliperidone, Perphenazine, Zyprexa. Per IP facility charting, patient would be a good candidate for Invega Sustenna injection.    Medications:  Scheduled Medications   Medication Dose Frequency    OLANZapine  5 mg Once    perphenazine  2 mg BID       Allergies:   Allergies   Allergen Reactions    Vicodin [Hydrocodone-Acetaminophen] Anaphylaxis     RXN=9 years ago    Denies 11/19/2022        MSE:  /61   Pulse 61   Temp 36.4 °C (97.5 °F) (Temporal)   Resp 16   Ht 1.702 m (5' 7\")   Wt 94.3 kg (207 lb 14.3 oz)   SpO2 96%     Constitutional: as noted above  General Appearance/Behavior: obese, intermittent eye contact, and indifferent, Uncooperative  Abnormal Movements: none, no PMA/PMR or tremor observed.  Gait and Posture: not observed  Musculoskeletal: as noted above  Mood: \"ready\"  Affect: Blunt   Speech: quiet  Language:  spontaneous, comprehends spoken commands, and fluent   Thought Process: Tangential  Thought Content: Admits SI. Denies HI  Insight/Judgement:  poor  Alert/Orientation: alert, oriented to person, place and time  Attn/Concentration: short attention span  Fund of Knowledge: not tested  Memory recent/remote: not tested  MMSE: deferred this visit     Medical ROS:  Review of systems (+10 systems) unremarkable except for concerns noted by patient or items listed.  Please see HPI for additional ROS.    Past Psychiatric Hx:  Dx: schizoaffective   IP:  Yes, multiple   OP: None current  Medication Trials: ADIEL    Substance Hx:  Admits remote Hx " of cannabis, cocaine > 3 months    Social History     Substance and Sexual Activity   Drug Use Yes    Types: Inhaled    Comment: hx: marijuana, cocaine     Substance Tx: No  Denies Hx of SZ, DT    Family Psych Hx:   Refused to participate    Family History   Family history unknown: Yes        Social Hx:  Born in [place]  Raised by grandmother  Did not graduate HS.  Unemployed  Lacks housing    Social History     Tobacco Use    Smoking status: Never    Smokeless tobacco: Never   Vaping Use    Vaping Use: Never used   Substance Use Topics    Alcohol use: Yes     Comment: patient reports she drinks once or twice a year    Drug use: Yes     Types: Inhaled     Comment: hx: marijuana, cocaine        Legal Hx:   ADIEL    Past Medical Hx:  Past Medical History:   Diagnosis Date    Abscess 10/12/2017    right AC arm area    Bipolar affective ([facility])     Depression     Kidney infection     Leukemia ([facility]) 12/2019    Schizoaffective disorder ([facility])     Substance abuse ([facility])     Suicide attempt ([facility])       Patient Active Problem List    Diagnosis Date Noted    Schizoaffective disorder ([facility]) 07/02/2018    RUQ pain 06/30/2018    Cholelithiasis without cholecystitis 06/30/2018    Lower urinary tract infectious disease 09/15/2014    Abdominal pain, other specified site 09/15/2014    Bipolar disorder (HCC) 09/15/2014    Leukocytosis 09/15/2014    Anemia 09/15/2014    Hyponatremia 09/11/2014    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       Labs:  Reviewed:   Lab Results   Component Value Date/Time    AMPHUR Negative 06/02/2023 1139    BARBSURINE Negative 06/02/2023 1139    BENZODIAZU Negative 06/02/2023 1139    COCAINEMET Negative 06/02/2023 1139    METHADONE Negative 06/02/2023 1139    ECSTASY Negative 07/07/2015 1105    OPIATES Negative 06/02/2023 1139    OXYCODN Negative 06/02/2023 1139    PCPURINE Negative 06/02/2023 1139    PROPOXY Negative 06/02/2023 1139    CANNABINOID Negative 06/02/2023 1139     Recent Labs     08/23/23  0259   WBC 12.1*    RBC 4.48   HEMOGLOBIN 11.8*   HEMATOCRIT 37.1   MCV 82.8   MCH 26.3*   RDW 45.5   PLATELETCT 387   MPV 9.3   NEUTSPOLYS 67.30   LYMPHOCYTES 25.10   MONOCYTES 6.10   EOSINOPHILS 0.80   BASOPHILS 0.40     Recent Labs     23  0259   SODIUM 139   POTASSIUM 3.8   CHLORIDE 105   CO2 25   GLUCOSE 95   BUN 10     Recent Labs     23  0259   ASTSGOT 15   ALTSGPT 15   TBILIRUBIN 0.4   ALKPHOSPHAT 85   GLOBULIN 2.4       EKG:   Results for orders placed or performed during the hospital encounter of 23   EKG   Result Value Ref Range    Report       [facility] Emergency Dept.    Test Date:  2023  Pt Name:    EVIE ENGEL           Department: ER  MRN:        [id number]                      Room:       [facility]  Gender:     Female                       Technician: [id number]  :        [id number]                   Requested By:ANITHA COWAN  Order #:    [id number]                    Reading MD: ANITHA COWAN MD    Measurements  Intervals                                Axis  Rate:       70                           P:          46  IL:         147                          QRS:        42  QRSD:       102                          T:          18  QT:         418  QTc:        452    Interpretive Statements  Sinus rhythm  Compared to ECG 2023 00:01:00  No significant changes  Electronically Signed On 2023 10:33:20 PDT by ANITHA COWAN MD          =======================================================================================================    [facility]  PSYCHIATRIC CONSULTATION - Intake    Assessment:  Patient observed in bed, assessment completed. Disorientation noted, unable to reorient for long periods of time. Reports increasing SI for weeks, unable to identify trigger, admits abd pain worsening mental health, expressing Hx of cutting, admits Hx of SA. Multiple inpatient hospitalizations. Continues to endorse SI. Vocalizing paranoia r/t shelter. Non  adherence with medications. Would benefit from restarting medications.    Admission Dx:  1. Abdominal pain, unspecified abdominal location    2. Schizoaffective disorder, unspecified type (HCC)         Psychiatric:   Schizoaffective disorder, bipolar type  Borderline personality disorder  PTSD    Medical: as noted by the medical team.    Recommendations:  Legal Status: on legal hold  Disposition per medical team    Please transfer patient to inpatient psychiatric hospital when medically cleared and bed is available.    Observation status:   - Telesitter    Phone: No    Visitors: No   Personal belongings: No     Discussed/voalted: ARIN Park RN, MD    Medication Recommendations: Final orders as per Treatment Team  Perphenazine 2mg PO BID for psychosis  Risks/benefits/side effects discussed, patient verbalized understanding.  Medication reconciliation was completed.  Maintain legal hold for safety, seeking extension.    Reviewed safety plan: 911, ER, PCM, MHC, suicide crisis line, nursing staff while inpatient.    Will Continue to Follow. Thank you for the consult.

## 2023-08-23 NOTE — ED NOTES
Pt sleeping comfortably in bed, breathing is easy and unlabored. Bed in lowest position, blankets provided for comfort. No s/s of distress noted. No new needs identified. Will continue to monitor.

## 2023-08-23 NOTE — ED NOTES
Pt ripped wristband off, stating it was too tight. When second wristband was given, pt ripped it out of this tech's hands and refused to put it on.

## 2023-10-22 ENCOUNTER — HOSPITAL ENCOUNTER (EMERGENCY)
Facility: MEDICAL CENTER | Age: 38
End: 2023-10-26
Attending: EMERGENCY MEDICINE

## 2023-10-22 DIAGNOSIS — R41.82 ALTERED MENTAL STATUS, UNSPECIFIED ALTERED MENTAL STATUS TYPE: ICD-10-CM

## 2023-10-22 DIAGNOSIS — F29 PSYCHOSIS, UNSPECIFIED PSYCHOSIS TYPE (HCC): Primary | ICD-10-CM

## 2023-10-22 LAB
FLUAV RNA SPEC QL NAA+PROBE: NEGATIVE
FLUBV RNA SPEC QL NAA+PROBE: NEGATIVE
POC BREATHALIZER: 0 PERCENT (ref 0–0.01)
RSV RNA SPEC QL NAA+PROBE: NEGATIVE
SARS-COV-2 RNA RESP QL NAA+PROBE: NOTDETECTED
SPECIMEN SOURCE: NORMAL

## 2023-10-22 PROCEDURE — 0241U HCHG SARS-COV-2 COVID-19 NFCT DS RESP RNA 4 TRGT MIC: CPT

## 2023-10-22 PROCEDURE — 700102 HCHG RX REV CODE 250 W/ 637 OVERRIDE(OP): Performed by: EMERGENCY MEDICINE

## 2023-10-22 PROCEDURE — 302970 POC BREATHALIZER: Performed by: EMERGENCY MEDICINE

## 2023-10-22 PROCEDURE — 99285 EMERGENCY DEPT VISIT HI MDM: CPT

## 2023-10-22 PROCEDURE — A9270 NON-COVERED ITEM OR SERVICE: HCPCS | Performed by: EMERGENCY MEDICINE

## 2023-10-22 PROCEDURE — C9803 HOPD COVID-19 SPEC COLLECT: HCPCS | Performed by: EMERGENCY MEDICINE

## 2023-10-22 RX ORDER — OLANZAPINE 5 MG/1
10 TABLET ORAL ONCE
Status: COMPLETED | OUTPATIENT
Start: 2023-10-22 | End: 2023-10-22

## 2023-10-22 RX ADMIN — OLANZAPINE 10 MG: 5 TABLET, FILM COATED ORAL at 16:29

## 2023-10-22 ASSESSMENT — FIBROSIS 4 INDEX: FIB4 SCORE: 0.36

## 2023-10-22 NOTE — ED PROVIDER NOTES
"ED Provider Note    Scribed for Leroy Langston by Leroy Langston. 10/22/2023  3:58 PM    Primary care provider: Pcp Pt States None  Means of arrival: private vehicle   History obtained from: Patient  History limited by: None    CHIEF COMPLAINT  Chief Complaint   Patient presents with    Psych Eval     Pt arrives to the ED and appears to be responding to internal stimuli. Pt not answering any questions asked by triage staff. Chart review shows hx of Bipolar and Schizoaffective disorder. When asked if pt is suicidal she states \"no\" and then states \"yes\".        EXTERNAL RECORDS REVIEWED  Inpatient Notes patient was admitted at [facility] inpatient psychiatric unit for decompensated psychosis on August 23 of this year, multiple ED visits for psychiatric disease in the past    HPI/ROS  LIMITATION TO HISTORY   Select: Altered mental status / Confusion  OUTSIDE HISTORIAN(S):  None    HPI  Dunia Sahni is a 38 y.o. female who presents to the Emergency Department with acute decompensated psychosis.  History of schizophrenia, bipolar, and currently not taking her medications.  Patient is able to provide almost no collateral information.  She has pressured speech, appears to be responding to internal stimuli.  Will not tell me her name.  Keeps repeating the same nonsensical information over and over again.  Unable to determine if she uses alcohol drug or tobacco use.  She states that she is not on her medications.  But she does not know what his medications are.  She states that she is not suicidal but again she is an unreliable historian.  Review of systems otherwise limited.  Denies any trauma however.    REVIEW OF SYSTEMS  As above, all other systems reviewed and are negative.   See HPI for further details.     PAST MEDICAL HISTORY   has a past medical history of Abscess (10/12/2017), Bipolar affective (HCC), Depression, Kidney infection, Leukemia (HCC) (12/2019), Schizoaffective disorder (HCC), " "Substance abuse (HCC), and Suicide attempt (HCC).  SURGICAL HISTORY   has a past surgical history that includes appendectomy; ercp in or (N/A, 7/5/2018); and skip by laparoscopy (N/A, 7/6/2018).  SOCIAL HISTORY  Social History     Tobacco Use    Smoking status: Never    Smokeless tobacco: Never   Vaping Use    Vaping Use: Never used   Substance Use Topics    Alcohol use: Yes     Comment: patient reports she drinks once or twice a year    Drug use: Yes     Types: Inhaled     Comment: hx: marijuana, cocaine      Social History     Substance and Sexual Activity   Drug Use Yes    Types: Inhaled    Comment: hx: marijuana, cocaine     FAMILY HISTORY  Family History   Family history unknown: Yes     CURRENT MEDICATIONS  Home Medications       Reviewed by Elisha Dalton (Pharmacy Tech) on 10/22/23 at 1731  Med List Status: Complete     Medication Last Dose Status        Patient Corwin Taking any Medications                         ALLERGIES  Allergies   Allergen Reactions    Vicodin [Hydrocodone-Acetaminophen] Anaphylaxis     RXN=9 years ago    Denies 11/19/2022       PHYSICAL EXAM    VITAL SIGNS:   Vitals:    10/22/23 1530 10/22/23 1532 10/22/23 1537   BP:   108/74   Pulse:   75   Resp:  18 20   Temp:   36.9 °C (98.5 °F)   TempSrc:   Temporal   SpO2:   97%   Weight: 93 kg (205 lb)     Height: 1.702 m (5' 7\")       Vitals: My interpretation: normotensive, not tachycardic, afebrile, not hypoxic    Reinterpretation of vitals: Unchanged unremarkable    PE:   Gen: agitated    ENT: Mucous membranes moist, posterior pharynx clear, uvula midline, nares patent bilaterally   Neck: Supple, FROM  Pulmonary: Lungs are clear to auscultation bilaterally. No tachypnea  CV:  RRR, no murmur appreciated, pulses 2+ in both upper and lower extremities  Abdomen: soft, NT/ND; no rebound/guarding  : no CVA or suprapubic tenderness   Neuro: Not alert and oriented to person place or time  Skin: No rash or lesions.  No pallor or jaundice.  " No cyanosis.  Warm and dry.   Psychiatric exam: Patient has obvious decompensated psychosis, pressured speech, nonsensical pattern of speech.  Not alert or oriented and unable to provide me with any collateral formation.  Does state that she is not suicidal.  Appears to be responding to internal stimuli.  Hyperkinetic.    DIAGNOSTIC STUDIES / PROCEDURES    LABS  Results for orders placed or performed during the hospital encounter of 10/22/23   CoV-2, Flu A/B, And RSV by PCR ([employer])    Specimen: Respirate   Result Value Ref Range    SARS-CoV-2 Source NP Swab    POC BREATHALIZER   Result Value Ref Range    POC Breathalizer 0.00 0.00 - 0.01 Percent      All labs reviewed by me. Labs were compared to prior labs if they were available. Significant for negative     COURSE & MEDICAL DECISION MAKING  Nursing notes, VS, PMSFHx, labs, imaging, EKG reviewed in chart.    ED Observation Status? Yes; I am placing the patient in to an observation status due to a diagnostic uncertainty as well as therapeutic intensity. Patient placed in observation status at 4:02 PM, 10/22/2023.     Observation plan is as follows: Patient require further therapeutic intensity and there is diagnostic uncertainty regarding her presentation here today before final disposition admitted for inpatient versus outpatient follow-up    Upon Reevaluation, the patient's condition has: Signed out to next provider pending transfer to psychiatric facility    Ddx: Trauma, CVA, stroke, psychosis, decompensated psychosis, drug use, bipolar, depression, schizophrenia    MDM: 3:58 PM Dunia Sahni is a 38 y.o. female who presented with acute decompensated psychosis, review of charts show admission in August for decompensated psychosis.  History of schizophrenia and bipolar disease.  Currently off her medications.  Arrives here with obvious decompensated psychosis.  No signs of trauma.  Vital signs are unremarkable.  She is unable to provide me with any  collateral information.  Does not know why she is here but keeps repeating she needs to go to the hospital.  She does not know which medications she is on.  She appears to be responding to internal stimuli.  Obvious psychosis, word salad, speech is pressured.  However physical exam shows no signs of trauma or injury.  I think this is isolated psychiatric disease.  Discussed with case management/behavioral health specialist and I think patient would benefit from Zyprexa.  Spoke with behavioral health specialist and they will evaluate the patient once the medications take effect.  They feel that patient has been very well-known to them as a history similar presentations which is often her baseline unfortunately.  They will evaluate as well.    On evaluation social work/behavioral health agrees that patient has decompensated psychosis worse than normal.  Will place patient on a legal hold.  She does not require sitter as she adamantly denies suicidal or homicidal ideation currently.  She will be signed out to oncoming provider awaiting placement in psychiatric care.  No significant proving with Zyprexa here in the ED.    ADDITIONAL PROBLEM LIST AND DISPOSITION    I have discussed management of the patient with the following physicians and CAROLINA's: None    Discussion of management with other [facility] or appropriate source(s): Behavioral Health will evaluate patient as well      Barriers to care at this time, including but not limited to: Patient does not have established PCP, Patient is homeless, Patient lacks transportation , Patient does not have insurance, Patient had difficult affording medications, and Patient lacks financial resources.     Decision tools and prescription drugs considered including, but not limited to: NIH Stroke Scale 0 .    FINAL IMPRESSION  1. Psychosis, unspecified psychosis type (HCC) Acute   2. Altered mental status, unspecified altered mental status type Acute      The note accurately reflects work  and decisions made by me.  Leroy Langston  10/22/2023  3:59 PM

## 2023-10-22 NOTE — ED TRIAGE NOTES
"Chief Complaint   Patient presents with    Psych Eval     Pt arrives to the ED and appears to be responding to internal stimuli. Pt not answering any questions asked by triage staff. Chart review shows hx of Bipolar and Schizoaffective disorder. When asked if pt is suicidal she states \"no\" and then states \"yes\".        Pt ambulatory to triage for above.       "

## 2023-10-22 NOTE — ED NOTES
Pt cooperative with breathalizer, read as negative.  Pt also cooperative with taking medications.

## 2023-10-23 LAB
AMPHET UR QL SCN: NEGATIVE
APPEARANCE UR: CLEAR
B-HCG SERPL-ACNC: <1 MIU/ML (ref 0–5)
BACTERIA #/AREA URNS HPF: NEGATIVE /HPF
BARBITURATES UR QL SCN: NEGATIVE
BASOPHILS # BLD AUTO: 0.4 % (ref 0–1.8)
BASOPHILS # BLD: 0.04 K/UL (ref 0–0.12)
BENZODIAZ UR QL SCN: NEGATIVE
BILIRUB UR QL STRIP.AUTO: NEGATIVE
BZE UR QL SCN: NEGATIVE
CANNABINOIDS UR QL SCN: NEGATIVE
CAOX CRY #/AREA URNS HPF: NORMAL /HPF
COLOR UR: YELLOW
EOSINOPHIL # BLD AUTO: 0.09 K/UL (ref 0–0.51)
EOSINOPHIL NFR BLD: 1 % (ref 0–6.9)
EPI CELLS #/AREA URNS HPF: NORMAL /HPF
ERYTHROCYTE [DISTWIDTH] IN BLOOD BY AUTOMATED COUNT: 44.7 FL (ref 35.9–50)
FENTANYL UR QL: NEGATIVE
GLUCOSE UR STRIP.AUTO-MCNC: NEGATIVE MG/DL
HCT VFR BLD AUTO: 39.8 % (ref 37–47)
HGB BLD-MCNC: 13 G/DL (ref 12–16)
HYALINE CASTS #/AREA URNS LPF: NORMAL /LPF
IMM GRANULOCYTES # BLD AUTO: 0.04 K/UL (ref 0–0.11)
IMM GRANULOCYTES NFR BLD AUTO: 0.4 % (ref 0–0.9)
KETONES UR STRIP.AUTO-MCNC: ABNORMAL MG/DL
LEUKOCYTE ESTERASE UR QL STRIP.AUTO: ABNORMAL
LYMPHOCYTES # BLD AUTO: 2.13 K/UL (ref 1–4.8)
LYMPHOCYTES NFR BLD: 23.6 % (ref 22–41)
MCH RBC QN AUTO: 27.4 PG (ref 27–33)
MCHC RBC AUTO-ENTMCNC: 32.7 G/DL (ref 32.2–35.5)
MCV RBC AUTO: 84 FL (ref 81.4–97.8)
METHADONE UR QL SCN: NEGATIVE
MICRO URNS: ABNORMAL
MONOCYTES # BLD AUTO: 0.68 K/UL (ref 0–0.85)
MONOCYTES NFR BLD AUTO: 7.5 % (ref 0–13.4)
NEUTROPHILS # BLD AUTO: 6.03 K/UL (ref 1.82–7.42)
NEUTROPHILS NFR BLD: 67.1 % (ref 44–72)
NITRITE UR QL STRIP.AUTO: NEGATIVE
NRBC # BLD AUTO: 0 K/UL
NRBC BLD-RTO: 0 /100 WBC (ref 0–0.2)
OPIATES UR QL SCN: NEGATIVE
OXYCODONE UR QL SCN: NEGATIVE
PCP UR QL SCN: NEGATIVE
PH UR STRIP.AUTO: 5.5 [PH] (ref 5–8)
PLATELET # BLD AUTO: 368 K/UL (ref 164–446)
PMV BLD AUTO: 9.2 FL (ref 9–12.9)
PROPOXYPH UR QL SCN: NEGATIVE
PROT UR QL STRIP: NEGATIVE MG/DL
RBC # BLD AUTO: 4.74 M/UL (ref 4.2–5.4)
RBC # URNS HPF: NORMAL /HPF
RBC UR QL AUTO: NEGATIVE
SP GR UR STRIP.AUTO: 1.03
UROBILINOGEN UR STRIP.AUTO-MCNC: 0.2 MG/DL
WBC # BLD AUTO: 9 K/UL (ref 4.8–10.8)
WBC #/AREA URNS HPF: NORMAL /HPF

## 2023-10-23 PROCEDURE — 80307 DRUG TEST PRSMV CHEM ANLYZR: CPT

## 2023-10-23 PROCEDURE — 90791 PSYCH DIAGNOSTIC EVALUATION: CPT

## 2023-10-23 PROCEDURE — 99285 EMERGENCY DEPT VISIT HI MDM: CPT | Mod: GC | Performed by: PSYCHIATRY & NEUROLOGY

## 2023-10-23 PROCEDURE — 85025 COMPLETE CBC W/AUTO DIFF WBC: CPT

## 2023-10-23 PROCEDURE — 700101 HCHG RX REV CODE 250

## 2023-10-23 PROCEDURE — 36415 COLL VENOUS BLD VENIPUNCTURE: CPT

## 2023-10-23 PROCEDURE — 81001 URINALYSIS AUTO W/SCOPE: CPT | Mod: XU

## 2023-10-23 PROCEDURE — A9270 NON-COVERED ITEM OR SERVICE: HCPCS

## 2023-10-23 PROCEDURE — 84702 CHORIONIC GONADOTROPIN TEST: CPT

## 2023-10-23 RX ORDER — OLANZAPINE 5 MG/1
10 TABLET ORAL EVERY EVENING
Status: DISCONTINUED | OUTPATIENT
Start: 2023-10-23 | End: 2023-10-23

## 2023-10-23 RX ORDER — LORAZEPAM 2 MG/1
2 TABLET ORAL EVERY 4 HOURS PRN
Status: DISCONTINUED | OUTPATIENT
Start: 2023-10-23 | End: 2023-10-23

## 2023-10-23 RX ORDER — PALIPERIDONE 6 MG/1
6 TABLET, EXTENDED RELEASE ORAL DAILY
Status: DISCONTINUED | OUTPATIENT
Start: 2023-10-23 | End: 2023-10-26 | Stop reason: HOSPADM

## 2023-10-23 RX ORDER — LORAZEPAM 2 MG/ML
2 INJECTION INTRAMUSCULAR EVERY 4 HOURS PRN
Status: DISCONTINUED | OUTPATIENT
Start: 2023-10-23 | End: 2023-10-26 | Stop reason: HOSPADM

## 2023-10-23 RX ORDER — HALOPERIDOL 5 MG/1
5 TABLET ORAL EVERY 6 HOURS PRN
Status: DISCONTINUED | OUTPATIENT
Start: 2023-10-23 | End: 2023-10-23

## 2023-10-23 RX ORDER — HALOPERIDOL 5 MG/ML
5 INJECTION INTRAMUSCULAR EVERY 6 HOURS PRN
Status: DISCONTINUED | OUTPATIENT
Start: 2023-10-23 | End: 2023-10-26 | Stop reason: HOSPADM

## 2023-10-23 RX ORDER — HALOPERIDOL 5 MG/1
5 TABLET ORAL EVERY 6 HOURS PRN
Status: DISCONTINUED | OUTPATIENT
Start: 2023-10-23 | End: 2023-10-26 | Stop reason: HOSPADM

## 2023-10-23 RX ORDER — LORAZEPAM 2 MG/ML
2 INJECTION INTRAMUSCULAR EVERY 4 HOURS PRN
Status: DISCONTINUED | OUTPATIENT
Start: 2023-10-23 | End: 2023-10-23

## 2023-10-23 RX ORDER — HALOPERIDOL 5 MG/ML
5 INJECTION INTRAMUSCULAR EVERY 4 HOURS PRN
Status: DISCONTINUED | OUTPATIENT
Start: 2023-10-23 | End: 2023-10-23

## 2023-10-23 RX ORDER — LORAZEPAM 2 MG/1
2 TABLET ORAL EVERY 4 HOURS PRN
Status: DISCONTINUED | OUTPATIENT
Start: 2023-10-23 | End: 2023-10-26 | Stop reason: HOSPADM

## 2023-10-23 RX ADMIN — PALIPERIDONE 6 MG: 6 TABLET, EXTENDED RELEASE ORAL at 14:52

## 2023-10-23 NOTE — ED NOTES
Rounded on patient. Pt sitting up in bed. Pt denies any additional needs. 1:1 sitter remains in direct view of patient.

## 2023-10-23 NOTE — DISCHARGE PLANNING
Alert Team Note:    Contacted by [place], spoke to Alice. Pt referral has been received and is being reviewed.

## 2023-10-23 NOTE — ED NOTES
Patient's home medications have been reviewed by the pharmacy team.     Past Medical History:   Diagnosis Date    Abscess 10/12/2017    right AC arm area    Bipolar affective (HCC)     Depression     Kidney infection     Leukemia (HCC) 12/2019    Schizoaffective disorder (HCC)     Substance abuse (HCC)     Suicide attempt (HCC)        Patient's Medications   New Prescriptions    No medications on file   Previous Medications    No medications on file   Modified Medications    No medications on file   Discontinued Medications    ONDANSETRON (ZOFRAN ODT) 4 MG TABLET DISPERSIBLE    Take 1 Tablet by mouth every 6 hours as needed for Nausea/Vomiting.          A:  Medication non-compliance likely contributing to current complaints. Pt reports not taking medications since admission in August.    P:    Encourage medication compliance. Planning to continue olanzapine     Tone Collier, MarkellD

## 2023-10-23 NOTE — CONSULTS
"PSYCHIATRIC INTAKE EVALUATION(new)  Reason for admission: Psychosis  Reason for consult: Legal Hold Evaluation  Requesting Provider:      Heath Arvizu  Legal Hold Status:          Extended  Chart reviewed.  Patient seen with similar symptoms in August 2023, transferred to [facility], discharged on paliperidone.  Numerous prior presentations since 2014.        *HPI:       Patient presented to ED with symptoms of acute psychosis, disorganized, responding to internal stimuli, nonsensical.  She was seen this morning at bedside and was observed internally preoccupied talking and responding to internal stimuli.  When history gathering was attempted, patient ignored most attempts to talk to her even when directly called by her name.  She then jumped up to the foot of her bed angrily stating \" now, I want to move\".  She then proceeded to anxiously rub her forehead and hair, looking around the room.  When asked who is she talking to, she mumbled unintelligible words.  With numerous attempts at redirection, she was able to tell her name, correctly identify location, and state month and year accurately.  She was unable to provide any history to when she last stopped any medication or if anything has ever been helpful.  Patient was notified that her legal hold is extended, labs will be ordered and referral for transfer to a psychiatric hospital was placed.  She did not object the plan but unclear if she comprehended any of it.      Psychiatric ROS:  Unable to perform ROS due to patient presentation    Medical ROS (as pertinent):     Review of Systems   Unable to perform ROS: Mental acuity           *Psychiatric Examination:  Vitals: /68   Pulse 60   Temp 36.9 °C (98.5 °F) (Temporal)   Resp 14   Ht 1.702 m (5' 7\")   Wt 93 kg (205 lb)   SpO2 97%   BMI 32.11 kg/m²    General Appearance: 38-year-old female, long hair, wearing bra, somewhat unpredictable  Abnormal Movements: No obvious tics or " tremors observed, no psychomotor agitation or retardation noted  Gait and Posture: Not evaluated  Speech: Frequently mumbling, difficult to understand at times.  Thought processes: Disorganized, illogical, not based in reality  Associations: No obvious loose associations, patient frequently hard to understand so cannot be ruled out.  Abnormal or Psychotic Thoughts: Prominent hallucinations, evidence of auditory and visual hallucinations as patient both is talking to someone not there as well as rapidly looking around the room at something is not there  Judgement and Insight: Poor/poor  Orientation: Oriented to person, place, time but not situation  Recent and Remote Memory: Unable to assess secondary to patient presentation  Attention Span and Concentration: Impaired, very distracted  Language: Fluent in English  Fund of Knowledge: Unable to assess  Mood and Affect: Guarded, suspicious, generally restricted, not appropriate to content  SI/HI: Denies/denies    Past Medical History:   Past Medical History:   Diagnosis Date    Abscess 10/12/2017    right AC arm area    Bipolar affective (HCC)     Depression     Kidney infection     Leukemia (HCC) 12/2019    Schizoaffective disorder (HCC)     Substance abuse (HCC)     Suicide attempt (HCC)         Past Psychiatric History: (taken from chart review)  Previous Diagnosis: Schizoaffective disorder, schizophrenia, bipolar disorder  Current meds: none  Previous med trials: risperdal, thorazine, haldol, Abilify, Zyprexa, paliperidone  Hospitalizations: Multiple. Last to [facility] in August 2023  Suicide attempts/SIB: patient did not respond  Outpatient services: Unable to obtain  Access to guns: Unable to obtain, should be assessed prior to discharge  Abuse/trauma hx: Unable to assess  Legal hx: Unable to assess    Family Hx:   Per chart, patient reports her mother has been diagnosed with schizophrenia. Unable to confirm with patient    Social Hx:   Unable to  obtain    Substances:  Unable to obtain    Current Medications:  Current Facility-Administered Medications   Medication Dose Route Frequency Provider Last Rate Last Admin    OLANZapine (ZyPREXA) tablet 10 mg  10 mg Oral Q EVENING Heath Arvizu         No current outpatient medications on file.        Allergies:  Vicodin [hydrocodone-acetaminophen]       Labs personally reviewed:   Recent Results (from the past 72 hour(s))   POC BREATHALIZER    Collection Time: 10/22/23  4:28 PM   Result Value Ref Range    POC Breathalizer 0.00 0.00 - 0.01 Percent   CoV-2, Flu A/B, And RSV by PCR ([facility])    Collection Time: 10/22/23  8:35 PM    Specimen: Respirate   Result Value Ref Range    Influenza virus A RNA Negative Negative    Influenza virus B, PCR Negative Negative    RSV, PCR Negative Negative    SARS-CoV-2 by PCR NotDetected     SARS-CoV-2 Source NP Swab            EKG: QTc 452 on 5/27/23  Brain Imaging: none  EEG:  none         Assessment:  Patient is a 38-year-old female with past history of schizoaffective disorder, multiple psychiatric hospital admissions, most recent August 2023 to [facility] on paliperidone 6 mg.  Patient presented to ED with symptoms consistent with acute psychosis.  On exam today she is disorganized, responding to internal stimuli, becomes self injurious during what looks like arguing with internal stimuli, she is difficult to engage for the interview due to severity of internal preoccupation and can only responds directly after repeated reorientation for a few moments before getting distracted by the internal stimulus.  At this time, patient meets criteria for continued legal hold which will be extended today because she continues to be a threat to self because of acute psychiatric disorder as evidenced by observed episode of agitation with hitting self while responding to internal stimuli, additionally she is currently acutely psychotic and unable to care for her own needs.  Patient  had CBC, CMP, TSH done within the last 6 months and had anemia on CBC (other labs normal).  CBC will be repeated.  EKG within last year with QTc 452.  Patient unable to reply whether there has been any drug use recently.  Drug urine screen and fentanyl screen ordered.  She is of childbearing age, pregnancy test ordered.  Will continue to follow to evaluate and reassess daily team obtain more history and decide if legal hold needs to be continued.    Dx:  #Unspecified Psychosis, acute  #Schizoaffective disorder (by history)    Medical:  See medical/ED note      Plan:  Legal hold: Extended  Psychotropic medications:  Discontinue Zyprexa  Start Paliperidone 6mg PO daily for psychosis  PRN Agitation medications:  Haldol 5mg PO or IM (if PO cannot be administered) q6HRs PRN  Ativan 2mg PO or IM (if PO cannot be administered) q4HRs PRN  Please transfer pt to inpatient psychiatric hospital when medically cleared and bed is available  Labs:CBC, UDS, Fentanyl drug screen, beta-Hcg ordered  EKG: QTc 452 on 5/27/23  Old records reviewed   Discussed the case with: Dr. Manjarrez   Psychiatry will follow up    Thank you for the consult.     Sitter: telesitter  Phone: With supervision to call family only  Visitors: No visitors  Personal belongings:  no personal belongings    This note was created using voice recognition software (Dragon). The accuracy of the dictation is limited by the abilities of the software. I have reviewed the note prior to signing. However, error related to voice recognition software and /or scribes may still exist and should be interpreted within the appropriate context.

## 2023-10-23 NOTE — DISCHARGE PLANNING
Alert Team Note:    Contacted by [facility] spoke to Alice. Pt to be accepted PENDING a negative Covid test, infection control form, UDS, UA, and RN to RN report.   Faxed negative Covid test to [facility].   Requested UA from Dr. Medina and infection form from Alert Team CHRISTIE Duarte.

## 2023-10-23 NOTE — ED NOTES
Bedside report received from off going RN/tech: Flakito, assumed care of patient.  POC discussed with patient. Call light within reach, all needs addressed at this time.       Fall risk interventions in place: Move the patient closer to the nurse's station, Place socks on patient, Place fall risk sign on patient's door, and Keep floor surfaces clean and dry (all applicable per [facility] Fall risk assessment)   Continuous monitoring: Not Applicable   IVF/IV medications: Not Applicable   Oxygen: Room Air  Bedside sitter: Not Applicable   Isolation: Not Applicable

## 2023-10-23 NOTE — ED NOTES
Pt ambulated to restroom and back to bed with RN escort. Urine sample collected and sent to lab. Telesitter remains in direct view of patient.
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
## 2023-10-23 NOTE — ED NOTES
Received bedside report from Dinah GARCIA RN; assumed care of Pt.    No oxygen.  Tele-Sitter at bedside for 1:1 observation. SI precautions.  Low fall risk precautions.    Rounded on Pt. Pt resting comfortably with eyes closed. Respirations regular.  Will continue to monitor.

## 2023-10-23 NOTE — ED NOTES
Bedside report received from CHRISTIE Low. Pt is awake in bed. Pt is orientedx3, disoriented to situation. Pt is responding to internal stimuli and speaking nonsensically. Pt denies SI/HI. Non skid socks on. Clean-well lit environment provided. Telesitter monitoring patient.

## 2023-10-23 NOTE — ED NOTES
Pt ambulated to the restroom with steady gait. Pt changed onto paper gown. Refusing's to provide urine sample at this time.

## 2023-10-23 NOTE — ED NOTES
Upon chart review, it was noticed psych ordered 1:1 observation status. Continuous visual monitoring by Trained Personnel initiated. Sitter has unobstructed view of patient at all times. Discussion with sitter about patient care, safety, and support. Stop sign updated.

## 2023-10-23 NOTE — PROGRESS NOTES
"ED Observation Progress Note    Date of Service: 10/23/23    Interval History and Interventions  Patient be seen initially on 10/22/2023.  She arrived to the ER appearing to respond to internal stimuli.  History of bipolar and schizoaffective disorder.  Patient noted to have pressured speech.  Patient placed on a legal hold due to psychosis.    COVID-negative.  Breathalyzer 0.  UDS pending.    Life skills consultation completed.  Zyprexa recommended.  Awaiting placement.    Patient offers no complaints today.  I asked the RN to obtain a urinalysis.      Physical Exam  /68   Pulse 60   Temp 36.9 °C (98.5 °F) (Temporal)   Resp 14   Ht 1.702 m (5' 7\")   Wt 93 kg (205 lb)   SpO2 97%   BMI 32.11 kg/m² .    Constitutional: Awake and alert. Nontoxic  HENT:  Grossly normal  Eyes: Grossly normal  Neck: Normal range of motion  Cardiovascular: Normal heart rate   Thorax & Lungs: No respiratory distress  Abdomen: Nontender  Skin:  No pathologic rash.   Extremities: Well perfused  Psychiatric: Affect flat, pressured speech    Labs  Results for orders placed or performed during the hospital encounter of 10/22/23   CoV-2, Flu A/B, And RSV by PCR ([facility])    Specimen: Respirate   Result Value Ref Range    Influenza virus A RNA Negative Negative    Influenza virus B, PCR Negative Negative    RSV, PCR Negative Negative    SARS-CoV-2 by PCR NotDetected     SARS-CoV-2 Source NP Swab    POC BREATHALIZER   Result Value Ref Range    POC Breathalizer 0.00 0.00 - 0.01 Percent       Radiology  No orders to display       Problem List  1. Psychosis, unspecified psychosis type (HCC) Acute       2. Altered mental status, unspecified altered mental status type Acute             Electronically signed by: Ashly Medina M.D., 10/23/2023 6:57 AM          "

## 2023-10-23 NOTE — ED NOTES
Med rec completed per patient and home pharmacy, [facility]  Allergies reviewed  No PO Antibiotics in the last 30 days   No Anticoagulants in the last 30 days    Preferred Pharmacy: [facility], [address]    Patient denies taking any medications, RX or OTC

## 2023-10-23 NOTE — DISCHARGE PLANNING
Alert Team:    [facility]; Medicare-out of psychiatric IP days.

## 2023-10-23 NOTE — ED NOTES
Per psychiatry at bedside, pt can have telesitter. Psychiatry MD to update most recent psych note. Telesitter tech notified and monitor back in room.

## 2023-10-23 NOTE — ED NOTES
Received bedside report from CHRISTIE Valdez.     Fall risk: No  Continuous monitoring: No   IVF/IV medications: No  Oxygen: No  Isolation: No  Observation status: Q15 obs

## 2023-10-23 NOTE — ED NOTES
Pt asleep in [facility] with unlabored breathing. No signs of distress at this time.

## 2023-10-23 NOTE — ED NOTES
Assisting primary RN:     Three bags of belongings placed in LK #11 after being searched by security.

## 2023-10-24 PROCEDURE — 700101 HCHG RX REV CODE 250

## 2023-10-24 PROCEDURE — 99284 EMERGENCY DEPT VISIT MOD MDM: CPT | Mod: GC | Performed by: PSYCHIATRY & NEUROLOGY

## 2023-10-24 PROCEDURE — A9270 NON-COVERED ITEM OR SERVICE: HCPCS

## 2023-10-24 RX ADMIN — PALIPERIDONE 6 MG: 6 TABLET, EXTENDED RELEASE ORAL at 05:11

## 2023-10-24 ASSESSMENT — ENCOUNTER SYMPTOMS
CARDIOVASCULAR NEGATIVE: 1
CONSTITUTIONAL NEGATIVE: 1
GASTROINTESTINAL NEGATIVE: 1
MUSCULOSKELETAL NEGATIVE: 1
RESPIRATORY NEGATIVE: 1

## 2023-10-24 NOTE — ED NOTES
Assumed report and patient care from CHRISTIE Pastrana. Patient is resting comfortably in bed with no signs of labored breathing or restlessness. No questions or concerns at this time.  Pending placement. Safety measures in place, tele sitter in direct view of pt.

## 2023-10-24 NOTE — ED NOTES
Hourly rounding complete. Pt now asleep and resting on gurney in no distress. No needs identified. Telesitter monitoring in place.

## 2023-10-24 NOTE — ED NOTES
Bedside report to RAIN Whiteside. Pt awake and breathing with even, unlabored respirations on RA at time of report.

## 2023-10-24 NOTE — ED NOTES
Provided pt with more water and food. Pt denies any additional needs. Telesitter remains in direct view of patient.

## 2023-10-24 NOTE — ED NOTES
Patient resting on stretcher in no acute distress. Equal chest rise and fall noted. teleSitter in doorway for direct 1:1 observation. Room safety checklist in use. No needs at this time, will continue to monitor

## 2023-10-24 NOTE — ED NOTES
Hourly rounding complete. Pt awake sitting on bed, no distress noted. Telesitter monitoring in place. No needs at this time.

## 2023-10-24 NOTE — ED NOTES
Hourly rounding completed. Pt resting on gurney, no needs identified at this time. Telesitter monitoring in place.

## 2023-10-24 NOTE — ED NOTES
Patient remains comfortable on gurney, no identifiable needs at this time. Equal chest rise and fall bilaterally. Pending placement. Safety measures in place, tele sitter at bedside

## 2023-10-24 NOTE — CONSULTS
"PSYCHIATRIC FOLLOW-UP:(established)  *Reason for admission:      Psychosis  *Legal Hold Status:          Extended  Chart reviewed.         *HPI:          Patient was seen at bedside this morning.  She was sleeping but arousable for brief interview.  She said she slept well, denied any physical pain or discomfort.  When asked if she has had any hallucinations, she became a little irritated stating that she never had any hallucinations and was just talking out loud because she was in pain.  Denied any suicidal or homicidal thoughts.  Patient is unable to verbalize where she would access to food or shelter if she was discharged.  As soon as this resident stopped outside of her room, she was heard responding to internal stimuli saying things like \" it will just be quiet, just shut up\".    Medical ROS (as pertinent):     Review of Systems   Constitutional: Negative.    HENT: Negative.     Respiratory: Negative.     Cardiovascular: Negative.    Gastrointestinal: Negative.    Genitourinary: Negative.    Musculoskeletal: Negative.            *Psychiatric Examination:  Vitals: /61   Pulse 64   Temp 36.8 °C (98.2 °F) (Temporal)   Resp 18   Ht 1.702 m (5' 7\")   Wt 93 kg (205 lb)   SpO2 98%   BMI 32.11 kg/m²    General Appearance: 38-year-old female, laying under covers, somewhat cooperative.  Abnormal Movements: Moving feet under covers, able to stop, not bothering her, denies feeling restless.  Gait and Posture: Did not evaluate  Speech: Normal volume and tone.  Responding in short sentences  Thought processes: Somewhat disorganized, nonlinear.  Associations: No cassidy loose associations  Abnormal or Psychotic Thoughts: Patient denies AVH, observed responding to internal stimuli.  Judgement and Insight: Poor/poor  Orientation: Unchanged from prior assessment  Recent and Remote Memory: Unable to assess  Attention Span and Concentration: Distracted but improved from prior evaluation  Language: Fluent in " English  Fund of Knowledge: Unable to assess  Mood and Affect: Guarded, irritable, constricted, not appropriate to content  SI/HI: Denies/denies      EKG: QTc 452 on 5/27/23  Brain Imaging: none  EEG:  none     *Labs personally reviewed:   Recent Results (from the past 24 hour(s))   HCG QUANTITATIVE    Collection Time: 10/23/23 12:23 PM   Result Value Ref Range    Bhcg <1.0 0.0 - 5.0 mIU/mL   CBC WITH DIFFERENTIAL    Collection Time: 10/23/23 12:23 PM   Result Value Ref Range    WBC 9.0 4.8 - 10.8 K/uL    RBC 4.74 4.20 - 5.40 M/uL    Hemoglobin 13.0 12.0 - 16.0 g/dL    Hematocrit 39.8 37.0 - 47.0 %    MCV 84.0 81.4 - 97.8 fL    MCH 27.4 27.0 - 33.0 pg    MCHC 32.7 32.2 - 35.5 g/dL    RDW 44.7 35.9 - 50.0 fL    Platelet Count 368 164 - 446 K/uL    MPV 9.2 9.0 - 12.9 fL    Neutrophils-Polys 67.10 44.00 - 72.00 %    Lymphocytes 23.60 22.00 - 41.00 %    Monocytes 7.50 0.00 - 13.40 %    Eosinophils 1.00 0.00 - 6.90 %    Basophils 0.40 0.00 - 1.80 %    Immature Granulocytes 0.40 0.00 - 0.90 %    Nucleated RBC 0.00 0.00 - 0.20 /100 WBC    Neutrophils (Absolute) 6.03 1.82 - 7.42 K/uL    Lymphs (Absolute) 2.13 1.00 - 4.80 K/uL    Monos (Absolute) 0.68 0.00 - 0.85 K/uL    Eos (Absolute) 0.09 0.00 - 0.51 K/uL    Baso (Absolute) 0.04 0.00 - 0.12 K/uL    Immature Granulocytes (abs) 0.04 0.00 - 0.11 K/uL    NRBC (Absolute) 0.00 K/uL   URINE DRUG SCREEN    Collection Time: 10/23/23  3:00 PM   Result Value Ref Range    Amphetamines Urine Negative Negative    Barbiturates Negative Negative    Benzodiazepines Negative Negative    Cocaine Metabolite Negative Negative    Fentanyl, Urine Negative Negative    Methadone Negative Negative    Opiates Negative Negative    Oxycodone Negative Negative    Phencyclidine -Pcp Negative Negative    Propoxyphene Negative Negative    Cannabinoid Metab Negative Negative   URINALYSIS    Collection Time: 10/23/23  3:00 PM    Specimen: Blood   Result Value Ref Range    Color Yellow     Character Clear      Specific Gravity 1.028 <1.035    Ph 5.5 5.0 - 8.0    Glucose Negative Negative mg/dL    Ketones Trace (A) Negative mg/dL    Protein Negative Negative mg/dL    Bilirubin Negative Negative    Urobilinogen, Urine 0.2 Negative    Nitrite Negative Negative    Leukocyte Esterase Small (A) Negative    Occult Blood Negative Negative    Micro Urine Req Microscopic    URINE MICROSCOPIC (W/UA)    Collection Time: 10/23/23  3:00 PM   Result Value Ref Range    WBC 2-5 /hpf    RBC 0-2 /hpf    Bacteria Negative None /hpf    Epithelial Cells Few /hpf    Ca Oxalate Crystal Rare /hpf    Hyaline Cast 0-2 /lpf       Current medications:  Current Facility-Administered Medications   Medication Dose Route Frequency Provider Last Rate Last Admin    paliperidone (Invega) ER tablet 6 mg  6 mg Oral DAILY Mile Osborne M.D.   6 mg at 10/24/23 0511    haloperidol lactate (Haldol) injection 5 mg  5 mg Intramuscular Q6HRS PRN Mile Osborne M.D.        Or    haloperidol (Haldol) tablet 5 mg  5 mg Oral Q6HRS PRN Mile Osborne M.D.        LORazepam (Ativan) injection 2 mg  2 mg Intramuscular Q4HRS PRN Mile Osborne M.D.        Or    LORazepam (Ativan) tablet 2 mg  2 mg Oral Q4HRS PRN Mile Osborne M.D.         No current outpatient medications on file.       Assessment:  Patient is a 38-year-old female with past history of schizoaffective disorder, multiple psychiatric hospital admissions, most recent August 2023 to [facility]'s discharge on paliperidone 6 mg.  Patient presented to ED with symptoms consistent with acute psychosis.  She was started on paliperidone 6 mg p.o. which was medication she was stabilized and discharged on from [facility] this past [facility].  Today patient continues to be seen responding to internal stimuli but is less distractible, improved participation in reciprocal conversation; although, there appears to be poor insight to her condition.  At this time she continues to be appropriate for legal hold as she is  unable to care for her own needs.       Dx:  #Unspecified Psychosis, acute  #Schizoaffective disorder (by history)    Medical :  See medical/ED note      Plan:  Legal hold: Extended  Psychotropic medications:  Continue Paliperidone 6mg PO daily for psychosis  PRN Agitation medications:  Haldol 5mg PO or IM (if PO cannot be administered) q6HRs PRN  Ativan 2mg PO or IM (if PO cannot be administered) q4HRs PRN  Please transfer pt to inpatient psychiatric hospital when medically cleared and bed is available  Labs:reviewed, no new labs ordered  EKG: QTc 452 on 5/27/23  Old records reviewed   Discussed the case with: Dr. Manjarrez   Psychiatry will follow up     Thank you for the consult.      Sitter: telesitter  Phone: With supervision to call family only  Visitors: No visitors  Personal belongings:  no personal belongings    This note was created using voice recognition software (Dragon). The accuracy of the dictation is limited by the abilities of the software. I have reviewed the note prior to signing. However, error related to voice recognition software and /or scribes may still exist and should be interpreted within the appropriate context.

## 2023-10-24 NOTE — ED NOTES
Patient resting on stretcher in no acute distress. Equal chest rise and fall noted. Telesitter for direct 1:1 observation. Room safety checklist in use. No needs at this time, will continue to monitor

## 2023-10-24 NOTE — PROGRESS NOTES
"ED Observation Progress Note    Date of Service: 10/24/23    Interval History and Interventions  Patient initially seen on 10/22/2023.  Patient noted to be psychotic.  Patient with history of bipolar and schizoaffective disorder.  Patient on a legal hold and awaiting inpatient psychiatric placement.    Psychiatry saw the patient yesterday and made recommendations.  Legal hold was extended.  Paliperidone started and Zyprexa discontinued here.  Haldol and Ativan as needed's recommended.    Patient offers no complaints today.    Physical Exam  /72   Pulse 68   Temp 36.8 °C (98.2 °F) (Temporal)   Resp 17   Ht 1.702 m (5' 7\")   Wt 93 kg (205 lb)   SpO2 96%   BMI 32.11 kg/m² .    Constitutional: Resting and alert. Nontoxic  HENT:  Grossly normal  Eyes: Grossly normal  Neck: Normal range of motion  Cardiovascular: Normal heart rate   Thorax & Lungs: No respiratory distress  Abdomen: Nontender  Skin:  No pathologic rash.   Extremities: Well perfused  Psychiatric: Affect flat    Labs  Results for orders placed or performed during the hospital encounter of 10/22/23   URINE DRUG SCREEN   Result Value Ref Range    Amphetamines Urine Negative Negative    Barbiturates Negative Negative    Benzodiazepines Negative Negative    Cocaine Metabolite Negative Negative    Fentanyl, Urine Negative Negative    Methadone Negative Negative    Opiates Negative Negative    Oxycodone Negative Negative    Phencyclidine -Pcp Negative Negative    Propoxyphene Negative Negative    Cannabinoid Metab Negative Negative   CoV-2, Flu A/B, And RSV by PCR ([employer])    Specimen: Respirate   Result Value Ref Range    Influenza virus A RNA Negative Negative    Influenza virus B, PCR Negative Negative    RSV, PCR Negative Negative    SARS-CoV-2 by PCR NotDetected     SARS-CoV-2 Source NP Swab    HCG QUANTITATIVE   Result Value Ref Range    Bhcg <1.0 0.0 - 5.0 mIU/mL   CBC WITH DIFFERENTIAL   Result Value Ref Range    WBC 9.0 4.8 - 10.8 K/uL    " RBC 4.74 4.20 - 5.40 M/uL    Hemoglobin 13.0 12.0 - 16.0 g/dL    Hematocrit 39.8 37.0 - 47.0 %    MCV 84.0 81.4 - 97.8 fL    MCH 27.4 27.0 - 33.0 pg    MCHC 32.7 32.2 - 35.5 g/dL    RDW 44.7 35.9 - 50.0 fL    Platelet Count 368 164 - 446 K/uL    MPV 9.2 9.0 - 12.9 fL    Neutrophils-Polys 67.10 44.00 - 72.00 %    Lymphocytes 23.60 22.00 - 41.00 %    Monocytes 7.50 0.00 - 13.40 %    Eosinophils 1.00 0.00 - 6.90 %    Basophils 0.40 0.00 - 1.80 %    Immature Granulocytes 0.40 0.00 - 0.90 %    Nucleated RBC 0.00 0.00 - 0.20 /100 WBC    Neutrophils (Absolute) 6.03 1.82 - 7.42 K/uL    Lymphs (Absolute) 2.13 1.00 - 4.80 K/uL    Monos (Absolute) 0.68 0.00 - 0.85 K/uL    Eos (Absolute) 0.09 0.00 - 0.51 K/uL    Baso (Absolute) 0.04 0.00 - 0.12 K/uL    Immature Granulocytes (abs) 0.04 0.00 - 0.11 K/uL    NRBC (Absolute) 0.00 K/uL   URINALYSIS    Specimen: Blood   Result Value Ref Range    Color Yellow     Character Clear     Specific Gravity 1.028 <1.035    Ph 5.5 5.0 - 8.0    Glucose Negative Negative mg/dL    Ketones Trace (A) Negative mg/dL    Protein Negative Negative mg/dL    Bilirubin Negative Negative    Urobilinogen, Urine 0.2 Negative    Nitrite Negative Negative    Leukocyte Esterase Small (A) Negative    Occult Blood Negative Negative    Micro Urine Req Microscopic    URINE MICROSCOPIC (W/UA)   Result Value Ref Range    WBC 2-5 /hpf    RBC 0-2 /hpf    Bacteria Negative None /hpf    Epithelial Cells Few /hpf    Ca Oxalate Crystal Rare /hpf    Hyaline Cast 0-2 /lpf   POC BREATHALIZER   Result Value Ref Range    POC Breathalizer 0.00 0.00 - 0.01 Percent       Radiology  No orders to display       Problem List  1. Psychosis, unspecified psychosis type (HCC) Acute       2. Altered mental status, unspecified altered mental status type Acute             Electronically signed by: Ashly Medina M.D., 10/24/2023 9:21 AM

## 2023-10-24 NOTE — ED NOTES
Patient walked to restroom. teleSitter in doorway for direct 1:1 observation. Room safety checklist in use. No needs at this time, will continue to monitor

## 2023-10-24 NOTE — ED NOTES
Patient remains comfortable on gurney, no identifiable needs at this time. Equal chest rise and fall bilaterally. Safety measures in place, tele sitter at beside

## 2023-10-24 NOTE — ED NOTES
Patient provided with fresh water. Linen change and toothbrush offered, but refused. teleSitter in doorway for direct 1:1 observation. Room safety checklist in use. No needs at this time, will continue to monitor

## 2023-10-24 NOTE — DISCHARGE PLANNING
Alert Team Note:    Contacted by [facility], spoke to Alice. Updated labs have been received and reviewed. Pt is on the pending list. Facility is at capacity and will not be taking new admissions today.

## 2023-10-24 NOTE — ED NOTES
Hourly rounding complete.pt resting on gurney, provided milk and crackers per request. Telesitter monitoring in place. No other needs at this time.

## 2023-10-24 NOTE — ED NOTES
Bedside report received from off going RN/tech: Miesha, assumed care of patient.  POC discussed with patient. Call light within reach, all needs addressed at this time.       Fall risk interventions in place: Not Applicable (all applicable per [place] Fall risk assessment)   Continuous monitoring: Not Applicable   IVF/IV medications: Not Applicable   Oxygen: Room Air  Bedside sitter: Telesitter at bedside  Isolation: Not Applicable

## 2023-10-24 NOTE — ED NOTES
Pt provided new gown. Resting on gurney. no identifiable needs at this time. Equal chest rise and fall bilaterally, pt connected to cardiac monitor.  Safety measures in place, tele sitter at bedside

## 2023-10-24 NOTE — ED NOTES
Vitals obtained and stable. Provided pt with additional snacks. Telesitter remains in direct view of patient.

## 2023-10-24 NOTE — ED NOTES
Food delivered to pt. Patient remains comfortable on gurney, no identifiable needs at this time. Equal chest rise and fall bilaterally. Safety measures in place, tele sitter 1:1 in direct view of pt.

## 2023-10-24 NOTE — ED NOTES
Bedside report received from off going RN/tech: Jo-Ann, assumed care of patient. Pt on legal hold, telesitter monitoring in place. Pt to and from the bathroom with RN in sight of patient. Provided juice.     Fall risk interventions in place: Not Applicable (all applicable per [place] Fall risk assessment)   Continuous monitoring: Not Applicable   IVF/IV medications: Not Applicable   Oxygen: Room Air  Bedside sitter: Not Applicable   Isolation: Not Applicable

## 2023-10-24 NOTE — DISCHARGE PLANNING
Legal Hold     Referral: Legal Hold Court     Intervention: Pt presented for legal hold meeting with  via video conferencing to discuss legal options of contesting hold and meeting with the court doctors tomorrow afternoon, or not contesting legal hold and continuing the hold for one week.  advised that pt's legal hold will be be continued for one week (11/2).       Plan: Pt's legal hold has been continued for one week. Pt awaiting transfer to an in patient psych facility.

## 2023-10-25 LAB
DRUG SCREEN COMMENT UR-IMP: NORMAL
FENTANYL UR-MCNC: NEGATIVE NG/ML

## 2023-10-25 PROCEDURE — A9270 NON-COVERED ITEM OR SERVICE: HCPCS

## 2023-10-25 PROCEDURE — 700102 HCHG RX REV CODE 250 W/ 637 OVERRIDE(OP)

## 2023-10-25 PROCEDURE — 700101 HCHG RX REV CODE 250

## 2023-10-25 RX ADMIN — PALIPERIDONE 6 MG: 6 TABLET, EXTENDED RELEASE ORAL at 05:46

## 2023-10-25 RX ADMIN — LORAZEPAM 2 MG: 2 TABLET ORAL at 05:47

## 2023-10-25 RX ADMIN — LORAZEPAM 2 MG: 2 TABLET ORAL at 19:38

## 2023-10-25 NOTE — ED NOTES
Pt resting in bed, with even rise and fall of chest noted. No obvious signs of pain or distress, tele-sitter in room performing direct observation, reposition self occasionally, bed in low position, safety rooms features in place.

## 2023-10-25 NOTE — ED NOTES
Pt requesting information on shelter provided homelessness resource packet. Requesting to have insurance verified. Registration notified

## 2023-10-25 NOTE — ED NOTES
Checked on bed, with unlabored respirations. No safety risk noted  Sleeping  On teleSitter with continuous visual monitoring by Trained Personnel  Continued safety precaution  Oseirsarwat in low position, side rail up for pt safety.   No needs identified at the moment

## 2023-10-25 NOTE — ED NOTES
Bedside report from CHRISTIE Rader. Pt on gurney in lowest, locked position, on RA.  Pt in hospital scrub. All potentially harmful non medically essential items removed from room.   Checklist completed, stop sign on door.   Call light in reach.    Pt resting in bed, appears to be sleeping with even rise and fall of chest noted. No obvious signs of pain or distress, tele-sitter in room performing direct observation, reposition self occasionally, bed in low position, safety rooms features in place.

## 2023-10-25 NOTE — ED NOTES
Bedside report received from off going RN Jasmeet, assumed care of patient.  POC discussed with patient. Call light within reach, all needs addressed at this time.       Fall risk interventions in place: Not Applicable (all applicable per [place] Fall risk assessment)   Continuous monitoring: Not Applicable   IVF/IV medications: Not Applicable   Oxygen: Room Air  Bedside sitter: legal hold;on telesitter  Isolation: Not Applicable

## 2023-10-25 NOTE — ED NOTES
Pt ambulated to BR w/ steady gait.  Pt is responding to internal stimuli and speaking nonsensically.  Pt medicated per MAR.   Re-checked pt's vitals.  Pt back in bed, with even rise and fall of chest noted. No obvious signs of pain or distress, tele-sitter in room performing direct observation, reposition self occasionally, bed in low position, safety rooms features in place.

## 2023-10-25 NOTE — ED NOTES
Checked on bed, with unlabored respirations. No safety risk noted  Sleeping  teleSitter - with continuous visual monitoring by Trained Personnel  Continued safety precaution  Oseirsarwat in low position, side rail up for pt safety.   No needs identified at the moment

## 2023-10-25 NOTE — ED NOTES
Assumed pt care. Tele sitter present in room pt sleeping at this time with observed chest rise and fall.

## 2023-10-25 NOTE — PROGRESS NOTES
"ED Observation Progress Note    Date of Service: 10/25/23    Interval History and Interventions  Patient is holding the emergency department pending psychiatric care/placement.  No complaints.  Vital signs stable.  Patient was started on medications by psychiatry.    Physical Exam  /88   Pulse (!) 101   Temp 36.9 °C (98.5 °F) (Temporal)   Resp 18   Ht 1.702 m (5' 7\")   Wt 93 kg (205 lb)   SpO2 98%   BMI 32.11 kg/m² .    Constitutional: Awake and alert. Nontoxic  HENT:  Grossly normal  Eyes: Grossly normal  Neck: Normal range of motion  Cardiovascular: Normal heart rate   Thorax & Lungs: No respiratory distress  Skin:  No pathologic rash.   Extremities: Well perfused  Psychiatric:  flat affect    Labs  Results for orders placed or performed during the hospital encounter of 10/22/23   URINE DRUG SCREEN   Result Value Ref Range    Amphetamines Urine Negative Negative    Barbiturates Negative Negative    Benzodiazepines Negative Negative    Cocaine Metabolite Negative Negative    Fentanyl, Urine Negative Negative    Methadone Negative Negative    Opiates Negative Negative    Oxycodone Negative Negative    Phencyclidine -Pcp Negative Negative    Propoxyphene Negative Negative    Cannabinoid Metab Negative Negative   CoV-2, Flu A/B, And RSV by PCR ([employer])    Specimen: Respirate   Result Value Ref Range    Influenza virus A RNA Negative Negative    Influenza virus B, PCR Negative Negative    RSV, PCR Negative Negative    SARS-CoV-2 by PCR NotDetected     SARS-CoV-2 Source NP Swab    HCG QUANTITATIVE   Result Value Ref Range    Bhcg <1.0 0.0 - 5.0 mIU/mL   CBC WITH DIFFERENTIAL   Result Value Ref Range    WBC 9.0 4.8 - 10.8 K/uL    RBC 4.74 4.20 - 5.40 M/uL    Hemoglobin 13.0 12.0 - 16.0 g/dL    Hematocrit 39.8 37.0 - 47.0 %    MCV 84.0 81.4 - 97.8 fL    MCH 27.4 27.0 - 33.0 pg    MCHC 32.7 32.2 - 35.5 g/dL    RDW 44.7 35.9 - 50.0 fL    Platelet Count 368 164 - 446 K/uL    MPV 9.2 9.0 - 12.9 fL    " Neutrophils-Polys 67.10 44.00 - 72.00 %    Lymphocytes 23.60 22.00 - 41.00 %    Monocytes 7.50 0.00 - 13.40 %    Eosinophils 1.00 0.00 - 6.90 %    Basophils 0.40 0.00 - 1.80 %    Immature Granulocytes 0.40 0.00 - 0.90 %    Nucleated RBC 0.00 0.00 - 0.20 /100 WBC    Neutrophils (Absolute) 6.03 1.82 - 7.42 K/uL    Lymphs (Absolute) 2.13 1.00 - 4.80 K/uL    Monos (Absolute) 0.68 0.00 - 0.85 K/uL    Eos (Absolute) 0.09 0.00 - 0.51 K/uL    Baso (Absolute) 0.04 0.00 - 0.12 K/uL    Immature Granulocytes (abs) 0.04 0.00 - 0.11 K/uL    NRBC (Absolute) 0.00 K/uL   URINALYSIS    Specimen: Blood   Result Value Ref Range    Color Yellow     Character Clear     Specific Gravity 1.028 <1.035    Ph 5.5 5.0 - 8.0    Glucose Negative Negative mg/dL    Ketones Trace (A) Negative mg/dL    Protein Negative Negative mg/dL    Bilirubin Negative Negative    Urobilinogen, Urine 0.2 Negative    Nitrite Negative Negative    Leukocyte Esterase Small (A) Negative    Occult Blood Negative Negative    Micro Urine Req Microscopic    URINE MICROSCOPIC (W/UA)   Result Value Ref Range    WBC 2-5 /hpf    RBC 0-2 /hpf    Bacteria Negative None /hpf    Epithelial Cells Few /hpf    Ca Oxalate Crystal Rare /hpf    Hyaline Cast 0-2 /lpf   POC BREATHALIZER   Result Value Ref Range    POC Breathalizer 0.00 0.00 - 0.01 Percent       Problem List  1.  Psychosis    Electronically signed by: John Santiago M.D., 10/25/2023 5:22 AM

## 2023-10-25 NOTE — ED NOTES
Checked on bed, with unlabored respirations. No safety risk noted  Sleeping  On teleSitter - with continuous visual monitoring by Trained Personnel  Continued safety precaution  Susanne in low position, side rail up for pt safety.   No needs identified at the moment

## 2023-10-25 NOTE — DISCHARGE PLANNING
Alert Team Note:    Contacted by [facility], spoke to Alice. Facility is requiring an RN skin assessment note to ensure pt has no skin issues. Informed RN Tatiana.

## 2023-10-26 VITALS
WEIGHT: 205 LBS | SYSTOLIC BLOOD PRESSURE: 119 MMHG | HEART RATE: 96 BPM | DIASTOLIC BLOOD PRESSURE: 68 MMHG | HEIGHT: 67 IN | TEMPERATURE: 97.9 F | OXYGEN SATURATION: 97 % | RESPIRATION RATE: 16 BRPM | BODY MASS INDEX: 32.18 KG/M2

## 2023-10-26 PROCEDURE — 700101 HCHG RX REV CODE 250: Mod: UD

## 2023-10-26 PROCEDURE — A9270 NON-COVERED ITEM OR SERVICE: HCPCS | Mod: UD

## 2023-10-26 RX ADMIN — PALIPERIDONE 6 MG: 6 TABLET, EXTENDED RELEASE ORAL at 07:08

## 2023-10-26 NOTE — ED NOTES
Checked on bed, with unlabored respirations. No safety risk noted  Sleeping  On tele sitter with continuous visual monitoring by Trained Personnel  Continued safety precaution  Gursarwat in low position, side rail up for pt safety.   No needs identified at the moment

## 2023-10-26 NOTE — DISCHARGE PLANNING
Alert Team Note:    Per Alice with [facility], due to construction, facility is at capacity and has no bed availability at this time.

## 2023-10-26 NOTE — ED NOTES
Pt lying left lateral side, eyes closed, tucking arms under blanket. Telesitter at bedside. Stop sign at door.

## 2023-10-26 NOTE — ED NOTES
Pt lying supine, sleeping. Active chest rise and fall noted. Telesitter in room. Stop sign at door.

## 2023-10-26 NOTE — ED NOTES
Report from Laurel SORIANO    Checked on bed, with unlabored respirations. No safety risk noted  Sleeping  Tele Sitter with continuous visual monitoring by Trained Personnel  Continued safety precaution  Oseirsarwat in low position, side rail up for pt safety.   No needs identified at the moment

## 2023-10-26 NOTE — ED NOTES
Pt ambulates to restroom, RN in direct view of pt. Pt provided with new paper pants to change into per request. Pt ambulates back to bed. Pt provided with crackers and water.

## 2023-10-26 NOTE — ED NOTES
Bedside report received from off going RN/tech: April, assumed care of patient.  POC discussed with patient. Call light within reach, all needs addressed at this time.       Fall risk interventions in place: Tele-sitter (all applicable per [place] Fall risk assessment)   Continuous monitoring: Not Applicable   IVF/IV medications: Not Applicable   Oxygen: Room Air  Bedside sitter: Pt on L2k SI with 1:1 sitter telesitter (name) and checklist completed, stop sign in doorway  Isolation: Not Applicable

## 2023-10-26 NOTE — DISCHARGE SUMMARY
"  ED Observation Discharge Summary    Patient:Dunia Sahni  Patient : 1985  Patient MRN: [id number]  Patient PCP: Pcp Pt States None    Admit Date: 10/22/2023  Discharge Date and Time: 10/26/23 10:01 AM  Discharge Diagnosis:   1. Psychosis, unspecified psychosis type (HCC) Acute   2. Altered mental status, unspecified altered mental status type Acute     Discharge Attending: Sam Pereira M.D.  Discharge Service: ED Observation    ED Course  Patient remains in the emergency department awaiting placement.  Patient with psychosis, likely undiagnosed bipolar versus schizophrenia.  Patient was started on Invega, she is awaiting placement versus safe discharge plan.  Patient has no medical complaints upon discharge    Discharge Exam:  /68   Pulse 96   Temp 36.6 °C (97.9 °F) (Temporal)   Resp 16   Ht 1.702 m (5' 7\")   Wt 93 kg (205 lb)   SpO2 97%   BMI 32.11 kg/m² .    Constitutional: Awake and alert. Nontoxic  HENT:  Grossly normal  Eyes: Grossly normal  Neck: Normal range of motion  Cardiovascular: Normal heart rate   Thorax & Lungs: No respiratory distress  Abdomen: Nontender  Skin:  No pathologic rash.   Extremities: Well perfused  Psychiatric: Affect normal    Labs  Results for orders placed or performed during the hospital encounter of 10/22/23   URINE DRUG SCREEN   Result Value Ref Range    Amphetamines Urine Negative Negative    Barbiturates Negative Negative    Benzodiazepines Negative Negative    Cocaine Metabolite Negative Negative    Fentanyl, Urine Negative Negative    Methadone Negative Negative    Opiates Negative Negative    Oxycodone Negative Negative    Phencyclidine -Pcp Negative Negative    Propoxyphene Negative Negative    Cannabinoid Metab Negative Negative   CoV-2, Flu A/B, And RSV by PCR (AppFog)    Specimen: Respirate   Result Value Ref Range    Influenza virus A RNA Negative Negative    Influenza virus B, PCR Negative Negative    RSV, PCR Negative Negative    " SARS-CoV-2 by PCR NotDetected     SARS-CoV-2 Source NP Swab    HCG QUANTITATIVE   Result Value Ref Range    Bhcg <1.0 0.0 - 5.0 mIU/mL   CBC WITH DIFFERENTIAL   Result Value Ref Range    WBC 9.0 4.8 - 10.8 K/uL    RBC 4.74 4.20 - 5.40 M/uL    Hemoglobin 13.0 12.0 - 16.0 g/dL    Hematocrit 39.8 37.0 - 47.0 %    MCV 84.0 81.4 - 97.8 fL    MCH 27.4 27.0 - 33.0 pg    MCHC 32.7 32.2 - 35.5 g/dL    RDW 44.7 35.9 - 50.0 fL    Platelet Count 368 164 - 446 K/uL    MPV 9.2 9.0 - 12.9 fL    Neutrophils-Polys 67.10 44.00 - 72.00 %    Lymphocytes 23.60 22.00 - 41.00 %    Monocytes 7.50 0.00 - 13.40 %    Eosinophils 1.00 0.00 - 6.90 %    Basophils 0.40 0.00 - 1.80 %    Immature Granulocytes 0.40 0.00 - 0.90 %    Nucleated RBC 0.00 0.00 - 0.20 /100 WBC    Neutrophils (Absolute) 6.03 1.82 - 7.42 K/uL    Lymphs (Absolute) 2.13 1.00 - 4.80 K/uL    Monos (Absolute) 0.68 0.00 - 0.85 K/uL    Eos (Absolute) 0.09 0.00 - 0.51 K/uL    Baso (Absolute) 0.04 0.00 - 0.12 K/uL    Immature Granulocytes (abs) 0.04 0.00 - 0.11 K/uL    NRBC (Absolute) 0.00 K/uL   FENTANYL, UR SCREEN RFLX QUANT   Result Value Ref Range    Fentanyl, Urn, Scrn Negative Cutoff 1 ng/mL    Scrn, Urine Interpretation See Note    URINALYSIS    Specimen: Blood   Result Value Ref Range    Color Yellow     Character Clear     Specific Gravity 1.028 <1.035    Ph 5.5 5.0 - 8.0    Glucose Negative Negative mg/dL    Ketones Trace (A) Negative mg/dL    Protein Negative Negative mg/dL    Bilirubin Negative Negative    Urobilinogen, Urine 0.2 Negative    Nitrite Negative Negative    Leukocyte Esterase Small (A) Negative    Occult Blood Negative Negative    Micro Urine Req Microscopic    URINE MICROSCOPIC (W/UA)   Result Value Ref Range    WBC 2-5 /hpf    RBC 0-2 /hpf    Bacteria Negative None /hpf    Epithelial Cells Few /hpf    Ca Oxalate Crystal Rare /hpf    Hyaline Cast 0-2 /lpf   POC BREATHALIZER   Result Value Ref Range    POC Breathalizer 0.00 0.00 - 0.01 Percent        Radiology  No orders to display       Medications:   New Prescriptions    No medications on file       My final assessment includes   1. Psychosis, unspecified psychosis type (HCC) Acute   2. Altered mental status, unspecified altered mental status type Acute     Upon Reevaluation, the patient's condition has: Improved; and will be discharged.    Patient discharged from ED Observation status at 10:02 AM (Time) 10/26/23 (Date).     Total time spent on this ED Observation discharge encounter is > 30 Minutes    Electronically signed by: Sam Pereira M.D., 10/26/2023 10:01 AM

## 2023-10-26 NOTE — ED NOTES
Pt medicated per MAR. Vitals assessed. Pt provided with meal tray. Pt has jumbled and incoherent speech, with intermittent appropriate statements. Pt requests shower. Provided with washcloth and soap. Placed on shower list.

## 2023-10-26 NOTE — ED NOTES
Checked on bed, with unlabored respirations. No safety risk noted  Sleeping  Sitter - 1:1 with continuous visual monitoring by Trained Personnel  Continued safety precaution  Oseirsarwat in low position, side rail up for pt safety.   No needs identified at the moment

## 2023-10-26 NOTE — DISCHARGE PLANNING
Alert Team Note:    Contacted by [facility], spoke to Alice. Pt has been accepted, accepting is Dr. Baptiste. Facility expects transport at 1030.  Informed LH DEBORA Levin.

## 2023-10-26 NOTE — ED NOTES
"Pt rounded. Refused vital signs check states \" I'm ok, Im good.\" This RN explained the importance of checking her vital signs, pt still refused. Pt not in acute distress, resting in bed with even respirations.   "

## 2023-10-26 NOTE — CONSULTS
"[facility]  PSYCHIATRIC CONSULTATION - Follow-up  Established Patient    DOS: 10/26/23     Reason for Admission:  arrived to the ER appearing to respond to internal stimuli.  History of bipolar and schizoaffective disorder.  Patient noted to have pressured speech.  Patient placed on a legal hold due to psychosis  Legal Hold Status: on legal hold    CC:   Chief Complaint   Patient presents with    Psych Eval     Pt arrives to the ED and appears to be responding to internal stimuli. Pt not answering any questions asked by triage staff. Chart review shows hx of Bipolar and Schizoaffective disorder. When asked if pt is suicidal she states \"no\" and then states \"yes\".                  S:   Patient observed in room, singing. Able to participate in brief assessment. Reports good sleep, appetite. Increased energy noted, observed singing out loud, pleasant, cooperative. \"Good\" mood, denies SI/HI, observed responding to internal simulation. Denies GI distress, HA, dizziness, appears to be tolerating medications. Agreeable to transfer to IP facility, asking about the facility, and activities available. Disorganized thought process, easily redirected towards meaningful conversation for brief periods, pleasant throughout encounter.    O:   Medical ROS (as pertinent):   Recent Labs     10/23/23  1223   WBC 9.0   RBC 4.74   HEMOGLOBIN 13.0   HEMATOCRIT 39.8   MCV 84.0   MCH 27.4   RDW 44.7   PLATELETCT 368   MPV 9.2   NEUTSPOLYS 67.10   LYMPHOCYTES 23.60   MONOCYTES 7.50   EOSINOPHILS 1.00   BASOPHILS 0.40     No results for input(s): \"SODIUM\", \"POTASSIUM\", \"CHLORIDE\", \"CO2\", \"GLUCOSE\", \"BUN\", \"CPKTOTAL\" in the last 72 hours.  No results for input(s): \"ALBUMIN\", \"TBILIRUBIN\", \"ALKPHOSPHAT\", \"TOTPROTEIN\", \"ALTSGPT\", \"ASTSGOT\", \"CREATININE\" in the last 72 hours.    EKG:   Results for orders placed or performed during the hospital encounter of 05/27/23   EKG   Result Value Ref Range    Report       [facility] " "[facility] Emergency Dept.    Test Date:  2023  Pt Name:    EVIE ENGEL           Department: ER  MRN:        [id number]                      Room:       [facility]  Gender:     Female                       Technician: [id number]  :        1985                   Requested By:ANITHA COWAN  Order #:    [id number]                    Reading MD: ANITHA COWAN MD    Measurements  Intervals                                Axis  Rate:       70                           P:          46  WV:         147                          QRS:        42  QRSD:       102                          T:          18  QT:         418  QTc:        452    Interpretive Statements  Sinus rhythm  Compared to ECG 2023 00:01:00  No significant changes  Electronically Signed On 2023 10:33:20 PDT by ANITHA COWAN MD          MSE:   /68   Pulse 96   Temp 36.6 °C (97.9 °F) (Temporal)   Resp 16   Ht 1.702 m (5' 7\")   Wt 93 kg (205 lb)   SpO2 97%     Constitutional: as noted above  General Appearance/Behavior: 38 y.o. appears normal habitus intermittent eye contact superficially cooperative, Poor impulse control  Abnormal Movements: none, no PMA/PMR or tremor observed.  Gait and Posture: not observed  Musculoskeletal: as noted above  Mood: \"Good\"  Affect: Inappropriate   Speech: loud  Language:  spontaneous, comprehends spoken commands, and fluent   Thought Process: Tangential, Future Oriented  Thought Content: Denies SI/HI. Responding to internal stimulation  Insight/Judgement:  unable to assess  Alert/Orientation: alert, only oriented to:, person, place  Attn/Concentration: short attention span  MMSE: deferred this visit     Medications:  Scheduled Medications   Medication Dose Frequency    paliperidone  6 mg DAILY       Allergies:   Allergies   Allergen Reactions    Vicodin [Hydrocodone-Acetaminophen] Anaphylaxis     RXN=9 years ago    Denies 2022        Assessment:   Diagnosis:   1. Psychosis, unspecified " psychosis type (HCC) Acute   2. Altered mental status, unspecified altered mental status type Acute        Psychiatric:   Schizoaffective disorder    Medical: as noted by the medical treatment team.      Recommendations:  Legal Status: on legal hold    Please transfer patient to inpatient psychiatric hospital when medically cleared and bed is available.    Observation status:   - Telesitter    Phone: Yes - Okay to use at nursing availability/discretion.   Visitors: No   Personal belongings: No     Discussed/voalted: WENDY Pereira MD    Medication Recommendations: Final orders as per Treatment Team  No new medication recommendations, continue paliperidone 6mg QAM  Risks/benefits/side effects discussed, patient verbalized understanding.  Medication reconciliation was completed.    Reviewed safety plan: 911, ER, PCM, MHC, suicide crisis line, nursing staff while inpatient.    Will Continue to Follow. Thank you for the consult.

## 2023-10-26 NOTE — DISCHARGE PLANNING
Legal Hold Transfer     Referral: Legal hold transfer to Mental Health Facility     Intervention: Informed by Alice at [facility] that pt has been accepted.     Pt's accepting physcian is Dr. Baptiste     Transport arranged through [place] [facility]     The pt will be picked up at 1030      Notified Bedside CHRISTIE Jade, Alert Team CHRISTIE Arita, and Dr. Pereira of the departure time as well as accepting facility.      Transfer packet to be created with original legal hold and placed on chart.      Plan: Pt will be transferring to [facility] via [facility] at 1030

## 2023-10-26 NOTE — ED NOTES
Bedside report received from off going RN/tech: Tatiana, assumed care of patient.  POC discussed with patient. Call light within reach, all needs addressed at this time.       Fall risk interventions in place: Not Applicable (all applicable per [place] Fall risk assessment)   Continuous monitoring: Not Applicable   IVF/IV medications: Not Applicable   Oxygen: Room Air  Bedside sitter: Telesitter, L2K  Isolation: Not Applicable

## 2023-10-26 NOTE — ED NOTES
Pt discharged to [facility] to transport pt to [facility]. 4 bags belongings sent with pt and crew from [facility]

## 2023-10-26 NOTE — DISCHARGE PLANNING
Alert Team:    Updated referral faxed to [facility] for review.    Informed pt out of medicare lifetime psych days.

## 2023-12-12 ENCOUNTER — HOSPITAL ENCOUNTER (EMERGENCY)
Facility: MEDICAL CENTER | Age: 38
End: 2023-12-12
Attending: EMERGENCY MEDICINE

## 2023-12-12 VITALS
WEIGHT: 224.87 LBS | BODY MASS INDEX: 35.29 KG/M2 | DIASTOLIC BLOOD PRESSURE: 45 MMHG | RESPIRATION RATE: 15 BRPM | HEIGHT: 67 IN | HEART RATE: 66 BPM | SYSTOLIC BLOOD PRESSURE: 106 MMHG | OXYGEN SATURATION: 94 % | TEMPERATURE: 97.5 F

## 2023-12-12 DIAGNOSIS — N39.0 URINARY TRACT INFECTION WITHOUT HEMATURIA, SITE UNSPECIFIED: ICD-10-CM

## 2023-12-12 DIAGNOSIS — R10.9 ABDOMINAL PAIN, UNSPECIFIED ABDOMINAL LOCATION: ICD-10-CM

## 2023-12-12 LAB
ALBUMIN SERPL BCP-MCNC: 3.9 G/DL (ref 3.2–4.9)
ALBUMIN/GLOB SERPL: 1.4 G/DL
ALP SERPL-CCNC: 85 U/L (ref 30–99)
ALT SERPL-CCNC: 13 U/L (ref 2–50)
ANION GAP SERPL CALC-SCNC: 9 MMOL/L (ref 7–16)
APPEARANCE UR: CLEAR
AST SERPL-CCNC: 12 U/L (ref 12–45)
BACTERIA #/AREA URNS HPF: ABNORMAL /HPF
BASOPHILS # BLD AUTO: 0.4 % (ref 0–1.8)
BASOPHILS # BLD: 0.04 K/UL (ref 0–0.12)
BILIRUB SERPL-MCNC: <0.2 MG/DL (ref 0.1–1.5)
BILIRUB UR QL STRIP.AUTO: NEGATIVE
BUN SERPL-MCNC: 7 MG/DL (ref 8–22)
CALCIUM ALBUM COR SERPL-MCNC: 9 MG/DL (ref 8.5–10.5)
CALCIUM SERPL-MCNC: 8.9 MG/DL (ref 8.5–10.5)
CHLORIDE SERPL-SCNC: 107 MMOL/L (ref 96–112)
CO2 SERPL-SCNC: 23 MMOL/L (ref 20–33)
COLOR UR: YELLOW
CREAT SERPL-MCNC: 0.63 MG/DL (ref 0.5–1.4)
EOSINOPHIL # BLD AUTO: 0.09 K/UL (ref 0–0.51)
EOSINOPHIL NFR BLD: 0.9 % (ref 0–6.9)
EPI CELLS #/AREA URNS HPF: ABNORMAL /HPF
ERYTHROCYTE [DISTWIDTH] IN BLOOD BY AUTOMATED COUNT: 45.8 FL (ref 35.9–50)
GFR SERPLBLD CREATININE-BSD FMLA CKD-EPI: 117 ML/MIN/1.73 M 2
GLOBULIN SER CALC-MCNC: 2.8 G/DL (ref 1.9–3.5)
GLUCOSE SERPL-MCNC: 113 MG/DL (ref 65–99)
GLUCOSE UR STRIP.AUTO-MCNC: NEGATIVE MG/DL
HCG SERPL QL: NEGATIVE
HCT VFR BLD AUTO: 38 % (ref 37–47)
HGB BLD-MCNC: 12.4 G/DL (ref 12–16)
HYALINE CASTS #/AREA URNS LPF: ABNORMAL /LPF
IMM GRANULOCYTES # BLD AUTO: 0.05 K/UL (ref 0–0.11)
IMM GRANULOCYTES NFR BLD AUTO: 0.5 % (ref 0–0.9)
KETONES UR STRIP.AUTO-MCNC: NEGATIVE MG/DL
LEUKOCYTE ESTERASE UR QL STRIP.AUTO: ABNORMAL
LIPASE SERPL-CCNC: 18 U/L (ref 11–82)
LYMPHOCYTES # BLD AUTO: 2.45 K/UL (ref 1–4.8)
LYMPHOCYTES NFR BLD: 24.3 % (ref 22–41)
MCH RBC QN AUTO: 28.1 PG (ref 27–33)
MCHC RBC AUTO-ENTMCNC: 32.6 G/DL (ref 32.2–35.5)
MCV RBC AUTO: 86 FL (ref 81.4–97.8)
MICRO URNS: ABNORMAL
MONOCYTES # BLD AUTO: 0.66 K/UL (ref 0–0.85)
MONOCYTES NFR BLD AUTO: 6.5 % (ref 0–13.4)
NEUTROPHILS # BLD AUTO: 6.79 K/UL (ref 1.82–7.42)
NEUTROPHILS NFR BLD: 67.4 % (ref 44–72)
NITRITE UR QL STRIP.AUTO: NEGATIVE
NRBC # BLD AUTO: 0 K/UL
NRBC BLD-RTO: 0 /100 WBC (ref 0–0.2)
PH UR STRIP.AUTO: 5.5 [PH] (ref 5–8)
PLATELET # BLD AUTO: 348 K/UL (ref 164–446)
PMV BLD AUTO: 9.2 FL (ref 9–12.9)
POTASSIUM SERPL-SCNC: 4.2 MMOL/L (ref 3.6–5.5)
PROT SERPL-MCNC: 6.7 G/DL (ref 6–8.2)
PROT UR QL STRIP: NEGATIVE MG/DL
RBC # BLD AUTO: 4.42 M/UL (ref 4.2–5.4)
RBC # URNS HPF: ABNORMAL /HPF
RBC UR QL AUTO: NEGATIVE
SODIUM SERPL-SCNC: 139 MMOL/L (ref 135–145)
SP GR UR STRIP.AUTO: 1.01
UROBILINOGEN UR STRIP.AUTO-MCNC: 0.2 MG/DL
WBC # BLD AUTO: 10.1 K/UL (ref 4.8–10.8)
WBC #/AREA URNS HPF: ABNORMAL /HPF

## 2023-12-12 PROCEDURE — 83690 ASSAY OF LIPASE: CPT

## 2023-12-12 PROCEDURE — 80053 COMPREHEN METABOLIC PANEL: CPT

## 2023-12-12 PROCEDURE — 81001 URINALYSIS AUTO W/SCOPE: CPT

## 2023-12-12 PROCEDURE — 84703 CHORIONIC GONADOTROPIN ASSAY: CPT

## 2023-12-12 PROCEDURE — 36415 COLL VENOUS BLD VENIPUNCTURE: CPT

## 2023-12-12 PROCEDURE — 87086 URINE CULTURE/COLONY COUNT: CPT

## 2023-12-12 PROCEDURE — A9270 NON-COVERED ITEM OR SERVICE: HCPCS | Performed by: EMERGENCY MEDICINE

## 2023-12-12 PROCEDURE — 99284 EMERGENCY DEPT VISIT MOD MDM: CPT

## 2023-12-12 PROCEDURE — 700102 HCHG RX REV CODE 250 W/ 637 OVERRIDE(OP): Performed by: EMERGENCY MEDICINE

## 2023-12-12 PROCEDURE — 85025 COMPLETE CBC W/AUTO DIFF WBC: CPT

## 2023-12-12 RX ORDER — CEFDINIR 300 MG/1
300 CAPSULE ORAL 2 TIMES DAILY
Qty: 10 CAPSULE | Refills: 0 | Status: ACTIVE | OUTPATIENT
Start: 2023-12-12 | End: 2023-12-17

## 2023-12-12 RX ORDER — CEFDINIR 300 MG/1
300 CAPSULE ORAL ONCE
Status: DISCONTINUED | OUTPATIENT
Start: 2023-12-13 | End: 2023-12-12

## 2023-12-12 RX ORDER — CEFDINIR 300 MG/1
300 CAPSULE ORAL ONCE
Status: COMPLETED | OUTPATIENT
Start: 2023-12-12 | End: 2023-12-12

## 2023-12-12 RX ADMIN — CEFDINIR 300 MG: 300 CAPSULE ORAL at 15:47

## 2023-12-12 ASSESSMENT — FIBROSIS 4 INDEX: FIB4 SCORE: 0.38

## 2023-12-12 NOTE — DISCHARGE INSTRUCTIONS
Abdominal Pain, Extended Version   Belly Pain, Stomach Pain    Take clear fluid diet 12 hours and slowly advance to solid food as tolerated. Take  Ibuprofen or Tylenol for pain as needed. Return to the emergency department in 24 hours for reevaluation if you continue to have abdominal. Return sooner if you have worsening pain (especially in the lower right abdomen), pain that is worse with movement, uncontrolled vomiting, new fever, bleeding or ill appearance as you may have a surgical condition in evolution that require further evaluation and consultation.    Your exam may not have shown the exact reason you have abdominal pain.  Since there are many different causes of abdominal pain, another checkup and more tests is required in 24 hours if your symptoms do not improve. One of the many possible causes of abdominal pain in any person who has not had their appendix removed is Acute Appendicitis.  Appendicitis is often a very difficult to diagnosis. Normal blood tests, urine tests, and even ultrasound and CT can not ensure there is not an early appendicitis. Sometimes only the changes which occur over time will allow appendicitis and other causes of abdominal pain to be determined.  Because of this, it is important you follow all of the instructions below.                                                                                                Home care instructions  Rest.  Do not eat solid food until your pain is gone.  While You Have Pain:  Stay on a clear liquid diet.  A clear liquid is one you can see through (water, weak tea, broth or bouillon, ginger ale, Jell-o, Azael-Aid, Gatorade, apple juice, popsicles or ice chips).   When Your Pain is Gone:  Start a light diet (dry toast, crackers, applesauce, white rice, bananas, broth or bouillon) and increase the diet slowly, as long as it does not bother you.  No dairy products (including cheese and eggs) and no spicy, fatty, fried or high fiber foods.  No  alcohol, caffeine or cigarettes.  Take your regular medicines unless the caregiver told you not to.  Take any prescribed medicine as directed.  Do not take medicine containing aspirin, ibuprofen (Advil® / Motrin® ), naprosyn/naproxen (Aleve®) or ketoprofen (Orudis® ) unless told to by your own caregiver.    seek immediate medical attention if :  Your pain is not gone in 8-12 hours.  Your pain becomes worse, changes location or feels different.  You have a fever or shaking chills.  You keep throwing up or cannot drink liquids.   You see blood when you throw up or see blood in your bowel movements.    Your bowel movements become dark or black.  You move your bowels frequently.  Your bowel movements stop (become blocked) or you cannot pass gas.  You have bloody, frequent or painful urination (“passing water”).  Your skin or the whites of your eyes look yellow.  Your stomach becomes bloated or bigger.  You notice bleeding or discharge from your vagina.  You have dizziness or fainting.  You have chest or back pain.   Anything else worries you.  You become short of breath.    If you have questions or concerns, please call your caregiver.     Adapted from ©2001  [employer] Aftercare Instruction Sheets  Last reviewed July 12, 2005, Layout Cotera, creator of [employer] Patient Information System.    [employer]® Patient Information ©2007 [employer].

## 2023-12-12 NOTE — ED TRIAGE NOTES
Chief Complaint   Patient presents with    Abdominal Pain     Pt BIB EMS for multiple complaints. First was Abd pain for 3 wks, LLQ pain. Pt states in triage room normal bowel movements but would like an U/S.      Explained to pt triage process, made pt aware to tell this RN/staff of any changes/concerns, pt verbalized understanding of process and instructions given. Pt to ER lobby.

## 2023-12-13 NOTE — ED PROVIDER NOTES
ED Provider Note    CHIEF COMPLAINT  Chief Complaint   Patient presents with    Abdominal Pain     Pt BIB EMS for multiple complaints. First was Abd pain for 3 wks, LLQ pain. Pt states in triage room normal bowel movements but would like an U/S.        EXTERNAL RECORDS REVIEWED  Nurse practitioner on 10/26/2023 for psychiatric consultation for history of internal stimuli schizoaffective disorder and bipolar disorder.    HPI/ROS    Dunia Sahni is a 38 y.o. female who presents states that she is here for abdominal pain.  She came by EMS stating she has pain throughout her entire abdomen but she would like to be checked for possible pregnancy and have an ultrasound because she is pregnant.  The patient denies vaginal bleeding, vaginal discharge, nausea, vomiting currently is denying any pain.  She denies any dysuria or hematuria.    PAST MEDICAL HISTORY   has a past medical history of Abscess (10/12/2017), Bipolar affective (HCC), Depression, Kidney infection, Leukemia (HCC) (12/2019), Schizoaffective disorder (HCC), Substance abuse (HCC), and Suicide attempt (HCC).    SURGICAL HISTORY   has a past surgical history that includes appendectomy; ercp in or (N/A, 7/5/2018); and skip by laparoscopy (N/A, 7/6/2018).    FAMILY HISTORY  Family History   Family history unknown: Yes       SOCIAL HISTORY  Social History     Tobacco Use    Smoking status: Never    Smokeless tobacco: Never   Vaping Use    Vaping Use: Never used   Substance and Sexual Activity    Alcohol use: Yes     Comment: patient reports she drinks once or twice a year    Drug use: Yes     Types: Inhaled     Comment: hx: marijuana, cocaine    Sexual activity: Not on file       CURRENT MEDICATIONS  Home Medications    **Home medications have not yet been reviewed for this encounter**         ALLERGIES  Allergies   Allergen Reactions    Vicodin [Hydrocodone-Acetaminophen] Anaphylaxis     RXN=9 years ago    Denies 11/19/2022       PHYSICAL EXAM  VITAL  "SIGNS: /45   Pulse 66   Temp 36.4 °C (97.5 °F)   Resp 15   Ht 1.702 m (5' 7\")   Wt 102 kg (224 lb 13.9 oz)   SpO2 94%   BMI 35.22 kg/m²      Nursing notes and vitals reviewed.  Constitutional: Well developed, Well nourished, No acute distress, Non-toxic appearance.   Eyes: PERRLA, EOMI, Conjunctiva normal, No discharge.   Thorax & Lungs: No respiratory distress, No rales, No rhonchi, No wheezing, No chest tenderness.   Abdomen: Bowel sounds normal, Soft, No tenderness, No guarding, No rebound, No masses, No pulsatile masses.   Skin: Warm, Dry, No erythema, No rash.   Extremities: No deformity, no edema, good range of motion range of motion upper lower extremes bilaterally  Neurologic: Alert & oriented x 3, no focal abnormalities noted, acting appropriately on examination  Psychiatric: Anxious affect      DIAGNOSTIC STUDIES / PROCEDURES    LABS  Results for orders placed or performed during the hospital encounter of 12/12/23   CBC WITH DIFFERENTIAL   Result Value Ref Range    WBC 10.1 4.8 - 10.8 K/uL    RBC 4.42 4.20 - 5.40 M/uL    Hemoglobin 12.4 12.0 - 16.0 g/dL    Hematocrit 38.0 37.0 - 47.0 %    MCV 86.0 81.4 - 97.8 fL    MCH 28.1 27.0 - 33.0 pg    MCHC 32.6 32.2 - 35.5 g/dL    RDW 45.8 35.9 - 50.0 fL    Platelet Count 348 164 - 446 K/uL    MPV 9.2 9.0 - 12.9 fL    Neutrophils-Polys 67.40 44.00 - 72.00 %    Lymphocytes 24.30 22.00 - 41.00 %    Monocytes 6.50 0.00 - 13.40 %    Eosinophils 0.90 0.00 - 6.90 %    Basophils 0.40 0.00 - 1.80 %    Immature Granulocytes 0.50 0.00 - 0.90 %    Nucleated RBC 0.00 0.00 - 0.20 /100 WBC    Neutrophils (Absolute) 6.79 1.82 - 7.42 K/uL    Lymphs (Absolute) 2.45 1.00 - 4.80 K/uL    Monos (Absolute) 0.66 0.00 - 0.85 K/uL    Eos (Absolute) 0.09 0.00 - 0.51 K/uL    Baso (Absolute) 0.04 0.00 - 0.12 K/uL    Immature Granulocytes (abs) 0.05 0.00 - 0.11 K/uL    NRBC (Absolute) 0.00 K/uL   COMP METABOLIC PANEL   Result Value Ref Range    Sodium 139 135 - 145 mmol/L    " Potassium 4.2 3.6 - 5.5 mmol/L    Chloride 107 96 - 112 mmol/L    Co2 23 20 - 33 mmol/L    Anion Gap 9.0 7.0 - 16.0    Glucose 113 (H) 65 - 99 mg/dL    Bun 7 (L) 8 - 22 mg/dL    Creatinine 0.63 0.50 - 1.40 mg/dL    Calcium 8.9 8.5 - 10.5 mg/dL    Correct Calcium 9.0 8.5 - 10.5 mg/dL    AST(SGOT) 12 12 - 45 U/L    ALT(SGPT) 13 2 - 50 U/L    Alkaline Phosphatase 85 30 - 99 U/L    Total Bilirubin <0.2 0.1 - 1.5 mg/dL    Albumin 3.9 3.2 - 4.9 g/dL    Total Protein 6.7 6.0 - 8.2 g/dL    Globulin 2.8 1.9 - 3.5 g/dL    A-G Ratio 1.4 g/dL   LIPASE   Result Value Ref Range    Lipase 18 11 - 82 U/L   HCG QUAL SERUM   Result Value Ref Range    Beta-Hcg Qualitative Serum Negative Negative   URINALYSIS,CULTURE IF INDICATED    Specimen: Urine   Result Value Ref Range    Color Yellow     Character Clear     Specific Gravity 1.012 <1.035    Ph 5.5 5.0 - 8.0    Glucose Negative Negative mg/dL    Ketones Negative Negative mg/dL    Protein Negative Negative mg/dL    Bilirubin Negative Negative    Urobilinogen, Urine 0.2 Negative    Nitrite Negative Negative    Leukocyte Esterase Moderate (A) Negative    Occult Blood Negative Negative    Micro Urine Req Microscopic    ESTIMATED GFR   Result Value Ref Range    GFR (CKD-EPI) 117 >60 mL/min/1.73 m 2   URINE MICROSCOPIC (W/UA)   Result Value Ref Range    WBC 20-50 (A) /hpf    RBC 0-2 /hpf    Bacteria Moderate (A) None /hpf    Epithelial Cells Few /hpf    Hyaline Cast 0-2 /lpf   URINE CULTURE(NEW)    Specimen: Urine   Result Value Ref Range    Significant Indicator NEG     Source UR     Site -     Culture Result -          COURSE & MEDICAL DECISION MAKING    ED Observation Status? No; Patient does not meet criteria for ED Observation.     INITIAL ASSESSMENT, COURSE AND PLAN  Care Narrative: This is a pleasant 38-year-old female presents stating she is pregnant like an ultrasound.  Here in the emergency department, the patient does not have a pregnancy positive test.  She has no abdominal  discomfort or tenderness and do not believe she has ectopic pregnancy, ovarian torsion or ovarian cyst.  She has no abdominal tenderness whatsoever.  The patient has no [place] leukocytosis, she is afebrile I do not believe she has an overlying infection.  In addition, the patient had a urinary tract infection on her urine test therefore she received cefdinir here and a prescription for cefdinir for home.    Reevaluation prior to discharge at 1450, the patient has no evidence of a surgical abdomen, she has a soft, nontender abdomen, she is positive p.o. will be discharged.  ADDITIONAL PROBLEM LIST  Schizoaffective disorder prior presenting today with component of that.  Does not require psychiatric evaluation.  DISPOSITION AND DISCUSSIONS    Escalation of care considered, and ultimately not performed:acute inpatient care management, however at this time, the patient is most appropriate for outpatient management        Decision tools and prescription drugs considered including, but not limited to: Consider imaging with the patient has nontender none surgical abdomen, she does not have a fever, she does have a leukocytosis I do not believe she has intra-abdominal infection requiring evaluation.    Although at this point I do not believe the patient has an acute intra-abdominal process that requires emergent surgical intervention there is a possibility that the patient is experiencing an early infection that is in evolution that may require further evaluation and possible surgical consultation. Therefore, strict return precautions have been given to return to the emergency department within 24 hours if the patient has any remaining abdominal pain and to return sooner for increasing pain, uncontrolled nausea or vomiting, fevers or change in symptoms. The patient will followup with their primary care physician within 3 days for re-evaluation.      FINAL DIAGNOSIS  1. Abdominal pain, unspecified abdominal location Active    2. Urinary tract infection without hematuria, site unspecified      DISPOSITION:  Patient will be discharged home in stable condition.    FOLLOW UP:  [facility], Emergency Dept  [address]  [place] [address] [phone]    If symptoms worsen      OUTPATIENT MEDICATIONS:  Discharge Medication List as of 12/12/2023  3:49 PM        START taking these medications    Details   cefdinir (OMNICEF) 300 MG Cap Take 1 Capsule by mouth 2 times a day for 5 days., Disp-10 Capsule, R-0, Normal             Electronically signed by: Ricky Santos D.O., 12/12/2023 5:12 PM

## 2023-12-14 LAB
BACTERIA UR CULT: NORMAL
SIGNIFICANT IND 70042: NORMAL
SITE SITE: NORMAL
SOURCE SOURCE: NORMAL

## 2024-05-01 NOTE — ED NOTES
CHW completed pt's LOCET.   Patient ambulated to restroom with RN escort, patient now brushing hair with comb in room.

## 2024-06-03 ENCOUNTER — APPOINTMENT (OUTPATIENT)
Dept: RADIOLOGY | Facility: MEDICAL CENTER | Age: 39
End: 2024-06-03
Attending: STUDENT IN AN ORGANIZED HEALTH CARE EDUCATION/TRAINING PROGRAM

## 2024-06-03 ENCOUNTER — HOSPITAL ENCOUNTER (EMERGENCY)
Facility: MEDICAL CENTER | Age: 39
End: 2024-06-03
Attending: STUDENT IN AN ORGANIZED HEALTH CARE EDUCATION/TRAINING PROGRAM

## 2024-06-03 VITALS
SYSTOLIC BLOOD PRESSURE: 141 MMHG | WEIGHT: 243.17 LBS | DIASTOLIC BLOOD PRESSURE: 73 MMHG | OXYGEN SATURATION: 93 % | TEMPERATURE: 97.4 F | HEART RATE: 72 BPM | HEIGHT: 67 IN | BODY MASS INDEX: 38.17 KG/M2 | RESPIRATION RATE: 20 BRPM

## 2024-06-03 DIAGNOSIS — R05.9 COUGH, UNSPECIFIED TYPE: ICD-10-CM

## 2024-06-03 LAB
ALBUMIN SERPL BCP-MCNC: 3.8 G/DL (ref 3.2–4.9)
ALBUMIN/GLOB SERPL: 1.5 G/DL
ALP SERPL-CCNC: 94 U/L (ref 30–99)
ALT SERPL-CCNC: 17 U/L (ref 2–50)
ANION GAP SERPL CALC-SCNC: 11 MMOL/L (ref 7–16)
AST SERPL-CCNC: 15 U/L (ref 12–45)
BASOPHILS # BLD AUTO: 0.3 % (ref 0–1.8)
BASOPHILS # BLD: 0.03 K/UL (ref 0–0.12)
BILIRUB SERPL-MCNC: 0.4 MG/DL (ref 0.1–1.5)
BUN SERPL-MCNC: 12 MG/DL (ref 8–22)
CALCIUM ALBUM COR SERPL-MCNC: 9 MG/DL (ref 8.5–10.5)
CALCIUM SERPL-MCNC: 8.8 MG/DL (ref 8.5–10.5)
CHLORIDE SERPL-SCNC: 105 MMOL/L (ref 96–112)
CO2 SERPL-SCNC: 23 MMOL/L (ref 20–33)
CREAT SERPL-MCNC: 0.7 MG/DL (ref 0.5–1.4)
EKG IMPRESSION: NORMAL
EOSINOPHIL # BLD AUTO: 0.13 K/UL (ref 0–0.51)
EOSINOPHIL NFR BLD: 1.2 % (ref 0–6.9)
ERYTHROCYTE [DISTWIDTH] IN BLOOD BY AUTOMATED COUNT: 44.3 FL (ref 35.9–50)
GFR SERPLBLD CREATININE-BSD FMLA CKD-EPI: 114 ML/MIN/1.73 M 2
GLOBULIN SER CALC-MCNC: 2.6 G/DL (ref 1.9–3.5)
GLUCOSE SERPL-MCNC: 97 MG/DL (ref 65–99)
HCG SERPL QL: NEGATIVE
HCT VFR BLD AUTO: 39.3 % (ref 37–47)
HGB BLD-MCNC: 12.6 G/DL (ref 12–16)
IMM GRANULOCYTES # BLD AUTO: 0.04 K/UL (ref 0–0.11)
IMM GRANULOCYTES NFR BLD AUTO: 0.4 % (ref 0–0.9)
LIPASE SERPL-CCNC: 14 U/L (ref 11–82)
LYMPHOCYTES # BLD AUTO: 1.88 K/UL (ref 1–4.8)
LYMPHOCYTES NFR BLD: 16.6 % (ref 22–41)
MCH RBC QN AUTO: 28.5 PG (ref 27–33)
MCHC RBC AUTO-ENTMCNC: 32.1 G/DL (ref 32.2–35.5)
MCV RBC AUTO: 88.9 FL (ref 81.4–97.8)
MONOCYTES # BLD AUTO: 0.74 K/UL (ref 0–0.85)
MONOCYTES NFR BLD AUTO: 6.5 % (ref 0–13.4)
NEUTROPHILS # BLD AUTO: 8.48 K/UL (ref 1.82–7.42)
NEUTROPHILS NFR BLD: 75 % (ref 44–72)
NRBC # BLD AUTO: 0 K/UL
NRBC BLD-RTO: 0 /100 WBC (ref 0–0.2)
PLATELET # BLD AUTO: 479 K/UL (ref 164–446)
PMV BLD AUTO: 8.9 FL (ref 9–12.9)
POTASSIUM SERPL-SCNC: 3.6 MMOL/L (ref 3.6–5.5)
PROT SERPL-MCNC: 6.4 G/DL (ref 6–8.2)
RBC # BLD AUTO: 4.42 M/UL (ref 4.2–5.4)
SODIUM SERPL-SCNC: 139 MMOL/L (ref 135–145)
TROPONIN T SERPL-MCNC: <6 NG/L (ref 6–19)
WBC # BLD AUTO: 11.3 K/UL (ref 4.8–10.8)

## 2024-06-03 PROCEDURE — 85025 COMPLETE CBC W/AUTO DIFF WBC: CPT

## 2024-06-03 PROCEDURE — 83690 ASSAY OF LIPASE: CPT

## 2024-06-03 PROCEDURE — 36415 COLL VENOUS BLD VENIPUNCTURE: CPT

## 2024-06-03 PROCEDURE — 74176 CT ABD & PELVIS W/O CONTRAST: CPT

## 2024-06-03 PROCEDURE — 80053 COMPREHEN METABOLIC PANEL: CPT

## 2024-06-03 PROCEDURE — 99284 EMERGENCY DEPT VISIT MOD MDM: CPT

## 2024-06-03 PROCEDURE — 71045 X-RAY EXAM CHEST 1 VIEW: CPT

## 2024-06-03 PROCEDURE — 93005 ELECTROCARDIOGRAM TRACING: CPT | Performed by: STUDENT IN AN ORGANIZED HEALTH CARE EDUCATION/TRAINING PROGRAM

## 2024-06-03 PROCEDURE — 84484 ASSAY OF TROPONIN QUANT: CPT

## 2024-06-03 PROCEDURE — 84703 CHORIONIC GONADOTROPIN ASSAY: CPT

## 2024-06-03 ASSESSMENT — PAIN DESCRIPTION - PAIN TYPE: TYPE: ACUTE PAIN

## 2024-06-03 ASSESSMENT — FIBROSIS 4 INDEX: FIB4 SCORE: 0.36

## 2024-06-03 NOTE — ED PROVIDER NOTES
"ED Provider Note    CHIEF COMPLAINT  Chief Complaint   Patient presents with    Psych Eval     Pt mumbling in triage to self, states \"my chest is pulling, everything is fine but I want to make sure, I haven't been here in a year\"       EXTERNAL RECORDS REVIEWED  Inpatient Notes psychiatry note from 1/4/2024 noted history of schizoaffective disorder, bipolar, methamphetamine dependence with cocaine abuse    HPI/ROS  LIMITATION TO HISTORY     OUTSIDE HISTORIAN(S):      Dunia Sahni is a 39 y.o. female who presents with multiple complaints.  Patient says the past 2 to 3 weeks has been having intermittent chest pain, cough, abdominal pain along her left flank.  Patient denies headache, vomiting, diarrhea, vaginal bleeding or discharge, urinary changes.    PAST MEDICAL HISTORY   has a past medical history of Abscess (10/12/2017), Bipolar affective (HCC), Depression, Kidney infection, Leukemia (HCC) (12/2019), Schizoaffective disorder (HCC), Substance abuse (HCC), and Suicide attempt (HCC).    SURGICAL HISTORY   has a past surgical history that includes appendectomy; ercp in or (N/A, 7/5/2018); and skip by laparoscopy (N/A, 7/6/2018).    FAMILY HISTORY  Family History   Family history unknown: Yes       SOCIAL HISTORY  Social History     Tobacco Use    Smoking status: Never    Smokeless tobacco: Never   Vaping Use    Vaping status: Never Used   Substance and Sexual Activity    Alcohol use: Yes     Comment: patient reports she drinks once or twice a year    Drug use: Yes     Types: Inhaled     Comment: hx: marijuana, cocaine    Sexual activity: Not on file       CURRENT MEDICATIONS  Home Medications       Reviewed by Vivian Salter R.N. (Registered Nurse) on 06/03/24 at 0326  Med List Status: Partial     Medication Last Dose Status        Patient Corwin Taking any Medications                         Audit from Redirected Encounters    **Home medications have not yet been reviewed for this encounter**   " "      ALLERGIES  Allergies   Allergen Reactions    Vicodin [Hydrocodone-Acetaminophen] Anaphylaxis     RXN=9 years ago    Denies 11/19/2022       PHYSICAL EXAM  VITAL SIGNS: BP (!) 141/73   Pulse 72   Temp 36.3 °C (97.4 °F) (Temporal)   Resp 20   Ht 1.702 m (5' 7\")   Wt 110 kg (243 lb 2.7 oz)   LMP 06/03/2024 (Approximate)   SpO2 93%   BMI 38.09 kg/m²    Constitutional: No acute distress  HENT: No signs of trauma, Bilateral external ears normal, Nose normal.   Eyes: Pupils are equal and reactive, Conjunctiva normal, Non-icteric.   Neck: Normal range of motion, No tenderness, Supple, No stridor.   Cardiovascular: Regular rate and rhythm, no murmurs.   Thorax & Lungs: Normal breath sounds, No respiratory distress, No wheezing, No chest tenderness.   Abdomen: Bowel sounds normal, Soft, No tenderness, No masses, No pulsatile masses.   Skin: Warm, Dry, No erythema, No rash.   Back: No bony tenderness, No CVA tenderness.   Extremities: Intact distal pulses, No edema, No tenderness, No cyanosis  Musculoskeletal: Good range of motion in all major joints. No tenderness to palpation or major deformities noted.   Neurologic: Alert , Normal motor function, Normal sensory function, No focal deficits noted.       EKG/LABS  Labs Reviewed   CBC WITH DIFFERENTIAL - Abnormal; Notable for the following components:       Result Value    WBC 11.3 (*)     MCHC 32.1 (*)     Platelet Count 479 (*)     MPV 8.9 (*)     Neutrophils-Polys 75.00 (*)     Lymphocytes 16.60 (*)     Neutrophils (Absolute) 8.48 (*)     All other components within normal limits   HCG QUAL SERUM   COMP METABOLIC PANEL   LIPASE   TROPONIN   ESTIMATED GFR     Results for orders placed or performed during the hospital encounter of 06/03/24   EKG (NOW)   Result Value Ref Range    Report       [facility] Emergency Dept.    Test Date:  2024-06-03  Pt Name:    EVIE ENGEL           Department: ER  MRN:        [id number]                      " Room:       [facility]  Gender:     Female                       Technician: [id number]  :        1985                   Requested By:FLAKITO SAINI  Order #:    [id number]                    Reading MD: Flakito Saini    Measurements  Intervals                                Axis  Rate:       68                           P:          18  VA:         174                          QRS:        22  QRSD:       104                          T:          10  QT:         412  QTc:        439    Interpretive Statements  Interpreted by me: Sinus rhythm, rate 68, no signs of acute ischemia when  compared to prior  Electronically Signed On 2024 06:33:10 PDT by Flakito Saini           I have independently interpreted this EKG    RADIOLOGY/PROCEDURES   I have independently interpreted the diagnostic imaging associated with this visit and am waiting the final reading from the radiologist.   My preliminary interpretation is as follows: No pneumothorax    Radiologist interpretation:  CT-RENAL COLIC EVALUATION(A/P W/O)   Final Result         1.  Diverticulosis      DX-CHEST-PORTABLE (1 VIEW)   Final Result         1.  No focal infiltrates.          COURSE & MEDICAL DECISION MAKING    ASSESSMENT, COURSE AND PLAN  Care Narrative: On arrival vital signs within normal limits.  Patient with longstanding psychiatric history and methamphetamine/cocaine abuse.  This time patient does not appear acutely intoxicated or psychotic.  Patient with nonspecific cardiopulmonary complaints and intermittent left flank pain.  Patient does not have any signs of trauma.  Patient currently with benign abdominal exam.  Patient does not have any hypoxemia, tachycardia or respecters for PE very low suspicion for this.  Will obtain blood work to assess for metabolic Hologic derangements, cardiac biomarkers and ECG.  Will obtain chest x-ray to assess for pneumonia.  With patient's intermittent left flank pain will obtain CT to assess for  nephrolithiasis.    CT imaging without acute abnormality.  Chest x-ray without signs of pneumonia or pneumothorax.  Blood work with mild leukocytosis otherwise unremarkable.  Patient will tolerate p.o. and ambulate without difficulty.  Discussed with patient return precautions and outpatient follow-up plan.            ADDITIONAL PROBLEMS MANAGED      DISPOSITION AND DISCUSSIONS  Barriers to care at this time, including but not limited to: Patient lacks financial resources.       FINAL DIAGNOSIS  1. Cough, unspecified type           Electronically signed by: Flakito Saini D.O., 6/3/2024 3:54 AM

## 2024-06-03 NOTE — ED NOTES
Discharge instructions reviewed with patient/ family. Patient verbalizes understanding of follow up care, medication management, and reasons to return to ER. Pt ambulates with steady gait to [facility].

## 2024-06-03 NOTE — ED TRIAGE NOTES
"Chief Complaint   Patient presents with    Psych Eval     Pt mumbling in triage to self, states \"my chest is pulling, everything is fine but I want to make sure, I haven't been here in a year\"     Pt BIB EMS for above complaint, pt paranoid in triage and unable to focus on triage questions  "

## 2024-06-03 NOTE — ED NOTES
Bedside report received from off going RN: David, assumed care of patient.  POC discussed with patient. Call light within reach, all needs addressed at this time.       Fall risk interventions in place: Not Applicable (all applicable per [place] Fall risk assessment)   Continuous monitoring: Cardiac Leads, Pulse Ox, or Blood Pressure  IVF/IV medications: Not Applicable   Oxygen: Room Air  Bedside sitter: Not Applicable   Isolation: Not Applicable

## 2024-06-03 NOTE — ED NOTES
Patient ambulatory to [place] [address]. Patient reports that she took cocaine. Patient continues to ramfrank

## 2024-08-29 NOTE — DISCHARGE PLANNING
Medical Social Work    Referral: Legal Hold    Intervention: Legal Hold Paperwork given to SW by [employer] RN: Clive    Legal Hold Initiated: Date: 04/16/2019  Time: 2219    Legal Hold faxed: Date: 04/17/2019  Time: 6487    Patient’s Insurance Listed on Face Sheet: Medicare and Medicaid FFS    [facility], [facility].    Plan: Patient will transfer to mental health facility once acceptance is obtained.        23-Jan-2024

## 2024-09-16 ENCOUNTER — PHARMACY VISIT (OUTPATIENT)
Dept: PHARMACY | Facility: MEDICAL CENTER | Age: 39
End: 2024-09-16
Payer: COMMERCIAL

## 2024-09-16 ENCOUNTER — HOSPITAL ENCOUNTER (EMERGENCY)
Facility: MEDICAL CENTER | Age: 39
End: 2024-09-16
Attending: EMERGENCY MEDICINE

## 2024-09-16 VITALS
TEMPERATURE: 97.7 F | SYSTOLIC BLOOD PRESSURE: 138 MMHG | DIASTOLIC BLOOD PRESSURE: 95 MMHG | HEART RATE: 92 BPM | OXYGEN SATURATION: 97 % | BODY MASS INDEX: 37.67 KG/M2 | WEIGHT: 240 LBS | RESPIRATION RATE: 18 BRPM | HEIGHT: 67 IN

## 2024-09-16 DIAGNOSIS — F23 BRIEF PSYCHOTIC DISORDER (HCC): ICD-10-CM

## 2024-09-16 LAB
AMPHET UR QL SCN: NEGATIVE
BARBITURATES UR QL SCN: NEGATIVE
BENZODIAZ UR QL SCN: NEGATIVE
BZE UR QL SCN: NEGATIVE
CANNABINOIDS UR QL SCN: NEGATIVE
FENTANYL UR QL: NEGATIVE
METHADONE UR QL SCN: NEGATIVE
OPIATES UR QL SCN: NEGATIVE
OXYCODONE UR QL SCN: NEGATIVE
PCP UR QL SCN: NEGATIVE
POC BREATHALIZER: 0 PERCENT (ref 0–0.01)
PROPOXYPH UR QL SCN: NEGATIVE

## 2024-09-16 PROCEDURE — RXMED WILLOW AMBULATORY MEDICATION CHARGE: Performed by: EMERGENCY MEDICINE

## 2024-09-16 PROCEDURE — 99284 EMERGENCY DEPT VISIT MOD MDM: CPT

## 2024-09-16 PROCEDURE — 80307 DRUG TEST PRSMV CHEM ANLYZR: CPT

## 2024-09-16 PROCEDURE — 700102 HCHG RX REV CODE 250 W/ 637 OVERRIDE(OP): Performed by: EMERGENCY MEDICINE

## 2024-09-16 PROCEDURE — A9270 NON-COVERED ITEM OR SERVICE: HCPCS | Performed by: EMERGENCY MEDICINE

## 2024-09-16 PROCEDURE — 302970 POC BREATHALIZER: Performed by: EMERGENCY MEDICINE

## 2024-09-16 RX ORDER — ARIPIPRAZOLE 10 MG/1
10 TABLET ORAL DAILY
Qty: 3 TABLET | Refills: 0 | Status: SHIPPED | OUTPATIENT
Start: 2024-09-16 | End: 2025-01-03

## 2024-09-16 RX ORDER — ARIPIPRAZOLE 10 MG/1
10 TABLET ORAL ONCE
Status: COMPLETED | OUTPATIENT
Start: 2024-09-16 | End: 2024-09-16

## 2024-09-16 RX ADMIN — ARIPIPRAZOLE 10 MG: 10 TABLET ORAL at 18:13

## 2024-09-16 ASSESSMENT — FIBROSIS 4 INDEX: FIB4 SCORE: 0.28

## 2024-09-16 NOTE — ED NOTES
Discussed patient with Alert team RN Chauncey, patient appropriate for telesitter. Called telesitter.

## 2024-09-16 NOTE — ED PROVIDER NOTES
ER Provider Note    Scribed for Denver Trevino D.O. by Loren Amaro. 9/16/2024  3:01 PM    Primary Care Provider: Pcp Pt States None    CHIEF COMPLAINT  Chief Complaint   Patient presents with    Legal 2000     Patient was started on a legal hold at [facility] for inability to care for self.        HPI/ROS  LIMITATION TO HISTORY   None    OUTSIDE HISTORIAN(S):  None    Dunia Sahni is a 39 y.o. female who presents to the Emergency Department for behavioral health evaluation. The patient presented to [facility] today for a routine checkup where she was placed on a legal hold for concerns of her speech and inability to take care of herself. The patient explains that she was feeling nervous at the appointment and thinks her health care provider may have misinterpreted that. She states that she is living at a home in [place]. She denies harming herself or having suicidal ideation. Denies using drugs or alcohol. Denies history of psychiatric issues or daily medications. Patient is agreeable to drug testing and behavioral health consult.     ROS as per HPI.    PAST MEDICAL HISTORY  Past Medical History:   Diagnosis Date    Abscess 10/12/2017    right AC arm area    Bipolar affective (HCC)     Depression     Kidney infection     Leukemia (HCC) 12/2019    Schizoaffective disorder (HCC)     Substance abuse (HCC)     Suicide attempt (HCC)        SURGICAL HISTORY  Past Surgical History:   Procedure Laterality Date    GEREMIAS BY LAPAROSCOPY N/A 7/6/2018    Procedure: GEREMIAS BY LAPAROSCOPY;  Surgeon: Leroy Goodson M.D.;  Location: SURGERY [facility];  Service: General    ERCP IN OR N/A 7/5/2018    Procedure: ERCP IN OR;  Surgeon: Nathan Maravilla M.D.;  Location: SURGERY [facility];  Service: Gastroenterology    APPENDECTOMY         FAMILY HISTORY  Family History   Family history unknown: Yes       SOCIAL HISTORY   reports that she has never smoked. She has never used smokeless tobacco. She reports current  "alcohol use. She reports current drug use. Drug: Inhaled.    CURRENT MEDICATIONS  Discharge Medication List as of 9/16/2024  5:56 PM          ALLERGIES  Vicodin [hydrocodone-acetaminophen]    PHYSICAL EXAM  BP (!) 144/111   Pulse (!) 102   Temp 36.5 °C (97.7 °F) (Temporal)   Resp (!) 24   Ht 1.702 m (5' 7\")   Wt 109 kg (240 lb)   SpO2 100%   BMI 37.59 kg/m²     General: No acute distress.  HENT: Alopecia to the left side of the head. Normocephalic, Mucus membranes are moist.   Chest: Lungs have even and unlabored respirations, Clear to auscultation.   Cardiovascular: Regular rate and regular rhythm, No peripheral cyanosis.  Abdomen: Non distended.  Neuro: Awake, Conversive, Able to relay recent events.  Psychiatric: No SI or HI. Calm and cooperative.     EXTERNAL RECORDS REVIEWED  Review of patient's past medical records show history of schizoaffective disorder, bipolar disorder, depression, and methamphetamine abuse. She was last seen here in the ED 6/3/24.    INITIAL ASSESSMENT  Patient is on a legal hold from [facility] for concern that she can't care for herself. She is speaking coherently and she is oriented. She is currently in a paper gown but otherwise appears well kept. Will evaluate for drugs, alcohol, and behavioral health consult.     ED Observation Status? No; The patient does not meet criteria for ED Observation.      DIAGNOSTIC STUDIES    Labs:   Results for orders placed or performed during the hospital encounter of 09/16/24   Urine Drug Screen   Result Value Ref Range    Amphetamines Urine Negative Negative    Barbiturates Negative Negative    Benzodiazepines Negative Negative    Cocaine Metabolite Negative Negative    Fentanyl, Urine Negative Negative    Methadone Negative Negative    Opiates Negative Negative    Oxycodone Negative Negative    Phencyclidine -Pcp Negative Negative    Propoxyphene Negative Negative    Cannabinoid Metab Negative Negative   POC BREATHALIZER   Result Value Ref " Range    POC Breathalizer 0.00 0.00 - 0.01 Percent      COURSE & MEDICAL DECISION MAKING     COURSE AND PLAN  3:01 PM - Patient seen and examined at bedside. Discussed plan of care, including labs and behavioral health consult. Patient agrees to the plan of care. Ordered for POC breathalyzer and urine drug screen to evaluate her symptoms.     4:49 PM - Spoke with Chauncey from the alert team who believes the patient does not meet criteria for legal hold which is consistent with my evaluation. Patient is stable for discharge.     4:51 PM - Patient reevaluated at bedside. Discussed plan for discharge, including plan for follow-up, and informed them to return to the [facility] ED with any new or worsening symptoms. Patient was given the opportunity for questions, and I addressed all questions or concerns. She is stable for discharge at this time. Patient verbalizes understanding and support with my plan for discharge.      ED Summary: Patient went to [facility], for a injection for her psychiatric medications.  They thought she was disorganized and unable to care for self.  On arrival here she is able to answer questions appropriately.  She seems to take care of herself well she is not suicidal homicidal and she is oriented.  She was evaluated the alert team and the patient does not meet criteria for legal hold.  The patient was given 1 dose of her medication here and discharged home with 3 days to follow-up with [facility] for her routine injection.      DISPOSITION AND DISCUSSIONS  I have discussed management of the patient with the following physicians and [place]'s: None    Discussion of management with other QHP or appropriate source(s): Behavioral Health: Consult    Barriers to care at this time, including but not limited to: No PCP     The patient will return for new or worsening symptoms and is stable at the time of discharge.    The patient is referred to a primary physician for blood pressure management, diabetic screening,  and for all other preventative health concerns.    DISPOSITION:  Patient will be discharged home in stable condition.    FOLLOW UP:    Please follow-up with your regular providers  In 1 week        FINAL DIAGNOSIS  1. Brief psychotic disorder (HCC)        Loren GARCIA (Scribe), am scribing for, and in the presence of, Denver Trevino D.O..    Electronically signed by: Loren Amaro (Scribe), 9/16/2024    Denver GARCIA D.O. personally performed the services described in this documentation, as scribed by Loren Amaro in my presence, and it is both accurate and complete.     The note accurately reflects work and decisions made by me.  Denver Trevino D.O.  9/16/2024  9:25 PM

## 2024-09-16 NOTE — ED TRIAGE NOTES
"..  Chief Complaint   Patient presents with    Legal 2000     Patient was started on a legal hold at [facility] for inability to care for self.        40 yo female brought in by [facility] for above complaint. Patient denies SI/HI.     Patient was given no medications en route.    Legal hold precautions placed, patient in paper scrubs, sitter at the bedside.     Ht 1.702 m (5' 7\")   Wt 109 kg (240 lb)   BMI 37.59 kg/m²       "
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
## 2024-09-17 NOTE — DISCHARGE PLANNING
[facility]  Crisis/Safety Plan    Name:  Dunia Sahni  MRN:  [id number]  Date:  2024    Warning signs that a crisis may be developing for me or I may be at risk:  1) becoming more confused  2) increasing levels of depression and anxiety  3) unable to continue with your daily activities    Coping strategies I can use on my own (relaxation, physical activity, etc):  1) try to exercise daily  2) listen to music  3) watch tv    Ways I can make my environment safe:  1) f/u with appointments  2) no access to a firearm   3) secure sharps and meds    Things I want to tell myself when I feel a crisis developin) try to stay calm  2) remember there is help out there  3) get help before a crisis develops.    People I can contact for support or distraction (and their phone numbers):  1) 988 for crisis  2) use numbers below  3)    If I’m not able to reach my support people, or the above strategies don’t help, I can contact the following professionals, agencies, or hotlines:  1) Crisis Call Center ():  [phone] -OR- [phone]  2) Crisis Text Line ():  Text CARE TO 688001  3)   4)     Chauncey Warren R.N.

## 2024-09-17 NOTE — ED NOTES
Medications picked up from [facility], given to patient. Discussed cab voucher with social work, but then patient did not want to wait for it. Took first dose of abilify. Belongings returned, patient ambulated to lobby.    Verbal order and read back per Chrissie Denton MD   Pt on metoprolol as long as he is asymptomatic he is ok for surgery.
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
## 2025-01-03 ENCOUNTER — HOSPITAL ENCOUNTER (EMERGENCY)
Facility: MEDICAL CENTER | Age: 40
End: 2025-01-03
Attending: EMERGENCY MEDICINE
Payer: MEDICARE

## 2025-01-03 VITALS
HEIGHT: 67 IN | DIASTOLIC BLOOD PRESSURE: 79 MMHG | BODY MASS INDEX: 28.25 KG/M2 | OXYGEN SATURATION: 97 % | SYSTOLIC BLOOD PRESSURE: 116 MMHG | RESPIRATION RATE: 15 BRPM | HEART RATE: 84 BPM | WEIGHT: 180 LBS | TEMPERATURE: 98 F

## 2025-01-03 DIAGNOSIS — F25.0 SCHIZOAFFECTIVE DISORDER, BIPOLAR TYPE (HCC): ICD-10-CM

## 2025-01-03 LAB — POC BREATHALIZER: 0 PERCENT (ref 0–0.01)

## 2025-01-03 PROCEDURE — 700102 HCHG RX REV CODE 250 W/ 637 OVERRIDE(OP): Mod: UD | Performed by: EMERGENCY MEDICINE

## 2025-01-03 PROCEDURE — A9270 NON-COVERED ITEM OR SERVICE: HCPCS | Mod: UD | Performed by: EMERGENCY MEDICINE

## 2025-01-03 PROCEDURE — 302970 POC BREATHALIZER

## 2025-01-03 PROCEDURE — 99283 EMERGENCY DEPT VISIT LOW MDM: CPT

## 2025-01-03 RX ORDER — ARIPIPRAZOLE 10 MG/1
20 TABLET ORAL ONCE
Status: COMPLETED | OUTPATIENT
Start: 2025-01-03 | End: 2025-01-03

## 2025-01-03 RX ORDER — ARIPIPRAZOLE 20 MG/1
20 TABLET ORAL DAILY
Qty: 30 TABLET | Refills: 0 | Status: SHIPPED | OUTPATIENT
Start: 2025-01-03

## 2025-01-03 RX ADMIN — ARIPIPRAZOLE 20 MG: 10 TABLET ORAL at 06:22

## 2025-01-03 ASSESSMENT — FIBROSIS 4 INDEX: FIB4 SCORE: 0.4

## 2025-01-03 NOTE — ED TRIAGE NOTES
Chief Complaint   Patient presents with    Psych Eval       Pt to triage ambulatory for above complaint. Patient whispering in triage and telling this RN about negligence and jumps back to another topic not letting this RN ask more questions.    Pt back to lobby, educated on triage process and encourage to alert staff of any changes.     Vitals:    01/03/25 0044   BP: (!) 141/93   Pulse: 87   Resp: 16   Temp: 36.4 °C (97.6 °F)   SpO2: 97%

## 2025-01-03 NOTE — ED NOTES
Rn attempt to d/c pt. Pt requesting to be on L2K. ERP notified.   Erp to come to bedside and speak w/ pt.

## 2025-01-03 NOTE — ED PROVIDER NOTES
ED Provider Note    CHIEF COMPLAINT  Chief Complaint   Patient presents with    Psych Eval       EXTERNAL RECORDS REVIEWED  Inpatient Notes recent hospitalization 12/18/2024 to [facility] for schizoaffective disorder.  Patient started on Abilify, symptoms improved with this and patient was discharged on 12/31/2024.    HPI/TRISH Pendletonvarinder Sahni is a 39 y.o. female who presents with chief complaint of running out of her medication.  Patient reports she lost her medication.  She was recently just discharged from [facility] for longstanding history of schizoaffective disorder.  Patient denies any SI or HI.  Patient denies any tingling breathing.  She Reports that she is hearing voices and these are troubling.  She is requesting some help for this.  Patient does have some chronic depression and occasionally will have suicidal thoughts but denies any active suicidal ideation or active plan at this point.    PAST MEDICAL HISTORY   has a past medical history of Abscess (10/12/2017), Bipolar affective (HCC), Depression, Kidney infection, Leukemia (HCC) (12/2019), Schizoaffective disorder (HCC), Substance abuse (HCC), and Suicide attempt ([facility]).    SURGICAL HISTORY   has a past surgical history that includes appendectomy; ercp in or (N/A, 7/5/2018); and skip by laparoscopy (N/A, 7/6/2018).    FAMILY HISTORY  Family History   Family history unknown: Yes       SOCIAL HISTORY  Social History     Tobacco Use    Smoking status: Never    Smokeless tobacco: Never   Vaping Use    Vaping status: Never Used   Substance and Sexual Activity    Alcohol use: Yes     Comment: patient reports she drinks once or twice a year    Drug use: Yes     Types: Inhaled     Comment: hx: marijuana, cocaine    Sexual activity: Not on file       CURRENT MEDICATIONS  Home Medications    **Home medications have not yet been reviewed for this encounter**         ALLERGIES  Allergies  "  Allergen Reactions    Vicodin [Hydrocodone-Acetaminophen] Anaphylaxis     RXN=9 years ago    Denies 11/19/2022       PHYSICAL EXAM  VITAL SIGNS: /76   Pulse 70   Temp 36.4 °C (97.5 °F) (Temporal)   Resp 18   Ht 1.702 m (5' 7\")   Wt 81.6 kg (180 lb)   LMP 12/06/2024 (Approximate)   SpO2 98%   BMI 28.19 kg/m²    Physical Exam  Constitutional:       Appearance: She is well-developed.   HENT:      Head: Normocephalic and atraumatic.   Eyes:      Pupils: Pupils are equal, round, and reactive to light.   Cardiovascular:      Rate and Rhythm: Normal rate and regular rhythm.   Pulmonary:      Effort: Pulmonary effort is normal. No accessory muscle usage or respiratory distress.      Breath sounds: Normal breath sounds.   Abdominal:      General: Bowel sounds are normal.      Palpations: Abdomen is soft. There is no mass.      Tenderness: There is no abdominal tenderness.   Musculoskeletal:         General: Normal range of motion.   Skin:     General: Skin is warm.      Capillary Refill: Capillary refill takes less than 2 seconds.   Neurological:      General: No focal deficit present.      Mental Status: She is alert.   Psychiatric:         Mood and Affect: Mood is not anxious.      Comments: Mildly anxious, responding to internal stimuli               COURSE & MEDICAL DECISION MAKING    ASSESSMENT, COURSE AND PLAN  Care Narrative: Patient here with longstanding chronic psychosis.  Off her medication.  Patient given 20 mg of her Abilify.  She initially stated that she did not have her Abilify but she allowed us to look through her belongings and she does have it with her.  Patient was given Abilify.  She slept following this.  She was seen by psychiatry who agrees that patient does not require legal hold at this point.  Patient remains in good condition.  Patient discharged with psychiatric resources.              DISPOSITION AND DISCUSSIONS      Discussion of management with other \[facility]\"" or appropriate " source(s): Behavioral health who saw patient, they agree patient does not currently require psychiatric legal hold.  Provided patient with multiple resources for psychiatric illness.    Escalation of care considered, and ultimately not performed: Legal hold was deferred as patient currently does not qualify.  On last assessment patient denies any SI or HI.  Feeling much better following receiving Abilify.  Cleared by psychiatric provider    Barriers to care at this time, including but not limited to: Patient does not have established PCP, Patient is homeless, and Patient had difficult affording medications.         FINAL DIAGNOSIS  1. Schizoaffective disorder, bipolar type (HCC)  aripiprazole (ABILIFY) 20 MG tablet

## 2025-01-03 NOTE — ED NOTES
PT [id number]. Apologized for wait times and thanked them for their patience. PT saying nothing about new conditions just mumbling.

## 2025-01-03 NOTE — ED NOTES
Pharmacy called for meds to bed, pt recently filled medication.  RN notified erp. RN to administer next dose and d/c.

## 2025-01-03 NOTE — DISCHARGE PLANNING
ALERT team  note:  Per [facility] ED RNTerese, writer RN provided pt resources to pt  39 year old female BIB self early this AM d/t disorganized thoughts, behaviors; voluntary pt;  she received Abilify 20 mg PO today at 0622; with noted h/o chronic mental illness and substance use/abuse, and with noted h/o psych diagnoses including Schizoaffective D/O Bipolar Type, Methamphetamine Dependence, Cocaine Abuse, and Cannabis Use D/O; with recent inpt MH tx at [facility] 12/18/24; she denies current outpt MH providers; per  [facility], [phone], pt has a scheduled psychiatry appt 1/7/25 at 1520, which writer RN reviewed with pt; her current psych med includes Abilify 20 mg PO daily, which pt states she has not been taking; she denies current substance use; she is unemployed, receives $1073 per month SSD payments, and states she is her own payee, and can pay her own bills; she is currently homeless, states she was recently residing at [facility], but unable to state if she can currently return there; today,  pt alert, oriented to person, place, some recent events, disoriented to date; calm; cooperative; with organized thoughts and behaviors; no delusions noted; with generalized paranoia; with auditory hallucinations, she is responding to internal stimuli, talking to herself, at times, but able to answer questions appropriately; insight, judgment adequate; currently denies SI, HI, or self-harm ideation; future-oriented; she is able to meet her basic needs, and plans to f/u at the [facility] homeless shelter; writer RN reviewed community MH and homeless resources with  pt, with written information given, including [facility],  [facility], [facility],  [facility], the [facility] homeless shelter and resource center, [facility] women[facility] shelter, [facility], [place] [facility], and [facility], with verbal understanding; a  RX for Abilify was sent to [facility] outpt pharmacy, but they are unable to fill this RX as pt recently got it filled at another pharmacy; writer RN encouraged pt to f/u at [facility] to [facility] a her medications, and the [facility] campus today; pt Dc'd to self today ambulatory with a 1 day bus pass given to her    Medicare and Medicaid FFS insurance plans

## 2025-01-03 NOTE — ED NOTES
Screaming was heard from restroom. This ED tech went into restroom and found patient in bathroom stall yelling and responding to internal stimuli. Patient was informed of inappropriate behavior and told to stop. Patient apologized and lowered their voice. This ED tech returned to lobby when more screaming was heard from restroom. Patient was now outside of bathroom stall digging fingernails into thigh and responding to internal stimuli still yelling. Security and this ED tech assisted patient into wheelchair, able to verbally deescalate patient behavior. Patient is now placed in front of triage staff.

## 2025-01-11 ENCOUNTER — HOSPITAL ENCOUNTER (EMERGENCY)
Facility: MEDICAL CENTER | Age: 40
End: 2025-01-11
Attending: EMERGENCY MEDICINE
Payer: MEDICARE

## 2025-01-11 VITALS
OXYGEN SATURATION: 94 % | BODY MASS INDEX: 28.03 KG/M2 | DIASTOLIC BLOOD PRESSURE: 73 MMHG | WEIGHT: 178.57 LBS | TEMPERATURE: 98.3 F | RESPIRATION RATE: 15 BRPM | HEART RATE: 75 BPM | SYSTOLIC BLOOD PRESSURE: 109 MMHG | HEIGHT: 67 IN

## 2025-01-11 DIAGNOSIS — Z65.9 MEDICATION NONADHERENCE DUE TO PSYCHOSOCIAL PROBLEM: ICD-10-CM

## 2025-01-11 DIAGNOSIS — R19.7 VOMITING AND DIARRHEA: ICD-10-CM

## 2025-01-11 DIAGNOSIS — Z91.148 MEDICATION NONADHERENCE DUE TO PSYCHOSOCIAL PROBLEM: ICD-10-CM

## 2025-01-11 DIAGNOSIS — R11.10 VOMITING AND DIARRHEA: ICD-10-CM

## 2025-01-11 LAB
ALBUMIN SERPL BCP-MCNC: 4.1 G/DL (ref 3.2–4.9)
ALBUMIN/GLOB SERPL: 1.2 G/DL
ALP SERPL-CCNC: 91 U/L (ref 30–99)
ALT SERPL-CCNC: 11 U/L (ref 2–50)
AMPHET UR QL SCN: NEGATIVE
ANION GAP SERPL CALC-SCNC: 10 MMOL/L (ref 7–16)
APPEARANCE UR: CLEAR
AST SERPL-CCNC: 17 U/L (ref 12–45)
BACTERIA #/AREA URNS HPF: ABNORMAL /HPF
BARBITURATES UR QL SCN: NEGATIVE
BENZODIAZ UR QL SCN: NEGATIVE
BILIRUB SERPL-MCNC: 0.4 MG/DL (ref 0.1–1.5)
BILIRUB UR QL STRIP.AUTO: NEGATIVE
BUN SERPL-MCNC: 12 MG/DL (ref 8–22)
BZE UR QL SCN: NEGATIVE
CALCIUM ALBUM COR SERPL-MCNC: 9.1 MG/DL (ref 8.5–10.5)
CALCIUM SERPL-MCNC: 9.2 MG/DL (ref 8.5–10.5)
CANNABINOIDS UR QL SCN: NEGATIVE
CASTS URNS QL MICRO: ABNORMAL /LPF (ref 0–2)
CHLORIDE SERPL-SCNC: 107 MMOL/L (ref 96–112)
CO2 SERPL-SCNC: 23 MMOL/L (ref 20–33)
COLOR UR: YELLOW
CREAT SERPL-MCNC: 0.85 MG/DL (ref 0.5–1.4)
EPITHELIAL CELLS 1715: ABNORMAL /HPF (ref 0–5)
FENTANYL UR QL: NEGATIVE
GFR SERPLBLD CREATININE-BSD FMLA CKD-EPI: 90 ML/MIN/1.73 M 2
GLOBULIN SER CALC-MCNC: 3.3 G/DL (ref 1.9–3.5)
GLUCOSE SERPL-MCNC: 150 MG/DL (ref 65–99)
GLUCOSE UR STRIP.AUTO-MCNC: NEGATIVE MG/DL
HCG SERPL QL: NEGATIVE
KETONES UR STRIP.AUTO-MCNC: NEGATIVE MG/DL
LEUKOCYTE ESTERASE UR QL STRIP.AUTO: ABNORMAL
LIPASE SERPL-CCNC: 11 U/L (ref 11–82)
METHADONE UR QL SCN: NEGATIVE
MICRO URNS: ABNORMAL
NITRITE UR QL STRIP.AUTO: NEGATIVE
OPIATES UR QL SCN: NEGATIVE
OXYCODONE UR QL SCN: NEGATIVE
PCP UR QL SCN: NEGATIVE
PH UR STRIP.AUTO: 8.5 [PH] (ref 5–8)
POTASSIUM SERPL-SCNC: 4.3 MMOL/L (ref 3.6–5.5)
PROPOXYPH UR QL SCN: NEGATIVE
PROT SERPL-MCNC: 7.4 G/DL (ref 6–8.2)
PROT UR QL STRIP: 30 MG/DL
RBC # URNS HPF: ABNORMAL /HPF (ref 0–2)
RBC UR QL AUTO: NEGATIVE
SODIUM SERPL-SCNC: 140 MMOL/L (ref 135–145)
SP GR UR STRIP.AUTO: 1.03
UROBILINOGEN UR STRIP.AUTO-MCNC: 1 EU/DL
WBC #/AREA URNS HPF: ABNORMAL /HPF

## 2025-01-11 PROCEDURE — 36415 COLL VENOUS BLD VENIPUNCTURE: CPT

## 2025-01-11 PROCEDURE — 700102 HCHG RX REV CODE 250 W/ 637 OVERRIDE(OP): Mod: UD

## 2025-01-11 PROCEDURE — 83690 ASSAY OF LIPASE: CPT

## 2025-01-11 PROCEDURE — 80053 COMPREHEN METABOLIC PANEL: CPT

## 2025-01-11 PROCEDURE — 99284 EMERGENCY DEPT VISIT MOD MDM: CPT

## 2025-01-11 PROCEDURE — 81001 URINALYSIS AUTO W/SCOPE: CPT | Mod: XU

## 2025-01-11 PROCEDURE — 80307 DRUG TEST PRSMV CHEM ANLYZR: CPT

## 2025-01-11 PROCEDURE — A9270 NON-COVERED ITEM OR SERVICE: HCPCS | Mod: UD

## 2025-01-11 PROCEDURE — 700111 HCHG RX REV CODE 636 W/ 250 OVERRIDE (IP): Mod: UD

## 2025-01-11 PROCEDURE — 84703 CHORIONIC GONADOTROPIN ASSAY: CPT

## 2025-01-11 RX ORDER — ONDANSETRON 4 MG/1
4 TABLET, ORALLY DISINTEGRATING ORAL EVERY 4 HOURS PRN
Status: DISCONTINUED | OUTPATIENT
Start: 2025-01-11 | End: 2025-01-11 | Stop reason: HOSPADM

## 2025-01-11 RX ORDER — ONDANSETRON 4 MG/1
4 TABLET, ORALLY DISINTEGRATING ORAL EVERY 6 HOURS PRN
Qty: 10 TABLET | Refills: 0 | Status: SHIPPED | OUTPATIENT
Start: 2025-01-11

## 2025-01-11 RX ORDER — LOPERAMIDE HYDROCHLORIDE 2 MG/1
2 CAPSULE ORAL 4 TIMES DAILY PRN
Status: DISCONTINUED | OUTPATIENT
Start: 2025-01-11 | End: 2025-01-11 | Stop reason: HOSPADM

## 2025-01-11 RX ADMIN — ONDANSETRON 4 MG: 4 TABLET, ORALLY DISINTEGRATING ORAL at 10:08

## 2025-01-11 RX ADMIN — LOPERAMIDE HYDROCHLORIDE 2 MG: 2 CAPSULE ORAL at 10:08

## 2025-01-11 ASSESSMENT — FIBROSIS 4 INDEX: FIB4 SCORE: 0.39

## 2025-01-11 NOTE — DISCHARGE PLANNING
"ALERT team  note:  39 year old female BIB self early this AM d/t c/o GI issues, nausea/vomiting; voluntary pt;  she sought ED services at [facility] earlier today for similar physical issue with DC to self; today at [facility] ED, pt also stating she wanted to be placed on a  \"legal hold\" to meet with the court  \"so I can clear up things with social security\" and receive more money; she receives $1073 monthly Social Security payments and is her own payee;  she states she is out of money for the month as she \"paid for a hotel room, bought clothes, paid her phone bill\"; with noted h/o chronic mental illness and substance use/abuse, and with noted h/o psych diagnoses including Schizoaffective D/O Bipolar Type, Methamphetamine Dependence, Cocaine Abuse, and Cannabis Use D/O; with recent inpt MH tx at [facility] 12/18/24; she denies current outpt MH providers; pt had a scheduled psychiatry appt 1/7/25 at 1520 at [facility] which she did not f/u with her current psych med includes Abilify 20 mg PO daily, which pt states she has not been taking; per pt's EMR, she was receiving Invega Sustenna 156 mg  long-acting injection with last dose noted as due 11/27/24; she denies current substance use (UDS negative); she is unemployed, pt is chronically homeless, and states she is staying at the homeless shelter; today,  pt alert, oriented x 4; anxious but pleasant; with organized thoughts and behaviors; no delusions, paranoia, hallucinations noted; insight, judgment adequate; currently denies SI, HI, or self-harm ideation; future-oriented; writer RN updated [facility] ERP, Dr. Grayson re: pt's current status; writer RN reviewed community MH and homeless resources with  pt, with written information given, including [facility],  [facility], [facility], [facility], the [facility] homeless shelter and resource center, [facility] women's shelter, and [place] [facility], writer SORIANO encouraged pt to f/u at [facility] next week for outpt MH services and with the [facility]; pt concerned she could not return to the [facility] for 24 hours d/t her nausea and vomiting; writer RN also called the [facility] , [facility], to confirm that pt is allowed to return, that there are no available beds currently, but the warming center is open 24 hours a day for patients, which writer RN reviewed with pt, with verbal understanding noted; pt Dc'd to self today with a 1 day bus pass given to pt    Medicare and Medicaid FFS insurance plans

## 2025-01-11 NOTE — ED NOTES
Urine collected and sent  [place] and [employer] provided.  Pt states they want to wait for urine results before signing D/C papers. ERP aware

## 2025-01-11 NOTE — DISCHARGE INSTRUCTIONS
Patient has no acute conditions that prevent her from returning to the [facility].

## 2025-01-11 NOTE — ED PROVIDER NOTES
"ED Provider Note    CHIEF COMPLAINT  Chief Complaint   Patient presents with    N/V       EXTERNAL RECORDS REVIEWED  External ED Note to be in the ER multiple times requesting opinions and going to the other ERs.    HPI/ROS  LIMITATION TO HISTORY   Gastric history  Noncompliant to medications  OUTSIDE HISTORIAN(S):  None    Dunia Carolina Sahni is a 39 y.o. female who presentsto the ED to request legal hold/second opinion because she would like to have the opportunity to present in front of a court to renegotiate her current \"estate\" and Social Security benefits.  Of note, patient also reports experiencing acute onset of diarrhea and emesis this morning, notable for blood being present in her bed sheets of unknown source.  She denies any fevers but reports subjective chills, body aches.  Patient expressed concerns regarding her current housing situation and the homeless shelter given her new onset of gastroenteritis symptoms which reportedly makes her ineligible to stay.  Patient is noncompliant with her Abilify.     Karie from Alert Team was notified and will evaluate patient.     PAST MEDICAL HISTORY   has a past medical history of Abscess (10/12/2017), Bipolar affective (HCC), Depression, Kidney infection, Leukemia (HCC) (12/2019), Schizoaffective disorder (HCC), Substance abuse (HCC), and Suicide attempt (HCC).    SURGICAL HISTORY   has a past surgical history that includes appendectomy; ercp in or (N/A, 7/5/2018); and skip by laparoscopy (N/A, 7/6/2018).    FAMILY HISTORY  Family History   Family history unknown: Yes       SOCIAL HISTORY  Social History     Tobacco Use    Smoking status: Never    Smokeless tobacco: Never   Vaping Use    Vaping status: Never Used   Substance and Sexual Activity    Alcohol use: Yes     Comment: patient reports she drinks once or twice a year    Drug use: Yes     Types: Inhaled     Comment: hx: marijuana, cocaine    Sexual activity: Not on file       CURRENT MEDICATIONS  Home " "Medications       Reviewed by Lurdes Sequeira R.N. (Registered Nurse) on 01/11/25 at 0736  Med List Status: Not Addressed     Medication Last Dose Status   aripiprazole (ABILIFY) 20 MG tablet  Active                    ALLERGIES  Allergies   Allergen Reactions    Vicodin [Hydrocodone-Acetaminophen] Anaphylaxis     RXN=9 years ago    Denies 11/19/2022       PHYSICAL EXAM  VITAL SIGNS: /73   Pulse 75   Temp 36.8 °C (98.3 °F) (Temporal)   Resp 15   Ht 1.702 m (5' 7\")   Wt 81 kg (178 lb 9.2 oz)   LMP 12/06/2024 (Approximate)   SpO2 94%   BMI 27.97 kg/m²    VITAL SIGNS: /76   Pulse 95   Temp 36.8 °C (98.3 °F) (Temporal)   Resp 18   Ht 1.702 m (5' 7\")   Wt 81 kg (178 lb 9.2 oz)   LMP 12/06/2024 (Approximate)   SpO2 95%   BMI 27.97 kg/m²    Physical Exam  Vitals reviewed.   Constitutional:       General: She is not in acute distress.     Appearance: She is not ill-appearing or toxic-appearing.      Comments: Disheveled appearance   HENT:      Head: Normocephalic and atraumatic.      Nose: Nose normal. No congestion.      Mouth/Throat:      Mouth: Mucous membranes are moist.   Neurological:      Mental Status: She is alert.       Radiologist interpretation:  No orders to display     Results for orders placed or performed during the hospital encounter of 01/11/25   COMP METABOLIC PANEL    Collection Time: 01/11/25  8:41 AM   Result Value Ref Range    Sodium 140 135 - 145 mmol/L    Potassium 4.3 3.6 - 5.5 mmol/L    Chloride 107 96 - 112 mmol/L    Co2 23 20 - 33 mmol/L    Anion Gap 10.0 7.0 - 16.0    Glucose 150 (H) 65 - 99 mg/dL    Bun 12 8 - 22 mg/dL    Creatinine 0.85 0.50 - 1.40 mg/dL    Calcium 9.2 8.5 - 10.5 mg/dL    Correct Calcium 9.1 8.5 - 10.5 mg/dL    AST(SGOT) 17 12 - 45 U/L    ALT(SGPT) 11 2 - 50 U/L    Alkaline Phosphatase 91 30 - 99 U/L    Total Bilirubin 0.4 0.1 - 1.5 mg/dL    Albumin 4.1 3.2 - 4.9 g/dL    Total Protein 7.4 6.0 - 8.2 g/dL    Globulin 3.3 1.9 - 3.5 g/dL    A-G " Ratio 1.2 g/dL   LIPASE    Collection Time: 01/11/25  8:41 AM   Result Value Ref Range    Lipase 11 11 - 82 U/L   HCG QUAL SERUM    Collection Time: 01/11/25  8:41 AM   Result Value Ref Range    Beta-Hcg Qualitative Serum Negative Negative   ESTIMATED GFR    Collection Time: 01/11/25  8:41 AM   Result Value Ref Range    GFR (CKD-EPI) 90 >60 mL/min/1.73 m 2   URINALYSIS,CULTURE IF INDICATED    Collection Time: 01/11/25 10:15 AM    Specimen: Urine   Result Value Ref Range    Color Yellow     Character Clear     Specific Gravity 1.028 <1.035    Ph 8.5 (A) 5.0 - 8.0    Glucose Negative Negative mg/dL    Ketones Negative Negative mg/dL    Protein 30 (A) Negative mg/dL    Bilirubin Negative Negative    Urobilinogen, Urine 1.0 <=1.0 EU/dL    Nitrite Negative Negative    Leukocyte Esterase Small (A) Negative    Occult Blood Negative Negative    Micro Urine Req Microscopic    URINE DRUG SCREEN    Collection Time: 01/11/25 10:15 AM   Result Value Ref Range    Amphetamines Urine Negative Negative    Barbiturates Negative Negative    Benzodiazepines Negative Negative    Cocaine Metabolite Negative Negative    Fentanyl, Urine Negative Negative    Methadone Negative Negative    Opiates Negative Negative    Oxycodone Negative Negative    Phencyclidine -Pcp Negative Negative    Propoxyphene Negative Negative    Cannabinoid Metab Negative Negative   URINE MICROSCOPIC (W/UA)    Collection Time: 01/11/25 10:15 AM   Result Value Ref Range    WBC 6-10 (A) /hpf    RBC 0-2 0 - 2 /hpf    Bacteria Many (A) None /hpf    Epithelial Cells 3-5 0 - 5 /hpf    Urine Casts 0-2 0 - 2 /lpf      COURSE & MEDICAL DECISION MAKING    ASSESSMENT, COURSE AND PLAN  This is a patient who comes in with schizoaffective and bipolar disorder.  Noncompliant medications, requesting second opinion for her symptoms.  Patient was here to have some nausea vomiting was given supportive care and had no clinical signs of acute surgical emergency.  Biceps is negative  urine was unremarkable and the patient be discharged home.  We had the alert team see the patient the patient is noncompliant medications resources given.    DISPOSITION AND DISCUSSIONS      FINAL DIAGNOSIS  1. Vomiting and diarrhea    2. Medication nonadherence due to psychosocial problem         Electronically signed by: Pedrito Morris M.D., 1/11/2025 3:30 PM

## 2025-01-11 NOTE — ED PROVIDER NOTES
"ED Provider Note     CHIEF COMPLAINT      Chief Complaint   Patient presents with    N/V         EXTERNAL RECORDS REVIEWED  External ED Note patient had been to the ER multiple times     HPI/ROS  LIMITATION TO HISTORY   Select: Behavior, tangential, pressured speech  OUTSIDE HISTORIAN(S):  None     Dunia Sahni is a 39 y.o. female who presents to the ED to request legal hold/second opinion because she would like to have the opportunity to present in front of a court to renegotiate her current \"estate\" and Social Security benefits.  Of note, patient also reports experiencing acute onset of diarrhea and emesis this morning, notable for blood being present in her bed sheets of unknown source.  She denies any fevers but reports subjective chills, body aches.  Patient expressed concerns regarding her current housing situation and the homeless shelter given her new onset of gastroenteritis symptoms which reportedly makes her ineligible to stay.  Patient is noncompliant with her Abilify.     Karie from Alert Team was notified and will evaluate patient.      PAST MEDICAL HISTORY   has a past medical history of Abscess (10/12/2017), Bipolar affective (HCC), Depression, Kidney infection, Leukemia (HCC) (12/2019), Schizoaffective disorder (HCC), Substance abuse (HCC), and Suicide attempt (HCC).     SURGICAL HISTORY   has a past surgical history that includes appendectomy; ercp in or (N/A, 7/5/2018); and skip by laparoscopy (N/A, 7/6/2018).     FAMILY HISTORY  Family History   Family History   Family history unknown: Yes            SOCIAL HISTORY  Social History            Tobacco Use    Smoking status: Never    Smokeless tobacco: Never   Vaping Use    Vaping status: Never Used   Substance and Sexual Activity    Alcohol use: Yes       Comment: patient reports she drinks once or twice a year    Drug use: Yes       Types: Inhaled       Comment: hx: marijuana, cocaine    Sexual activity: Not on file         CURRENT " "MEDICATIONS  Home Medications         Reviewed by Lurdes Sequeira R.N. (Registered Nurse) on 01/11/25 at 0736  Med List Status: Not Addressed      Medication Last Dose Status   aripiprazole (ABILIFY) 20 MG tablet   Active                          ALLERGIES  Allergies         Allergies   Allergen Reactions    Vicodin [Hydrocodone-Acetaminophen] Anaphylaxis       RXN=9 years ago     Denies 11/19/2022            PHYSICAL EXAM  VITAL SIGNS: /76   Pulse 95   Temp 36.8 °C (98.3 °F) (Temporal)   Resp 18   Ht 1.702 m (5' 7\")   Wt 81 kg (178 lb 9.2 oz)   LMP 12/06/2024 (Approximate)   SpO2 95%   BMI 27.97 kg/m²    Physical Exam  Vitals reviewed.   Constitutional:       General: She is not in acute distress.     Appearance: She is not ill-appearing or toxic-appearing.      Comments: Disheveled appearance   HENT:      Head: Normocephalic and atraumatic.      Nose: Nose normal. No congestion.      Mouth/Throat:      Mouth: Mucous membranes are moist.   Neurological:      Mental Status: She is alert.              COURSE & MEDICAL DECISION MAKING     ASSESSMENT, COURSE AND PLAN  Care Narrative: 39-year-old female with significant past medical history of bipolar and schizoaffective disorder,     Evaluated here lab work was unremarkable as well as blood work and repeat exam.  The patient at this point tolerated supportive care.            ADDITIONAL PROBLEMS MANAGED  Behavioral health.  Is seen by the alert team.     DISPOSITION AND DISCUSSIONS  I have discussed management of the patient with the following physicians and [place]'s: Team regarding possible need for more medication.  Patient declined.  Alert team felt the patient was safe and there is no acute life-threatening psychiatric illnesses       FINAL DIAGNOSIS  Nausea and vomiting  Medication nonadherence     Electronically signed by: Junito Forbes M.D., 1/11/2025 8:38 AM  "

## 2025-01-11 NOTE — ED NOTES
Patient discharge per order. Oral and written discharge instructions reviewed. IV removed. Medications sent to [facility] pharmacy. New Medications reviewed. All belongings accounted for and taken with patient. Questions answered, and patient agrees with discharge plan. Encouraged to follow up with PCP.

## 2025-01-20 ENCOUNTER — DOCUMENTATION (OUTPATIENT)
Dept: MEDICAL GROUP | Facility: CLINIC | Age: 40
End: 2025-01-20
Payer: MEDICARE

## 2025-01-20 NOTE — PROGRESS NOTES
[facility] clinic    CC: congested    S: 40yo F w/ pmh of psychosis in past month had flu twice, no feeling better, stating she wants something for congestion; she is afebrile; she does complain of sore throat that lingering, but no cough, sob/jerome, cp, ear pain.     O: T 97.7, other vss  ENT OP no exudate or erythema, TM's unremarkable b/l, no ttp over sinuses  Lungs ctab  Heart rrr    A/P:  #URI resolving  #rhinorrhea clear  -will provide pt w/ mucinex and benadryl   -edu to hydrate

## (undated) DEVICE — SET LEADWIRE 5 LEAD BEDSIDE DISPOSABLE ECG (1SET OF 5/EA)

## (undated) DEVICE — CONTAINER, SPECIMEN, STERILE

## (undated) DEVICE — SENSOR SPO2 NEO LNCS ADHESIVE (20/BX) SEE USER NOTES

## (undated) DEVICE — KIT ANESTHESIA W/CIRCUIT & 3/LT BAG W/FILTER (20EA/CA)

## (undated) DEVICE — GLOVE BIOGEL SZ 6.5 SURGICAL PF LTX (50PR/BX 4BX/CA)

## (undated) DEVICE — CHLORAPREP 26 ML APPLICATOR - ORANGE TINT(25/CA)

## (undated) DEVICE — TRUETOME 44 20MM (10/BX)

## (undated) DEVICE — TRUETOME JAG 44 PRELOADED SPHINTERTOME

## (undated) DEVICE — CANNULA W/SEAL 5X100 Z-THRE - ADED KII (12/BX)

## (undated) DEVICE — SUCTION INSTRUMENT YANKAUER BULBOUS TIP W/O VENT (50EA/CA)

## (undated) DEVICE — TROCAR LAPSCP 100MM 12MM NTHRD - (6/BX)

## (undated) DEVICE — HEAD HOLDER JUNIOR/ADULT

## (undated) DEVICE — DERMABOND ADVANCED - (12EA/BX)

## (undated) DEVICE — TROCAR 5X100 NON BLADED Z-TH - READ KII (6/BX)

## (undated) DEVICE — NEPTUNE 4 PORT MANIFOLD - (20/PK)

## (undated) DEVICE — TUBE E-T HI-LO CUFF 6.5MM (10EA/BX)

## (undated) DEVICE — KIT CUSTOM PROCEDURE SINGLE FOR ENDO  (15/CA)

## (undated) DEVICE — LACTATED RINGERS INJ 1000 ML - (14EA/CA 60CA/PF)

## (undated) DEVICE — EXTRACTOR PRO XL 9-12 MM ABOVE

## (undated) DEVICE — PACK LAP CHOLE OR - (2EA/CA)

## (undated) DEVICE — FILM CASSETTE ENDO

## (undated) DEVICE — GOWN WARMING STANDARD FLEX - (30/CA)

## (undated) DEVICE — TUBING CLEARLINK DUO-VENT - C-FLO (48EA/CA)

## (undated) DEVICE — TROCAR Z THREAD11MM OPTICAL - NON BLADED(6/BX)

## (undated) DEVICE — GLOVE BIOGEL SZ 7.5 SURGICAL PF LTX - (50PR/BX 4BX/CA)

## (undated) DEVICE — ENDOLOOP 0 VICRYL - (12/BX)

## (undated) DEVICE — MASK ANESTHESIA ADULT  - (100/CA)

## (undated) DEVICE — TUBE CONNECT SUCTION CLEAR 120 X 1/4" (50EA/CA)"

## (undated) DEVICE — SET EXTENSION WITH 2 PORTS (48EA/CA) ***PART #2C8610 IS A SUBSTITUTE*****

## (undated) DEVICE — SET SUCTION/IRRIGATION WITH DISPOSABLE TIP (6/CA )PART #0250-070-520 IS A SUB

## (undated) DEVICE — APPLIER ENDO MEDIUM CLIP (3EA/BX)

## (undated) DEVICE — SUTURE GENERAL

## (undated) DEVICE — GLOVE BIOGEL INDICATOR SZ 8.5 SURGICAL PF LTX - (50/BX 4BX/CA)

## (undated) DEVICE — KIT ROOM DECONTAMINATION

## (undated) DEVICE — BAG RETRIEVAL 10ML (10EA/BX)

## (undated) DEVICE — SUTURE 4-0 MONOCRYL PLUS PS-2 - 27 INCH (36/BX)

## (undated) DEVICE — SPONGE GAUZE NON-STERILE 4X4 - (2000/CA 10PK/CA)

## (undated) DEVICE — TUBE E-T HI-LO CUFF 7.0MM (10EA/PK)

## (undated) DEVICE — ELECTRODE DUAL RETURN W/ CORD - (50/PK)

## (undated) DEVICE — PROTECTOR ULNA NERVE - (36PR/CA)

## (undated) DEVICE — SUTURE 0 VICRYL PLUS UR-6 - 27 INCH (36/BX)

## (undated) DEVICE — GLOVE BIOGEL SZ 8.5 SURGICAL PF LTX - (50PR/BX 4BX/CA)

## (undated) DEVICE — BITE BLOCK ADULT 60FR (100EA/CA)

## (undated) DEVICE — ELECTRODE 850 FOAM ADHESIVE - HYDROGEL RADIOTRNSPRNT (50/PK)

## (undated) DEVICE — CANISTER SUCTION 3000ML MECHANICAL FILTER AUTO SHUTOFF MEDI-VAC NONSTERILE LF DISP  (40EA/CA)